# Patient Record
Sex: FEMALE | Race: BLACK OR AFRICAN AMERICAN | Employment: OTHER | URBAN - METROPOLITAN AREA
[De-identification: names, ages, dates, MRNs, and addresses within clinical notes are randomized per-mention and may not be internally consistent; named-entity substitution may affect disease eponyms.]

---

## 2017-01-10 ENCOUNTER — OFFICE VISIT (OUTPATIENT)
Dept: FAMILY MEDICINE CLINIC | Age: 62
End: 2017-01-10

## 2017-01-10 VITALS
SYSTOLIC BLOOD PRESSURE: 128 MMHG | HEART RATE: 83 BPM | BODY MASS INDEX: 30.9 KG/M2 | OXYGEN SATURATION: 98 % | HEIGHT: 64 IN | WEIGHT: 181 LBS | DIASTOLIC BLOOD PRESSURE: 74 MMHG | TEMPERATURE: 98.4 F | RESPIRATION RATE: 16 BRPM

## 2017-01-10 DIAGNOSIS — I10 ESSENTIAL HYPERTENSION: Primary | ICD-10-CM

## 2017-01-10 DIAGNOSIS — F32.5 MAJOR DEPRESSIVE DISORDER WITH SINGLE EPISODE, IN FULL REMISSION (HCC): ICD-10-CM

## 2017-01-10 DIAGNOSIS — Z23 ENCOUNTER FOR IMMUNIZATION: ICD-10-CM

## 2017-01-10 DIAGNOSIS — R23.2 HOT FLASHES: ICD-10-CM

## 2017-01-10 DIAGNOSIS — J30.2 SEASONAL ALLERGIC RHINITIS, UNSPECIFIED ALLERGIC RHINITIS TRIGGER: ICD-10-CM

## 2017-01-10 RX ORDER — PAROXETINE HYDROCHLORIDE 20 MG/1
20 TABLET, FILM COATED ORAL DAILY
Qty: 30 TAB | Refills: 0 | Status: SHIPPED | OUTPATIENT
Start: 2017-01-10 | End: 2017-02-23 | Stop reason: SDUPTHER

## 2017-01-10 RX ORDER — MONTELUKAST SODIUM 10 MG/1
10 TABLET ORAL DAILY
Qty: 30 TAB | Refills: 2 | Status: SHIPPED | OUTPATIENT
Start: 2017-01-10 | End: 2019-11-08

## 2017-01-10 RX ORDER — TRIAMCINOLONE ACETONIDE 0.25 MG/ML
LOTION TOPICAL 2 TIMES DAILY
Qty: 60 ML | Refills: 1 | Status: SHIPPED | OUTPATIENT
Start: 2017-01-10 | End: 2017-03-23 | Stop reason: SDUPTHER

## 2017-01-10 NOTE — PROGRESS NOTES
Louvella Goldmann, 62 y.o.,  female    SUBJECTIVE  Routine ff-up    HTN-compliant with meds  Hot flashes/depression- says paxil somewhat helpful, helps with mood as well  H/o chronic intermittent low back pain, known lumbar radiculopathy, followed by dr. Randy Vega  Request refill of triamcinolone she uses for intermittent hives, related to heat/sweating. Says this has been her worst allergic rhinitis winter season, she uses veramyst nasal spray, singulair and antihistamine, but still with constant post nasal drip, sore throat, rhinitis, at times with bloody tinged nasal discharge. Her eye doctor gave her patanol drops as well. She is interested in seeing allergist at this point for possible immunotherapy. ROS:  See HPI, all others negative        Patient Active Problem List   Diagnosis Code    Hypertension I10    Depression F32.9    Lumbar radiculopathy M54.16    Herpes genitalis in women A60.09    Allergic rhinitis due to allergen J30.9    Hot flashes R23.2    Advance directive discussed with patient Z71.89       Current Outpatient Prescriptions   Medication Sig Dispense Refill    fluticasone (VERAMYST) 27.5 mcg/actuation nasal spray 2 Sprays by Nasal route daily. 1 Bottle 5    PARoxetine (PAXIL) 20 mg tablet Take 1 Tab by mouth daily. 30 Tab 0    triamcinolone acetonide 0.025 % lotion Apply  to affected area two (2) times a day. 60 mL 1    montelukast (SINGULAIR) 10 mg tablet Take 1 Tab by mouth daily. 30 Tab 2    dextromethorphan-guaiFENesin  mg/15 mL liqd Take 10 mL by mouth three (3) times daily as needed. 1 Bottle 0    ibuprofen (MOTRIN) 200 mg tablet       calcium citrate-vitamin d2 250-100 mg-unit per tablet       prochlorperazine (COMPAZINE) 5 mg tablet Take 1 Tab by mouth every eight (8) hours as needed for Nausea. 10 Tab 0    lisinopril-hydrochlorothiazide (PRINZIDE, ZESTORETIC) 20-25 mg per tablet Take 1 Tab by mouth daily.  90 Tab 1    gabapentin (NEURONTIN) 300 mg capsule Take 1 tab in the morning, 1 tab in the afternoon, and 2 tabs in the evening. 120 Cap 2    acetaminophen-codeine (TYLENOL-CODEINE #3) 300-30 mg per tablet Take 1 Tab by mouth two (2) times daily as needed for Pain. Max Daily Amount: 2 Tabs. 60 Tab 0    valACYclovir (VALTREX) 500 mg tablet Take 1 Tab by mouth two (2) times a day. 6 Tab 5    PATADAY 0.2 % drop ophthalmic solution       aspirin delayed-release 81 mg tablet Take 81 mg by mouth daily.  cyclobenzaprine (FLEXERIL) 10 mg tablet Take 1 Tab by mouth nightly. 12 Tab 0    meloxicam (MOBIC) 15 mg tablet Take 15 mg by mouth daily.  nabumetone (RELAFEN) 500 mg tablet Take 1 Tab by mouth two (2) times daily as needed for Pain. 60 Tab 1    CALCIUM CARBONATE (CALCIUM 600 PO) Take  by mouth daily.  multivitamin (ONE A DAY) tablet Take 1 Tab by mouth daily. No Known Allergies    Past Medical History   Diagnosis Date    Arthritis     Bulging lumbar disc     Degenerative arthritis of finger      MP joint right index finger    Depression     H/O colonoscopy 12/2014     recommended yearly hemoccult stools    Hypertension     Low back pain     Lower extremity pain, left     Pinched nerve        Social History     Social History    Marital status:      Spouse name: N/A    Number of children: N/A    Years of education: N/A     Occupational History    Not on file.      Social History Main Topics    Smoking status: Never Smoker    Smokeless tobacco: Never Used    Alcohol use No    Drug use: No    Sexual activity: Not Currently     Other Topics Concern    Not on file     Social History Narrative       Family History   Problem Relation Age of Onset    Cancer Mother     Ovarian Cancer Mother 68    Stroke Father          OBJECTIVE    Physical Exam:     Visit Vitals    /74 (BP 1 Location: Left arm, BP Patient Position: Sitting)    Pulse 83    Temp 98.4 °F (36.9 °C) (Oral)    Resp 16    Ht 5' 4\" (1.626 m)    Wt 181 lb (82.1 kg)    SpO2 98%    BMI 31.07 kg/m2       General: alert, well-appearing, in no apparent distress or pain  HEENT: boggy nasal mucosa, throat, pharynx normal  CVS: normal rate, regular rhythm, distinct S1 and S2  Lungs:clear to ausculation bilaterally, no crackles, wheezing or rhonchi noted  Abdomen: normoactive bowel sounds, soft, non-tender  Skin: warm, no lesions, rashes noted  Psych:  mood and affect normal        ASSESSMENT/PLAN  Nish Lopez was seen today for hypertension, depression, leg pain and hand pain. Diagnoses and all orders for this visit:  Nish Lopez was seen today for hives, annual wellness visit, itchy eye and pressure behind the eyes. Diagnoses and all orders for this visit:    Essential hypertension, hypertension with unspecified goal  controlled  Orders:in 3 months  -     LIPID PANEL; Future  -     METABOLIC PANEL, COMPREHENSIVE; Future    Major depressive disorder with single episode, in full remission ([facility]  Stable, cont paxil    Hot flashes    Allergic rhinitis  Worsening  Cont veramyst, singulair, zyrtec  Refer to allergist    Encounter for vaccine  fluarix today. Follow-up Disposition:  Return in about 3 months (around 4/10/2017), or if symptoms worsen or fail to improve. Patient understands plan of care. Patient has provided input and agrees with goals.

## 2017-01-10 NOTE — MR AVS SNAPSHOT
Visit Information Date & Time Provider Department Dept. Phone Encounter #  
 1/10/2017 10:30 AM Kiana Moses, [address] [id number] Follow-up Instructions Return in about 3 months (around 4/10/2017), or if symptoms worsen or fail to improve. Upcoming Health Maintenance Date Due  
 PAP AKA CERVICAL CYTOLOGY 10/9/2017 BREAST CANCER SCRN MAMMOGRAM 12/9/2018 COLONOSCOPY 11/1/2019 DTaP/Tdap/Td series (2 - Td) 7/10/2025 Allergies as of 1/10/2017  Review Complete On: 1/10/2017 By: Kiana Moses MD  
 No Known Allergies Current Immunizations  Reviewed on 11/4/2015 Name Date Influenza Vaccine (Quad) PF 1/10/2017, 11/4/2015 Tdap 7/10/2015 Not reviewed this visit You Were Diagnosed With   
  
 Codes Comments Essential hypertension    -  Primary ICD-10-CM: I10 
ICD-9-CM: 401.9 Encounter for immunization     ICD-10-CM: L55 ICD-9-CM: V03.89 Hot flashes     ICD-10-CM: R23.2 ICD-9-CM: 782.62 Seasonal allergic rhinitis, unspecified allergic rhinitis trigger     ICD-10-CM: J30.2 ICD-9-CM: 477.9 Major depressive disorder with single episode, in full remission ([facility]     ICD-10-CM: F32.5 ICD-9-CM: 296.26 Vitals BP Pulse Temp Resp Height(growth percentile) Weight(growth percentile) 128/74 (BP 1 Location: Left arm, BP Patient Position: Sitting) 83 98.4 °F (36.9 °C) (Oral) 16 5' 4\" (1.626 m) 181 lb (82.1 kg) SpO2 BMI OB Status Smoking Status 98% 31.07 kg/m2 Hysterectomy Never Smoker BMI and BSA Data Body Mass Index Body Surface Area 31.07 kg/m 2 1.93 m 2 Preferred Pharmacy Pharmacy Name Phone [facility] [address], [address] Your Updated Medication List  
  
   
This list is accurate as of: 1/10/17 10:55 AM.  Always use your most recent med list.  
  
  
  
  
 acetaminophen-codeine 300-30 mg per tablet Commonly known as:  TYLENOL-CODEINE #3 Take 1 Tab by mouth two (2) times daily as needed for Pain. Max Daily Amount: 2 Tabs. aspirin delayed-release 81 mg tablet Take 81 mg by mouth daily. CALCIUM 600 PO Take  by mouth daily. calcium citrate-vitamin d2 250-100 mg-unit per tablet  
  
 cyclobenzaprine 10 mg tablet Commonly known as:  FLEXERIL Take 1 Tab by mouth nightly. dextromethorphan-guaiFENesin  mg/15 mL Liqd Take 10 mL by mouth three (3) times daily as needed. fluticasone 27.5 mcg/actuation nasal spray Commonly known as:  VERAMYST  
2 Sprays by Nasal route daily. gabapentin 300 mg capsule Commonly known as:  NEURONTIN Take 1 tab in the morning, 1 tab in the afternoon, and 2 tabs in the evening. ibuprofen 200 mg tablet Commonly known as:  MOTRIN  
  
 lisinopril-hydroCHLOROthiazide 20-25 mg per tablet Commonly known as:  Bam Posey Take 1 Tab by mouth daily. meloxicam 15 mg tablet Commonly known as:  MOBIC Take 15 mg by mouth daily. montelukast 10 mg tablet Commonly known as:  SINGULAIR Take 1 Tab by mouth daily. multivitamin tablet Commonly known as:  ONE A DAY Take 1 Tab by mouth daily. nabumetone 500 mg tablet Commonly known as:  RELAFEN Take 1 Tab by mouth two (2) times daily as needed for Pain. PARoxetine 20 mg tablet Commonly known as:  PAXIL Take 1 Tab by mouth daily. PATADAY 0.2 % Drop ophthalmic solution Generic drug:  olopatadine  
  
 prochlorperazine 5 mg tablet Commonly known as:  COMPAZINE Take 1 Tab by mouth every eight (8) hours as needed for Nausea. triamcinolone acetonide 0.025 % lotion Apply  to affected area two (2) times a day. valACYclovir 500 mg tablet Commonly known as:  VALTREX Take 1 Tab by mouth two (2) times a day. Prescriptions Sent to Pharmacy Refills fluticasone (VERAMYST) 27.5 mcg/actuation nasal spray 5 Si Sprays by Nasal route daily. Class: Normal  
 Pharmacy: [place] [id number] Ph #: [phone] Route: Nasal  
 PARoxetine (PAXIL) 20 mg tablet 0 Sig: Take 1 Tab by mouth daily. Class: Normal  
 Pharmacy: [place] [address] Ph #: [phone] Route: Oral  
 triamcinolone acetonide 0.025 % lotion 1 Sig: Apply  to affected area two (2) times a day. Class: Normal  
 Pharmacy: [place] [address] Ph #: [phone] Route: Topical  
 montelukast (SINGULAIR) 10 mg tablet 2 Sig: Take 1 Tab by mouth daily. Class: Normal  
 Pharmacy: [place] [address] Ph #: [phone] Route: Oral  
  
We Performed the Following INFLUENZA VIRUS VAC QUAD,SPLIT,PRESV FREE SYRINGE 3/> YRS IM [id number] CPT(R)] REFERRAL TO ALLERGY [REF5 Custom] Comments:  
 Please evaluate patient for allergic rhinitis Follow-up Instructions Return in about 3 months (around 4/10/2017), or if symptoms worsen or fail to improve. To-Do List   
 01/10/2017 Lab:  LIPID PANEL   
  
 01/10/2017 Lab:  METABOLIC PANEL, COMPREHENSIVE Referral Information Referral ID Referred By Referred To  
  
 [id number] Kadie TEE Not Available Visits Status Start Date End Date 1 New Request 1/10/17 1/10/18 If your referral has a status of pending review or denied, additional information will be sent to support the outcome of this decision. Patient Instructions DASH Diet: Care Instructions Your Care Instructions The DASH diet is an eating plan that can help lower your blood pressure. DASH stands for Dietary Approaches to Stop Hypertension. Hypertension is high blood pressure.  
The DASH diet focuses on eating foods that are high in calcium, potassium, and magnesium. These nutrients can lower blood pressure. The foods that are highest in these nutrients are fruits, vegetables, low-fat dairy products, nuts, seeds, and legumes. But taking calcium, potassium, and magnesium supplements instead of eating foods that are high in those nutrients does not have the same effect. The DASH diet also includes whole grains, fish, and poultry. The DASH diet is one of several lifestyle changes your doctor may recommend to lower your high blood pressure. Your doctor may also want you to decrease the amount of sodium in your diet. Lowering sodium while following the DASH diet can lower blood pressure even further than just the DASH diet alone. Follow-up care is a key part of your treatment and safety. Be sure to make and go to all appointments, and call your doctor if you are having problems. It's also a good idea to know your test results and keep a list of the medicines you take. How can you care for yourself at home? Following the DASH diet · Eat 4 to 5 servings of fruit each day. A serving is 1 medium-sized piece of fruit, ½ cup chopped or canned fruit, 1/4 cup dried fruit, or 4 ounces (½ cup) of fruit juice. Choose fruit more often than fruit juice. · Eat 4 to 5 servings of vegetables each day. A serving is 1 cup of lettuce or raw leafy vegetables, ½ cup of chopped or cooked vegetables, or 4 ounces (½ cup) of vegetable juice. Choose vegetables more often than vegetable juice. · Get 2 to 3 servings of low-fat and fat-free dairy each day. A serving is 8 ounces of milk, 1 cup of yogurt, or 1 ½ ounces of cheese. · Eat 6 to 8 servings of grains each day. A serving is 1 slice of bread, 1 ounce of dry cereal, or ½ cup of cooked rice, pasta, or cooked cereal. Try to choose whole-grain products as much as possible. · Limit lean meat, poultry, and fish to 2 servings each day. A serving is 3 ounces, about the size of a deck of cards. · Eat 4 to 5 servings of nuts, seeds, and legumes (cooked dried beans, lentils, and split peas) each week. A serving is 1/3 cup of nuts, 2 tablespoons of seeds, or ½ cup of cooked beans or peas. · Limit fats and oils to 2 to 3 servings each day. A serving is 1 teaspoon of vegetable oil or 2 tablespoons of salad dressing. · Limit sweets and added sugars to 5 servings or less a week. A serving is 1 tablespoon jelly or jam, ½ cup sorbet, or 1 cup of lemonade. · Eat less than 2,300 milligrams (mg) of sodium a day. If you limit your sodium to 1,500 mg a day, you can lower your blood pressure even more. Tips for success · Start small. Do not try to make dramatic changes to your diet all at once. You might feel that you are missing out on your favorite foods and then be more likely to not follow the plan. Make small changes, and stick with them. Once those changes become habit, add a few more changes. · Try some of the following: ¨ Make it a goal to eat a fruit or vegetable at every meal and at snacks. This will make it easy to get the recommended amount of fruits and vegetables each day. ¨ Try yogurt topped with fruit and nuts for a snack or healthy dessert. ¨ Add lettuce, tomato, cucumber, and onion to sandwiches. ¨ Combine a ready-made pizza crust with low-fat mozzarella cheese and lots of vegetable toppings. Try using tomatoes, squash, spinach, broccoli, carrots, cauliflower, and onions. ¨ Have a variety of cut-up vegetables with a low-fat dip as an appetizer instead of chips and dip. ¨ Sprinkle sunflower seeds or chopped almonds over salads. Or try adding chopped walnuts or almonds to cooked vegetables. ¨ Try some vegetarian meals using beans and peas. Add garbanzo or kidney beans to salads. Make burritos and tacos with mashed mccracken beans or black beans. Where can you learn more? Go to http://christine-katlin.info/.  
Enter H351 in the search box to learn more about \"DASH Diet: Care Instructions. \" Current as of: March 23, 2016 Content Version: 11.1 © 1187-9136 [employer]. Care instructions adapted under license by [employer] (which disclaims liability or warranty for this information). If you have questions about a medical condition or this instruction, always ask your healthcare professional. [address] any warranty or liability for your use of this information. Introducing [facility]! Alyssa Dougherty introduces [employer] patient portal. Now you can access parts of your medical record, email your doctor's office, and request medication refills online. 1. In your internet browser, go to [email or web address] 2. Click on the First Time User? Click Here link in the Sign In box. You will see the New Member Sign Up page. 3. Enter your [employer] Access Code exactly as it appears below. You will not need to use this code after youve completed the sign-up process. If you do not sign up before the expiration date, you must request a new code. · All[employer] Access Code: [email or web address] Expires: 4/10/2017 10:55 AM 
 
4. Enter the last four digits of your Social Security Number (xxxx) and Date of Birth (mm/dd/yyyy) as indicated and click Submit. You will be taken to the next sign-up page. 5. Create a AllFreed ID. This will be your AllFreed login ID and cannot be changed, so think of one that is secure and easy to remember. 6. Create a [employer]Freed password. You can change your password at any time. 7. Enter your Password Reset Question and Answer. This can be used at a later time if you forget your password. 8. Enter your e-mail address. You will receive e-mail notification when new information is available in [address] 9. Click Sign Up. You can now view and download portions of your medical record. 10. Click the Download Summary menu link to download a portable copy of your medical information. If you have questions, please visit the Frequently Asked Questions section of the [employer] website. Remember, [employer] is NOT to be used for urgent needs. For medical emergencies, dial 911. Now available from your iPhone and Android! Please provide this summary of care documentation to your next provider. Your primary care clinician is listed as Ziyad Cameron. If you have any questions after today's visit, please call [phone].

## 2017-01-10 NOTE — PATIENT INSTRUCTIONS

## 2017-01-10 NOTE — PROGRESS NOTES
Chief Complaint   Patient presents with    Hypertension    Depression    Other     Hot Flashes    Nasal Congestion     with bloody discharge     Patient presents for flu vaccine. Consent obtained, Tolerated procedure well at right deltoid. Patient remained in office 10 minutes post vaccine. No side effects noted.      Lot #  [id number]  exp 06/30/2017  [employer]  [address]: [phone]

## 2017-02-16 ENCOUNTER — OFFICE VISIT (OUTPATIENT)
Dept: ORTHOPEDIC SURGERY | Age: 62
End: 2017-02-16

## 2017-02-16 VITALS
BODY MASS INDEX: 30.19 KG/M2 | HEART RATE: 76 BPM | DIASTOLIC BLOOD PRESSURE: 82 MMHG | TEMPERATURE: 98.4 F | RESPIRATION RATE: 18 BRPM | SYSTOLIC BLOOD PRESSURE: 147 MMHG | HEIGHT: 64 IN | OXYGEN SATURATION: 99 % | WEIGHT: 176.8 LBS

## 2017-02-16 DIAGNOSIS — M48.061 LUMBAR STENOSIS: ICD-10-CM

## 2017-02-16 DIAGNOSIS — M47.819 FACET ARTHROPATHY: Primary | ICD-10-CM

## 2017-02-16 DIAGNOSIS — M54.16 LUMBAR RADICULOPATHY: ICD-10-CM

## 2017-02-16 DIAGNOSIS — M47.899 FACET SYNDROME: ICD-10-CM

## 2017-02-16 RX ORDER — NAPROXEN 500 MG/1
500 TABLET ORAL 2 TIMES DAILY WITH MEALS
Qty: 60 TAB | Refills: 1 | Status: SHIPPED | OUTPATIENT
Start: 2017-02-16 | End: 2017-04-27 | Stop reason: SDUPTHER

## 2017-02-16 RX ORDER — GABAPENTIN 300 MG/1
CAPSULE ORAL
Qty: 90 CAP | Refills: 2 | Status: SHIPPED | OUTPATIENT
Start: 2017-02-16 | End: 2017-10-26 | Stop reason: ALTCHOICE

## 2017-02-16 NOTE — PROGRESS NOTES
Chief complaint/History of Present Illness:  Chief Complaint   Patient presents with    Back Pain     3 month follow up     HPI  Jerri Saunders is a  62 y.o.  female      HISTORY OF PRESENT ILLNESS:  The patient comes in today for followup of her chronic low back pain with radiating left buttock pain and posterior leg pain down to her knee. She has known facet arthropathy at L4-5 and L5-S1 and foraminal stenosis, especially at L5-S1. She states the Tylenol No. 3 she was taking previously makes her nauseous, and she has stopped taking that. The Neurontin 300 mg one in the morning, one in the afternoon, and two at night, makes her sleepy, but it does help with the leg pain. She is trying to walk for exercise about a block a day, but she finds it difficult. She states Aleve does help. She is on Social Security disability for her back since 2012 or 2013. She is a nonsmoker. She denies fever or bowel or bladder dysfunction. PHYSICAL EXAM:  Ms. Oracio Ochoa is a 29-year-old female. She is alert and oriented. She has normal mood and affect. She has a full weight bearing, non-antalgic gait, 4/5 strength of the bilateral lower extremities, and negative straight leg raise. ASSESSENT/PLAN:  This is a patient with facet arthropathy and spinal stenosis. We did talk about doing facet injections. She would like to think about that. We are going to try her Naproxen 500 mg twice a day since Aleve does help her. We have refilled her Neurontin. She will stop the Tylenol No. 3.  We will see her back in about two months.         Review of systems:    Past Medical History   Diagnosis Date    Arthritis     Bulging lumbar disc     Degenerative arthritis of finger      MP joint right index finger    Depression     H/O colonoscopy 12/2014     recommended yearly hemoccult stools    Hypertension     Low back pain     Lower extremity pain, left     Pinched nerve      Past Surgical History   Procedure Laterality Date    Hx carpal tunnel release Right      hand    Hx orthopaedic      Hx hysterectomy       Social History     Social History    Marital status:      Spouse name: N/A    Number of children: N/A    Years of education: N/A     Occupational History    Not on file. Social History Main Topics    Smoking status: Never Smoker    Smokeless tobacco: Never Used    Alcohol use No    Drug use: No    Sexual activity: Not Currently     Other Topics Concern    Not on file     Social History Narrative     Family History   Problem Relation Age of Onset    Cancer Mother     Ovarian Cancer Mother 68    Stroke Father        Physical Exam:  Visit Vitals    /82    Pulse 76    Temp 98.4 °F (36.9 °C) (Oral)    Resp 18    Ht 5' 4\" (1.626 m)    Wt 176 lb 12.8 oz (80.2 kg)    SpO2 99%    BMI 30.35 kg/m2     Pain Scale: 6/10     has been . reviewed and is appropriate      Cy Joel was seen today for back pain. Diagnoses and all orders for this visit:    Facet arthropathy ([address]    Lumbar stenosis    Lumbar radiculopathy  -     gabapentin (NEURONTIN) 300 mg capsule; 3 tabs po q hs    Facet syndrome  -     gabapentin (NEURONTIN) 300 mg capsule; 3 tabs po q hs    Other orders  -     naproxen (NAPROSYN) 500 mg tablet; Take 1 Tab by mouth two (2) times daily (with meals). Follow-up Disposition:  Return in about 2 months (around 4/16/2017) for with [address] [place] [address].         We have informed Susi Greco to notify us for immediate appointment if she has any worsening neurogical symptoms or if an emergency situation presents, then call 911

## 2017-02-16 NOTE — PATIENT INSTRUCTIONS
Back Pain: Care Instructions  Your Care Instructions    Back pain has many possible causes. It is often related to problems with muscles and ligaments of the back. It may also be related to problems with the nerves, discs, or bones of the back. Moving, lifting, standing, sitting, or sleeping in an awkward way can strain the back. Sometimes you don't notice the injury until later. Arthritis is another common cause of back pain. Although it may hurt a lot, back pain usually improves on its own within several weeks. Most people recover in 12 weeks or less. Using good home treatment and being careful not to stress your back can help you feel better sooner. Follow-up care is a key part of your treatment and safety. Be sure to make and go to all appointments, and call your doctor if you are having problems. Its also a good idea to know your test results and keep a list of the medicines you take. How can you care for yourself at home? · Sit or lie in positions that are most comfortable and reduce your pain. Try one of these positions when you lie down:  ¨ Lie on your back with your knees bent and supported by large pillows. ¨ Lie on the floor with your legs on the seat of a sofa or chair. Mona Dumont on your side with your knees and hips bent and a pillow between your legs. ¨ Lie on your stomach if it does not make pain worse. · Do not sit up in bed, and avoid soft couches and twisted positions. Bed rest can help relieve pain at first, but it delays healing. Avoid bed rest after the first day of back pain. · Change positions every 30 minutes. If you must sit for long periods of time, take breaks from sitting. Get up and walk around, or lie in a comfortable position. · Try using a heating pad on a low or medium setting for 15 to 20 minutes every 2 or 3 hours. Try a warm shower in place of one session with the heating pad. · You can also try an ice pack for 10 to 15 minutes every 2 to 3 hours.  Put a thin cloth between the ice pack and your skin. · Take pain medicines exactly as directed. ¨ If the doctor gave you a prescription medicine for pain, take it as prescribed. ¨ If you are not taking a prescription pain medicine, ask your doctor if you can take an over-the-counter medicine. · Take short walks several times a day. You can start with 5 to 10 minutes, 3 or 4 times a day, and work up to longer walks. Walk on level surfaces and avoid hills and stairs until your back is better. · Return to work and other activities as soon as you can. Continued rest without activity is usually not good for your back. · To prevent future back pain, do exercises to stretch and strengthen your back and stomach. Learn how to use good posture, safe lifting techniques, and proper body mechanics. When should you call for help? Call your doctor now or seek immediate medical care if:  · You have new or worsening numbness in your legs. · You have new or worsening weakness in your legs. (This could make it hard to stand up.)  · You lose control of your bladder or bowels. Watch closely for changes in your health, and be sure to contact your doctor if:  · Your pain gets worse. · You are not getting better after 2 weeks. Where can you learn more? Go to http://christine-katlin.info/. Enter R946 in the search box to learn more about \"Back Pain: Care Instructions. \"  Current as of: May 23, 2016  Content Version: 11.1  © 4907-5323 [employer]wise, Incorporated. Care instructions adapted under license by "[employer] (which disclaims liability or warranty for this information). If you have questions about a medical condition or this instruction, always ask your healthcare professional. [address] any warranty or liability for your use of this information.

## 2017-02-16 NOTE — MR AVS SNAPSHOT
Visit Information Date & Time Provider Department Dept. Phone Encounter #  
 2/16/2017 10:00 AM Lizbet Hurd NP [address] [facility] [phone] [id number] Follow-up Instructions Return in about 2 months (around 4/16/2017) for with Gilbert Olson. Routing History Follow-up and Disposition History Your Appointments 4/17/2017  9:30 AM  
ROUTINE CARE with Holly Cheatham MD  
[facility] ([address]) Appt Note: 3 month followup [address] [facility] [address]  
[address] [facility] [address] [facility] [address] Upcoming Health Maintenance Date Due  
 PAP AKA CERVICAL CYTOLOGY 10/9/2017 BREAST CANCER SCRN MAMMOGRAM 12/9/2018 COLONOSCOPY 11/1/2019 DTaP/Tdap/Td series (2 - Td) 7/10/2025 Allergies as of 2/16/2017  Review Complete On: 2/16/2017 By: Lizbet Hurd NP No Known Allergies Current Immunizations  Reviewed on 11/4/2015 Name Date Influenza Vaccine (Quad) PF 1/10/2017, 11/4/2015 Tdap 7/10/2015 Not reviewed this visit You Were Diagnosed With   
  
 Codes Comments Facet arthropathy ([facility]    -  Primary ICD-10-CM: M12.88 ICD-9-CM: 721.90 Lumbar stenosis     ICD-10-CM: M48.06 
ICD-9-CM: 724.02 Lumbar radiculopathy     ICD-10-CM: M54.16 
ICD-9-CM: 724.4 Facet syndrome     ICD-10-CM: M12.88 ICD-9-CM: 724.8 Vitals BP Pulse Temp Resp Height(growth percentile) Weight(growth percentile) 147/82 76 98.4 °F (36.9 °C) (Oral) 18 5' 4\" (1.626 m) 176 lb 12.8 oz (80.2 kg) SpO2 BMI OB Status Smoking Status 99% 30.35 kg/m2 Hysterectomy Never Smoker BMI and BSA Data Body Mass Index Body Surface Area  
 30.35 kg/m 2 1.9 m 2 Preferred Pharmacy Pharmacy Name Phone [facility] [address], [address] [facility] Your Updated Medication List  
  
   
 This list is accurate as of: 2/16/17 11:05 AM.  Always use your most recent med list.  
  
  
  
  
 acetaminophen-codeine 300-30 mg per tablet Commonly known as:  TYLENOL-CODEINE #3 Take 1 Tab by mouth two (2) times daily as needed for Pain. Max Daily Amount: 2 Tabs. aspirin delayed-release 81 mg tablet Take 81 mg by mouth daily. CALCIUM 600 PO Take  by mouth daily. calcium citrate-vitamin d2 250-100 mg-unit per tablet  
  
 cyclobenzaprine 10 mg tablet Commonly known as:  FLEXERIL Take 1 Tab by mouth nightly. dextromethorphan-guaiFENesin  mg/15 mL Liqd Take 10 mL by mouth three (3) times daily as needed. fluticasone 27.5 mcg/actuation nasal spray Commonly known as:  VERAMYST  
2 Sprays by Nasal route daily. gabapentin 300 mg capsule Commonly known as:  NEURONTIN  
3 tabs po q hs  
  
 ibuprofen 200 mg tablet Commonly known as:  MOTRIN  
  
 lisinopril-hydroCHLOROthiazide 20-25 mg per tablet Commonly known as:  Claudia Schilder Take 1 Tab by mouth daily. meloxicam 15 mg tablet Commonly known as:  MOBIC Take 15 mg by mouth daily. montelukast 10 mg tablet Commonly known as:  SINGULAIR Take 1 Tab by mouth daily. multivitamin tablet Commonly known as:  ONE A DAY Take 1 Tab by mouth daily. nabumetone 500 mg tablet Commonly known as:  RELAFEN Take 1 Tab by mouth two (2) times daily as needed for Pain. naproxen 500 mg tablet Commonly known as:  NAPROSYN Take 1 Tab by mouth two (2) times daily (with meals). PARoxetine 20 mg tablet Commonly known as:  PAXIL Take 1 Tab by mouth daily. PATADAY 0.2 % Drop ophthalmic solution Generic drug:  olopatadine  
  
 prochlorperazine 5 mg tablet Commonly known as:  COMPAZINE Take 1 Tab by mouth every eight (8) hours as needed for Nausea. triamcinolone acetonide 0.025 % lotion Apply  to affected area two (2) times a day. valACYclovir 500 mg tablet Commonly known as:  VALTREX Take 1 Tab by mouth two (2) times a day. Prescriptions Sent to Pharmacy Refills  
 naproxen (NAPROSYN) 500 mg tablet 1 Sig: Take 1 Tab by mouth two (2) times daily (with meals). Class: Normal  
 Pharmacy: [id number] Ph #: [phone] Route: Oral  
 gabapentin (NEURONTIN) 300 mg capsule 2 Sig: 3 tabs po q hs Class: Normal  
 Pharmacy: [id number] Ph #: [phone] Follow-up Instructions Return in about 2 months (around 4/16/2017) for with Fernando Gilman. Patient Instructions Back Pain: Care Instructions Your Care Instructions Back pain has many possible causes. It is often related to problems with muscles and ligaments of the back. It may also be related to problems with the nerves, discs, or bones of the back. Moving, lifting, standing, sitting, or sleeping in an awkward way can strain the back. Sometimes you don't notice the injury until later. Arthritis is another common cause of back pain. Although it may hurt a lot, back pain usually improves on its own within several weeks. Most people recover in 12 weeks or less. Using good home treatment and being careful not to stress your back can help you feel better sooner. Follow-up care is a key part of your treatment and safety. Be sure to make and go to all appointments, and call your doctor if you are having problems. Its also a good idea to know your test results and keep a list of the medicines you take. How can you care for yourself at home? · Sit or lie in positions that are most comfortable and reduce your pain. Try one of these positions when you lie down: ¨ Lie on your back with your knees bent and supported by large pillows. ¨ Lie on the floor with your legs on the seat of a sofa or chair. [place] [address] on your side with your knees and hips bent and a pillow between your legs. ¨ Lie on your stomach if it does not make pain worse. · Do not sit up in bed, and avoid soft couches and twisted positions. Bed rest can help relieve pain at first, but it delays healing. Avoid bed rest after the first day of back pain. · Change positions every 30 minutes. If you must sit for long periods of time, take breaks from sitting. Get up and walk around, or lie in a comfortable position. · Try using a heating pad on a low or medium setting for 15 to 20 minutes every 2 or 3 hours. Try a warm shower in place of one session with the heating pad. · You can also try an ice pack for 10 to 15 minutes every 2 to 3 hours. Put a thin cloth between the ice pack and your skin. · Take pain medicines exactly as directed. ¨ If the doctor gave you a prescription medicine for pain, take it as prescribed. ¨ If you are not taking a prescription pain medicine, ask your doctor if you can take an over-the-counter medicine. · Take short walks several times a day. You can start with 5 to 10 minutes, 3 or 4 times a day, and work up to longer walks. Walk on level surfaces and avoid hills and stairs until your back is better. · Return to work and other activities as soon as you can. Continued rest without activity is usually not good for your back. · To prevent future back pain, do exercises to stretch and strengthen your back and stomach. Learn how to use good posture, safe lifting techniques, and proper body mechanics. When should you call for help? Call your doctor now or seek immediate medical care if: 
· You have new or worsening numbness in your legs. · You have new or worsening weakness in your legs. (This could make it hard to stand up.) · You lose control of your bladder or bowels. Watch closely for changes in your health, and be sure to contact your doctor if: 
· Your pain gets worse. · You are not getting better after 2 weeks. Where can you learn more? Go to http://christine-katlin.info/. Enter U970 in the search box to learn more about \"Back Pain: Care Instructions. \" Current as of: May 23, 2016 Content Version: 11.1 © 3316-3707 [employer]. Care instructions adapted under license by [employer] (which disclaims liability or warranty for this information). If you have questions about a medical condition or this instruction, always ask your healthcare professional. [address] any warranty or liability for your use of this information. Patient Instructions History Introducing [facility]! Amelia Gorman introduces InsideMaps patient portal. Now you can access parts of your medical record, email your doctor's office, and request medication refills online. 1. In your internet browser, go to [email or web address] 2. Click on the First Time User? Click Here link in the Sign In box. You will see the New Member Sign Up page. 3. Enter your InsideMaps Access Code exactly as it appears below. You will not need to use this code after youve completed the sign-up process. If you do not sign up before the expiration date, you must request a new code. · InsideMaps Access Code: [email or web address] Expires: 4/10/2017 10:55 AM 
 
4. Enter the last four digits of your Social Security Number (xxxx) and Date of Birth (mm/dd/yyyy) as indicated and click Submit. You will be taken to the next sign-up page. 5. Create a [employer] ID. This will be your InsideMaps login ID and cannot be changed, so think of one that is secure and easy to remember. 6. Create a InsideMaps password. You can change your password at any time. 7. Enter your Password Reset Question and Answer. This can be used at a later time if you forget your password. 8. Enter your e-mail address.  You will receive e-mail notification when new information is available in Vortal. 9. Click Sign Up. You can now view and download portions of your medical record. 10. Click the Download Summary menu link to download a portable copy of your medical information. If you have questions, please visit the Frequently Asked Questions section of the Vortal website. Remember, Vortal is NOT to be used for urgent needs. For medical emergencies, dial 911. Now available from your iPhone and Android! Please provide this summary of care documentation to your next provider. Your primary care clinician is listed as Kiana Moses. If you have any questions after today's visit, please call [phone].

## 2017-02-17 ENCOUNTER — TELEPHONE (OUTPATIENT)
Dept: FAMILY MEDICINE CLINIC | Age: 62
End: 2017-02-17

## 2017-02-17 NOTE — TELEPHONE ENCOUNTER
[facility] approved [id number]  Start date 02/16/2017  End date 02/16/2018

## 2017-02-17 NOTE — TELEPHONE ENCOUNTER
Patient is requesting (New  Updated) referral to the following office:    Speciality: Orthopedics  Specialist Name: Dr. Radha Tolentino Location: [facility] [address] [place] [address], [address] [facility]  Phone (if available): [phone]  Fax (if available): [phone]  Diagnosis: Back pain  Date of appointment (if scheduled): 2/16/17

## 2017-02-20 ENCOUNTER — HOSPITAL ENCOUNTER (OUTPATIENT)
Dept: LAB | Age: 62
Discharge: HOME | End: 2017-02-20

## 2017-02-20 PROCEDURE — 99001 SPECIMEN HANDLING PT-LAB: CPT | Performed by: FAMILY MEDICINE

## 2017-02-21 LAB
ALBUMIN SERPL-MCNC: 3.8 G/DL (ref 3.6–4.8)
ALBUMIN/GLOB SERPL: 1.5 {RATIO} (ref 1.1–2.5)
ALP SERPL-CCNC: 59 IU/L (ref 39–117)
ALT SERPL-CCNC: 17 IU/L (ref 0–32)
AST SERPL-CCNC: 19 IU/L (ref 0–40)
BILIRUB SERPL-MCNC: 0.6 MG/DL (ref 0–1.2)
BUN SERPL-MCNC: 12 MG/DL (ref 8–27)
BUN/CREAT SERPL: 21 (ref 11–26)
CALCIUM SERPL-MCNC: 9.2 MG/DL (ref 8.7–10.3)
CHLORIDE SERPL-SCNC: 100 MMOL/L (ref 96–106)
CHOLEST SERPL-MCNC: 186 MG/DL (ref 100–199)
CO2 SERPL-SCNC: 26 MMOL/L (ref 18–29)
CREAT SERPL-MCNC: 0.57 MG/DL (ref 0.57–1)
GLOBULIN SER CALC-MCNC: 2.6 G/DL (ref 1.5–4.5)
GLUCOSE SERPL-MCNC: 92 MG/DL (ref 65–99)
HDLC SERPL-MCNC: 76 MG/DL
INTERPRETATION, 910389: NORMAL
LDLC SERPL CALC-MCNC: 93 MG/DL (ref 0–99)
POTASSIUM SERPL-SCNC: 4.1 MMOL/L (ref 3.5–5.2)
PROT SERPL-MCNC: 6.4 G/DL (ref 6–8.5)
SODIUM SERPL-SCNC: 140 MMOL/L (ref 134–144)
TRIGL SERPL-MCNC: 83 MG/DL (ref 0–149)
VLDLC SERPL CALC-MCNC: 17 MG/DL (ref 5–40)

## 2017-02-24 RX ORDER — PAROXETINE HYDROCHLORIDE 20 MG/1
20 TABLET, FILM COATED ORAL DAILY
Qty: 30 TAB | Refills: 2 | Status: SHIPPED | OUTPATIENT
Start: 2017-02-24 | End: 2017-06-07 | Stop reason: SDUPTHER

## 2017-03-01 NOTE — PROGRESS NOTES
Essentially normal labs, cholesterol good range. Will discuss in detail in next appt 4/17. pls notify pt.   (Stevie CV risk 7%)

## 2017-03-01 NOTE — PROGRESS NOTES
Patient identified with 2 identifiers (name and ).   Patient aware of normal labs and reminded of appt 2017

## 2017-03-23 RX ORDER — TRIAMCINOLONE ACETONIDE 0.25 MG/ML
LOTION TOPICAL 2 TIMES DAILY
Qty: 60 ML | Refills: 1 | Status: SHIPPED | OUTPATIENT
Start: 2017-03-23 | End: 2017-06-15 | Stop reason: SDUPTHER

## 2017-03-23 RX ORDER — LISINOPRIL AND HYDROCHLOROTHIAZIDE 20; 25 MG/1; MG/1
1 TABLET ORAL DAILY
Qty: 90 TAB | Refills: 1 | Status: SHIPPED | OUTPATIENT
Start: 2017-03-23 | End: 2017-11-03 | Stop reason: SDUPTHER

## 2017-04-17 ENCOUNTER — OFFICE VISIT (OUTPATIENT)
Dept: FAMILY MEDICINE CLINIC | Age: 62
End: 2017-04-17

## 2017-04-17 VITALS
OXYGEN SATURATION: 96 % | DIASTOLIC BLOOD PRESSURE: 78 MMHG | HEIGHT: 64 IN | TEMPERATURE: 98.4 F | HEART RATE: 84 BPM | BODY MASS INDEX: 30.9 KG/M2 | WEIGHT: 181 LBS | SYSTOLIC BLOOD PRESSURE: 130 MMHG | RESPIRATION RATE: 16 BRPM

## 2017-04-17 DIAGNOSIS — G57.01 PIRIFORMIS SYNDROME OF RIGHT SIDE: ICD-10-CM

## 2017-04-17 DIAGNOSIS — J30.2 SEASONAL ALLERGIC RHINITIS, UNSPECIFIED ALLERGIC RHINITIS TRIGGER: ICD-10-CM

## 2017-04-17 DIAGNOSIS — R23.2 HOT FLASHES: ICD-10-CM

## 2017-04-17 DIAGNOSIS — I10 ESSENTIAL HYPERTENSION: Primary | ICD-10-CM

## 2017-04-17 DIAGNOSIS — F32.5 MAJOR DEPRESSIVE DISORDER WITH SINGLE EPISODE, IN FULL REMISSION (HCC): ICD-10-CM

## 2017-04-17 RX ORDER — CYCLOBENZAPRINE HCL 10 MG
10 TABLET ORAL
Qty: 12 TAB | Refills: 0 | Status: SHIPPED | OUTPATIENT
Start: 2017-04-17 | End: 2018-04-16

## 2017-04-17 NOTE — MR AVS SNAPSHOT
Visit Information Date & Time Provider Department Dept. Phone Encounter #  
 4/17/2017  9:30 AM Eva Feliciano, [address] [id number] Follow-up Instructions Return in about 2 months (around 6/17/2017), or plan for Medicare wellness then. Your Appointments 4/27/2017 10:00 AM  
Follow Up with Yonatan Sousa NP  
[facility] ([facility]) Appt Note: 2 mo f/u low back [address] [facility] [id number] [phone]  
  
   
 [address] [facility] [address] Upcoming Health Maintenance Date Due  
 PAP AKA CERVICAL CYTOLOGY 10/9/2017 BREAST CANCER SCRN MAMMOGRAM 12/9/2018 COLONOSCOPY 11/1/2019 DTaP/Tdap/Td series (2 - Td) 7/10/2025 Allergies as of 4/17/2017  Review Complete On: 4/17/2017 By: Eva Feliciano MD  
 No Known Allergies Current Immunizations  Reviewed on 11/4/2015 Name Date Influenza Vaccine (Quad) PF 1/10/2017, 11/4/2015 Tdap 7/10/2015 Not reviewed this visit You Were Diagnosed With   
  
 Codes Comments Essential hypertension    -  Primary ICD-10-CM: I10 
ICD-9-CM: 401.9 Major depressive disorder with single episode, in full remission ([address]     ICD-10-CM: F32.5 ICD-9-CM: 296.26 Hot flashes     ICD-10-CM: R23.2 ICD-9-CM: 782.62 Seasonal allergic rhinitis, unspecified allergic rhinitis trigger     ICD-10-CM: J30.2 ICD-9-CM: 477.9 Piriformis syndrome of right side     ICD-10-CM: G57.01 
ICD-9-CM: 355.0 Vitals BP Pulse Temp Resp Height(growth percentile) Weight(growth percentile) 130/78 (BP 1 Location: Left arm, BP Patient Position: Sitting) 84 98.4 °F (36.9 °C) (Oral) 16 5' 4\" (1.626 m) 181 lb (82.1 kg) SpO2 BMI OB Status Smoking Status 96% 31.07 kg/m2 Hysterectomy Never Smoker BMI and BSA Data Body Mass Index Body Surface Area 31.07 kg/m 2 1.93 m 2 Preferred Pharmacy Pharmacy Name Phone [facility] [address], [address] [facility] Your Updated Medication List  
  
   
This list is accurate as of: 4/17/17 10:02 AM.  Always use your most recent med list.  
  
  
  
  
 aspirin delayed-release 81 mg tablet Take 81 mg by mouth daily. CALCIUM 600 PO Take  by mouth daily. calcium citrate-vitamin d2 250-100 mg-unit per tablet  
  
 cyclobenzaprine 10 mg tablet Commonly known as:  FLEXERIL Take 1 Tab by mouth nightly. fluticasone 27.5 mcg/actuation nasal spray Commonly known as:  VERAMYST  
2 Sprays by Nasal route daily. gabapentin 300 mg capsule Commonly known as:  NEURONTIN  
3 tabs po q hs  
  
 lisinopril-hydroCHLOROthiazide 20-25 mg per tablet Commonly known as:  Selwyn Phalen Take 1 Tab by mouth daily. montelukast 10 mg tablet Commonly known as:  SINGULAIR Take 1 Tab by mouth daily. multivitamin tablet Commonly known as:  ONE A DAY Take 1 Tab by mouth daily. naproxen 500 mg tablet Commonly known as:  NAPROSYN Take 1 Tab by mouth two (2) times daily (with meals). PARoxetine 20 mg tablet Commonly known as:  PAXIL Take 1 Tab by mouth daily. PATADAY 0.2 % Drop ophthalmic solution Generic drug:  olopatadine  
  
 triamcinolone acetonide 0.025 % lotion Apply  to affected area two (2) times a day. valACYclovir 500 mg tablet Commonly known as:  VALTREX Take 1 Tab by mouth two (2) times a day. Prescriptions Sent to Pharmacy Refills  
 cyclobenzaprine (FLEXERIL) 10 mg tablet 0 Sig: Take 1 Tab by mouth nightly. Class: Normal  
 Pharmacy: [facility] [address] Ph #: [phone] Route: Oral  
  
We Performed the Following REFERRAL TO ALLERGY [REF5 Custom] Follow-up Instructions Return in about 2 months (around 6/17/2017), or plan for Medicare wellness then. Referral Information Referral ID Referred By Referred To  
  
 [id number] Julia TEE Not Available Visits Status Start Date End Date 1 New Request 4/17/17 4/17/18 If your referral has a status of pending review or denied, additional information will be sent to support the outcome of this decision. Patient Instructions Piriformis Syndrome: Care Instructions Your Care Instructions The piriformis muscle is deep under your rear end (buttock). One end of the muscle connects deep inside the pelvic area, and the other end attaches to the top of the thighbone. This muscle can press on the sciatic nerve that runs from your spine down your leg. When this happens, you may have pain, numbness, and tingling in the buttock and down the back of your leg. This is called piriformis syndrome. The pain may get worse when you sit for a long time or climb stairs. Also, you may be more likely to develop piriformis syndrome if you run or walk often. Your doctor will check for other causes of your pain before treating this syndrome. Treatment may include stretching exercises, massage, and medicine for the pain and swelling. If these do not help, you may get a shot of steroid medicine. Until the pain is gone, you may need to rest the muscle and limit activities like running. Exercises and a change in how you move and sit may be enough to stop the pressure on the nerve. Follow-up care is a key part of your treatment and safety. Be sure to make and go to all appointments, and call your doctor if you are having problems. It's also a good idea to know your test results and keep a list of the medicines you take. How can you care for yourself at home? · If your doctor thinks that strenuous exercise is causing your problem, stop or cut back on activities such as running.  You may find swimming to be a good exercise for a while. · Stretch the piriformis muscle. Carlos Barroso on your back. ¨ Bend one leg at the knee and keep the other leg flat on the ground. ¨ Raise your bent knee up and then move it across your body. Hold the outside of the knee with the opposite hand. ¨ Gently pull the knee with your hand toward the opposite shoulder. ¨ Hold the stretch for at least 15 to 30 seconds. Switch legs. ¨ Do the stretch several times each day. · Massage the muscle to relieve pressure. ¨ Sit on the floor. Lean to one side so that the hip on your sore side is off the ground. Put a tennis ball under your buttock on that side. ¨ As you put weight onto the tennis ball, you may find spots that are especially sore. Move gently so that the tennis ball gently massages each of the sore spots. · Use ice or heat to help reduce pain. Put ice or a cold pack or a heating pad set on low or a warm cloth on the sore area for 10 to 20 minutes at a time. Put a thin cloth between the ice pack or heating pad and your skin. · Ask your doctor if you can take an over-the-counter pain medicine, such as acetaminophen (Tylenol), ibuprofen (Advil, Motrin), or naproxen (Aleve). Be safe with medicines. Read and follow all instructions on the label. · Have your doctor or a physical therapist watch how you move. You may need physical therapy or special inserts in your shoes (orthotics) to help you move in a way that does not put pressure on your nerves. When should you call for help? Watch closely for changes in your health, and be sure to contact your doctor if: 
· You do not feel better after several weeks of home care. · Your pain gets worse. · Your leg becomes weak or numb. Where can you learn more? Go to http://christine-katlin.info/. Enter B126 in the search box to learn more about \"Piriformis Syndrome: Care Instructions. \" Current as of: May 23, 2016 Content Version: 11.2 © 7568-7287 Healthwise, Incorporated. Care instructions adapted under license by [employer] (which disclaims liability or warranty for this information). If you have questions about a medical condition or this instruction, always ask your healthcare professional. [address] any warranty or liability for your use of this information. Introducing [facility]! [facility] introduces [employer] patient portal. Now you can access parts of your medical record, email your doctor's office, and request medication refills online. 1. In your internet browser, go to [email or web address] 2. Click on the First Time User? Click Here link in the Sign In box. You will see the New Member Sign Up page. 3. Enter your [employer] Access Code exactly as it appears below. You will not need to use this code after youve completed the sign-up process. If you do not sign up before the expiration date, you must request a new code. · [employer] Access Code: [email or web address] Expires: 7/16/2017 10:02 AM 
 
4. Enter the last four digits of your Social Security Number (xxxx) and Date of Birth (mm/dd/yyyy) as indicated and click Submit. You will be taken to the next sign-up page. 5. Create a [employer] ID. This will be your [employer] login ID and cannot be changed, so think of one that is secure and easy to remember. 6. Create a [employer] password. You can change your password at any time. 7. Enter your Password Reset Question and Answer. This can be used at a later time if you forget your password. 8. Enter your e-mail address. You will receive e-mail notification when new information is available in [address] 9. Click Sign Up. You can now view and download portions of your medical record. 10. Click the Download Summary menu link to download a portable copy of your medical information.  
 
If you have questions, please visit the Frequently Asked Questions section of the [employer]. Remember, [employer] is NOT to be used for urgent needs. For medical emergencies, dial 911. Now available from your iPhone and Android! Please provide this summary of care documentation to your next provider. Your primary care clinician is listed as Akash Lujan. If you have any questions after today's visit, please call [phone].

## 2017-04-17 NOTE — PATIENT INSTRUCTIONS
Piriformis Syndrome: Care Instructions  Your Care Instructions    The piriformis muscle is deep under your rear end (buttock). One end of the muscle connects deep inside the pelvic area, and the other end attaches to the top of the thighbone. This muscle can press on the sciatic nerve that runs from your spine down your leg. When this happens, you may have pain, numbness, and tingling in the buttock and down the back of your leg. This is called piriformis syndrome. The pain may get worse when you sit for a long time or climb stairs. Also, you may be more likely to develop piriformis syndrome if you run or walk often. Your doctor will check for other causes of your pain before treating this syndrome. Treatment may include stretching exercises, massage, and medicine for the pain and swelling. If these do not help, you may get a shot of steroid medicine. Until the pain is gone, you may need to rest the muscle and limit activities like running. Exercises and a change in how you move and sit may be enough to stop the pressure on the nerve. Follow-up care is a key part of your treatment and safety. Be sure to make and go to all appointments, and call your doctor if you are having problems. It's also a good idea to know your test results and keep a list of the medicines you take. How can you care for yourself at home? · If your doctor thinks that strenuous exercise is causing your problem, stop or cut back on activities such as running. You may find swimming to be a good exercise for a while. · Stretch the piriformis muscle. Frank Neighbor on your back. ¨ Bend one leg at the knee and keep the other leg flat on the ground. ¨ Raise your bent knee up and then move it across your body. Hold the outside of the knee with the opposite hand. ¨ Gently pull the knee with your hand toward the opposite shoulder. ¨ Hold the stretch for at least 15 to 30 seconds. Switch legs. ¨ Do the stretch several times each day.   · Massage the muscle to relieve pressure. ¨ Sit on the floor. Lean to one side so that the hip on your sore side is off the ground. Put a tennis ball under your buttock on that side. ¨ As you put weight onto the tennis ball, you may find spots that are especially sore. Move gently so that the tennis ball gently massages each of the sore spots. · Use ice or heat to help reduce pain. Put ice or a cold pack or a heating pad set on low or a warm cloth on the sore area for 10 to 20 minutes at a time. Put a thin cloth between the ice pack or heating pad and your skin. · Ask your doctor if you can take an over-the-counter pain medicine, such as acetaminophen (Tylenol), ibuprofen (Advil, Motrin), or naproxen (Aleve). Be safe with medicines. Read and follow all instructions on the label. · Have your doctor or a physical therapist watch how you move. You may need physical therapy or special inserts in your shoes (orthotics) to help you move in a way that does not put pressure on your nerves. When should you call for help? Watch closely for changes in your health, and be sure to contact your doctor if:  · You do not feel better after several weeks of home care. · Your pain gets worse. · Your leg becomes weak or numb. Where can you learn more? Go to http://christine-katlin.info/. Enter F111 in the search box to learn more about \"Piriformis Syndrome: Care Instructions. \"  Current as of: May 23, 2016  Content Version: 11.2  © 7134-7871 [employer]. Care instructions adapted under license by [employer] (which disclaims liability or warranty for this information). If you have questions about a medical condition or this instruction, always ask your healthcare professional. [address] any warranty or liability for your use of this information.

## 2017-04-17 NOTE — PROGRESS NOTES
Yamilka Sewell, 62 y.o.,  female    SUBJECTIVE  Routine ff-up    HTN-compliant with meds, reviewed labs. Hot flashes/depression- says paxil somewhat helpful, helps with mood as well  H/o chronic intermittent low back pain, known lumbar radiculopathy, followed by dr. Victor Manuel Cannon    Did not see allergist for rhinitis says insurance did not cover for dr. Meghan Reardon. She says symptoms have improved of zyrtec and wants to hold off for now    C/o R sided buttock pain, worse with sitting past 2 days. No trauma, weakness, paresthesia. ROS:  See HPI, all others negative        Patient Active Problem List   Diagnosis Code    Hypertension I10    Depression F32.9    Lumbar radiculopathy M54.16    Herpes genitalis in women A60.09    Allergic rhinitis due to allergen J30.9    Hot flashes R23.2    Advance directive discussed with patient Z70.80    Facet arthropathy ([facility] [address] M12.88    Lumbar stenosis M48.06       Current Outpatient Prescriptions   Medication Sig Dispense Refill    cyclobenzaprine (FLEXERIL) 10 mg tablet Take 1 Tab by mouth nightly. 12 Tab 0    triamcinolone acetonide 0.025 % lotion Apply  to affected area two (2) times a day. 60 mL 1    lisinopril-hydroCHLOROthiazide (PRINZIDE, ZESTORETIC) 20-25 mg per tablet Take 1 Tab by mouth daily. 90 Tab 1    PARoxetine (PAXIL) 20 mg tablet Take 1 Tab by mouth daily. 30 Tab 2    naproxen (NAPROSYN) 500 mg tablet Take 1 Tab by mouth two (2) times daily (with meals). 60 Tab 1    gabapentin (NEURONTIN) 300 mg capsule 3 tabs po q hs 90 Cap 2    fluticasone (VERAMYST) 27.5 mcg/actuation nasal spray 2 Sprays by Nasal route daily. 1 Bottle 5    montelukast (SINGULAIR) 10 mg tablet Take 1 Tab by mouth daily. 30 Tab 2    calcium citrate-vitamin d2 250-100 mg-unit per tablet       valACYclovir (VALTREX) 500 mg tablet Take 1 Tab by mouth two (2) times a day.  6 Tab 5    PATADAY 0.2 % drop ophthalmic solution       aspirin delayed-release 81 mg tablet Take 81 mg by mouth daily.  CALCIUM CARBONATE (CALCIUM 600 PO) Take  by mouth daily.  multivitamin (ONE A DAY) tablet Take 1 Tab by mouth daily. No Known Allergies    Past Medical History:   Diagnosis Date    Arthritis     Bulging lumbar disc     Degenerative arthritis of finger     MP joint right index finger    Depression     H/O colonoscopy 12/2014    recommended yearly hemoccult stools    Hypertension     Low back pain     Lower extremity pain, left     Pinched nerve        Social History     Social History    Marital status:      Spouse name: N/A    Number of children: N/A    Years of education: N/A     Occupational History    Not on file. Social History Main Topics    Smoking status: Never Smoker    Smokeless tobacco: Never Used    Alcohol use No    Drug use: No    Sexual activity: Not Currently     Other Topics Concern    Not on file     Social History Narrative       Family History   Problem Relation Age of Onset    Cancer Mother     Ovarian Cancer Mother 68    Stroke Father          OBJECTIVE    Physical Exam:     Visit Vitals    /78 (BP 1 Location: Left arm, BP Patient Position: Sitting)    Pulse 84    Temp 98.4 °F (36.9 °C) (Oral)    Resp 16    Ht 5' 4\" (1.626 m)    Wt 181 lb (82.1 kg)    SpO2 96%    BMI 31.07 kg/m2       General: alert, well-appearing, in no apparent distress or pain  HEENT: boggy nasal mucosa, throat, pharynx normal  CVS: normal rate, regular rhythm, distinct S1 and S2  Lungs:clear to ausculation bilaterally, no crackles, wheezing or rhonchi noted  Abdomen: normoactive bowel sounds, soft, non-tender  Back: + R piriformis tenderness, neg SLR.  Motor, sensation , dtrs intact  Skin: warm, no lesions, rashes noted  Psych:  mood and affect normal    Results for orders placed or performed in visit on 00/60/78   METABOLIC PANEL, COMPREHENSIVE   Result Value Ref Range    Glucose 92 65 - 99 mg/dL    BUN 12 8 - 27 mg/dL    Creatinine 0.57 0.57 - 1.00 mg/dL    GFR est non- >59 mL/min/1.73    GFR est  >59 mL/min/1.73    BUN/Creatinine ratio 21 11 - 26    Sodium 140 134 - 144 mmol/L    Potassium 4.1 3.5 - 5.2 mmol/L    Chloride 100 96 - 106 mmol/L    CO2 26 18 - 29 mmol/L    Calcium 9.2 8.7 - 10.3 mg/dL    Protein, total 6.4 6.0 - 8.5 g/dL    Albumin 3.8 3.6 - 4.8 g/dL    GLOBULIN, TOTAL 2.6 1.5 - 4.5 g/dL    A-G Ratio 1.5 1.1 - 2.5    Bilirubin, total 0.6 0.0 - 1.2 mg/dL    Alk. phosphatase 59 39 - 117 IU/L    AST (SGOT) 19 0 - 40 IU/L    ALT (SGPT) 17 0 - 32 IU/L   LIPID PANEL   Result Value Ref Range    Cholesterol, total 186 100 - 199 mg/dL    Triglyceride 83 0 - 149 mg/dL    HDL Cholesterol 76 >39 mg/dL    VLDL, calculated 17 5 - 40 mg/dL    LDL, calculated 93 0 - 99 mg/dL   CVD REPORT   Result Value Ref Range    INTERPRETATION Note          ASSESSMENT/PLAN  Genevieve Hendricks was seen today for hypertension, depression, leg pain and hand pain. Diagnoses and all orders for this visit:  Genevieve Hendricks was seen today for hives, annual wellness visit, itchy eye and pressure behind the eyes. Diagnoses and all orders for this visit:    Essential hypertension, hypertension with unspecified goal  Controlled, cont prinzide  Calc cv risk 4.4% vs 3.1% for age. Statin not indicated    Major depressive disorder with single episode, in full remission ([facility] [address]  Stable, cont paxil    Hot flashes    Allergic rhinitis  improved  Cont veramyst, singulair, zyrtec  Hold off on allergist referral    Piriformis muscle pain  Flexeril, prn naprosyn  HEP provided     request shingles vaccine record from [facility]    Follow-up Disposition:  Return in about 2 months (around 6/17/2017), or plan for Medicare wellness then. Patient understands plan of care. Patient has provided input and agrees with goals.

## 2017-04-17 NOTE — PROGRESS NOTES
Chief Complaint   Patient presents with    Hypertension    Depression    Results     1. Have you been to the ER, urgent care clinic since your last visit? Hospitalized since your last visit? No    2. Have you seen or consulted any other health care providers outside of the [facility] since your last visit? Include any pap smears or colon screening.  No

## 2017-04-27 ENCOUNTER — OFFICE VISIT (OUTPATIENT)
Dept: ORTHOPEDIC SURGERY | Age: 62
End: 2017-04-27

## 2017-04-27 VITALS
DIASTOLIC BLOOD PRESSURE: 81 MMHG | HEIGHT: 64 IN | OXYGEN SATURATION: 99 % | HEART RATE: 91 BPM | RESPIRATION RATE: 18 BRPM | WEIGHT: 180.4 LBS | TEMPERATURE: 98.5 F | BODY MASS INDEX: 30.8 KG/M2 | SYSTOLIC BLOOD PRESSURE: 146 MMHG

## 2017-04-27 DIAGNOSIS — M47.819 FACET ARTHROPATHY: ICD-10-CM

## 2017-04-27 DIAGNOSIS — M48.061 LUMBAR STENOSIS: Primary | ICD-10-CM

## 2017-04-27 RX ORDER — NAPROXEN 500 MG/1
500 TABLET ORAL 2 TIMES DAILY WITH MEALS
Qty: 60 TAB | Refills: 5 | Status: SHIPPED | OUTPATIENT
Start: 2017-04-27 | End: 2017-10-26 | Stop reason: SDUPTHER

## 2017-04-27 NOTE — MR AVS SNAPSHOT
Visit Information Date & Time Provider Department Dept. Phone Encounter #  
 4/27/2017 10:00 AM Nunu Boles NP [address] [facility] [phone] [id number] Follow-up Instructions Return in about 6 months (around 10/27/2017) for with . Your Appointments 6/19/2017  9:00 AM  
Office Visit with Kacy Gutierrez MD  
[address] [facility] ([address]) Appt Note: AWV  and 2mo f/u  
 [address] [facility] [address]  
[address] [facility] [address] [facility] [address] Upcoming Health Maintenance Date Due  
 PAP AKA CERVICAL CYTOLOGY 10/9/2017 BREAST CANCER SCRN MAMMOGRAM 12/9/2018 COLONOSCOPY 11/1/2019 DTaP/Tdap/Td series (2 - Td) 7/10/2025 Allergies as of 4/27/2017  Review Complete On: 4/27/2017 By: Nunu Boles NP No Known Allergies Current Immunizations  Reviewed on 11/4/2015 Name Date Influenza Vaccine (Quad) PF 1/10/2017, 11/4/2015 Tdap 7/10/2015 Not reviewed this visit You Were Diagnosed With   
  
 Codes Comments Lumbar stenosis    -  Primary ICD-10-CM: M48.06 
ICD-9-CM: 724.02 Facet arthropathy ([facility] [address]     ICD-10-CM: M12.88 ICD-9-CM: 721.90 Vitals BP Pulse Temp Resp Height(growth percentile) Weight(growth percentile) 146/81 91 98.5 °F (36.9 °C) (Oral) 18 5' 4\" (1.626 m) 180 lb 6.4 oz (81.8 kg) SpO2 BMI OB Status Smoking Status 99% 30.97 kg/m2 Hysterectomy Never Smoker BMI and BSA Data Body Mass Index Body Surface Area 30.97 kg/m 2 1.92 m 2 Preferred Pharmacy Pharmacy Name Phone [facility] [address], [address] Your Updated Medication List  
  
   
This list is accurate as of: 4/27/17 10:43 AM.  Always use your most recent med list.  
  
  
  
  
 aspirin delayed-release 81 mg tablet Take 81 mg by mouth daily. CALCIUM 600 PO Take  by mouth daily. calcium citrate-vitamin d2 250-100 mg-unit per tablet  
  
 cyclobenzaprine 10 mg tablet Commonly known as:  FLEXERIL Take 1 Tab by mouth nightly. fluticasone 27.5 mcg/actuation nasal spray Commonly known as:  VERAMYST  
2 Sprays by Nasal route daily. gabapentin 300 mg capsule Commonly known as:  NEURONTIN  
3 tabs po q hs  
  
 lisinopril-hydroCHLOROthiazide 20-25 mg per tablet Commonly known as:  Trenna Pi Take 1 Tab by mouth daily. montelukast 10 mg tablet Commonly known as:  SINGULAIR Take 1 Tab by mouth daily. multivitamin tablet Commonly known as:  ONE A DAY Take 1 Tab by mouth daily. naproxen 500 mg tablet Commonly known as:  NAPROSYN Take 1 Tab by mouth two (2) times daily (with meals). PARoxetine 20 mg tablet Commonly known as:  PAXIL Take 1 Tab by mouth daily. PATADAY 0.2 % Drop ophthalmic solution Generic drug:  olopatadine  
  
 triamcinolone acetonide 0.025 % lotion Apply  to affected area two (2) times a day. valACYclovir 500 mg tablet Commonly known as:  VALTREX Take 1 Tab by mouth two (2) times a day. Follow-up Instructions Return in about 6 months (around 10/27/2017) for with . Patient Instructions Mattersight Activation Thank you for requesting access to Mattersight. Please follow the instructions below to securely access and download your online medical record. Mattersight allows you to send messages to your doctor, view your test results, renew your prescriptions, schedule appointments, and more. How Do I Sign Up? 1. In your internet browser, go to [email or web address] 
2. Click on the First Time User? Click Here link in the Sign In box. You will be redirect to the New Member Sign Up page. 3. Enter your Mattersight Access Code exactly as it appears below.  You will not need to use this code after youve completed the sign-up process. If you do not sign up before the expiration date, you must request a new code. Healthcare Bluebook Access Code: [email or web address] Expires: 2017 10:02 AM (This is the date your Healthcare Bluebook access code will ) 4. Enter the last four digits of your Social Security Number (xxxx) and Date of Birth (mm/dd/yyyy) as indicated and click Submit. You will be taken to the next sign-up page. 5. Create a Healthcare Bluebook ID. This will be your Healthcare Bluebook login ID and cannot be changed, so think of one that is secure and easy to remember. 6. Create a Healthcare Bluebook password. You can change your password at any time. 7. Enter your Password Reset Question and Answer. This can be used at a later time if you forget your password. 8. Enter your e-mail address. You will receive e-mail notification when new information is available in [address] 9. Click Sign Up. You can now view and download portions of your medical record. 10. Click the Download Summary menu link to download a portable copy of your medical information. Additional Information If you have questions, please visit the Frequently Asked Questions section of the Healthcare Bluebook website at [email or web address] Remember, Healthcare Bluebook is NOT to be used for urgent needs. For medical emergencies, dial 911. Back Pain: Care Instructions Your Care Instructions Back pain has many possible causes. It is often related to problems with muscles and ligaments of the back. It may also be related to problems with the nerves, discs, or bones of the back. Moving, lifting, standing, sitting, or sleeping in an awkward way can strain the back. Sometimes you don't notice the injury until later. Arthritis is another common cause of back pain. Although it may hurt a lot, back pain usually improves on its own within several weeks. Most people recover in 12 weeks or less.  Using good home treatment and being careful not to stress your back can help you feel better sooner. Follow-up care is a key part of your treatment and safety. Be sure to make and go to all appointments, and call your doctor if you are having problems. Its also a good idea to know your test results and keep a list of the medicines you take. How can you care for yourself at home? · Sit or lie in positions that are most comfortable and reduce your pain. Try one of these positions when you lie down: ¨ Lie on your back with your knees bent and supported by large pillows. ¨ Lie on the floor with your legs on the seat of a sofa or chair. Nusrat Donath on your side with your knees and hips bent and a pillow between your legs. ¨ Lie on your stomach if it does not make pain worse. · Do not sit up in bed, and avoid soft couches and twisted positions. Bed rest can help relieve pain at first, but it delays healing. Avoid bed rest after the first day of back pain. · Change positions every 30 minutes. If you must sit for long periods of time, take breaks from sitting. Get up and walk around, or lie in a comfortable position. · Try using a heating pad on a low or medium setting for 15 to 20 minutes every 2 or 3 hours. Try a warm shower in place of one session with the heating pad. · You can also try an ice pack for 10 to 15 minutes every 2 to 3 hours. Put a thin cloth between the ice pack and your skin. · Take pain medicines exactly as directed. ¨ If the doctor gave you a prescription medicine for pain, take it as prescribed. ¨ If you are not taking a prescription pain medicine, ask your doctor if you can take an over-the-counter medicine. · Take short walks several times a day. You can start with 5 to 10 minutes, 3 or 4 times a day, and work up to longer walks. Walk on level surfaces and avoid hills and stairs until your back is better. · Return to work and other activities as soon as you can.  Continued rest without activity is usually not good for your back. · To prevent future back pain, do exercises to stretch and strengthen your back and stomach. Learn how to use good posture, safe lifting techniques, and proper body mechanics. When should you call for help? Call your doctor now or seek immediate medical care if: 
· You have new or worsening numbness in your legs. · You have new or worsening weakness in your legs. (This could make it hard to stand up.) · You lose control of your bladder or bowels. Watch closely for changes in your health, and be sure to contact your doctor if: 
· Your pain gets worse. · You are not getting better after 2 weeks. Where can you learn more? Go to http://christine-katlin.info/. Enter G321 in the search box to learn more about \"Back Pain: Care Instructions. \" Current as of: May 23, 2016 Content Version: 11.2 © 5169-7031 [employer]. Care instructions adapted under license by UrbanIndo (which disclaims liability or warranty for this information). If you have questions about a medical condition or this instruction, always ask your healthcare professional. [address] any warranty or liability for your use of this information. Introducing [facility]! Cameron White introduces QuesCom patient portal. Now you can access parts of your medical record, email your doctor's office, and request medication refills online. 1. In your internet browser, go to [email or web address] 2. Click on the First Time User? Click Here link in the Sign In box. You will see the New Member Sign Up page. 3. Enter your QuesCom Access Code exactly as it appears below. You will not need to use this code after youve completed the sign-up process. If you do not sign up before the expiration date, you must request a new code. · QuesCom Access Code: [email or web address] Expires: 7/16/2017 10:02 AM 
 
 4. Enter the last four digits of your Social Security Number (xxxx) and Date of Birth (mm/dd/yyyy) as indicated and click Submit. You will be taken to the next sign-up page. 5. Create a AlphaStripe ID. This will be your AlphaStripe login ID and cannot be changed, so think of one that is secure and easy to remember. 6. Create a AlphaStripe password. You can change your password at any time. 7. Enter your Password Reset Question and Answer. This can be used at a later time if you forget your password. 8. Enter your e-mail address. You will receive e-mail notification when new information is available in [address] 9. Click Sign Up. You can now view and download portions of your medical record. 10. Click the Download Summary menu link to download a portable copy of your medical information. If you have questions, please visit the Frequently Asked Questions section of the AlphaStripe website. Remember, AlphaStripe is NOT to be used for urgent needs. For medical emergencies, dial 911. Now available from your iPhone and Android! Please provide this summary of care documentation to your next provider. Your primary care clinician is listed as Andres Valdovinos. If you have any questions after today's visit, please call [phone].

## 2017-04-27 NOTE — PROGRESS NOTES
Chief complaint/History of Present Illness:  Chief Complaint   Patient presents with    Back Pain     2 month follow up     HPI  Eden Frias is a  65 y.o.  female      HISTORY OF PRESENT ILLNESS:  The patient comes in today for followup of her chronic low back pain with radiating left lower extremity pain. She rates her pain as a 10/10 with a flare and with activity, but today, she is rating it as a 5/10. When she lies down, that does help her back and it will go down to a 0/10. She does that one to two times a day. She is doing exercises and stretching four to five times a week during the flare, but now she is doing it twice a week. She has considered the facet injections we discussed previously. She denies fever or bowel or bladder dysfunction. She is taking Neurontin 300 mg in the morning and at lunch, and two at night, which is 600 mg. She is taking Naproxen 500 mg twice a day without any side effects. Her PCP did start her on some Flexeril, which helps some. She is on Social Security disability. She is a nonsmoker. PHYSICAL EXAM:  Ms. Sofia Coleman is a 78-year-old female. She is alert and oriented. She has a full weight bearing, non-antalgic gait with 5/5 strength of the bilateral lower extremities and negative straight leg raise. ASSESSENT/PLAN:  This is a patient with lumbar facet arthropathy and stenosis. She wants to continue her current conservative management with Naproxen and Neurontin. We have refilled the Naproxen. She will call us when she needs the Neurontin. We will see her back in six months with Dr. Edgar Fowler for M.D. followup.         Review of systems:    Past Medical History:   Diagnosis Date    Arthritis     Bulging lumbar disc     Degenerative arthritis of finger     MP joint right index finger    Depression     H/O colonoscopy 12/2014    recommended yearly hemoccult stools    Hypertension     Low back pain     Lower extremity pain, left     Pinched nerve      Past Surgical History:   Procedure Laterality Date    HX CARPAL TUNNEL RELEASE Right     hand    HX HYSTERECTOMY      HX ORTHOPAEDIC       Social History     Social History    Marital status:      Spouse name: N/A    Number of children: N/A    Years of education: N/A     Occupational History    Not on file. Social History Main Topics    Smoking status: Never Smoker    Smokeless tobacco: Never Used    Alcohol use No    Drug use: No    Sexual activity: Not Currently     Other Topics Concern    Not on file     Social History Narrative     Family History   Problem Relation Age of Onset    Cancer Mother     Ovarian Cancer Mother 68    Stroke Father        Physical Exam:  Visit Vitals    /81    Pulse 91    Temp 98.5 °F (36.9 °C) (Oral)    Resp 18    Ht 5' 4\" (1.626 m)    Wt 180 lb 6.4 oz (81.8 kg)    SpO2 99%    BMI 30.97 kg/m2     Pain Scale: 5/10     has been . reviewed and is appropriate          Elo Bella was seen today for back pain. Diagnoses and all orders for this visit:    Lumbar stenosis    Facet arthropathy ([address]    Other orders  -     naproxen (NAPROSYN) 500 mg tablet; Take 1 Tab by mouth two (2) times daily (with meals). Follow-up Disposition:  Return in about 6 months (around 10/27/2017) for with Dr Marianna Swann.         We have informed Maria Guadalupe Monteiro to notify us for immediate appointment if she has any worsening neurogical symptoms or if an emergency situation presents, then call 911

## 2017-04-27 NOTE — PATIENT INSTRUCTIONS
HealthyRoad Activation    Thank you for requesting access to HealthyRoad. Please follow the instructions below to securely access and download your online medical record. HealthyRoad allows you to send messages to your doctor, view your test results, renew your prescriptions, schedule appointments, and more. How Do I Sign Up? 1. In your internet browser, go to [email or web address]  2. Click on the First Time User? Click Here link in the Sign In box. You will be redirect to the New Member Sign Up page. 3. Enter your HealthyRoad Access Code exactly as it appears below. You will not need to use this code after youve completed the sign-up process. If you do not sign up before the expiration date, you must request a new code. HealthyRoad Access Code: [email or web address]  Expires: 2017 10:02 AM (This is the date your HealthyRoad access code will )    4. Enter the last four digits of your Social Security Number (xxxx) and Date of Birth (mm/dd/yyyy) as indicated and click Submit. You will be taken to the next sign-up page. 5. Create a HealthyRoad ID. This will be your HealthyRoad login ID and cannot be changed, so think of one that is secure and easy to remember. 6. Create a HealthyRoad password. You can change your password at any time. 7. Enter your Password Reset Question and Answer. This can be used at a later time if you forget your password. 8. Enter your e-mail address. You will receive e-mail notification when new information is available in [address] 9. Click Sign Up. You can now view and download portions of your medical record. 10. Click the Download Summary menu link to download a portable copy of your medical information. Additional Information    If you have questions, please visit the Frequently Asked Questions section of the HealthyRoad website at [email or web address]/. Remember, HealthyRoad is NOT to be used for urgent needs. For medical emergencies, dial 911.          Back Pain: Care Instructions  Your Care Instructions    Back pain has many possible causes. It is often related to problems with muscles and ligaments of the back. It may also be related to problems with the nerves, discs, or bones of the back. Moving, lifting, standing, sitting, or sleeping in an awkward way can strain the back. Sometimes you don't notice the injury until later. Arthritis is another common cause of back pain. Although it may hurt a lot, back pain usually improves on its own within several weeks. Most people recover in 12 weeks or less. Using good home treatment and being careful not to stress your back can help you feel better sooner. Follow-up care is a key part of your treatment and safety. Be sure to make and go to all appointments, and call your doctor if you are having problems. Its also a good idea to know your test results and keep a list of the medicines you take. How can you care for yourself at home? · Sit or lie in positions that are most comfortable and reduce your pain. Try one of these positions when you lie down:  ¨ Lie on your back with your knees bent and supported by large pillows. ¨ Lie on the floor with your legs on the seat of a sofa or chair. [place]ine [place] on your side with your knees and hips bent and a pillow between your legs. ¨ Lie on your stomach if it does not make pain worse. · Do not sit up in bed, and avoid soft couches and twisted positions. Bed rest can help relieve pain at first, but it delays healing. Avoid bed rest after the first day of back pain. · Change positions every 30 minutes. If you must sit for long periods of time, take breaks from sitting. Get up and walk around, or lie in a comfortable position. · Try using a heating pad on a low or medium setting for 15 to 20 minutes every 2 or 3 hours. Try a warm shower in place of one session with the heating pad. · You can also try an ice pack for 10 to 15 minutes every 2 to 3 hours.  Put a thin cloth between the ice pack and your skin. · Take pain medicines exactly as directed. ¨ If the doctor gave you a prescription medicine for pain, take it as prescribed. ¨ If you are not taking a prescription pain medicine, ask your doctor if you can take an over-the-counter medicine. · Take short walks several times a day. You can start with 5 to 10 minutes, 3 or 4 times a day, and work up to longer walks. Walk on level surfaces and avoid hills and stairs until your back is better. · Return to work and other activities as soon as you can. Continued rest without activity is usually not good for your back. · To prevent future back pain, do exercises to stretch and strengthen your back and stomach. Learn how to use good posture, safe lifting techniques, and proper body mechanics. When should you call for help? Call your doctor now or seek immediate medical care if:  · You have new or worsening numbness in your legs. · You have new or worsening weakness in your legs. (This could make it hard to stand up.)  · You lose control of your bladder or bowels. Watch closely for changes in your health, and be sure to contact your doctor if:  · Your pain gets worse. · You are not getting better after 2 weeks. Where can you learn more? Go to http://christine-katlin.info/. Enter C857 in the search box to learn more about \"Back Pain: Care Instructions. \"  Current as of: May 23, 2016  Content Version: 11.2  © 6961-7977 [employer]. Care instructions adapted under license by Translimit (which disclaims liability or warranty for this information). If you have questions about a medical condition or this instruction, always ask your healthcare professional. [address] any warranty or liability for your use of this information.

## 2017-06-07 RX ORDER — PAROXETINE HYDROCHLORIDE 20 MG/1
20 TABLET, FILM COATED ORAL DAILY
Qty: 90 TAB | Refills: 1 | Status: SHIPPED | OUTPATIENT
Start: 2017-06-07 | End: 2018-01-09 | Stop reason: SDUPTHER

## 2017-06-15 RX ORDER — TRIAMCINOLONE ACETONIDE 0.25 MG/ML
LOTION TOPICAL 2 TIMES DAILY
Qty: 60 ML | Refills: 1 | Status: SHIPPED | OUTPATIENT
Start: 2017-06-15 | End: 2017-12-04 | Stop reason: SDUPTHER

## 2017-06-15 NOTE — TELEPHONE ENCOUNTER
This patient contacted office for the following prescriptions to be filled:    Medication requested :   Requested Prescriptions     Pending Prescriptions Disp Refills    triamcinolone acetonide 0.025 % lotion 60 mL 1     Sig: Apply  to affected area two (2) times a day.      PCP: [address] [place] [address] or Print: [facility]  Mail order or Local pharmacy [address]    Scheduled appointment if not seen by current providers in office: LOV 4/17/2017 f/u 6/19/2017

## 2017-06-19 ENCOUNTER — OFFICE VISIT (OUTPATIENT)
Dept: FAMILY MEDICINE CLINIC | Age: 62
End: 2017-06-19

## 2017-06-19 VITALS
WEIGHT: 184 LBS | RESPIRATION RATE: 16 BRPM | DIASTOLIC BLOOD PRESSURE: 80 MMHG | HEIGHT: 64 IN | SYSTOLIC BLOOD PRESSURE: 136 MMHG | OXYGEN SATURATION: 96 % | HEART RATE: 81 BPM | BODY MASS INDEX: 31.41 KG/M2 | TEMPERATURE: 98.2 F

## 2017-06-19 DIAGNOSIS — Z71.89 ADVANCE CARE PLANNING: Primary | ICD-10-CM

## 2017-06-19 DIAGNOSIS — F32.5 MAJOR DEPRESSIVE DISORDER WITH SINGLE EPISODE, IN FULL REMISSION (HCC): ICD-10-CM

## 2017-06-19 DIAGNOSIS — Z13.39 SCREENING FOR ALCOHOLISM: ICD-10-CM

## 2017-06-19 DIAGNOSIS — R23.2 HOT FLASHES: ICD-10-CM

## 2017-06-19 DIAGNOSIS — J30.2 SEASONAL ALLERGIC RHINITIS, UNSPECIFIED ALLERGIC RHINITIS TRIGGER: ICD-10-CM

## 2017-06-19 DIAGNOSIS — I10 ESSENTIAL HYPERTENSION: ICD-10-CM

## 2017-06-19 DIAGNOSIS — Z00.00 ROUTINE GENERAL MEDICAL EXAMINATION AT A HEALTH CARE FACILITY: ICD-10-CM

## 2017-06-19 DIAGNOSIS — A60.09 HERPES GENITALIS IN WOMEN: ICD-10-CM

## 2017-06-19 NOTE — MR AVS SNAPSHOT
Visit Information Date & Time Provider Department Dept. Phone Encounter #  
 6/19/2017  9:00 AM Jason Ramsay, [address] [id number] Follow-up Instructions Return in about 5 months (around 11/19/2017). Your Appointments 10/26/2017  9:45 AM  
Follow Up with Darlyn Navarrete MD  
[address] [facility], [address] and [facility] ONE [address]) Appt Note: 6mo low back [address] [facility] [id number] [phone]  
  
   
 [address] [facility] [id number] Upcoming Health Maintenance Date Due INFLUENZA AGE 9 TO ADULT 8/1/2017 BREAST CANCER SCRN MAMMOGRAM 12/9/2018 COLONOSCOPY 11/1/2019 DTaP/Tdap/Td series (2 - Td) 7/10/2025 Allergies as of 6/19/2017  Review Complete On: 6/19/2017 By: Jason Ramsay MD  
 No Known Allergies Current Immunizations  Reviewed on 11/4/2015 Name Date Influenza Vaccine (Quad) PF 1/10/2017, 11/4/2015 Tdap 7/10/2015 Not reviewed this visit You Were Diagnosed With   
  
 Codes Comments Advance care planning    -  Primary ICD-10-CM: Z71.89 ICD-9-CM: V65.49 Routine general medical examination at a health care facility     ICD-10-CM: Z00.00 ICD-9-CM: V70.0 Screening for alcoholism     ICD-10-CM: Z13.89 ICD-9-CM: V79.1 Essential hypertension     ICD-10-CM: I10 
ICD-9-CM: 401.9 Major depressive disorder with single episode, in full remission ([facility] [address]     ICD-10-CM: F32.5 ICD-9-CM: 296.26 Hot flashes     ICD-10-CM: R23.2 ICD-9-CM: 782.62 Seasonal allergic rhinitis, unspecified allergic rhinitis trigger     ICD-10-CM: J30.2 ICD-9-CM: 477.9 Herpes genitalis in women     ICD-10-CM: A60.09 
ICD-9-CM: 054.10 Vitals BP Pulse Temp Resp Height(growth percentile) Weight(growth percentile)  136/80 (BP 1 Location: Left arm, BP Patient Position: Sitting) 81 98.2 °F (36.8 °C) (Oral) 16 5' 4\" (1.626 m) 184 lb (83.5 kg) SpO2 BMI OB Status Smoking Status 96% 31.58 kg/m2 Hysterectomy Never Smoker BMI and BSA Data Body Mass Index Body Surface Area 31.58 kg/m 2 1.94 m 2 Preferred Pharmacy Pharmacy Name Phone [facility] [address], [address] [facility] Your Updated Medication List  
  
   
This list is accurate as of: 6/19/17  9:26 AM.  Always use your most recent med list.  
  
  
  
  
 aspirin delayed-release 81 mg tablet Take 81 mg by mouth daily. CALCIUM 600 PO Take  by mouth daily. calcium citrate-vitamin d2 250-100 mg-unit per tablet  
  
 cyclobenzaprine 10 mg tablet Commonly known as:  FLEXERIL Take 1 Tab by mouth nightly. fluticasone 27.5 mcg/actuation nasal spray Commonly known as:  VERAMYST  
2 Sprays by Nasal route daily. gabapentin 300 mg capsule Commonly known as:  NEURONTIN  
3 tabs po q hs  
  
 lisinopril-hydroCHLOROthiazide 20-25 mg per tablet Commonly known as:  Roz Rower Take 1 Tab by mouth daily. montelukast 10 mg tablet Commonly known as:  SINGULAIR Take 1 Tab by mouth daily. multivitamin tablet Commonly known as:  ONE A DAY Take 1 Tab by mouth daily. naproxen 500 mg tablet Commonly known as:  NAPROSYN Take 1 Tab by mouth two (2) times daily (with meals). PARoxetine 20 mg tablet Commonly known as:  PAXIL Take 1 Tab by mouth daily. PATADAY 0.2 % Drop ophthalmic solution Generic drug:  olopatadine  
  
 triamcinolone acetonide 0.025 % lotion Apply  to affected area two (2) times a day. valACYclovir 500 mg tablet Commonly known as:  VALTREX Take 1 Tab by mouth two (2) times a day. We Performed the Following ADVANCE CARE PLANNING FIRST 30 MINS [23665 CPT(R)] Follow-up Instructions Return in about 5 months (around 11/19/2017). Patient Instructions Medicare Wellness Visit, Female The best way to improve and maintain good health is to have a healthy lifestyle by eating a well-balanced diet, exercising regularly, limiting alcohol and stopping smoking. Regular visits with your physician or non-physician health care provider also support your good health. Preventive screening tests can find health problems before they become diseases or illnesses. Preventive services such as immunizations prevent serious infections. All people over age 72 should have a Pneumovax and a Prevnar-13 shot to prevent potentially life threatening infections with the pneumococcus bacteria, a common cause of pneumonia. These are once in a lifetime unless you and your provider decide differently. All people over 65 should have a yearly influenza vaccine or \"flu\" shot. This does not prevent infection with cold viruses but has been proven to prevent hospitalization and death from influenza. Although Medicare part B \"regular Medicare\" currently only covers tetanus vaccination in the context of an injury, a tetanus vaccine (Tdap or Td) is recommended every 10 years. A shingles vaccine is recommended once in a lifetime after age 61. The Shingles vaccine is also not covered by Medicare part B. Note, however, that both the Shingles vaccine and Tdap/Td are generally covered by secondary carriers. Please check your coverage and out of pocket expenses. Consider contacting your local health department because it may stock these vaccines for a reasonable charge. We currently have documentation of the following immunization history for you: 
Immunization History Administered Date(s) Administered  Influenza Vaccine (Quad) PF 11/04/2015, 01/10/2017  Tdap 07/10/2015 Screening for infection with Hepatitis C is recommended for anyone born between [birth year] through [address]. The table at the bottom of this document indicates the status of this and other preventive services. A bone mass density test (DEXA) to screen for osteoporosis or thinning of the bones should be done at least once after age 72 and may be done up to every 2 years as determined by you and your health care provider. The most recent DEXA we have on file for you is: DEXA Results (most recent): No results found for this or any previous visit. Screening for diabetes mellitus with a blood sugar test (glucose) should be done at least every 3 years until age 79. You and your health care provider may decide whether to continue screening after age 79. The most recent blood glucose we have on file for you is: Lab Results Component Value Date/Time Glucose 92 02/20/2017 11:05 AM  
 
 
 
Glaucoma is a disease of the eye due to increased ocular pressure that can lead to blindness. People with risk factors for glaucoma ( race, diabetes, family history) should consider screening at least every 2 years by an eye professional.  
 
Cardiovascular screening tests that check for elevated lipids or cholesterol (fatty part of blood) which can lead to heart disease and strokes should be done every 4-6 years through age 79. You and your health care provider may decide whether to continue screening after age 79. The most recent lipid panel we have on file for you is:  
Lab Results Component Value Date/Time Cholesterol, total 186 02/20/2017 11:05 AM  
 HDL Cholesterol 76 02/20/2017 11:05 AM  
 LDL, calculated 93 02/20/2017 11:05 AM  
 VLDL, calculated 17 02/20/2017 11:05 AM  
 Triglyceride 83 02/20/2017 11:05 AM  
 
 
Colorectal cancer screening that evaluates for blood or polyps in your colon for people with average risk should be done yearly as a stool test, every five years as a flexible sigmoidoscope or every 10 years as a colonoscopy up to age 76. You and your health care provider may decide whether to continue screening after age 76. Breast cancer screening with a mammogram is recommended at least once every 2 years  for women age 54-69. You and your health care provider may decide whether to continue screening after age 76. The most recent mammogram we have on file for you is: [facility] Results (most recent): 
 
Results from Hospital Encounter encounter on 12/09/16 [facility] MAMMO BI SCREENING DIGTL Narrative STUDY:  Bilateral digital screening mammogram      
 
INDICATION:  Screening. COMPARISON:  2015; 2014; 2013 FINDINGS: Bilateral digital screening mammography was performed, and is 
interpreted in conjunction with a computer assisted detection (CAD) system. No dominant masses, unexplained architectural distortion or suspicious 
microcalcifications are identified. Impression IMPRESSION:  
No mammographic evidence of malignancy. RECOMMENDATION: 
Routine follow-up advised. Breast Density B:  The breast parenchymal pattern shows scattered fibroglandular 
densities. BIRADS 1:  Negative Screening for cervical cancer with a pap smear is recommended for all women with a cervix until age 72. The frequency of this test is based on the details of her prior pap smear testing. You and your health care provider may decide whether to continue screening after age 72. People who have smoked the equivalent of 1 pack per day for 30 years or more may benefit from screening for lung cancer with a yearly low dose CT scan until they have been non smokers for 15 years or competing health conditions render this unlikely to be beneficial.  
 
Your Medicare Wellness Exam is recommended annually. Here is a list of your current Health Maintenance items with a due date: 
Health Maintenance Topic Date Due  
 INFLUENZA AGE 9 TO ADULT  08/01/2017  BREAST CANCER SCRN MAMMOGRAM  12/09/2018  COLONOSCOPY  11/01/2019  
 DTaP/Tdap/Td series (2 - Td) 07/10/2025  Hepatitis C Screening  Completed  ZOSTER VACCINE AGE 60>  Completed Introducing [facility]! [facility] introduces [employer] patient portal. Now you can access parts of your medical record, email your doctor's office, and request medication refills online. 1. In your internet browser, go to [email or web address] 2. Click on the First Time User? Click Here link in the Sign In box. You will see the New Member Sign Up page. 3. Enter your [employer] Access Code exactly as it appears below. You will not need to use this code after youve completed the sign-up process. If you do not sign up before the expiration date, you must request a new code. · [employer] Access Code: [email or web address] Expires: 7/16/2017 10:02 AM 
 
4. Enter the last four digits of your Social Security Number (xxxx) and Date of Birth (mm/dd/yyyy) as indicated and click Submit. You will be taken to the next sign-up page. 5. Create a [employer] ID. This will be your [employer] login ID and cannot be changed, so think of one that is secure and easy to remember. 6. Create a [employer] password. You can change your password at any time. 7. Enter your Password Reset Question and Answer. This can be used at a later time if you forget your password. 8. Enter your e-mail address. You will receive e-mail notification when new information is available in [address] 9. Click Sign Up. You can now view and download portions of your medical record. 10. Click the Download Summary menu link to download a portable copy of your medical information. If you have questions, please visit the Frequently Asked Questions section of the [employer] website. Remember, [employer] is NOT to be used for urgent needs. For medical emergencies, dial 911. Now available from your iPhone and Android! Please provide this summary of care documentation to your next provider.  
  
  
 Your primary care clinician is listed as Jason Ramsay. If you have any questions after today's visit, please call [phone].

## 2017-06-19 NOTE — PROGRESS NOTES
This is a Subsequent Medicare Annual Wellness Visit providing Personalized Prevention Plan Services (PPPS) (Performed 12 months after initial AWV and PPPS )    I have reviewed the patient's medical history in detail and updated the computerized patient record. History     Past Medical History:   Diagnosis Date    Arthritis     Bulging lumbar disc     Degenerative arthritis of finger     MP joint right index finger    Depression     H/O colonoscopy 12/2014    recommended yearly hemoccult stools    Hypertension     Low back pain     Lower extremity pain, left     Pinched nerve       Past Surgical History:   Procedure Laterality Date    HX CARPAL TUNNEL RELEASE Right     hand    HX HYSTERECTOMY      HX ORTHOPAEDIC       Current Outpatient Prescriptions   Medication Sig Dispense Refill    triamcinolone acetonide 0.025 % lotion Apply  to affected area two (2) times a day. 60 mL 1    PARoxetine (PAXIL) 20 mg tablet Take 1 Tab by mouth daily. 90 Tab 1    naproxen (NAPROSYN) 500 mg tablet Take 1 Tab by mouth two (2) times daily (with meals). 60 Tab 5    cyclobenzaprine (FLEXERIL) 10 mg tablet Take 1 Tab by mouth nightly. 12 Tab 0    lisinopril-hydroCHLOROthiazide (PRINZIDE, ZESTORETIC) 20-25 mg per tablet Take 1 Tab by mouth daily. 90 Tab 1    gabapentin (NEURONTIN) 300 mg capsule 3 tabs po q hs (Patient taking differently: 1 in am and 2 in pm) 90 Cap 2    fluticasone (VERAMYST) 27.5 mcg/actuation nasal spray 2 Sprays by Nasal route daily. 1 Bottle 5    montelukast (SINGULAIR) 10 mg tablet Take 1 Tab by mouth daily. 30 Tab 2    calcium citrate-vitamin d2 250-100 mg-unit per tablet       valACYclovir (VALTREX) 500 mg tablet Take 1 Tab by mouth two (2) times a day. 6 Tab 5    PATADAY 0.2 % drop ophthalmic solution       aspirin delayed-release 81 mg tablet Take 81 mg by mouth daily.  CALCIUM CARBONATE (CALCIUM 600 PO) Take  by mouth daily.       multivitamin (ONE A DAY) tablet Take 1 Tab by mouth daily. No Known Allergies  Family History   Problem Relation Age of Onset   [facility] Cancer Mother     Ovarian Cancer Mother 68    Stroke Father      Social History   Substance Use Topics    Smoking status: Never Smoker    Smokeless tobacco: Never Used    Alcohol use No     Patient Active Problem List   Diagnosis Code    Hypertension I10    Depression F32.9    Lumbar radiculopathy M54.16    Herpes genitalis in women A60.09    Allergic rhinitis due to allergen J30.9    Hot flashes R23.2    Advance directive discussed with patient Z71.89    Facet arthropathy ([address] M12.88    Lumbar stenosis M48.06       Depression Risk Factor Screening:     PHQ over the last two weeks 5/12/2014   Little interest or pleasure in doing things Not at all   Feeling down, depressed or hopeless Not at all   Total Score PHQ 2 0     Alcohol Risk Factor Screening:   None per patient report      Functional Ability and Level of Safety:     Hearing Loss   none    Activities of Daily Living   Self-care. Requires assistance with: no ADLs    Fall Risk     Fall Risk Assessment, last 12 mths 5/12/2014   Able to walk? Yes   Fall in past 12 months? No     Abuse Screen   Patient is not abused    Review of Systems   Pertinent items are noted in HPI.     Physical Examination     Evaluation of Cognitive Function:  Mood/affect:  neutral  Appearance: age appropriate and casually dressed  Family member/caregiver input: None present      Patient Care Team:  Selwyn Lock MD as PCP - General (Family Practice)  DAVID Lux MD (Orthopedic Surgery)  Campbell Martinez MD (Orthopedic Surgery)    Advice/Referrals/Counseling   Education and counseling provided:  Are appropriate based on today's review and evaluation  End-of-Life planning (with patient's consent)- discussed, and provided form  Screening Mammography-update 12/17  Colorectal cancer screening tests-update 2019      Assessment/Plan   reviewed diet, exercise and weight control. Rolf Edmondson, 65 y.o.,  female    SUBJECTIVE  Routine ff-up    HTN-compliant with meds, reviewed labs. Hot flashes/depression- says paxil somewhat helpful, helps with mood as well  H/o chronic intermittent low back pain, known lumbar radiculopathy, followed by dr. Reji Umanzor    Piriformis syndrome, from last visit improved    ROS:  See HPI, all others negative        Patient Active Problem List   Diagnosis Code    Hypertension I10    Depression F32.9    Lumbar radiculopathy M54.16    Herpes genitalis in women A60.09    Allergic rhinitis due to allergen J30.9    Hot flashes R23.2    Advance directive discussed with patient Z70.80    Facet arthropathy ([facility] [address] M12.88    Lumbar stenosis M48.06    Advance care planning Z71.89       Current Outpatient Prescriptions   Medication Sig Dispense Refill    triamcinolone acetonide 0.025 % lotion Apply  to affected area two (2) times a day. 60 mL 1    PARoxetine (PAXIL) 20 mg tablet Take 1 Tab by mouth daily. 90 Tab 1    naproxen (NAPROSYN) 500 mg tablet Take 1 Tab by mouth two (2) times daily (with meals). 60 Tab 5    cyclobenzaprine (FLEXERIL) 10 mg tablet Take 1 Tab by mouth nightly. 12 Tab 0    lisinopril-hydroCHLOROthiazide (PRINZIDE, ZESTORETIC) 20-25 mg per tablet Take 1 Tab by mouth daily. 90 Tab 1    gabapentin (NEURONTIN) 300 mg capsule 3 tabs po q hs (Patient taking differently: 1 in am and 2 in pm) 90 Cap 2    fluticasone (VERAMYST) 27.5 mcg/actuation nasal spray 2 Sprays by Nasal route daily. 1 Bottle 5    montelukast (SINGULAIR) 10 mg tablet Take 1 Tab by mouth daily. 30 Tab 2    calcium citrate-vitamin d2 250-100 mg-unit per tablet       valACYclovir (VALTREX) 500 mg tablet Take 1 Tab by mouth two (2) times a day. 6 Tab 5    PATADAY 0.2 % drop ophthalmic solution       aspirin delayed-release 81 mg tablet Take 81 mg by mouth daily.  CALCIUM CARBONATE (CALCIUM 600 PO) Take  by mouth daily.       multivitamin (ONE A DAY) tablet Take 1 Tab by mouth daily. No Known Allergies    Past Medical History:   Diagnosis Date    Arthritis     Bulging lumbar disc     Degenerative arthritis of finger     MP joint right index finger    Depression     H/O colonoscopy 12/2014    recommended yearly hemoccult stools    Hypertension     Low back pain     Lower extremity pain, left     Pinched nerve        Social History     Social History    Marital status:      Spouse name: N/A    Number of children: N/A    Years of education: N/A     Occupational History    Not on file. Social History Main Topics    Smoking status: Never Smoker    Smokeless tobacco: Never Used    Alcohol use No    Drug use: No    Sexual activity: Not Currently     Other Topics Concern    Not on file     Social History Narrative       Family History   Problem Relation Age of Onset    Cancer Mother     Ovarian Cancer Mother 68    Stroke Father          OBJECTIVE    Physical Exam:     Visit Vitals    /80 (BP 1 Location: Left arm, BP Patient Position: Sitting)    Pulse 81    Temp 98.2 °F (36.8 °C) (Oral)    Resp 16    Ht 5' 4\" (1.626 m)    Wt 184 lb (83.5 kg)    SpO2 96%    BMI 31.58 kg/m2       General: alert, well-appearing, in no apparent distress or pain  Breasts: breasts appear normal, no suspicious masses, no skin or nipple changes or axillary nodes.   HEENT: normal nasal mucosa, throat, pharynx normal  CVS: normal rate, regular rhythm, distinct S1 and S2  Lungs:clear to ausculation bilaterally, no crackles, wheezing or rhonchi noted  Abdomen: normoactive bowel sounds, soft, non-tender  Skin: warm, no lesions, rashes noted   Psych:  mood and affect normal    Results for orders placed or performed in visit on 69/24/13   METABOLIC PANEL, COMPREHENSIVE   Result Value Ref Range    Glucose 92 65 - 99 mg/dL    BUN 12 8 - 27 mg/dL    Creatinine 0.57 0.57 - 1.00 mg/dL    GFR est non- >59 mL/min/1.73 GFR est  >59 mL/min/1.73    BUN/Creatinine ratio 21 11 - 26    Sodium 140 134 - 144 mmol/L    Potassium 4.1 3.5 - 5.2 mmol/L    Chloride 100 96 - 106 mmol/L    CO2 26 18 - 29 mmol/L    Calcium 9.2 8.7 - 10.3 mg/dL    Protein, total 6.4 6.0 - 8.5 g/dL    Albumin 3.8 3.6 - 4.8 g/dL    GLOBULIN, TOTAL 2.6 1.5 - 4.5 g/dL    A-G Ratio 1.5 1.1 - 2.5    Bilirubin, total 0.6 0.0 - 1.2 mg/dL    Alk. phosphatase 59 39 - 117 IU/L    AST (SGOT) 19 0 - 40 IU/L    ALT (SGPT) 17 0 - 32 IU/L   LIPID PANEL   Result Value Ref Range    Cholesterol, total 186 100 - 199 mg/dL    Triglyceride 83 0 - 149 mg/dL    HDL Cholesterol 76 >39 mg/dL    VLDL, calculated 17 5 - 40 mg/dL    LDL, calculated 93 0 - 99 mg/dL   CVD REPORT   Result Value Ref Range    INTERPRETATION Note          ASSESSMENT/PLAN  Jasper Blancas was seen today for hypertension, depression, leg pain and hand pain. Diagnoses and all orders for this visit:  Jasper Blancas was seen today for hives, annual wellness visit, itchy eye and pressure behind the eyes. Diagnoses and all orders for this visit:    Essential hypertension, hypertension with unspecified goal  Controlled, cont prinzide  Calc cv risk 4.4% vs 3.1% for age. Statin not indicated    Major depressive disorder with single episode, in full remission ([address]  Stable, cont paxil    Hot flashes    Allergic rhinitis  Fair control  Cont veramyst, singulair, zyrtec    Follow-up Disposition:  Return in about 5 months (around 11/19/2017). Patient understands plan of care. Patient has provided input and agrees with goals.

## 2017-06-19 NOTE — PATIENT INSTRUCTIONS
Medicare Wellness Visit, Female    The best way to improve and maintain good health is to have a healthy lifestyle by eating a well-balanced diet, exercising regularly, limiting alcohol and stopping smoking. Regular visits with your physician or non-physician health care provider also support your good health. Preventive screening tests can find health problems before they become diseases or illnesses. Preventive services such as immunizations prevent serious infections. All people over age 72 should have a Pneumovax and a Prevnar-13 shot to prevent potentially life threatening infections with the pneumococcus bacteria, a common cause of pneumonia. These are once in a lifetime unless you and your provider decide differently. All people over 65 should have a yearly influenza vaccine or \"flu\" shot. This does not prevent infection with cold viruses but has been proven to prevent hospitalization and death from influenza. Although Medicare part B \"regular Medicare\" currently only covers tetanus vaccination in the context of an injury, a tetanus vaccine (Tdap or Td) is recommended every 10 years. A shingles vaccine is recommended once in a lifetime after age 61. The Shingles vaccine is also not covered by Medicare part B. Note, however, that both the Shingles vaccine and Tdap/Td are generally covered by secondary carriers. Please check your coverage and out of pocket expenses. Consider contacting your local health department because it may stock these vaccines for a reasonable charge. We currently have documentation of the following immunization history for you:  Immunization History   Administered Date(s) Administered    Influenza Vaccine (Quad) PF 11/04/2015, 01/10/2017    Tdap 07/10/2015       Screening for infection with Hepatitis C is recommended for anyone born between [birth year] through [address]. The table at the bottom of this document indicates the status of this and other preventive services. A bone mass density test (DEXA) to screen for osteoporosis or thinning of the bones should be done at least once after age 72 and may be done up to every 2 years as determined by you and your health care provider. The most recent DEXA we have on file for you is:  DEXA Results (most recent):  No results found for this or any previous visit. Screening for diabetes mellitus with a blood sugar test (glucose) should be done at least every 3 years until age 79. You and your health care provider may decide whether to continue screening after age 79. The most recent blood glucose we have on file for you is: Lab Results   Component Value Date/Time    Glucose 92 02/20/2017 11:05 AM         Glaucoma is a disease of the eye due to increased ocular pressure that can lead to blindness. People with risk factors for glaucoma ( race, diabetes, family history) should consider screening at least every 2 years by an eye professional.     Cardiovascular screening tests that check for elevated lipids or cholesterol (fatty part of blood) which can lead to heart disease and strokes should be done every 4-6 years through age 79. You and your health care provider may decide whether to continue screening after age 79. The most recent lipid panel we have on file for you is:   Lab Results   Component Value Date/Time    Cholesterol, total 186 02/20/2017 11:05 AM    HDL Cholesterol 76 02/20/2017 11:05 AM    LDL, calculated 93 02/20/2017 11:05 AM    VLDL, calculated 17 02/20/2017 11:05 AM    Triglyceride 83 02/20/2017 11:05 AM       Colorectal cancer screening that evaluates for blood or polyps in your colon for people with average risk should be done yearly as a stool test, every five years as a flexible sigmoidoscope or every 10 years as a colonoscopy up to age 76. You and your health care provider may decide whether to continue screening after age 76.     Breast cancer screening with a mammogram is recommended at least once every 2 years  for women age 54-69. You and your health care provider may decide whether to continue screening after age 76. The most recent mammogram we have on file for you is:   [facility] Results (most recent):    Results from [place] encounter on 12/09/16   [facility] [address]   Narrative STUDY:  Bilateral digital screening mammogram         INDICATION:  Screening. COMPARISON:  2015; 2014; 2013      FINDINGS: Bilateral digital screening mammography was performed, and is  interpreted in conjunction with a computer assisted detection (CAD) system. No dominant masses, unexplained architectural distortion or suspicious  microcalcifications are identified. Impression IMPRESSION:   No mammographic evidence of malignancy. RECOMMENDATION:  Routine follow-up advised. Breast Density B:  The breast parenchymal pattern shows scattered fibroglandular  densities. BIRADS 1:  Negative          Screening for cervical cancer with a pap smear is recommended for all women with a cervix until age 72. The frequency of this test is based on the details of her prior pap smear testing. You and your health care provider may decide whether to continue screening after age 72. People who have smoked the equivalent of 1 pack per day for 30 years or more may benefit from screening for lung cancer with a yearly low dose CT scan until they have been non smokers for 15 years or competing health conditions render this unlikely to be beneficial.     Your Medicare Wellness Exam is recommended annually.     Here is a list of your current Health Maintenance items with a due date:  Health Maintenance   Topic Date Due    INFLUENZA AGE 9 TO ADULT  08/01/2017    BREAST CANCER SCRN MAMMOGRAM  12/09/2018    COLONOSCOPY  11/01/2019    DTaP/Tdap/Td series (2 - Td) 07/10/2025    Hepatitis C Screening  Completed    ZOSTER VACCINE AGE 60>  Completed

## 2017-06-19 NOTE — ACP (ADVANCE CARE PLANNING)
Advance Care Planning (ACP) Provider Conversation Snapshot    Date of ACP Conversation: 06/19/17  Persons included in Conversation:  patient  Length of ACP Conversation in minutes:  16 minutes    Authorized Decision Maker (if patient is incapable of making informed decisions): This person is:   pt has yet to decide          For Patients with Decision Making Capacity:   Values/Goals: Exploration of values, goals, and preferences if recovery is not expected, even with continued medical treatment in the event of:  Imminent death  Severe, permanent brain injury  \"In these circumstances, what matters most to you? \"  Other: she has yet to decide    Conversation Outcomes / Follow-Up Plan:   Recommended completion of Advance Directive form after review of ACP materials and conversation with prospective healthcare agent   Recommended communicating the plan and making copies for the healthcare agent, personal physician, and others as appropriate (e.g., health system)  Recommended review of completed ACP document annually or upon change in health status

## 2017-09-21 ENCOUNTER — OFFICE VISIT (OUTPATIENT)
Dept: FAMILY MEDICINE CLINIC | Age: 62
End: 2017-09-21

## 2017-09-21 VITALS
DIASTOLIC BLOOD PRESSURE: 88 MMHG | OXYGEN SATURATION: 98 % | BODY MASS INDEX: 31.58 KG/M2 | TEMPERATURE: 98.8 F | WEIGHT: 185 LBS | HEART RATE: 83 BPM | HEIGHT: 64 IN | SYSTOLIC BLOOD PRESSURE: 142 MMHG | RESPIRATION RATE: 16 BRPM

## 2017-09-21 DIAGNOSIS — J30.2 SEASONAL ALLERGIC RHINITIS, UNSPECIFIED ALLERGIC RHINITIS TRIGGER: Primary | ICD-10-CM

## 2017-09-21 DIAGNOSIS — I10 ESSENTIAL HYPERTENSION: ICD-10-CM

## 2017-09-21 DIAGNOSIS — E66.9 OBESITY, UNSPECIFIED OBESITY SEVERITY, UNSPECIFIED OBESITY TYPE: ICD-10-CM

## 2017-09-21 DIAGNOSIS — H93.12 TINNITUS, LEFT: ICD-10-CM

## 2017-09-21 DIAGNOSIS — R11.0 NAUSEA: ICD-10-CM

## 2017-09-21 DIAGNOSIS — Z23 ENCOUNTER FOR IMMUNIZATION: ICD-10-CM

## 2017-09-21 NOTE — MR AVS SNAPSHOT
Visit Information Date & Time Provider Department Dept. Phone Encounter #  
 9/21/2017 10:15 AM Valentin Ramires, [address] [id number] Follow-up Instructions Return Tuesday, December 19, 2017 10:00 AM for routine care (already scheduled). Your Appointments 10/26/2017  9:45 AM  
Follow Up with Montez Cooks, MD  
[address] [facility], [address] and [facility] ONE [address]) Appt Note: 6mo low back [address] [facility] [address]  
[phone]  
  
   
 [address]  
  
    
 12/19/2017 10:00 AM  
ROUTINE CARE with Ashkan Beatty MD  
[address] [facility] ([address]) Appt Note: routine f/u 6mo [address] [facility] [address]  
[address] [facility] [address] [facility] [address] Upcoming Health Maintenance Date Due INFLUENZA AGE 9 TO ADULT 8/1/2017 BREAST CANCER SCRN MAMMOGRAM 12/9/2018 COLONOSCOPY 11/1/2019 DTaP/Tdap/Td series (2 - Td) 7/10/2025 Allergies as of 9/21/2017  Review Complete On: 9/21/2017 By: Valentin Ramires MD  
 No Known Allergies Current Immunizations  Reviewed on 11/4/2015 Name Date Influenza Vaccine (Quad) PF 1/10/2017, 11/4/2015 Tdap 7/10/2015 Not reviewed this visit You Were Diagnosed With   
  
 Codes Comments Seasonal allergic rhinitis, unspecified allergic rhinitis trigger    -  Primary ICD-10-CM: J30.2 ICD-9-CM: 477.9 Tinnitus, left     ICD-10-CM: H93.12 
ICD-9-CM: 388.30 Essential hypertension     ICD-10-CM: I10 
ICD-9-CM: 401.9 Nausea     ICD-10-CM: R11.0 ICD-9-CM: 787.02 Vitals BP Pulse Temp Resp Height(growth percentile) Weight(growth percentile) 142/88 (BP 1 Location: Left arm, BP Patient Position: Sitting) 83 98.8 °F (37.1 °C) (Oral) 16 5' 4\" (1.626 m) 185 lb (83.9 kg) LMP SpO2 BMI OB Status Smoking Status (LMP Unknown) 98% 31.76 kg/m2 Hysterectomy Never Smoker Vitals History BMI and BSA Data Body Mass Index Body Surface Area 31.76 kg/m 2 1.95 m 2 Preferred Pharmacy Pharmacy Name Phone [facility] [address], [address] [facility] Your Updated Medication List  
  
   
This list is accurate as of: 9/21/17 10:35 AM.  Always use your most recent med list.  
  
  
  
  
 aspirin delayed-release 81 mg tablet Take 81 mg by mouth daily. CALCIUM 600 PO Take 1 Tab by mouth daily. calcium citrate-vitamin d2 250-100 mg-unit per tablet Take 1 Tab by mouth daily. cyclobenzaprine 10 mg tablet Commonly known as:  FLEXERIL Take 1 Tab by mouth nightly. fluticasone 27.5 mcg/actuation nasal spray Commonly known as:  VERAMYST  
2 Sprays by Nasal route daily. gabapentin 300 mg capsule Commonly known as:  NEURONTIN  
3 tabs po q hs  
  
 lisinopril-hydroCHLOROthiazide 20-25 mg per tablet Commonly known as:  Jose Scarlet Take 1 Tab by mouth daily. montelukast 10 mg tablet Commonly known as:  SINGULAIR Take 1 Tab by mouth daily. multivitamin tablet Commonly known as:  ONE A DAY Take 1 Tab by mouth daily. naproxen 500 mg tablet Commonly known as:  NAPROSYN Take 1 Tab by mouth two (2) times daily (with meals). PARoxetine 20 mg tablet Commonly known as:  PAXIL Take 1 Tab by mouth daily. PATADAY 0.2 % Drop ophthalmic solution Generic drug:  olopatadine Administer 1 Drop to both eyes daily. triamcinolone acetonide 0.025 % lotion Apply  to affected area two (2) times a day. valACYclovir 500 mg tablet Commonly known as:  VALTREX Take 1 Tab by mouth two (2) times a day. Prescriptions Sent to Pharmacy Refills  
 fluticasone (VERAMYST) 27.5 mcg/actuation nasal spray 5 Si Sprays by Nasal route daily. Class: Normal  
 Pharmacy: [facility] [address] Ph #: [phone] Route: Nasal  
  
Follow-up Instructions Return 2017 10:00 AM for routine care (already scheduled). Patient Instructions Allergies: Care Instructions Your Care Instructions Allergies occur when your body's defense system (immune system) overreacts to certain substances. The immune system treats a harmless substance as if it were a harmful germ or virus. Many things can cause this overreaction, including pollens, medicine, food, dust, animal dander, and mold. Allergies can be mild or severe. Mild allergies can be managed with home treatment. But medicine may be needed to prevent problems. Managing your allergies is an important part of staying healthy. Your doctor may suggest that you have allergy testing to help find out what is causing your allergies. When you know what things trigger your symptoms, you can avoid them. This can prevent allergy symptoms and other health problems. For severe allergies that cause reactions that affect your whole body (anaphylactic reactions), your doctor may prescribe a shot of epinephrine to carry with you in case you have a severe reaction. Learn how to give yourself the shot and keep it with you at all times. Make sure it is not . Follow-up care is a key part of your treatment and safety. Be sure to make and go to all appointments, and call your doctor if you are having problems. It's also a good idea to know your test results and keep a list of the medicines you take. How can you care for yourself at home? · If you have been told by your doctor that dust or dust mites are causing your allergy, decrease the dust around your bed: 
[facility] [employer] sheets, pillowcases, and other bedding in hot water every week. ¨ Use dust-proof covers for pillows, duvets, and mattresses.  Avoid plastic covers because they tear easily and do not \"breathe. \" Wash as instructed on the label. ¨ Do not use any blankets and pillows that you do not need. ¨ Use blankets that you can wash in your washing machine. ¨ Consider removing drapes and carpets, which attract and hold dust, from your bedroom. · If you are allergic to house dust and mites, do not use home humidifiers. Your doctor can suggest ways you can control dust and mites. · Look for signs of cockroaches. Cockroaches cause allergic reactions. Use cockroach baits to get rid of them. Then, clean your home well. Cockroaches like areas where grocery bags, newspapers, empty bottles, or cardboard boxes are stored. Do not keep these inside your home, and keep trash and food containers sealed. Seal off any spots where cockroaches might enter your home. · If you are allergic to mold, get rid of furniture, rugs, and drapes that smell musty. Check for mold in the bathroom. · If you are allergic to outdoor pollen or mold spores, use air-conditioning. Change or clean all filters every month. Keep windows closed. · If you are allergic to pollen, stay inside when pollen counts are high. Use a vacuum  with a HEPA filter or a double-thickness filter at least two times each week. · Stay inside when air pollution is bad. Avoid paint fumes, perfumes, and other strong odors. · Avoid conditions that make your allergies worse. Stay away from smoke. Do not smoke or let anyone else smoke in your house. Do not use fireplaces or wood-burning stoves. · If you are allergic to your pets, change the air filter in your furnace every month. Use high-efficiency filters. · If you are allergic to pet dander, keep pets outside or out of your bedroom. Old carpet and cloth furniture can hold a lot of animal dander. You may need to replace them. When should you call for help? Give an epinephrine shot if: 
· You think you are having a severe allergic reaction. · You have symptoms in more than one body area, such as mild nausea and an itchy mouth. After giving an epinephrine shot call 911, even if you feel better. Call 911 if: 
· You have symptoms of a severe allergic reaction. These may include: 
¨ Sudden raised, red areas (hives) all over your body. ¨ Swelling of the throat, mouth, lips, or tongue. ¨ Trouble breathing. ¨ Passing out (losing consciousness). Or you may feel very lightheaded or suddenly feel weak, confused, or restless. · You have been given an epinephrine shot, even if you feel better. Call your doctor now or seek immediate medical care if: 
· You have symptoms of an allergic reaction, such as: ¨ A rash or hives (raised, red areas on the skin). ¨ Itching. ¨ Swelling. ¨ Belly pain, nausea, or vomiting. Watch closely for changes in your health, and be sure to contact your doctor if: 
· You do not get better as expected. Where can you learn more? Go to http://christine-katlin.info/. Enter T520 in the search box to learn more about \"Allergies: Care Instructions. \" Current as of: April 3, 2017 Content Version: 11.3 © 2326-1237 [employer]. Care instructions adapted under license by [employer] (which disclaims liability or warranty for this information). If you have questions about a medical condition or this instruction, always ask your healthcare professional. [place] [address] any warranty or liability for your use of this information. Eustachian Tube Problems: Care Instructions Your Care Instructions The eustachian (say \"you-STAY-shee-un\") tubes run between the inside of the ears and the throat. They keep air pressure stable in the ears. If your eustachian tubes become blocked, the air pressure in your ears changes. The fluids from a cold can clog eustachian tubes, causing pain in the ears.  A quick change in air pressure can cause eustachian tubes to close up. This might happen when an airplane changes altitude or when a  goes up or down underwater. Eustachian tube problems often clear up on their own or after allergy treatment. If your tubes continue to be blocked, you may need surgery. Follow-up care is a key part of your treatment and safety. Be sure to make and go to all appointments, and call your doctor if you are having problems. It's also a good idea to know your test results and keep a list of the medicines you take. How can you care for yourself at home? · To ease ear pain, apply a warm washcloth or a heating pad set on low. There may be some drainage from the ear when the heat melts earwax. Put a cloth between the heat source and your skin. Do not use a heating pad with children. · If your doctor prescribed antibiotics, take them as directed. Do not stop taking them just because you feel better. You need to take the full course of antibiotics. · Your doctor may recommend over-the-counter medicine. Be safe with medicines. Oral or nasal decongestants may relieve ear pain. Avoid decongestants that are combined with antihistamines, which tend to cause more blockage. But if allergies seem to be the problem, your doctor may recommend a combination. Be careful with cough and cold medicines. Don't give them to children younger than 6, because they don't work for children that age and can even be harmful. For children 6 and older, always follow all the instructions carefully. Make sure you know how much medicine to give and how long to use it. And use the dosing device if one is included. When should you call for help? Call your doctor now or seek immediate medical care if: 
· You develop sudden, complete hearing loss. · You have severe pain or feel dizzy. · You have new or increasing pus or blood draining from your ear. · You have redness, swelling, or pain around or behind the ear. Watch closely for changes in your health, and be sure to contact your doctor if: 
· You do not get better after 2 weeks. · You have any new symptoms, such as itching or a feeling of fullness in the ear. Where can you learn more? Go to http://christine-katlin.info/. Enter Y822 in the search box to learn more about \"Eustachian Tube Problems: Care Instructions. \" Current as of: May 4, 2017 Content Version: 11.3 © 7695-2906 [employer]. Care instructions adapted under license by [employer] (which disclaims liability or warranty for this information). If you have questions about a medical condition or this instruction, always ask your healthcare professional. [address] any warranty or liability for your use of this information. Influenza (Flu) Vaccine (Inactivated or Recombinant): What You Need to Know Why get vaccinated? Influenza (\"flu\") is a contagious disease that spreads around the [facility] every winter, usually between October and May. Flu is caused by influenza viruses and is spread mainly by coughing, sneezing, and close contact. Anyone can get flu. Flu strikes suddenly and can last several days. Symptoms vary by age, but can include: · Fever/chills. · Sore throat. · Muscle aches. · Fatigue. · Cough. · Headache. · Runny or stuffy nose. Flu can also lead to pneumonia and blood infections, and cause diarrhea and seizures in children. If you have a medical condition, such as heart or lung disease, flu can make it worse. Flu is more dangerous for some people. Infants and young children, people 72years of age and older, pregnant women, and people with certain health conditions or a weakened immune system are at greatest risk. Each year thousands of people in the [facility] die from flu, and many more are hospitalized. Flu vaccine can: · Keep you from getting flu. · Make flu less severe if you do get it. · Keep you from spreading flu to your family and other people. Inactivated and recombinant flu vaccines A dose of flu vaccine is recommended every flu season. Children 6 months through 6years of age may need two doses during the same flu season. Everyone else needs only one dose each flu season. Some inactivated flu vaccines contain a very small amount of a mercury-based preservative called thimerosal. Studies have not shown thimerosal in vaccines to be harmful, but flu vaccines that do not contain thimerosal are available. There is no live flu virus in flu shots. They cannot cause the flu. There are many flu viruses, and they are always changing. Each year a new flu vaccine is made to protect against three or four viruses that are likely to cause disease in the upcoming flu season. But even when the vaccine doesn't exactly match these viruses, it may still provide some protection. Flu vaccine cannot prevent: · Flu that is caused by a virus not covered by the vaccine. · Illnesses that look like flu but are not. Some people should not get this vaccine Tell the person who is giving you the vaccine: · If you have any severe (life-threatening) allergies. If you ever had a life-threatening allergic reaction after a dose of flu vaccine, or have a severe allergy to any part of this vaccine, you may be advised not to get vaccinated. Most, but not all, types of flu vaccine contain a small amount of egg protein. · If you ever had Guillain-Barré syndrome (also called GBS) Some people with a history of GBS should not get this vaccine. This should be discussed with your doctor. · If you are not feeling well. It is usually okay to get flu vaccine when you have a mild illness, but you might be asked to come back when you feel better. Risks of a vaccine reaction With any medicine, including vaccines, there is a chance of reactions.  These are usually mild and go away on their own, but serious reactions are also possible. Most people who get a flu shot do not have any problems with it. Minor problems following a flu shot include: · Soreness, redness, or swelling where the shot was given · Hoarseness · Sore, red or itchy eyes · Cough · Fever · Aches · Headache · Itching · Fatigue If these problems occur, they usually begin soon after the shot and last 1 or 2 days. More serious problems following a flu shot can include the following: · There may be a small increased risk of Guillain-Barré Syndrome (GBS) after inactivated flu vaccine. This risk has been estimated at 1 or 2 additional cases per million people vaccinated. This is much lower than the risk of severe complications from flu, which can be prevented by flu vaccine. · [facility] children who get the flu shot along with pneumococcal vaccine (PCV13) and/or DTaP vaccine at the same time might be slightly more likely to have a seizure caused by fever. Ask your doctor for more information. Tell your doctor if a child who is getting flu vaccine has ever had a seizure Problems that could happen after any injected vaccine: · People sometimes faint after a medical procedure, including vaccination. Sitting or lying down for about 15 minutes can help prevent fainting, and injuries caused by a fall. Tell your doctor if you feel dizzy, or have vision changes or ringing in the ears. · Some people get severe pain in the shoulder and have difficulty moving the arm where a shot was given. This happens very rarely. · Any medication can cause a severe allergic reaction. Such reactions from a vaccine are very rare, estimated at about 1 in a million doses, and would happen within a few minutes to a few hours after the vaccination. As with any medicine, there is a very remote chance of a vaccine causing a serious injury or death. The safety of vaccines is always being monitored. For more information, visit: www.cdc.gov/vaccinesafety/. What if there is a serious reaction? What should I look for? · Look for anything that concerns you, such as signs of a severe allergic reaction, very high fever, or unusual behavior. Signs of a severe allergic reaction can include hives, swelling of the face and throat, difficulty breathing, a fast heartbeat, dizziness, and weakness  usually within a few minutes to a few hours after the vaccination. What should I do? · If you think it is a severe allergic reaction or other emergency that can't wait, call 9-1-1 and get the person to the nearest hospital. Otherwise, call your doctor. · Reactions should be reported to the \"Vaccine Adverse Event Reporting System\" (VAERS). Your doctor should file this report, or you can do it yourself through the VAERS website at www.vaers. [place].gov, or by calling 8-465.434.4026. VAERS does not give medical advice. The National Vaccine Injury Compensation Program 
The National Vaccine Injury Compensation Program (VICP) is a federal program that was created to compensate people who may have been injured by certain vaccines. Persons who believe they may have been injured by a vaccine can learn about the program and about filing a claim by calling 3-487.838.6977 or visiting the [facility] [employer] website at www.[facility].gov/vaccinecompensation. There is a time limit to file a claim for compensation. How can I learn more? · Ask your healthcare provider. He or she can give you the vaccine package insert or suggest other sources of information. · Call your local or state health department. · Contact the Centers for Disease Control and Prevention (CDC): 
¨ Call 4-482.547.5900 (1-800-CDC-INFO) or ¨ Visit CDC's website at www.cdc.gov/flu Vaccine Information Statement Inactivated Influenza Vaccine 8/7/2015) 42 JORDY Hernandez [id number]-80 Department of Health and [employer] Centers for Disease Control and Prevention Many Vaccine Information Statements are available in Romansh and other languages. See www.immunize.org/vis. Muchas hojas de información sobre vacunas están disponibles en español y en otros idiomas. Visite www.immunize.org/vis. Care instructions adapted under license by [employer] (which disclaims liability or warranty for this information). If you have questions about a medical condition or this instruction, always ask your healthcare professional. [address] any warranty or liability for your use of this information. Introducing [facility] & HEALTH SERVICES! [facility] introduces [employer] patient portal. Now you can access parts of your medical record, email your doctor's office, and request medication refills online. 1. In your internet browser, go to [email or web address] 2. Click on the First Time User? Click Here link in the Sign In box. You will see the New Member Sign Up page. 3. Enter your [employer] Access Code exactly as it appears below. You will not need to use this code after youve completed the sign-up process. If you do not sign up before the expiration date, you must request a new code. · [employer] Access Code: [email or web address] Expires: 12/20/2017 10:35 AM 
 
4. Enter the last four digits of your Social Security Number (xxxx) and Date of Birth (mm/dd/yyyy) as indicated and click Submit. You will be taken to the next sign-up page. 5. Create a [employer] ID. This will be your [employer] login ID and cannot be changed, so think of one that is secure and easy to remember. 6. Create a [employer] password. You can change your password at any time. 7. Enter your Password Reset Question and Answer. This can be used at a later time if you forget your password. 8. Enter your e-mail address. You will receive e-mail notification when new information is available in [address] 9. Click Sign Up. You can now view and download portions of your medical record.  
10. Click the Download Summary menu link to download a portable copy of your medical information. If you have questions, please visit the Frequently Asked Questions section of the [employer] website. Remember, [employer] is NOT to be used for urgent needs. For medical emergencies, dial 911. Now available from your iPhone and Android! Please provide this summary of care documentation to your next provider. Your primary care clinician is listed as Lulla Leyden. If you have any questions after today's visit, please call [phone].

## 2017-09-21 NOTE — PATIENT INSTRUCTIONS
Allergies: Care Instructions  Your Care Instructions  Allergies occur when your body's defense system (immune system) overreacts to certain substances. The immune system treats a harmless substance as if it were a harmful germ or virus. Many things can cause this overreaction, including pollens, medicine, food, dust, animal dander, and mold. Allergies can be mild or severe. Mild allergies can be managed with home treatment. But medicine may be needed to prevent problems. Managing your allergies is an important part of staying healthy. Your doctor may suggest that you have allergy testing to help find out what is causing your allergies. When you know what things trigger your symptoms, you can avoid them. This can prevent allergy symptoms and other health problems. For severe allergies that cause reactions that affect your whole body (anaphylactic reactions), your doctor may prescribe a shot of epinephrine to carry with you in case you have a severe reaction. Learn how to give yourself the shot and keep it with you at all times. Make sure it is not . Follow-up care is a key part of your treatment and safety. Be sure to make and go to all appointments, and call your doctor if you are having problems. It's also a good idea to know your test results and keep a list of the medicines you take. How can you care for yourself at home? · If you have been told by your doctor that dust or dust mites are causing your allergy, decrease the dust around your bed:  [facility] sheets, pillowcases, and other bedding in hot water every week. ¨ Use dust-proof covers for pillows, duvets, and mattresses. Avoid plastic covers because they tear easily and do not \"breathe. \" Wash as instructed on the label. ¨ Do not use any blankets and pillows that you do not need. ¨ Use blankets that you can wash in your washing machine. ¨ Consider removing drapes and carpets, which attract and hold dust, from your bedroom.   · If you are allergic to house dust and mites, do not use home humidifiers. Your doctor can suggest ways you can control dust and mites. · Look for signs of cockroaches. Cockroaches cause allergic reactions. Use cockroach baits to get rid of them. Then, clean your home well. Cockroaches like areas where grocery bags, newspapers, empty bottles, or cardboard boxes are stored. Do not keep these inside your home, and keep trash and food containers sealed. Seal off any spots where cockroaches might enter your home. · If you are allergic to mold, get rid of furniture, rugs, and drapes that smell musty. Check for mold in the bathroom. · If you are allergic to outdoor pollen or mold spores, use air-conditioning. Change or clean all filters every month. Keep windows closed. · If you are allergic to pollen, stay inside when pollen counts are high. Use a vacuum  with a HEPA filter or a double-thickness filter at least two times each week. · Stay inside when air pollution is bad. Avoid paint fumes, perfumes, and other strong odors. · Avoid conditions that make your allergies worse. Stay away from smoke. Do not smoke or let anyone else smoke in your house. Do not use fireplaces or wood-burning stoves. · If you are allergic to your pets, change the air filter in your furnace every month. Use high-efficiency filters. · If you are allergic to pet dander, keep pets outside or out of your bedroom. Old carpet and cloth furniture can hold a lot of animal dander. You may need to replace them. When should you call for help? Give an epinephrine shot if:  · You think you are having a severe allergic reaction. · You have symptoms in more than one body area, such as mild nausea and an itchy mouth. After giving an epinephrine shot call 911, even if you feel better. Call 911 if:  · You have symptoms of a severe allergic reaction. These may include:  ¨ Sudden raised, red areas (hives) all over your body.   ¨ Swelling of the throat, mouth, lips, or tongue. ¨ Trouble breathing. ¨ Passing out (losing consciousness). Or you may feel very lightheaded or suddenly feel weak, confused, or restless. · You have been given an epinephrine shot, even if you feel better. Call your doctor now or seek immediate medical care if:  · You have symptoms of an allergic reaction, such as:  ¨ A rash or hives (raised, red areas on the skin). ¨ Itching. ¨ Swelling. ¨ Belly pain, nausea, or vomiting. Watch closely for changes in your health, and be sure to contact your doctor if:  · You do not get better as expected. Where can you learn more? Go to http://christine-katlin.info/. Enter C379 in the search box to learn more about \"Allergies: Care Instructions. \"  Current as of: April 3, 2017  Content Version: 11.3  © 3396-6412 [employer]. Care instructions adapted under license by [employer] (which disclaims liability or warranty for this information). If you have questions about a medical condition or this instruction, always ask your healthcare professional. [place] [address] any warranty or liability for your use of this information. Eustachian Tube Problems: Care Instructions  Your Care Instructions    The eustachian (say \"you-STAY-shee-un\") tubes run between the inside of the ears and the throat. They keep air pressure stable in the ears. If your eustachian tubes become blocked, the air pressure in your ears changes. The fluids from a cold can clog eustachian tubes, causing pain in the ears. A quick change in air pressure can cause eustachian tubes to close up. This might happen when an airplane changes altitude or when a  goes up or down underwater. Eustachian tube problems often clear up on their own or after allergy treatment. If your tubes continue to be blocked, you may need surgery. Follow-up care is a key part of your treatment and safety.  Be sure to make and go to all appointments, and call your doctor if you are having problems. It's also a good idea to know your test results and keep a list of the medicines you take. How can you care for yourself at home? · To ease ear pain, apply a warm washcloth or a heating pad set on low. There may be some drainage from the ear when the heat melts earwax. Put a cloth between the heat source and your skin. Do not use a heating pad with children. · If your doctor prescribed antibiotics, take them as directed. Do not stop taking them just because you feel better. You need to take the full course of antibiotics. · Your doctor may recommend over-the-counter medicine. Be safe with medicines. Oral or nasal decongestants may relieve ear pain. Avoid decongestants that are combined with antihistamines, which tend to cause more blockage. But if allergies seem to be the problem, your doctor may recommend a combination. Be careful with cough and cold medicines. Don't give them to children younger than 6, because they don't work for children that age and can even be harmful. For children 6 and older, always follow all the instructions carefully. Make sure you know how much medicine to give and how long to use it. And use the dosing device if one is included. When should you call for help? Call your doctor now or seek immediate medical care if:  · You develop sudden, complete hearing loss. · You have severe pain or feel dizzy. · You have new or increasing pus or blood draining from your ear. · You have redness, swelling, or pain around or behind the ear. Watch closely for changes in your health, and be sure to contact your doctor if:  · You do not get better after 2 weeks. · You have any new symptoms, such as itching or a feeling of fullness in the ear. Where can you learn more? Go to http://christine-katlin.info/. Enter Y822 in the search box to learn more about \"Eustachian Tube Problems: Care Instructions. \"  Current as of:  May 4, 2017  Content Version: 11.3  © 0058-5636 [employer]. Care instructions adapted under license by [email or web address] (which disclaims liability or warranty for this information). If you have questions about a medical condition or this instruction, always ask your healthcare professional. [address] any warranty or liability for your use of this information. Influenza (Flu) Vaccine (Inactivated or Recombinant): What You Need to Know  Why get vaccinated? Influenza (\"flu\") is a contagious disease that spreads around the [place] every winter, usually between October and May. Flu is caused by influenza viruses and is spread mainly by coughing, sneezing, and close contact. Anyone can get flu. Flu strikes suddenly and can last several days. Symptoms vary by age, but can include:  · Fever/chills. · Sore throat. · Muscle aches. · Fatigue. · Cough. · Headache. · Runny or stuffy nose. Flu can also lead to pneumonia and blood infections, and cause diarrhea and seizures in children. If you have a medical condition, such as heart or lung disease, flu can make it worse. Flu is more dangerous for some people. Infants and young children, people 72years of age and older, pregnant women, and people with certain health conditions or a weakened immune system are at greatest risk. Each year thousands of people in the [facility] die from flu, and many more are hospitalized. Flu vaccine can:  · Keep you from getting flu. · Make flu less severe if you do get it. · Keep you from spreading flu to your family and other people. Inactivated and recombinant flu vaccines  A dose of flu vaccine is recommended every flu season. Children 6 months through 6years of age may need two doses during the same flu season. Everyone else needs only one dose each flu season.   Some inactivated flu vaccines contain a very small amount of a mercury-based preservative called thimerosal. Studies have not shown thimerosal in vaccines to be harmful, but flu vaccines that do not contain thimerosal are available. There is no live flu virus in flu shots. They cannot cause the flu. There are many flu viruses, and they are always changing. Each year a new flu vaccine is made to protect against three or four viruses that are likely to cause disease in the upcoming flu season. But even when the vaccine doesn't exactly match these viruses, it may still provide some protection. Flu vaccine cannot prevent:  · Flu that is caused by a virus not covered by the vaccine. · Illnesses that look like flu but are not. Some people should not get this vaccine  Tell the person who is giving you the vaccine:  · If you have any severe (life-threatening) allergies. If you ever had a life-threatening allergic reaction after a dose of flu vaccine, or have a severe allergy to any part of this vaccine, you may be advised not to get vaccinated. Most, but not all, types of flu vaccine contain a small amount of egg protein. · If you ever had Guillain-Barré syndrome (also called GBS) Some people with a history of GBS should not get this vaccine. This should be discussed with your doctor. · If you are not feeling well. It is usually okay to get flu vaccine when you have a mild illness, but you might be asked to come back when you feel better. Risks of a vaccine reaction  With any medicine, including vaccines, there is a chance of reactions. These are usually mild and go away on their own, but serious reactions are also possible. Most people who get a flu shot do not have any problems with it. Minor problems following a flu shot include:  · Soreness, redness, or swelling where the shot was given  · Hoarseness  · Sore, red or itchy eyes  · Cough  · Fever  · Aches  · Headache  · Itching  · Fatigue  If these problems occur, they usually begin soon after the shot and last 1 or 2 days.   More serious problems following a flu shot can include the following:  · There may be a small increased risk of Guillain-Barré Syndrome (GBS) after inactivated flu vaccine. This risk has been estimated at 1 or 2 additional cases per million people vaccinated. This is much lower than the risk of severe complications from flu, which can be prevented by flu vaccine. · The [place] children who get the flu shot along with pneumococcal vaccine (PCV13) and/or DTaP vaccine at the same time might be slightly more likely to have a seizure caused by fever. Ask your doctor for more information. Tell your doctor if a child who is getting flu vaccine has ever had a seizure  Problems that could happen after any injected vaccine:  · People sometimes faint after a medical procedure, including vaccination. Sitting or lying down for about 15 minutes can help prevent fainting, and injuries caused by a fall. Tell your doctor if you feel dizzy, or have vision changes or ringing in the ears. · Some people get severe pain in the shoulder and have difficulty moving the arm where a shot was given. This happens very rarely. · Any medication can cause a severe allergic reaction. Such reactions from a vaccine are very rare, estimated at about 1 in a million doses, and would happen within a few minutes to a few hours after the vaccination. As with any medicine, there is a very remote chance of a vaccine causing a serious injury or death. The safety of vaccines is always being monitored. For more information, visit: www.cdc.gov/vaccinesafety/. What if there is a serious reaction? What should I look for? · Look for anything that concerns you, such as signs of a severe allergic reaction, very high fever, or unusual behavior. Signs of a severe allergic reaction can include hives, swelling of the face and throat, difficulty breathing, a fast heartbeat, dizziness, and weakness  usually within a few minutes to a few hours after the vaccination. What should I do?   · If you think it is a severe allergic reaction or other emergency that can't wait, call 9-1-1 and get the person to the nearest hospital. Otherwise, call your doctor. · Reactions should be reported to the \"Vaccine Adverse Event Reporting System\" (VAERS). Your doctor should file this report, or you can do it yourself through the VAERS website at www.vaers. [facility].gov, or by calling 9-642.626.4794. VAERS does not give medical advice. The National Vaccine Injury Compensation Program  The National Vaccine Injury Compensation Program (VICP) is a federal program that was created to compensate people who may have been injured by certain vaccines. Persons who believe they may have been injured by a vaccine can learn about the program and about filing a claim by calling 4-337.144.2751 or visiting the [employer] website at www.[facility].gov/vaccinecompensation. There is a time limit to file a claim for compensation. How can I learn more? · Ask your healthcare provider. He or she can give you the vaccine package insert or suggest other sources of information. · Call your local or state health department. · Contact the Centers for Disease Control and Prevention (CDC):  ¨ Call 9-971.968.9006 (1-800-CDC-INFO) or  ¨ Visit CDC's website at www.cdc.gov/flu  Vaccine Information Statement  Inactivated Influenza Vaccine  8/7/2015)  [address] 124OA-98  Department of Health and Human Services  Centers for Disease Control and Prevention  Many Vaccine Information Statements are available in English and other languages. See www.immunize.org/vis. Muchas hojas de información sobre vacunas están disponibles en español y en otros idiomas. Visite www.immunize.org/vis. Care instructions adapted under license by [employer] (which disclaims liability or warranty for this information).  If you have questions about a medical condition or this instruction, always ask your healthcare professional. [address] any warranty or liability for your use of this information.

## 2017-09-21 NOTE — PROGRESS NOTES
SUBJECTIVE  Chief Complaint   Patient presents with    Ringing in Ear     left x 3 days    Nausea     denies vomitting    Fatigue     x 3 days     The patient presents complaining of a 3 day history of left ear fullness and ringing intermittently. Associated symptoms: has had fatigue and increased allergy symptoms over the past 3 days as well. This morning felt a sour stomach with nausea. She has not been having this regularly. She did not vomit but dry heaved a bit. The patient denies sinus pressure or headaches. The patient denies any fevers. The patient has tried taking Zyrtec without significant relief. She is not currently on her nasal corticosteroid. OBJECTIVE    Blood pressure 142/88, pulse 83, temperature 98.8 °F (37.1 °C), temperature source Oral, resp. rate 16, height 5' 4\" (1.626 m), weight 185 lb (83.9 kg), SpO2 98 %. General:  Alert, cooperative, well appearing, in no apparent distress. Eyes: The lids are without swelling, lesions, or drainage. The conjunctiva is clear and noninjected. ENT:  The external auditory canals are without swelling or drainage. The tympanic membranes are intact, clear, and non-erythematous. The left has fluid present which is only minimally cloudy, not purulent. The septum is midline. The nasal turbinates are edematous. The tongue and mucous membranes are pink and moist without lesions. The pharynx is mildly erythematous without exudates. CV:  The heart sounds are regular in rate and rhythm. There is a normal S1 and S2.    Lungs: Inspiratory and expiratory efforts are full and unlabored. Lung sounds are clear and equal to auscultation throughout all lung fields without wheezing, rales, or rhonchi. Skin:  no rashes, no jaundice. ASSESSMENT / PLAN    ICD-10-CM ICD-9-CM    1. Seasonal allergic rhinitis, unspecified allergic rhinitis trigger J30.2 477.9    2. Tinnitus, left H93.12 388.30    3. Essential hypertension I10 401.9    4.  Nausea R11.0 787.02    5. Obesity, unspecified obesity severity, unspecified obesity type E66.9 278.00    6. Encounter for immunization Z23 V03.89 INFLUENZA VIRUS VAC QUAD,SPLIT,PRESV FREE SYRINGE IM      ADMIN INFLUENZA VIRUS VAC     Allergic rhinitis with tinnitus from ETD - advised to cont Zyrtec and add Veramyst.   If this is not improving in 2 weeks, we can advise she starts back on singulair. I did advise her on signs and symptoms of otitis media. HTN - mild elevation. She will see what her BP is at her routine follow-up with her PCP in Dec.    Nausea - she will monitor as this was only this morning. Obesity - EMR alert addressed. Will benefit from encouraging diet and exercise. Flu vaccine administered. All chart history elements were reviewed by me at the time of the visit even though marked at time of note closure. Patient understands our medical plan. Patient has provided input and agrees with goals. Alternatives have been explained and offered. All questions answered. The patient is to call if condition worsens or fails to improve. Follow-up Disposition:  Return Tuesday, December 19, 2017 10:00 AM for routine care (already scheduled).

## 2017-09-21 NOTE — PROGRESS NOTES
1. Have you been to the ER, urgent care clinic since your last visit? Hospitalized since your last visit? No    2. Have you seen or consulted any other health care providers outside of the [address] [place] [address] since your last visit? Include any pap smears or colon screening. No      Physician order obtained. Patient completed adult immunization consent form. Allergies, contraindications and recommendations reviewed with patient. Seasonal influenza vaccine administered IM left deltoid. Patient tolerated well. Patient remained in office for 15 minutes after injection and no adverse reactions were noted.

## 2017-10-26 ENCOUNTER — OFFICE VISIT (OUTPATIENT)
Dept: ORTHOPEDIC SURGERY | Age: 62
End: 2017-10-26

## 2017-10-26 VITALS
SYSTOLIC BLOOD PRESSURE: 146 MMHG | RESPIRATION RATE: 18 BRPM | BODY MASS INDEX: 32.03 KG/M2 | DIASTOLIC BLOOD PRESSURE: 76 MMHG | WEIGHT: 187.6 LBS | OXYGEN SATURATION: 99 % | HEIGHT: 64 IN | HEART RATE: 84 BPM | TEMPERATURE: 98.4 F

## 2017-10-26 DIAGNOSIS — M47.816 LUMBAR FACET ARTHROPATHY: Primary | ICD-10-CM

## 2017-10-26 RX ORDER — NAPROXEN 500 MG/1
500 TABLET ORAL 2 TIMES DAILY WITH MEALS
Qty: 180 TAB | Refills: 1 | Status: SHIPPED | OUTPATIENT
Start: 2017-10-26 | End: 2018-10-15 | Stop reason: SDUPTHER

## 2017-10-26 RX ORDER — GABAPENTIN 300 MG/1
900 CAPSULE ORAL
Qty: 270 CAP | Status: SHIPPED | OUTPATIENT
Start: 2017-10-26 | End: 2018-01-25 | Stop reason: SDUPTHER

## 2017-10-26 NOTE — MR AVS SNAPSHOT
Visit Information Date & Time Provider Department Dept. Phone Encounter #  
 10/26/2017  9:45 AM Mohsen Watkins MD [address] [facility] [phone] [id number] Follow-up Instructions Return in about 3 months (around 1/26/2018), or with NP. Prabhjot Zhang Your Appointments 12/19/2017 10:00 AM  
ROUTINE CARE with Fede Dunaway MD  
[facility] ([address]) Appt Note: routine f/u 6mo [address] [facility] [address]  
[address] [facility] [address] [facility] [address] Upcoming Health Maintenance Date Due  
 BREAST CANCER SCRN MAMMOGRAM 12/9/2018 COLONOSCOPY 11/1/2019 DTaP/Tdap/Td series (2 - Td) 7/10/2025 Allergies as of 10/26/2017  Review Complete On: 10/26/2017 By: Orly Moreau No Known Allergies Current Immunizations  Reviewed on 11/4/2015 Name Date Influenza Vaccine (Quad) PF 9/21/2017, 1/10/2017, 11/4/2015 Tdap 7/10/2015 Not reviewed this visit You Were Diagnosed With   
  
 Codes Comments Lumbar facet arthropathy    -  Primary ICD-10-CM: M12.88 ICD-9-CM: 721.3 Vitals BP Pulse Temp Resp Height(growth percentile) Weight(growth percentile) 146/76 84 98.4 °F (36.9 °C) (Oral) 18 5' 4\" (1.626 m) 187 lb 9.6 oz (85.1 kg) LMP SpO2 BMI OB Status Smoking Status (LMP Unknown) 99% 32.2 kg/m2 Hysterectomy Never Smoker BMI and BSA Data Body Mass Index Body Surface Area  
 32.2 kg/m 2 1.96 m 2 Preferred Pharmacy Pharmacy Name Phone [facility] [address], [address] Your Updated Medication List  
  
   
This list is accurate as of: 10/26/17 11:19 AM.  Always use your most recent med list.  
  
  
  
  
 aspirin delayed-release 81 mg tablet Take 81 mg by mouth daily. CALCIUM 600 PO Take 1 Tab by mouth daily. calcium citrate-vitamin d2 250-100 mg-unit per tablet Take 1 Tab by mouth daily. cyclobenzaprine 10 mg tablet Commonly known as:  FLEXERIL Take 1 Tab by mouth nightly. fluticasone 27.5 mcg/actuation nasal spray Commonly known as:  VERAMYST  
2 Sprays by Nasal route daily. gabapentin 300 mg capsule Commonly known as:  NEURONTIN Take 3 Caps by mouth nightly. lisinopril-hydroCHLOROthiazide 20-25 mg per tablet Commonly known as:  Sneha Hodgkin Take 1 Tab by mouth daily. montelukast 10 mg tablet Commonly known as:  SINGULAIR Take 1 Tab by mouth daily. multivitamin tablet Commonly known as:  ONE A DAY Take 1 Tab by mouth daily. naproxen 500 mg tablet Commonly known as:  NAPROSYN Take 1 Tab by mouth two (2) times daily (with meals). PARoxetine 20 mg tablet Commonly known as:  PAXIL Take 1 Tab by mouth daily. PATADAY 0.2 % Drop ophthalmic solution Generic drug:  olopatadine Administer 1 Drop to both eyes daily. triamcinolone acetonide 0.025 % lotion Apply  to affected area two (2) times a day. valACYclovir 500 mg tablet Commonly known as:  VALTREX Take 1 Tab by mouth two (2) times a day. Prescriptions Sent to Pharmacy Refills  
 gabapentin (NEURONTIN) 300 mg capsule prn Sig: Take 3 Caps by mouth nightly. Class: Normal  
 Pharmacy: [facility] [address] [facility] [address] Ph #: [phone] Route: Oral  
 naproxen (NAPROSYN) 500 mg tablet 1 Sig: Take 1 Tab by mouth two (2) times daily (with meals). Class: Normal  
 Pharmacy: [facility] [address] [facility] [address] Ph #: [phone] Route: Oral  
  
Follow-up Instructions Return in about 3 months (around 1/26/2018), or with NP. Atul Menjivar Patient Instructions MyChart Activation Thank you for requesting access to [employer].  Please follow the instructions below to securely access and download your online medical record. [employer] allows you to send messages to your doctor, view your test results, renew your prescriptions, schedule appointments, and more. How Do I Sign Up? 1. In your internet browser, go to [email or web address] 
2. Click on the First Time User? Click Here link in the Sign In box. You will be redirect to the New Member Sign Up page. 3. Enter your Al[employer]ir [employer] Access Code exactly as it appears below. You will not need to use this code after youve completed the sign-up process. If you do not sign up before the expiration date, you must request a new code. Al[employer]ir [employer] Access Code: [email or web address] Expires: 2017 10:35 AM (This is the date your Al[employer] access code will ) 4. Enter the last four digits of your Social Security Number (xxxx) and Date of Birth (mm/dd/yyyy) as indicated and click Submit. You will be taken to the next sign-up page. 5. Create a Altair Prep ID. This will be your Al[employer]ir Prep login ID and cannot be changed, so think of one that is secure and easy to remember. 6. Create a Al[employer]ir Prep password. You can change your password at any time. 7. Enter your Password Reset Question and Answer. This can be used at a later time if you forget your password. 8. Enter your e-mail address. You will receive e-mail notification when new information is available in [address] 9. Click Sign Up. You can now view and download portions of your medical record. 10. Click the Download Summary menu link to download a portable copy of your medical information. Additional Information If you have questions, please visit the Frequently Asked Questions section of the Altair Prep website at [email or web address] Remember, [employer] Prep is NOT to be used for urgent needs. For medical emergencies, dial 911. Learning About Medial Branch Block and Neurotomy What are medial branch block and neurotomy? Facet joints connect your vertebrae to each other. Problems in these joints can cause chronic (long-term) pain in the neck or back. They can sometimes affect the shoulders, arms, buttocks, or legs. Medial branch nerves are the nerves that carry many of the pain messages from your facet joints. Radiofrequency medial branch neurotomy is a type of medial branch neurotomy that is used to relieve arthritis pain. It uses radio waves to damage nerves in your neck or back so that they can no longer send pain messages to your brain. Before your doctor knows if a neurotomy will help you, he or she will do a medial branch block to find out if certain nerves are the ones that are a source of your pain. You will need two separate visits to the outpatient center or hospital to have both procedures. How is a medial branch block done? The doctor will use a tiny needle to numb the skin where you will get the block. Then he or she puts the block needle into the numbed area. You may feel some pressure, but you should not feel pain. Using fluoroscopy (live X-ray) to guide the needle, the doctor injects medicine onto one or more nerves to make them numb. If you get relief from your pain in the next 4 to 6 hours, it's a sign that those nerves may be contributing to your pain. The relief will last only a short time. You may then have a medial branch neurotomy at a later visit to try to get longer relief. It takes 20 to 30 minutes to get the block. You can go home after the doctor watches you for about an hour. You will get instructions on how to report how much pain you have when you are at home. You will need someone to drive you home. How is medial branch neurotomy done? The doctor will use a tiny needle to numb the skin where you will get the neurotomy. Then he or she puts the neurotomy needle into the numbed area. You may feel some pressure.  Using fluoroscopy (live X-ray) to guide the needle, the doctor sends radio waves through the needle to the nerve for 60 to 90 seconds. The radio waves heat the nerve, which damages it. The doctor may do this several times. And he or she may treat more than one nerve. It takes 45 to 90 minutes to get a neurotomy, depending on how many nerves are heated. You will probably go home 30 to 60 minutes later. You will need someone to drive you home. What can you expect after a neurotomy? You may feel a little sore or tender at the injection site at first. But after a successful neurotomy, most people have pain relief right away. It often lasts for 9 to 12 months or longer. Sometimes the pain relief is permanent. If your pain does come back, it may mean that the damaged nerve has healed and can send pain messages again. Or it can mean that a different nerve is causing pain. Your doctor will discuss your options with you. Follow-up care is a key part of your treatment and safety. Be sure to make and go to all appointments, and call your doctor if you are having problems. It's also a good idea to know your test results and keep a list of the medicines you take. Where can you learn more? Go to http://christine-katlin.info/. Enter V380 in the search box to learn more about \"Learning About Medial Branch Block and Neurotomy. \" Current as of: October 14, 2016 Content Version: 11.4 © 0422-3453 Healthwise, Incorporated. Care instructions adapted under license by ebooxter.com (which disclaims liability or warranty for this information). If you have questions about a medical condition or this instruction, always ask your healthcare professional. [place] [address] any warranty or liability for your use of this information. Introducing [facility]! Giovani Francisco introduces CrayonPixel patient portal. Now you can access parts of your medical record, email your doctor's office, and request medication refills online. 1. In your internet browser, go to [email or web address] 2. Click on the First Time User? Click Here link in the Sign In box. You will see the New Member Sign Up page. 3. Enter your [employer] Access Code exactly as it appears below. You will not need to use this code after youve completed the sign-up process. If you do not sign up before the expiration date, you must request a new code. · [employer] Access Code: [email or web address] Expires: 12/20/2017 10:35 AM 
 
4. Enter the last four digits of your Social Security Number (xxxx) and Date of Birth (mm/dd/yyyy) as indicated and click Submit. You will be taken to the next sign-up page. 5. Create a Core Diagnosticst ID. This will be your [employer] login ID and cannot be changed, so think of one that is secure and easy to remember. 6. Create a [employer] password. You can change your password at any time. 7. Enter your Password Reset Question and Answer. This can be used at a later time if you forget your password. 8. Enter your e-mail address. You will receive e-mail notification when new information is available in [address] 9. Click Sign Up. You can now view and download portions of your medical record. 10. Click the Download Summary menu link to download a portable copy of your medical information. If you have questions, please visit the Frequently Asked Questions section of the [employer] website. Remember, [employer] is NOT to be used for urgent needs. For medical emergencies, dial 911. Now available from your iPhone and Android! Please provide this summary of care documentation to your next provider. Your primary care clinician is listed as María Ibanez. If you have any questions after today's visit, please call [phone].

## 2017-10-26 NOTE — PROGRESS NOTES
[address]  [address]  [place]  Phone: [phone]  Fax: [phone]        Joey Patel  : 1955  PCP: Magi Almendarez MD  10/26/2017    PROGRESS NOTE      ASSESSMENT AND PLAN    Can Katz comes in to the office today for continued symptoms related to lumbar facet arthropathy and myofascial pain. On the examination, she had tenderness in the lumbar region and reproducible pain with back extension. She has been compliant with a HEP. I suggested a few more exercises such as a clamshell progression. We discussed the RFA procedure as a treatment option for the lumbar facet arthropathy. She preferred to think about this and call if interested. I provided her refills on her current medication regimen of Gabapentin and Naproxen. Pt will f/u in 3 months with NP and in 6 months with Dr. Irvin Dallas. Diagnoses and all orders for this visit:    1. Lumbar facet arthropathy  -     gabapentin (NEURONTIN) 300 mg capsule; Take 3 Caps by mouth nightly.  -     naproxen (NAPROSYN) 500 mg tablet; Take 1 Tab by mouth two (2) times daily (with meals). Follow-up Disposition:  Return in about 3 months (around 2018), or with NP. Carla Jamison HISTORY OF PRESENT ILLNESS  Can Katz is a 65 y.o. female. A&P / HPI from 2017: Can Katz was in the office today for a f/u appointment. She has been taking Tylenol 3 prn and Gabapentin 1,200mg daily with relief so she will continue these medications. She was advised to increase her overall exercise level by doing activities such as walking and her at home PT exercises. HISTORY OF PRESENT ILLNESS  Can Katz is a 62 y.o. female. Pt presents to the office for a f/u for lumbar pain.  Pt has been taking Tylenol 3 prn and she has been finding relief from Gabapentin 1,200mg daily, no side effects were noted.      Pt says increased lifting and bending exacerbates her pain when doing activities such as cleaning but she is learning how to limit herself. She also is continuing to have pain with walking. Pt reports she has not been exercising.      Excerpt from Rosina Nunez's note on 04/27/2017:     HISTORY OF PRESENT ILLNESS:  The patient comes in today for followup of her chronic low back pain with radiating left lower extremity pain. She rates her pain as a 10/10 with a flare and with activity, but today, she is rating it as a 5/10. When she lies down, that does help her back and it will go down to a 0/10. She does that one to two times a day. She is doing exercises and stretching four to five times a week during the flare, but now she is doing it twice a week. She has considered the facet injections we discussed previously. She denies fever or bowel or bladder dysfunction. She is taking Neurontin 300 mg in the morning and at lunch, and two at night, which is 600 mg. She is taking Naproxen 500 mg twice a day without any side effects. Her PCP did start her on some Flexeril, which helps some. She is on Social Security disability. She is a nonsmoker. Updates from 10/26/17:  Pt presents for a PRN f/u. Since her last office visit she has been evaluated by Courtney Obrein who continued her medication regimen of Narproxen and Gabapentin. At this time, she rates her pain at an constant aching 5/10. She notes pressure while sitting will increase her pain. PAST MEDICAL HISTORY   Past Medical History:   Diagnosis Date    Arthritis     Bulging lumbar disc     Degenerative arthritis of finger     MP joint right index finger    Depression     H/O colonoscopy 12/2014    recommended yearly hemoccult stools    Hypertension     Low back pain     Lower extremity pain, left     Pinched nerve        Past Surgical History:   Procedure Laterality Date    HX CARPAL TUNNEL RELEASE Right     hand    HX HYSTERECTOMY      HX ORTHOPAEDIC     .       MEDICATIONS      Current Outpatient Prescriptions   Medication Sig Dispense Refill    fluticasone (VERAMYST) 27.5 mcg/actuation nasal spray 2 Sprays by Nasal route daily. 1 Bottle 5    triamcinolone acetonide 0.025 % lotion Apply  to affected area two (2) times a day. 60 mL 1    PARoxetine (PAXIL) 20 mg tablet Take 1 Tab by mouth daily. 90 Tab 1    naproxen (NAPROSYN) 500 mg tablet Take 1 Tab by mouth two (2) times daily (with meals). 60 Tab 5    cyclobenzaprine (FLEXERIL) 10 mg tablet Take 1 Tab by mouth nightly. 12 Tab 0    lisinopril-hydroCHLOROthiazide (PRINZIDE, ZESTORETIC) 20-25 mg per tablet Take 1 Tab by mouth daily. 90 Tab 1    gabapentin (NEURONTIN) 300 mg capsule 3 tabs po q hs (Patient taking differently: 1 in am and 3 in pm) 90 Cap 2    montelukast (SINGULAIR) 10 mg tablet Take 1 Tab by mouth daily. 30 Tab 2    calcium citrate-vitamin d2 250-100 mg-unit per tablet Take 1 Tab by mouth daily.  valACYclovir (VALTREX) 500 mg tablet Take 1 Tab by mouth two (2) times a day. 6 Tab 5    PATADAY 0.2 % drop ophthalmic solution Administer 1 Drop to both eyes daily.  aspirin delayed-release 81 mg tablet Take 81 mg by mouth daily.  CALCIUM CARBONATE (CALCIUM 600 PO) Take 1 Tab by mouth daily.  multivitamin (ONE A DAY) tablet Take 1 Tab by mouth daily.           ALLERGIES  No Known Allergies       SOCIAL HISTORY    Social History     Social History    Marital status:      Spouse name: N/A    Number of children: N/A    Years of education: N/A     Social History Main Topics    Smoking status: Never Smoker    Smokeless tobacco: Never Used    Alcohol use No    Drug use: No    Sexual activity: Not Currently     Other Topics Concern    None     Social History Narrative     Social History Narrative      Problem Relation Age of Onset    Cancer Mother     Ovarian Cancer Mother 68    Stroke Father          REVIEW OF SYSTEMS  Review of Systems   Constitutional: Negative for chills, diaphoresis, fever, malaise/fatigue and weight loss. Respiratory: Negative for shortness of breath. Cardiovascular: Negative for chest pain and leg swelling. Gastrointestinal: Negative for constipation, nausea and vomiting. Neurological: Negative for dizziness, tingling, seizures, loss of consciousness, weakness and headaches. Psychiatric/Behavioral: The patient does not have insomnia. PHYSICAL EXAMINATION  Visit Vitals    /76    Pulse 84    Temp 98.4 °F (36.9 °C) (Oral)    Resp 18    Ht 5' 4\" (1.626 m)    Wt 187 lb 9.6 oz (85.1 kg)    LMP  (LMP Unknown)    SpO2 99%    BMI 32.2 kg/m2       Pain Assessment  10/26/2017   Location of Pain Back;Leg   Pain Location Comment -   Location Modifiers -   Severity of Pain 5   Quality of Pain Aching;Burning   Quality of Pain Comment tingling   Duration of Pain Persistent   Frequency of Pain Constant   Date Pain First Started -   Aggravating Factors -   Aggravating Factors Comment -   Limiting Behavior Some   Relieving Factors Nothing   Relieving Factors Comment -   Result of Injury No           Constitutional:  Well developed, well nourished, in no acute distress. Psychiatric: Affect and mood are appropriate. Integumentary: No rashes or abrasions noted on exposed areas. SPINE/MUSCULOSKELETAL EXAM    Lumbar spine:  No rash, ecchymosis, or gross obliquity.    No fasciculations.    No focal atrophy is noted.    No pain with hip ROM.    Range of motion is normal.  Mild tenderness to palpation. Tenderness to palpation at the right sciatic notch.    SI joints non-tender.    Trochanters non tender. Piriformis stretch test negative on right side.         Sensation in the bilateral legs grossly intact to light touch.     MOTOR:      Biceps  Triceps Deltoids Wrist Ext Wrist Flex Hand Intrin   Right 5/5 5/5 5/5 5/5 5/5 5/5   Left 5/5 5/5 5/5 5/5 5/5 5/5             Hip Flex  Quads Hamstrings Ankle DF EHL Ankle PF   Right 5/5 5/5 5/5 5/5 5/5 5/5   Left 5/5 5/5 5/5 5/5 5/5 5/5     DTRs are 2+ biceps, triceps, brachioradialis, patella, and Achilles.     Negative Straight Leg raise.    Squat not tested.    No difficulty with tandem gait.    Normal heel walk.    Normal toe rise.      Ambulation without assistive device. FWB.       RADIOGRAPHS  Lumbar MRI films from 4/19/2016 personally reviewed with patient:  1) Degenerative grade 1 anterior listhesis of both L4 and L5 in the presence of  advanced facet arthropathy. No high-grade spinal stenosis. There are bilateral  foraminal stenoses. Some exiting nerve contact but no overt impingement.       reviewed     This plan was reviewed with the patient and patient agrees. All questions were answered. More than half of this visit today was spent on counseling. Written by Fiona Sharma, as dictated by Dr. Otf Lauren. I, Dr. Otf Lauren, confirm that all documentation is accurate.

## 2017-10-26 NOTE — PATIENT INSTRUCTIONS
BigCalc Activation    Thank you for requesting access to BigCalc. Please follow the instructions below to securely access and download your online medical record. BigCalc allows you to send messages to your doctor, view your test results, renew your prescriptions, schedule appointments, and more. How Do I Sign Up? 1. In your internet browser, go to [email or web address]  2. Click on the First Time User? Click Here link in the Sign In box. You will be redirect to the New Member Sign Up page. 3. Enter your BigCalc Access Code exactly as it appears below. You will not need to use this code after youve completed the sign-up process. If you do not sign up before the expiration date, you must request a new code. BigCalc Access Code: [email or web address]  Expires: 2017 10:35 AM (This is the date your BigCalc access code will )    4. Enter the last four digits of your Social Security Number (xxxx) and Date of Birth (mm/dd/yyyy) as indicated and click Submit. You will be taken to the next sign-up page. 5. Create a BigCalc ID. This will be your BigCalc login ID and cannot be changed, so think of one that is secure and easy to remember. 6. Create a BigCalc password. You can change your password at any time. 7. Enter your Password Reset Question and Answer. This can be used at a later time if you forget your password. 8. Enter your e-mail address. You will receive e-mail notification when new information is available in [address] 9. Click Sign Up. You can now view and download portions of your medical record. 10. Click the Download Summary menu link to download a portable copy of your medical information. Additional Information    If you have questions, please visit the Frequently Asked Questions section of the BigCalc website at [email or web address] Remember, BigCalc is NOT to be used for urgent needs. For medical emergencies, dial 911.          Learning About Medial Branch Block and Neurotomy  What are medial branch block and neurotomy? Facet joints connect your vertebrae to each other. Problems in these joints can cause chronic (long-term) pain in the neck or back. They can sometimes affect the shoulders, arms, buttocks, or legs. Medial branch nerves are the nerves that carry many of the pain messages from your facet joints. Radiofrequency medial branch neurotomy is a type of medial branch neurotomy that is used to relieve arthritis pain. It uses radio waves to damage nerves in your neck or back so that they can no longer send pain messages to your brain. Before your doctor knows if a neurotomy will help you, he or she will do a medial branch block to find out if certain nerves are the ones that are a source of your pain. You will need two separate visits to the outpatient center or hospital to have both procedures. How is a medial branch block done? The doctor will use a tiny needle to numb the skin where you will get the block. Then he or she puts the block needle into the numbed area. You may feel some pressure, but you should not feel pain. Using fluoroscopy (live X-ray) to guide the needle, the doctor injects medicine onto one or more nerves to make them numb. If you get relief from your pain in the next 4 to 6 hours, it's a sign that those nerves may be contributing to your pain. The relief will last only a short time. You may then have a medial branch neurotomy at a later visit to try to get longer relief. It takes 20 to 30 minutes to get the block. You can go home after the doctor watches you for about an hour. You will get instructions on how to report how much pain you have when you are at home. You will need someone to drive you home. How is medial branch neurotomy done? The doctor will use a tiny needle to numb the skin where you will get the neurotomy. Then he or she puts the neurotomy needle into the numbed area. You may feel some pressure.  Using fluoroscopy (live X-ray) to guide the needle, the doctor sends radio waves through the needle to the nerve for 60 to 90 seconds. The radio waves heat the nerve, which damages it. The doctor may do this several times. And he or she may treat more than one nerve. It takes 45 to 90 minutes to get a neurotomy, depending on how many nerves are heated. You will probably go home 30 to 60 minutes later. You will need someone to drive you home. What can you expect after a neurotomy? You may feel a little sore or tender at the injection site at first. But after a successful neurotomy, most people have pain relief right away. It often lasts for 9 to 12 months or longer. Sometimes the pain relief is permanent. If your pain does come back, it may mean that the damaged nerve has healed and can send pain messages again. Or it can mean that a different nerve is causing pain. Your doctor will discuss your options with you. Follow-up care is a key part of your treatment and safety. Be sure to make and go to all appointments, and call your doctor if you are having problems. It's also a good idea to know your test results and keep a list of the medicines you take. Where can you learn more? Go to http://christine-katlin.info/. Enter Y639 in the search box to learn more about \"Learning About Medial Branch Block and Neurotomy. \"  Current as of: October 14, 2016  Content Version: 11.4  © 4346-0872 [employer]. Care instructions adapted under license by [employer] (which disclaims liability or warranty for this information). If you have questions about a medical condition or this instruction, always ask your healthcare professional. [address] any warranty or liability for your use of this information.

## 2017-11-03 RX ORDER — LISINOPRIL AND HYDROCHLOROTHIAZIDE 20; 25 MG/1; MG/1
1 TABLET ORAL DAILY
Qty: 90 TAB | Refills: 1 | Status: SHIPPED | OUTPATIENT
Start: 2017-11-03 | End: 2018-05-22 | Stop reason: SDUPTHER

## 2017-11-03 NOTE — TELEPHONE ENCOUNTER
This patient contacted office for the following prescriptions to be filled:    Medication requested :  Requested Prescriptions     Pending Prescriptions Disp Refills    lisinopril-hydroCHLOROthiazide (PRINZIDE, ZESTORETIC) 20-25 mg per tablet 90 Tab 1     Sig: Take 1 Tab by mouth daily. PCP: gladys  Pharmacy or Print:  [facility]  Mail order or Local pharmacy [phone]    Scheduled appointment if not seen by current providers in office: 9/21/17 [facility] 12/19/17    Pt states her insurance [employer] need the dr to call the rx to verify pt is taking med listed above.

## 2017-11-13 ENCOUNTER — OFFICE VISIT (OUTPATIENT)
Dept: FAMILY MEDICINE CLINIC | Age: 62
End: 2017-11-13

## 2017-11-13 ENCOUNTER — HOSPITAL ENCOUNTER (OUTPATIENT)
Dept: LAB | Age: 62
Discharge: HOME | End: 2017-11-13

## 2017-11-13 VITALS
SYSTOLIC BLOOD PRESSURE: 124 MMHG | TEMPERATURE: 98.6 F | OXYGEN SATURATION: 96 % | HEART RATE: 90 BPM | BODY MASS INDEX: 31.24 KG/M2 | HEIGHT: 64 IN | DIASTOLIC BLOOD PRESSURE: 78 MMHG | RESPIRATION RATE: 16 BRPM | WEIGHT: 183 LBS

## 2017-11-13 DIAGNOSIS — K62.5 BRBPR (BRIGHT RED BLOOD PER RECTUM): Primary | ICD-10-CM

## 2017-11-13 DIAGNOSIS — J30.2 CHRONIC SEASONAL ALLERGIC RHINITIS, UNSPECIFIED TRIGGER: ICD-10-CM

## 2017-11-13 DIAGNOSIS — I10 ESSENTIAL HYPERTENSION: ICD-10-CM

## 2017-11-13 DIAGNOSIS — F32.0 MILD SINGLE CURRENT EPISODE OF MAJOR DEPRESSIVE DISORDER (HCC): ICD-10-CM

## 2017-11-13 PROCEDURE — 99001 SPECIMEN HANDLING PT-LAB: CPT | Performed by: FAMILY MEDICINE

## 2017-11-13 NOTE — PATIENT INSTRUCTIONS
Lower Gastrointestinal Bleeding: Care Instructions  Your Care Instructions    The digestive or gastrointestinal tract goes from the mouth to the anus. It is often called the GI tract. Bleeding in the lower GI tract can happen anywhere in your small or large intestine. It can also happen in your rectum or anus. In some cases, it is caused by an infection, cancer, or inflammatory bowel disease. Or it may be caused by hemorrhoids, diverticulitis, or clotting problems. Light bleeding may not cause any symptoms at first. But if you continue to bleed for a while, you may feel very weak or tired. Sudden, heavy bleeding means you need to see a doctor right away. This kind of bleeding can be very dangerous. But it can usually be cured or controlled. The doctor may do some tests to find the cause of your bleeding. Follow-up care is a key part of your treatment and safety. Be sure to make and go to all appointments, and call your doctor if you are having problems. It's also a good idea to know your test results and keep a list of the medicines you take. How can you care for yourself at home? · Be safe with medicines. Take your medicines exactly as prescribed. Call your doctor if you think you are having a problem with your medicine. You will get more details on the specific medicines your doctor prescribes. · Do not take aspirin or other anti-inflammatory medicines, such as naproxen (Aleve) or ibuprofen (Advil, Motrin), without talking to your doctor first. Ask your doctor if it is okay to use acetaminophen (Tylenol). · Do not drink alcohol. · The bleeding may make you lose iron. So it's important to eat foods that have a lot of iron. These include red meat, shellfish, poultry, and eggs. They also include beans, raisins, whole-grain breads, and leafy green vegetables. If you want help planning meals, you can meet with a dietitian. When should you call for help?   Call 911 anytime you think you may need emergency care. For example, call if:  ? · You have sudden, severe belly pain. ? · You vomit blood or what looks like coffee grounds. ? · You passed out (lost consciousness). ? · Your stools are maroon or very bloody. ?Call your doctor now or seek immediate medical care if:  ? · You are dizzy or lightheaded, or you feel like you may faint. ? · Your stools are black and look like tar, or they have streaks of blood. ? · You have belly pain. ? · You vomit or have nausea. ? Watch closely for changes in your health, and be sure to contact your doctor if you do not get better as expected. Where can you learn more? Go to http://christine-katlin.info/. Enter D566 in the search box to learn more about \"Lower Gastrointestinal Bleeding: Care Instructions. \"  Current as of: March 20, 2017  Content Version: 11.4  © 9653-6530 [employer]. Care instructions adapted under license by [employer] (which disclaims liability or warranty for this information). If you have questions about a medical condition or this instruction, always ask your healthcare professional. [place] [address] any warranty or liability for your use of this information.

## 2017-11-13 NOTE — MR AVS SNAPSHOT
Visit Information Date & Time Provider Department Dept. Phone Encounter #  
 11/13/2017  2:30 PM Magi Almendarez, [address] [id number] Follow-up Instructions Return in about 2 months (around 1/13/2018), or if symptoms worsen or fail to improve. Your Appointments 12/19/2017 10:00 AM  
ROUTINE CARE with Magi Almendarez MD  
[facility] ([facility]) Appt Note: routine f/u 6mo [address] [facility] [address] [facility] [address] [address] [address] [address]  
  
    
 1/25/2018  9:20 AM  
Follow Up with Jania Partida NP  
[facility] ([facility]) Appt Note: 3 mo f/u  
 [address] [facility] [address]  
[phone]  
  
   
 [address] [facility] [address] Upcoming Health Maintenance Date Due  
 BREAST CANCER SCRN MAMMOGRAM 12/9/2018 COLONOSCOPY 11/1/2019 DTaP/Tdap/Td series (2 - Td) 7/10/2025 Allergies as of 11/13/2017  Review Complete On: 11/13/2017 By: Magi Almendarez MD  
 No Known Allergies Current Immunizations  Reviewed on 11/4/2015 Name Date Influenza Vaccine (Quad) PF 9/21/2017, 1/10/2017, 11/4/2015 Tdap 7/10/2015 Not reviewed this visit You Were Diagnosed With   
  
 Codes Comments Chronic seasonal allergic rhinitis, unspecified trigger    -  Primary ICD-10-CM: J30.2 ICD-9-CM: 477.8 Essential hypertension     ICD-10-CM: I10 
ICD-9-CM: 401.9 Mild single current episode of major depressive disorder (HCC)     ICD-10-CM: F32.0 ICD-9-CM: 296.21 BMI 31.0-31.9,adult     ICD-10-CM: Z68.31 
ICD-9-CM: V85.31   
 BRBPR (bright red blood per rectum)     ICD-10-CM: K62.5 ICD-9-CM: 569.3 Vitals BP Pulse Temp Resp Height(growth percentile) Weight(growth percentile) 124/78 (BP 1 Location: Left arm, BP Patient Position: Sitting) 90 98.6 °F (37 °C) (Oral) 16 5' 4\" (1.626 m) 183 lb (83 kg) LMP SpO2 BMI OB Status Smoking Status (LMP Unknown) 96% 31.41 kg/m2 Hysterectomy Never Smoker Vitals History BMI and BSA Data Body Mass Index Body Surface Area  
 31.41 kg/m 2 1.94 m 2 Preferred Pharmacy Pharmacy Name Phone [facility] [address], [address] [facility] Your Updated Medication List  
  
   
This list is accurate as of: 11/13/17  2:58 PM.  Always use your most recent med list.  
  
  
  
  
 aspirin delayed-release 81 mg tablet Take 81 mg by mouth daily. calcium citrate-vitamin d2 250-100 mg-unit per tablet Take 1 Tab by mouth daily. cyclobenzaprine 10 mg tablet Commonly known as:  FLEXERIL Take 1 Tab by mouth nightly. fluticasone 27.5 mcg/actuation nasal spray Commonly known as:  VERAMYST  
2 Sprays by Nasal route daily. gabapentin 300 mg capsule Commonly known as:  NEURONTIN Take 3 Caps by mouth nightly. lisinopril-hydroCHLOROthiazide 20-25 mg per tablet Commonly known as:  Kathalene Rockers Take 1 Tab by mouth daily. montelukast 10 mg tablet Commonly known as:  SINGULAIR Take 1 Tab by mouth daily. multivitamin tablet Commonly known as:  ONE A DAY Take 1 Tab by mouth daily. naproxen 500 mg tablet Commonly known as:  NAPROSYN Take 1 Tab by mouth two (2) times daily (with meals). PARoxetine 20 mg tablet Commonly known as:  PAXIL Take 1 Tab by mouth daily. PATADAY 0.2 % Drop ophthalmic solution Generic drug:  olopatadine Administer 1 Drop to both eyes daily. triamcinolone acetonide 0.025 % lotion Apply  to affected area two (2) times a day. valACYclovir 500 mg tablet Commonly known as:  VALTREX Take 1 Tab by mouth two (2) times a day. We Performed the Following REFERRAL TO GASTROENTEROLOGY [XCS44 Custom] Comments:  
 Or first available Follow-up Instructions Return in about 2 months (around 1/13/2018), or if symptoms worsen or fail to improve. To-Do List   
 11/13/2017 Lab:  CBC WITH AUTOMATED DIFF   
  
 11/13/2017 Microbiology:  CULTURE, STOOL   
  
 11/13/2017 Microbiology:  OVA & PARASITES, STOOL   
  
 11/13/2017 Lab:  WBC, STOOL Referral Information Referral ID Referred By Referred To  
  
 [id number] [place], [address] Violet Knox MD   
   [address] [place] [address] Drive [address] Ryan ayala, [address] Phone: [phone] Fax: [phone] Visits Status Start Date End Date 1 New Request 11/13/17 11/13/18 If your referral has a status of pending review or denied, additional information will be sent to support the outcome of this decision. Patient Instructions Lower Gastrointestinal Bleeding: Care Instructions Your Care Instructions The digestive or gastrointestinal tract goes from the mouth to the anus. It is often called the GI tract. Bleeding in the lower GI tract can happen anywhere in your small or large intestine. It can also happen in your rectum or anus. In some cases, it is caused by an infection, cancer, or inflammatory bowel disease. Or it may be caused by hemorrhoids, diverticulitis, or clotting problems. Light bleeding may not cause any symptoms at first. But if you continue to bleed for a while, you may feel very weak or tired. Sudden, heavy bleeding means you need to see a doctor right away. This kind of bleeding can be very dangerous. But it can usually be cured or controlled. The doctor may do some tests to find the cause of your bleeding. Follow-up care is a key part of your treatment and safety. Be sure to make and go to all appointments, and call your doctor if you are having problems.  It's also a good idea to know your test results and keep a list of the medicines you take. How can you care for yourself at home? · Be safe with medicines. Take your medicines exactly as prescribed. Call your doctor if you think you are having a problem with your medicine. You will get more details on the specific medicines your doctor prescribes. · Do not take aspirin or other anti-inflammatory medicines, such as naproxen (Aleve) or ibuprofen (Advil, Motrin), without talking to your doctor first. Ask your doctor if it is okay to use acetaminophen (Tylenol). · Do not drink alcohol. · The bleeding may make you lose iron. So it's important to eat foods that have a lot of iron. These include red meat, shellfish, poultry, and eggs. They also include beans, raisins, whole-grain breads, and leafy green vegetables. If you want help planning meals, you can meet with a dietitian. When should you call for help? Call 911 anytime you think you may need emergency care. For example, call if: 
? · You have sudden, severe belly pain. ? · You vomit blood or what looks like coffee grounds. ? · You passed out (lost consciousness). ? · Your stools are maroon or very bloody. ?Call your doctor now or seek immediate medical care if: 
? · You are dizzy or lightheaded, or you feel like you may faint. ? · Your stools are black and look like tar, or they have streaks of blood. ? · You have belly pain. ? · You vomit or have nausea. ? Watch closely for changes in your health, and be sure to contact your doctor if you do not get better as expected. Where can you learn more? Go to http://christine-katlin.info/. Enter V178 in the search box to learn more about \"Lower Gastrointestinal Bleeding: Care Instructions. \" Current as of: March 20, 2017 Content Version: 11.4 © 5496-4216 [employer].  Care instructions adapted under license by [employer] (which disclaims liability or warranty for this information). If you have questions about a medical condition or this instruction, always ask your healthcare professional. [address] any warranty or liability for your use of this information. Introducing [facility]! [facility] introduces Workforce Insight patient portal. Now you can access parts of your medical record, email your doctor's office, and request medication refills online. 1. In your internet browser, go to [email or web address] 2. Click on the First Time User? Click Here link in the Sign In box. You will see the New Member Sign Up page. 3. Enter your Workforce Insight Access Code exactly as it appears below. You will not need to use this code after youve completed the sign-up process. If you do not sign up before the expiration date, you must request a new code. · Workforce Insight Access Code: [email or web address] Expires: 12/20/2017  9:35 AM 
 
4. Enter the last four digits of your Social Security Number (xxxx) and Date of Birth (mm/dd/yyyy) as indicated and click Submit. You will be taken to the next sign-up page. 5. Create a Workforce Insight ID. This will be your Workforce Insight login ID and cannot be changed, so think of one that is secure and easy to remember. 6. Create a Workforce Insight password. You can change your password at any time. 7. Enter your Password Reset Question and Answer. This can be used at a later time if you forget your password. 8. Enter your e-mail address. You will receive e-mail notification when new information is available in [address] 9. Click Sign Up. You can now view and download portions of your medical record. 10. Click the Download Summary menu link to download a portable copy of your medical information. If you have questions, please visit the Frequently Asked Questions section of the Workforce Insight website. Remember, Workforce Insight is NOT to be used for urgent needs. For medical emergencies, dial 911. Now available from your iPhone and Android! Please provide this summary of care documentation to your next provider. Your primary care clinician is listed as Shreya Dean. If you have any questions after today's visit, please call [phone].

## 2017-11-13 NOTE — PROGRESS NOTES
Toi Castellano, 65 y.o.,  female    SUBJECTIVE  Routine ff-up with acute concern    Bloody watery stools about 3-4 bouts a day for the past 3 days. No abdominal pain, nausea, vomiting, constipation, fever, travel. She denies lightheadedness/fatigue, no new meds or food. Does not feel ill or sick. Had h/o colonoscopy she reports about 2-3 years ago which showed benign polyp (dr. Michelle Patel)    HTN-no cp, leg edema, compliant with meds  Hot flashes/depression- says paxil somewhat helpful, helps with mood as well  H/o chronic intermittent low back pain, known lumbar radiculopathy, followed by dr. Rick Olvera on neurontin  Allergies- more symptomatic this fall, says has been using veramyst more regularly now with good response. ROS:  See HPI, all others negative        Patient Active Problem List   Diagnosis Code    Hypertension I10    Lumbar radiculopathy M54.16    Herpes genitalis in women A60.09    Allergic rhinitis due to allergen J30.9    Hot flashes R23.2    Advance directive discussed with patient Z71.89    Facet arthropathy M12.88    Lumbar stenosis M48.061    Advance care planning Z71.89    Obesity E66.9    Mild single current episode of major depressive disorder (HCC) F32.0       Current Outpatient Prescriptions   Medication Sig Dispense Refill    lisinopril-hydroCHLOROthiazide (PRINZIDE, ZESTORETIC) 20-25 mg per tablet Take 1 Tab by mouth daily. 90 Tab 1    gabapentin (NEURONTIN) 300 mg capsule Take 3 Caps by mouth nightly. 270 Cap prn    naproxen (NAPROSYN) 500 mg tablet Take 1 Tab by mouth two (2) times daily (with meals). 180 Tab 1    fluticasone (VERAMYST) 27.5 mcg/actuation nasal spray 2 Sprays by Nasal route daily. 1 Bottle 5    triamcinolone acetonide 0.025 % lotion Apply  to affected area two (2) times a day. 60 mL 1    cyclobenzaprine (FLEXERIL) 10 mg tablet Take 1 Tab by mouth nightly. 12 Tab 0    calcium citrate-vitamin d2 250-100 mg-unit per tablet Take 1 Tab by mouth daily.  valACYclovir (VALTREX) 500 mg tablet Take 1 Tab by mouth two (2) times a day. 6 Tab 5    PATADAY 0.2 % drop ophthalmic solution Administer 1 Drop to both eyes daily.  aspirin delayed-release 81 mg tablet Take 81 mg by mouth daily.  multivitamin (ONE A DAY) tablet Take 1 Tab by mouth daily.  PARoxetine (PAXIL) 20 mg tablet Take 1 Tab by mouth daily. 90 Tab 1    montelukast (SINGULAIR) 10 mg tablet Take 1 Tab by mouth daily. 30 Tab 2       No Known Allergies    Past Medical History:   Diagnosis Date    Arthritis     Bulging lumbar disc     Degenerative arthritis of finger     MP joint right index finger    Depression     H/O colonoscopy 12/2014    recommended yearly hemoccult stools    Hypertension     Low back pain     Lower extremity pain, left     Pinched nerve        Social History     Social History    Marital status:      Spouse name: N/A    Number of children: N/A    Years of education: N/A     Occupational History    Not on file.      Social History Main Topics    Smoking status: Never Smoker    Smokeless tobacco: Never Used    Alcohol use No    Drug use: No    Sexual activity: Not Currently     Other Topics Concern    Not on file     Social History Narrative       Family History   Problem Relation Age of Onset    Cancer Mother     Ovarian Cancer Mother 68    Stroke Father          OBJECTIVE    Physical Exam:     Visit Vitals    /78 (BP 1 Location: Left arm, BP Patient Position: Sitting)    Pulse 90    Temp 98.6 °F (37 °C) (Oral)    Resp 16    Ht 5' 4\" (1.626 m)    Wt 183 lb (83 kg)    LMP  (LMP Unknown)    SpO2 96%    BMI 31.41 kg/m2       General: alert, well-appearing, in no apparent distress or pain  HEENT: normal nasal mucosa, throat, pharynx normal  CVS: normal rate, regular rhythm, distinct S1 and S2  Lungs:clear to ausculation bilaterally, no crackles, wheezing or rhonchi noted  Abdomen: normoactive bowel sounds, soft, non-tender  Rectal exam: negative without mass, lesions or tenderness, external hemorrhoids noted. Skin: warm, no lesions, rashes noted   Psych:  mood and affect normal    Results for orders placed or performed in visit on 51/61/59   METABOLIC PANEL, COMPREHENSIVE   Result Value Ref Range    Glucose 92 65 - 99 mg/dL    BUN 12 8 - 27 mg/dL    Creatinine 0.57 0.57 - 1.00 mg/dL    GFR est non- >59 mL/min/1.73    GFR est  >59 mL/min/1.73    BUN/Creatinine ratio 21 11 - 26    Sodium 140 134 - 144 mmol/L    Potassium 4.1 3.5 - 5.2 mmol/L    Chloride 100 96 - 106 mmol/L    CO2 26 18 - 29 mmol/L    Calcium 9.2 8.7 - 10.3 mg/dL    Protein, total 6.4 6.0 - 8.5 g/dL    Albumin 3.8 3.6 - 4.8 g/dL    GLOBULIN, TOTAL 2.6 1.5 - 4.5 g/dL    A-G Ratio 1.5 1.1 - 2.5    Bilirubin, total 0.6 0.0 - 1.2 mg/dL    Alk. phosphatase 59 39 - 117 IU/L    AST (SGOT) 19 0 - 40 IU/L    ALT (SGPT) 17 0 - 32 IU/L   LIPID PANEL   Result Value Ref Range    Cholesterol, total 186 100 - 199 mg/dL    Triglyceride 83 0 - 149 mg/dL    HDL Cholesterol 76 >39 mg/dL    VLDL, calculated 17 5 - 40 mg/dL    LDL, calculated 93 0 - 99 mg/dL   CVD REPORT   Result Value Ref Range    INTERPRETATION Note          ASSESSMENT/PLAN  Scott Mclaughlin was seen today for hypertension, depression, leg pain and hand pain. Diagnoses and all orders for this visit:  Scott Mclaughlin was seen today for hives, annual wellness visit, itchy eye and pressure behind the eyes. Diagnoses and all orders for this visit:    Diagnoses and all orders for this visit:    1. BRBPR (bright red blood per rectum)  Hemodynamically stable, painless, w/ h/o colon polyp  -     REFERRAL TO GASTROENTEROLOGY  check  -     CBC WITH AUTOMATED DIFF; Future  -     CULTURE, STOOL; Future  -     WBC, STOOL; Future  -     OVA & PARASITES, STOOL; Future  ED precautions discussed    2. Essential hypertension  Controlled, cont prinzide    3. Mild single current episode of major depressive disorder (HCC)  Stable, cont paxil    4. Chronic seasonal allergic rhinitis, unspecified trigger  Fair control, cont veramyst, zyrtec, advised regular use    5. BMI 31.0-31.9,adult  Encouraged wt loss/diet        Follow-up Disposition:  Return in about 2 months (around 1/13/2018), or if symptoms worsen or fail to improve. Patient understands plan of care. Patient has provided input and agrees with goals.

## 2017-11-14 LAB
BASOPHILS # BLD AUTO: 0 X10E3/UL (ref 0–0.2)
BASOPHILS NFR BLD AUTO: 0 %
EOSINOPHIL # BLD AUTO: 0 X10E3/UL (ref 0–0.4)
EOSINOPHIL NFR BLD AUTO: 1 %
ERYTHROCYTE [DISTWIDTH] IN BLOOD BY AUTOMATED COUNT: 14.7 % (ref 12.3–15.4)
HCT VFR BLD AUTO: 35.4 % (ref 34–46.6)
HGB BLD-MCNC: 12.1 G/DL (ref 11.1–15.9)
IMM GRANULOCYTES # BLD: 0 X10E3/UL (ref 0–0.1)
IMM GRANULOCYTES NFR BLD: 0 %
LYMPHOCYTES # BLD AUTO: 2.1 X10E3/UL (ref 0.7–3.1)
LYMPHOCYTES NFR BLD AUTO: 40 %
MCH RBC QN AUTO: 28.3 PG (ref 26.6–33)
MCHC RBC AUTO-ENTMCNC: 34.2 G/DL (ref 31.5–35.7)
MCV RBC AUTO: 83 FL (ref 79–97)
MONOCYTES # BLD AUTO: 0.4 X10E3/UL (ref 0.1–0.9)
MONOCYTES NFR BLD AUTO: 8 %
NEUTROPHILS # BLD AUTO: 2.6 X10E3/UL (ref 1.4–7)
NEUTROPHILS NFR BLD AUTO: 51 %
PLATELET # BLD AUTO: 202 X10E3/UL (ref 150–379)
RBC # BLD AUTO: 4.27 X10E6/UL (ref 3.77–5.28)
WBC # BLD AUTO: 5.2 X10E3/UL (ref 3.4–10.8)

## 2017-11-15 ENCOUNTER — HOSPITAL ENCOUNTER (OUTPATIENT)
Dept: LAB | Age: 62
Discharge: HOME | End: 2017-11-15

## 2017-11-15 PROCEDURE — 99001 SPECIMEN HANDLING PT-LAB: CPT | Performed by: FAMILY MEDICINE

## 2017-11-16 NOTE — PROGRESS NOTES
Patient identified with 2 identifiers (name and ). Patient aware of no anemia and awaiting stool results.

## 2017-11-19 LAB
CAMPYLOBACTER STL CULT: NORMAL
E COLI SXT STL QL IA: NEGATIVE
O+P SPEC MICRO: NORMAL
SALM + SHIG STL CULT: NORMAL
WBC STL QL MICRO: NORMAL

## 2017-12-04 RX ORDER — TRIAMCINOLONE ACETONIDE 0.25 MG/ML
LOTION TOPICAL 2 TIMES DAILY
Qty: 60 ML | Refills: 1 | Status: SHIPPED | OUTPATIENT
Start: 2017-12-04 | End: 2018-03-02 | Stop reason: SDUPTHER

## 2017-12-04 NOTE — TELEPHONE ENCOUNTER
This patient contacted office for the following prescriptions to be filled:    Medication requested :   Requested Prescriptions     Pending Prescriptions Disp Refills    triamcinolone acetonide 0.025 % lotion 60 mL 1     Sig: Apply  to affected area two (2) times a day.      PCP: [address] [place] [address] or Print: [facility]   Mail order or Local pharmacy [address]    Scheduled appointment if not seen by current providers in office:  LOV 11/13/2017 f/u 1/16/2018

## 2017-12-15 ENCOUNTER — HOSPITAL ENCOUNTER (OUTPATIENT)
Dept: MAMMOGRAPHY | Age: 62
Discharge: HOME | End: 2017-12-15
Attending: FAMILY MEDICINE
Payer: MEDICARE

## 2017-12-15 DIAGNOSIS — Z12.31 VISIT FOR SCREENING MAMMOGRAM: ICD-10-CM

## 2017-12-15 PROCEDURE — 77063 BREAST TOMOSYNTHESIS BI: CPT

## 2018-01-09 NOTE — TELEPHONE ENCOUNTER
This patient contacted office for the following prescriptions to be filled:    Medication requested :  Requested Prescriptions     Pending Prescriptions Disp Refills    fluticasone (VERAMYST) 27.5 mcg/actuation nasal spray 1 Bottle 5     Si Sprays by Nasal route daily.  PARoxetine (PAXIL) 20 mg tablet 90 Tab 1     Sig: Take 1 Tab by mouth daily. PCP: gladys    Pharmacy or Print: [facility]    Mail order or Local pharmacy [phone]    Scheduled appointment if not seen by current providers in office: lov 17 ucv 18      Pt states her insurance will not cover the veramyst is there another option for that med.

## 2018-01-11 RX ORDER — PAROXETINE HYDROCHLORIDE 20 MG/1
20 TABLET, FILM COATED ORAL DAILY
Qty: 90 TAB | Refills: 1 | Status: SHIPPED | OUTPATIENT
Start: 2018-01-11 | End: 2018-07-10 | Stop reason: SDUPTHER

## 2018-01-16 ENCOUNTER — OFFICE VISIT (OUTPATIENT)
Dept: FAMILY MEDICINE CLINIC | Age: 63
End: 2018-01-16

## 2018-01-16 VITALS
BODY MASS INDEX: 31.58 KG/M2 | WEIGHT: 185 LBS | HEART RATE: 81 BPM | SYSTOLIC BLOOD PRESSURE: 110 MMHG | DIASTOLIC BLOOD PRESSURE: 74 MMHG | TEMPERATURE: 98.3 F | HEIGHT: 64 IN | RESPIRATION RATE: 16 BRPM | OXYGEN SATURATION: 98 %

## 2018-01-16 DIAGNOSIS — F32.0 MILD SINGLE CURRENT EPISODE OF MAJOR DEPRESSIVE DISORDER (HCC): Primary | ICD-10-CM

## 2018-01-16 DIAGNOSIS — R23.2 HOT FLASHES: ICD-10-CM

## 2018-01-16 DIAGNOSIS — J30.2 CHRONIC SEASONAL ALLERGIC RHINITIS, UNSPECIFIED TRIGGER: ICD-10-CM

## 2018-01-16 DIAGNOSIS — I10 ESSENTIAL HYPERTENSION: ICD-10-CM

## 2018-01-16 NOTE — PROGRESS NOTES
Chief Complaint   Patient presents with    Hypertension    Depression    Referral Follow Up     did not see GI/ no more rectal bleeding

## 2018-01-16 NOTE — MR AVS SNAPSHOT
[address] [facility] [address] [facility], [place] 
[phone] Patient: Lidia Schaefer MRN: [id number] MHQ:[id number] Visit Information Date & Time Provider Department Dept. Phone Encounter #  
 1/16/2018  1:15 PM Spike Vickey, [address] [facility] [id number] Follow-up Instructions Return in about 3 months (around 4/16/2018), or if symptoms worsen or fail to improve. Your Appointments 1/25/2018  9:20 AM  
Follow Up with Andre Perla NP  
[facility] ([address] [place] [address]) Appt Note: 3 mo f/u  
 [address] [facility] [address]  
[phone]  
  
   
 [address] [facility] [address] Upcoming Health Maintenance Date Due COLONOSCOPY 11/1/2019 BREAST CANCER SCRN MAMMOGRAM 12/15/2019 DTaP/Tdap/Td series (2 - Td) 7/10/2025 Allergies as of 1/16/2018  Review Complete On: 1/16/2018 By: Gil Ramirez LPN No Known Allergies Current Immunizations  Reviewed on 11/4/2015 Name Date Influenza Vaccine (Quad) PF 9/21/2017, 1/10/2017, 11/4/2015 Tdap 7/10/2015 Not reviewed this visit You Were Diagnosed With   
  
 Codes Comments Mild single current episode of major depressive disorder [facility]    -  Primary ICD-10-CM: F32.0 ICD-9-CM: 296.21 BMI 31.0-31.9,adult     ICD-10-CM: Z68.31 
ICD-9-CM: V85.31 Hot flashes     ICD-10-CM: R23.2 ICD-9-CM: 782.62 Chronic seasonal allergic rhinitis, unspecified trigger     ICD-10-CM: J30.2 ICD-9-CM: 477.8 Essential hypertension     ICD-10-CM: I10 
ICD-9-CM: 401.9 Vitals BP Pulse Temp Resp Height(growth percentile) Weight(growth percentile) 110/74 (BP 1 Location: Left arm, BP Patient Position: Sitting) 81 98.3 °F (36.8 °C) (Oral) 16 5' 4\" (1.626 m) 185 lb (83.9 kg) LMP SpO2 BMI OB Status Smoking Status (LMP Unknown) 98% 31.76 kg/m2 Hysterectomy Never Smoker Vitals History BMI and BSA Data Body Mass Index Body Surface Area 31.76 kg/m 2 1.95 m 2 Preferred Pharmacy Pharmacy Name Phone Soren Gonsalves [address], [address] [facility] Your Updated Medication List  
  
   
This list is accurate as of: 1/16/18  1:35 PM.  Always use your most recent med list.  
  
  
  
  
 aspirin delayed-release 81 mg tablet Take 81 mg by mouth daily. calcium citrate-vitamin d2 250-100 mg-unit per tablet Take 1 Tab by mouth daily. cyclobenzaprine 10 mg tablet Commonly known as:  FLEXERIL Take 1 Tab by mouth nightly. fluticasone 27.5 mcg/actuation nasal spray Commonly known as:  VERAMYST  
2 Sprays by Nasal route daily. gabapentin 300 mg capsule Commonly known as:  NEURONTIN Take 3 Caps by mouth nightly. lisinopril-hydroCHLOROthiazide 20-25 mg per tablet Commonly known as:  Nadine Michelle Take 1 Tab by mouth daily. montelukast 10 mg tablet Commonly known as:  SINGULAIR Take 1 Tab by mouth daily. multivitamin tablet Commonly known as:  ONE A DAY Take 1 Tab by mouth daily. naproxen 500 mg tablet Commonly known as:  NAPROSYN Take 1 Tab by mouth two (2) times daily (with meals). PARoxetine 20 mg tablet Commonly known as:  PAXIL Take 1 Tab by mouth daily. PATADAY 0.2 % Drop ophthalmic solution Generic drug:  olopatadine Administer 1 Drop to both eyes daily. triamcinolone acetonide 0.025 % lotion Apply  to affected area two (2) times a day. valACYclovir 500 mg tablet Commonly known as:  VALTREX Take 1 Tab by mouth two (2) times a day. Follow-up Instructions Return in about 3 months (around 4/16/2018), or if symptoms worsen or fail to improve. To-Do List   
 01/16/2018 Lab:  LIPID PANEL   
  
 01/16/2018 Lab: METABOLIC PANEL, COMPREHENSIVE Patient Instructions DASH Diet: Care Instructions Your Care Instructions The DASH diet is an eating plan that can help lower your blood pressure. DASH stands for Dietary Approaches to Stop Hypertension. Hypertension is high blood pressure. The DASH diet focuses on eating foods that are high in calcium, potassium, and magnesium. These nutrients can lower blood pressure. The foods that are highest in these nutrients are fruits, vegetables, low-fat dairy products, nuts, seeds, and legumes. But taking calcium, potassium, and magnesium supplements instead of eating foods that are high in those nutrients does not have the same effect. The DASH diet also includes whole grains, fish, and poultry. The DASH diet is one of several lifestyle changes your doctor may recommend to lower your high blood pressure. Your doctor may also want you to decrease the amount of sodium in your diet. Lowering sodium while following the DASH diet can lower blood pressure even further than just the DASH diet alone. Follow-up care is a key part of your treatment and safety. Be sure to make and go to all appointments, and call your doctor if you are having problems. It's also a good idea to know your test results and keep a list of the medicines you take. How can you care for yourself at home? Following the DASH diet · Eat 4 to 5 servings of fruit each day. A serving is 1 medium-sized piece of fruit, ½ cup chopped or canned fruit, 1/4 cup dried fruit, or 4 ounces (½ cup) of fruit juice. Choose fruit more often than fruit juice. · Eat 4 to 5 servings of vegetables each day. A serving is 1 cup of lettuce or raw leafy vegetables, ½ cup of chopped or cooked vegetables, or 4 ounces (½ cup) of vegetable juice. Choose vegetables more often than vegetable juice. · Get 2 to 3 servings of low-fat and fat-free dairy each day.  A serving is 8 ounces of milk, 1 cup of yogurt, or 1 ½ ounces of cheese. · Eat 6 to 8 servings of grains each day. A serving is 1 slice of bread, 1 ounce of dry cereal, or ½ cup of cooked rice, pasta, or cooked cereal. Try to choose whole-grain products as much as possible. · Limit lean meat, poultry, and fish to 2 servings each day. A serving is 3 ounces, about the size of a deck of cards. · Eat 4 to 5 servings of nuts, seeds, and legumes (cooked dried beans, lentils, and split peas) each week. A serving is 1/3 cup of nuts, 2 tablespoons of seeds, or ½ cup of cooked beans or peas. · Limit fats and oils to 2 to 3 servings each day. A serving is 1 teaspoon of vegetable oil or 2 tablespoons of salad dressing. · Limit sweets and added sugars to 5 servings or less a week. A serving is 1 tablespoon jelly or jam, ½ cup sorbet, or 1 cup of lemonade. · Eat less than 2,300 milligrams (mg) of sodium a day. If you limit your sodium to 1,500 mg a day, you can lower your blood pressure even more. Tips for success · Start small. Do not try to make dramatic changes to your diet all at once. You might feel that you are missing out on your favorite foods and then be more likely to not follow the plan. Make small changes, and stick with them. Once those changes become habit, add a few more changes. · Try some of the following: ¨ Make it a goal to eat a fruit or vegetable at every meal and at snacks. This will make it easy to get the recommended amount of fruits and vegetables each day. ¨ Try yogurt topped with fruit and nuts for a snack or healthy dessert. ¨ Add lettuce, tomato, cucumber, and onion to sandwiches. ¨ Combine a ready-made pizza crust with low-fat mozzarella cheese and lots of vegetable toppings. Try using tomatoes, squash, spinach, broccoli, carrots, cauliflower, and onions. ¨ Have a variety of cut-up vegetables with a low-fat dip as an appetizer instead of chips and dip. ¨ Sprinkle sunflower seeds or chopped almonds over salads. Or try adding chopped walnuts or almonds to cooked vegetables. ¨ Try some vegetarian meals using beans and peas. Add garbanzo or kidney beans to salads. Make burritos and tacos with mashed mccracken beans or black beans. Where can you learn more? Go to http://christine-katlin.info/. Enter X471 in the search box to learn more about \"DASH Diet: Care Instructions. \" Current as of: September 21, 2016 Content Version: 11.4 © 4860-8492 [employer]. Care instructions adapted under license by [employer] (which disclaims liability or warranty for this information). If you have questions about a medical condition or this instruction, always ask your healthcare professional. [address] any warranty or liability for your use of this information. Introducing [facility]! Rehan Watson introduces Roseonly patient portal. Now you can access parts of your medical record, email your doctor's office, and request medication refills online. 1. In your internet browser, go to [email or web address] 2. Click on the First Time User? Click Here link in the Sign In box. You will see the New Member Sign Up page. 3. Enter your Roseonly Access Code exactly as it appears below. You will not need to use this code after youve completed the sign-up process. If you do not sign up before the expiration date, you must request a new code. · Roseonly Access Code: [email or web address] Expires: 4/16/2018  1:35 PM 
 
4. Enter the last four digits of your Social Security Number (xxxx) and Date of Birth (mm/dd/yyyy) as indicated and click Submit. You will be taken to the next sign-up page. 5. Create a EPV [employer]AR[employer] ID. This will be your Roseonly login ID and cannot be changed, so think of one that is secure and easy to remember. 6. Create a [employer] password. You can change your password at any time. 7. Enter your Password Reset Question and Answer. This can be used at a later time if you forget your password. 8. Enter your e-mail address. You will receive e-mail notification when new information is available in [address] 9. Click Sign Up. You can now view and download portions of your medical record. 10. Click the Download Summary menu link to download a portable copy of your medical information. If you have questions, please visit the Frequently Asked Questions section of the [email or web address] website. Remember, [email or web address] is NOT to be used for urgent needs. For medical emergencies, dial 911. Now available from your iPhone and Android! Please provide this summary of care documentation to your next provider. Your primary care clinician is listed as Brutus Sandifer. If you have any questions after today's visit, please call [phone].

## 2018-01-16 NOTE — PROGRESS NOTES
Sara Palafox, 65 y.o.,  female    SUBJECTIVE  Ff-up    HTN- taking meds without problems. Intends to start daily walking regimen   Depression/hot flashes- says doing well for the most part, taking paxil. Rectal bleeding has resolved. Isolated incident after spicy meal.  FIT test negative. Colonoscopy normal per pt 2-3 years ago dr. Pilar Haas. Chronic LBP- on neurontin, following dr. Louis Patel  ROS:  See HPI, all others negative        Patient Active Problem List   Diagnosis Code    Hypertension I10    Lumbar radiculopathy M54.16    Herpes genitalis in women A60.09    Allergic rhinitis due to allergen J30.9    Hot flashes R23.2    Advance directive discussed with patient Z71.89    Facet arthropathy M12.88    Lumbar stenosis M48.061    Advance care planning Z71.89    Obesity E66.9    Mild single current episode of major depressive disorder (HCC) F32.0       Current Outpatient Prescriptions   Medication Sig Dispense Refill    PARoxetine (PAXIL) 20 mg tablet Take 1 Tab by mouth daily. 90 Tab 1    triamcinolone acetonide 0.025 % lotion Apply  to affected area two (2) times a day. 60 mL 1    lisinopril-hydroCHLOROthiazide (PRINZIDE, ZESTORETIC) 20-25 mg per tablet Take 1 Tab by mouth daily. 90 Tab 1    gabapentin (NEURONTIN) 300 mg capsule Take 3 Caps by mouth nightly. 270 Cap prn    calcium citrate-vitamin d2 250-100 mg-unit per tablet Take 1 Tab by mouth daily.  valACYclovir (VALTREX) 500 mg tablet Take 1 Tab by mouth two (2) times a day. 6 Tab 5    PATADAY 0.2 % drop ophthalmic solution Administer 1 Drop to both eyes daily.  aspirin delayed-release 81 mg tablet Take 81 mg by mouth daily.  multivitamin (ONE A DAY) tablet Take 1 Tab by mouth daily.  fluticasone (VERAMYST) 27.5 mcg/actuation nasal spray 2 Sprays by Nasal route daily. 1 Bottle 5    naproxen (NAPROSYN) 500 mg tablet Take 1 Tab by mouth two (2) times daily (with meals).  180 Tab 1    cyclobenzaprine (FLEXERIL) 10 mg tablet Take 1 Tab by mouth nightly. 12 Tab 0    montelukast (SINGULAIR) 10 mg tablet Take 1 Tab by mouth daily. 30 Tab 2       No Known Allergies    Past Medical History:   Diagnosis Date    Arthritis     Bulging lumbar disc     Degenerative arthritis of finger     MP joint right index finger    Depression     H/O colonoscopy 12/2014    recommended yearly hemoccult stools    Hypertension     Low back pain     Lower extremity pain, left     Pinched nerve        Social History     Social History    Marital status:      Spouse name: N/A    Number of children: N/A    Years of education: N/A     Occupational History    Not on file. Social History Main Topics    Smoking status: Never Smoker    Smokeless tobacco: Never Used    Alcohol use No    Drug use: No    Sexual activity: Not Currently     Other Topics Concern    Not on file     Social History Narrative       Family History   Problem Relation Age of Onset    Cancer Mother     Ovarian Cancer Mother 68    Stroke Father          OBJECTIVE    Physical Exam:     Visit Vitals    /74 (BP 1 Location: Left arm, BP Patient Position: Sitting)    Pulse 81    Temp 98.3 °F (36.8 °C) (Oral)    Resp 16    Ht 5' 4\" (1.626 m)    Wt 185 lb (83.9 kg)    LMP  (LMP Unknown)    SpO2 98%    BMI 31.76 kg/m2       General: alert, well-appearing, AA, in no apparent distress or pain  Neck: supple, no adenopathy palpated  CVS: normal rate, regular rhythm, distinct S1 and S2  Lungs:clear to ausculation bilaterally, no crackles, wheezing or rhonchi noted  Abdomen: normoactive bowel sounds, soft, non-tender  Extremities: no edema, no cyanosis,  Skin: warm, no lesions, rashes noted  Psych:  mood and affect normal        ASSESSMENT/PLAN  Diagnoses and all orders for this visit:    1. Mild single current episode of major depressive disorder (HCC)  Stable, cont paxil    2. BMI 31.0-31.9,adult  encouraged wt loss/ exercise     3.  Hot flashes  Benefiting from paxil    4. Chronic seasonal allergic rhinitis, unspecified trigger  Weather changes trigger  Cont prn veramyst, zyrtec    5. Essential hypertension  Controlled, cont lisinopril/hctz  -     METABOLIC PANEL, COMPREHENSIVE; Future  -     LIPID PANEL; Future    Follow-up Disposition:  Return in about 3 months (around 4/16/2018), or if symptoms worsen or fail to improve. Patient understands plan of care. Patient has provided input and agrees with goals.

## 2018-01-16 NOTE — PATIENT INSTRUCTIONS

## 2018-01-25 ENCOUNTER — OFFICE VISIT (OUTPATIENT)
Dept: ORTHOPEDIC SURGERY | Age: 63
End: 2018-01-25

## 2018-01-25 VITALS
HEART RATE: 85 BPM | TEMPERATURE: 96.6 F | DIASTOLIC BLOOD PRESSURE: 78 MMHG | HEIGHT: 64 IN | WEIGHT: 185 LBS | BODY MASS INDEX: 31.58 KG/M2 | SYSTOLIC BLOOD PRESSURE: 122 MMHG

## 2018-01-25 DIAGNOSIS — M47.816 LUMBAR FACET ARTHROPATHY: ICD-10-CM

## 2018-01-25 DIAGNOSIS — M48.062 SPINAL STENOSIS OF LUMBAR REGION WITH NEUROGENIC CLAUDICATION: Primary | ICD-10-CM

## 2018-01-25 RX ORDER — GABAPENTIN 300 MG/1
900 CAPSULE ORAL
Qty: 270 CAP | Refills: 1 | Status: SHIPPED | OUTPATIENT
Start: 2018-01-25 | End: 2019-02-21 | Stop reason: ALTCHOICE

## 2018-01-25 NOTE — MR AVS SNAPSHOT
[address] [facility] [facility] [address] 
[phone] Patient: Seven Man MRN: [id number] Visit Information Date & Time Provider Department Dept. Phone Encounter #  
 1/25/2018  9:20 AM Madhu Limon NP [facility] [phone] [id number] Follow-up Instructions Return in about 6 months (around 7/25/2018) for with Dr. Fredy Jaffe. Follow-up and Disposition History Your Appointments 4/16/2018 10:00 AM  
FOLLOW UP EXAM with Marino Mclaughlin MD  
[facility] ([facility]) Appt Note: routine f/u [address] [place] [address] Drive [address] [facility] [address]  
[address] [facility] 200 [facility] Upcoming Health Maintenance Date Due COLONOSCOPY 11/1/2019 BREAST CANCER SCRN MAMMOGRAM 12/15/2019 DTaP/Tdap/Td series (2 - Td) 7/10/2025 Allergies as of 1/25/2018  Review Complete On: 1/25/2018 By: Madhu Limon NP No Known Allergies Current Immunizations  Reviewed on 11/4/2015 Name Date Influenza Vaccine (Quad) PF 9/21/2017, 1/10/2017, 11/4/2015 Tdap 7/10/2015 Not reviewed this visit You Were Diagnosed With   
  
 Codes Comments Spinal stenosis of lumbar region with neurogenic claudication    -  Primary ICD-10-CM: K45.983 
ICD-9-CM: 724.03 Lumbar facet arthropathy     ICD-10-CM: M12.88 ICD-9-CM: 721.3 Vitals BP Pulse Temp Height(growth percentile) Weight(growth percentile) LMP  
 122/78 85 96.6 °F (35.9 °C) (Tympanic) 5' 4\" (1.626 m) 185 lb (83.9 kg) (LMP Unknown) BMI OB Status Smoking Status 31.76 kg/m2 Hysterectomy Never Smoker BMI and BSA Data Body Mass Index Body Surface Area 31.76 kg/m 2 1.95 m 2 Preferred Pharmacy Pharmacy Name Phone [address] [facility] [address] [facility] Your Updated Medication List  
  
   
This list is accurate as of: 1/25/18  9:45 AM.  Always use your most recent med list.  
  
  
  
  
 aspirin delayed-release 81 mg tablet Take 81 mg by mouth daily. calcium citrate-vitamin d2 250-100 mg-unit per tablet Take 1 Tab by mouth daily. cyclobenzaprine 10 mg tablet Commonly known as:  FLEXERIL Take 1 Tab by mouth nightly. fluticasone 27.5 mcg/actuation nasal spray Commonly known as:  VERAMYST  
2 Sprays by Nasal route daily. gabapentin 300 mg capsule Commonly known as:  NEURONTIN Take 3 Caps by mouth nightly. Indications: NEUROPATHIC PAIN  
  
 lisinopril-hydroCHLOROthiazide 20-25 mg per tablet Commonly known as:  Lashawn Awkward Take 1 Tab by mouth daily. montelukast 10 mg tablet Commonly known as:  SINGULAIR Take 1 Tab by mouth daily. multivitamin tablet Commonly known as:  ONE A DAY Take 1 Tab by mouth daily. naproxen 500 mg tablet Commonly known as:  NAPROSYN Take 1 Tab by mouth two (2) times daily (with meals). PARoxetine 20 mg tablet Commonly known as:  PAXIL Take 1 Tab by mouth daily. PATADAY 0.2 % Drop ophthalmic solution Generic drug:  olopatadine Administer 1 Drop to both eyes daily. triamcinolone acetonide 0.025 % lotion Apply  to affected area two (2) times a day. valACYclovir 500 mg tablet Commonly known as:  VALTREX Take 1 Tab by mouth two (2) times a day. Prescriptions Printed Refills  
 gabapentin (NEURONTIN) 300 mg capsule 1 Sig: Take 3 Caps by mouth nightly. Indications: NEUROPATHIC PAIN Class: Print Route: Oral  
  
Follow-up Instructions Return in about 6 months (around 7/25/2018) for with Dr. Jose Daniel Grajeda. Patient Instructions Back Pain: Care Instructions Your Care Instructions Back pain has many possible causes. It is often related to problems with muscles and ligaments of the back. It may also be related to problems with the nerves, discs, or bones of the back. Moving, lifting, standing, sitting, or sleeping in an awkward way can strain the back. Sometimes you don't notice the injury until later. Arthritis is another common cause of back pain. Although it may hurt a lot, back pain usually improves on its own within several weeks. Most people recover in 12 weeks or less. Using good home treatment and being careful not to stress your back can help you feel better sooner. Follow-up care is a key part of your treatment and safety. Be sure to make and go to all appointments, and call your doctor if you are having problems. It's also a good idea to know your test results and keep a list of the medicines you take. How can you care for yourself at home? · Sit or lie in positions that are most comfortable and reduce your pain. Try one of these positions when you lie down: ¨ Lie on your back with your knees bent and supported by large pillows. ¨ Lie on the floor with your legs on the seat of a sofa or chair. Gallito Kurk on your side with your knees and hips bent and a pillow between your legs. ¨ Lie on your stomach if it does not make pain worse. · Do not sit up in bed, and avoid soft couches and twisted positions. Bed rest can help relieve pain at first, but it delays healing. Avoid bed rest after the first day of back pain. · Change positions every 30 minutes. If you must sit for long periods of time, take breaks from sitting. Get up and walk around, or lie in a comfortable position. · Try using a heating pad on a low or medium setting for 15 to 20 minutes every 2 or 3 hours. Try a warm shower in place of one session with the heating pad. · You can also try an ice pack for 10 to 15 minutes every 2 to 3 hours. Put a thin cloth between the ice pack and your skin. · Take pain medicines exactly as directed. ¨ If the doctor gave you a prescription medicine for pain, take it as prescribed. ¨ If you are not taking a prescription pain medicine, ask your doctor if you can take an over-the-counter medicine. · Take short walks several times a day. You can start with 5 to 10 minutes, 3 or 4 times a day, and work up to longer walks. Walk on level surfaces and avoid hills and stairs until your back is better. · Return to work and other activities as soon as you can. Continued rest without activity is usually not good for your back. · To prevent future back pain, do exercises to stretch and strengthen your back and stomach. Learn how to use good posture, safe lifting techniques, and proper body mechanics. When should you call for help? Call your doctor now or seek immediate medical care if: 
? · You have new or worsening numbness in your legs. ? · You have new or worsening weakness in your legs. (This could make it hard to stand up.) ? · You lose control of your bladder or bowels. ? Watch closely for changes in your health, and be sure to contact your doctor if: 
? · Your pain gets worse. ? · You are not getting better after 2 weeks. Where can you learn more? Go to http://christine-katlin.info/. Enter O005 in the search box to learn more about \"Back Pain: Care Instructions. \" Current as of: March 21, 2017 Content Version: 11.4 © 8019-6943 [employer]. Care instructions adapted under license by [employer] (which disclaims liability or warranty for this information). If you have questions about a medical condition or this instruction, always ask your healthcare professional. [address] any warranty or liability for your use of this information. Patient Instructions History Introducing [facility]!    
 [facility] [employer] introduces [employer] patient portal. Now you can access parts of your medical record, email your doctor's office, and request medication refills online. 1. In your internet browser, go to [email or web address] 2. Click on the First Time User? Click Here link in the Sign In box. You will see the New Member Sign Up page. 3. Enter your [employer] Access Code exactly as it appears below. You will not need to use this code after youve completed the sign-up process. If you do not sign up before the expiration date, you must request a new code. · [employer] Access Code: [email or web address] Expires: 4/16/2018  1:35 PM 
 
4. Enter the last four digits of your Social Security Number (xxxx) and Date of Birth (mm/dd/yyyy) as indicated and click Submit. You will be taken to the next sign-up page. 5. Create a [employer] ID. This will be your [employer] login ID and cannot be changed, so think of one that is secure and easy to remember. 6. Create a [employer] password. You can change your password at any time. 7. Enter your Password Reset Question and Answer. This can be used at a later time if you forget your password. 8. Enter your e-mail address. You will receive e-mail notification when new information is available in [address] 9. Click Sign Up. You can now view and download portions of your medical record. 10. Click the Download Summary menu link to download a portable copy of your medical information. If you have questions, please visit the Frequently Asked Questions section of the [employer] website. Remember, [employer] is NOT to be used for urgent needs. For medical emergencies, dial 911. Now available from your iPhone and Android! Please provide this summary of care documentation to your next provider. Your primary care clinician is listed as Tova Hwang. If you have any questions after today's visit, please call [phone].

## 2018-01-25 NOTE — PATIENT INSTRUCTIONS

## 2018-01-25 NOTE — PROGRESS NOTES
Chief complaint/History of Present Illness:  Chief Complaint   Patient presents with    Back Pain     follow up    Hip Pain     left side     HPI  Abel Salamanca is a  65 y.o.  female      HISTORY OF PRESENT ILLNESS:  The patient comes in today for follow up of her chronic low back pain. She is still getting pain in the left lower extremity down to her calf. Her pain is increased with cold weather. She has good and bad days. She denies fever and bowel or bladder dysfunction. She is on Social Security Disability. She is a nonsmoker. She denies new medical issues. PHYSICAL EXAM:  Ms. Danielle Oconnell is 72-year-old female. She is alert and oriented. She has a normal mood and affect. She has a full weightbearing, non-antalgic gait. She has 4/5 strength of the bilateral lower extremities and negative straight leg raise. She does have pain with hyperextension of the lumbar spine. She has a stiff gait. ASSESSENT/PLAN:  This is a patient with facet arthropathy, scattered listhesis that gives her back pain and radiating left leg pain. We did discuss injections and RF again. She still does not want to try that. She wants to continue her Naproxen 500 mg twice a day and Gabapentin 300 mg three capsules at night. I have refilled the Gabapentin. She has enough Naproxen. She knows to take Naproxen with food. We will see her back in six months or sooner if needed.        Review of systems:    Past Medical History:   Diagnosis Date    Arthritis     Bulging lumbar disc     Degenerative arthritis of finger     MP joint right index finger    Depression     H/O colonoscopy 12/2014    recommended yearly hemoccult stools    Hypertension     Low back pain     Lower extremity pain, left     Pinched nerve      Past Surgical History:   Procedure Laterality Date    HX CARPAL TUNNEL RELEASE Right     hand    HX HYSTERECTOMY      HX ORTHOPAEDIC       Social History     Social History    Marital status:      Spouse name: N/A    Number of children: N/A    Years of education: N/A     Occupational History    Not on file. Social History Main Topics    Smoking status: Never Smoker    Smokeless tobacco: Never Used    Alcohol use No    Drug use: No    Sexual activity: Not Currently     Other Topics Concern    Not on file     Social History Narrative     Family History   Problem Relation Age of Onset    Cancer Mother     Ovarian Cancer Mother 68    Stroke Father        Physical Exam:  Visit Vitals    /78    Pulse 85    Temp 96.6 °F (35.9 °C) (Tympanic)    Ht 5' 4\" (1.626 m)    Wt 185 lb (83.9 kg)    LMP  (LMP Unknown)    BMI 31.76 kg/m2     Pain Scale: 2/10        has been . reviewed and is appropriate    Pt has a consistent UDS in the record      Diagnoses and all orders for this visit:    1. Spinal stenosis of lumbar region with neurogenic claudication    2. Lumbar facet arthropathy  -     gabapentin (NEURONTIN) 300 mg capsule; Take 3 Caps by mouth nightly. Indications: NEUROPATHIC PAIN            Follow-up Disposition:  Return in about 6 months (around 7/25/2018) for with Dr. Bola Contreras.         We have informed Ilana Coker to notify us for immediate appointment if she has any worsening neurogical symptoms or if an emergency situation presents, then call 911

## 2018-02-12 ENCOUNTER — HOSPITAL ENCOUNTER (OUTPATIENT)
Dept: LAB | Age: 63
Discharge: HOME | End: 2018-02-12

## 2018-02-12 PROCEDURE — 99001 SPECIMEN HANDLING PT-LAB: CPT | Performed by: FAMILY MEDICINE

## 2018-02-13 LAB
ALBUMIN SERPL-MCNC: 4.3 G/DL (ref 3.6–4.8)
ALBUMIN/GLOB SERPL: 1.6 {RATIO} (ref 1.2–2.2)
ALP SERPL-CCNC: 72 IU/L (ref 39–117)
ALT SERPL-CCNC: 15 IU/L (ref 0–32)
AST SERPL-CCNC: 17 IU/L (ref 0–40)
BILIRUB SERPL-MCNC: 0.5 MG/DL (ref 0–1.2)
BUN SERPL-MCNC: 14 MG/DL (ref 8–27)
BUN/CREAT SERPL: 20 (ref 12–28)
CALCIUM SERPL-MCNC: 9.6 MG/DL (ref 8.7–10.3)
CHLORIDE SERPL-SCNC: 97 MMOL/L (ref 96–106)
CHOLEST SERPL-MCNC: 204 MG/DL (ref 100–199)
CO2 SERPL-SCNC: 30 MMOL/L (ref 18–29)
CREAT SERPL-MCNC: 0.69 MG/DL (ref 0.57–1)
GFR SERPLBLD CREATININE-BSD FMLA CKD-EPI: 109 ML/MIN/1.73
GFR SERPLBLD CREATININE-BSD FMLA CKD-EPI: 94 ML/MIN/1.73
GLOBULIN SER CALC-MCNC: 2.7 G/DL (ref 1.5–4.5)
GLUCOSE SERPL-MCNC: 101 MG/DL (ref 65–99)
HDLC SERPL-MCNC: 76 MG/DL
INTERPRETATION, 910389: NORMAL
LDLC SERPL CALC-MCNC: 113 MG/DL (ref 0–99)
POTASSIUM SERPL-SCNC: 4.1 MMOL/L (ref 3.5–5.2)
PROT SERPL-MCNC: 7 G/DL (ref 6–8.5)
SODIUM SERPL-SCNC: 141 MMOL/L (ref 134–144)
TRIGL SERPL-MCNC: 75 MG/DL (ref 0–149)
VLDLC SERPL CALC-MCNC: 15 MG/DL (ref 5–40)

## 2018-02-27 NOTE — PROGRESS NOTES
Borderline high cholesterol and glucose level, cont to work on diet/ wt loss  Monitoring, pls notify pt  (Calc cv risk 5.2% vs 3.4%)

## 2018-03-02 RX ORDER — TRIAMCINOLONE ACETONIDE 0.25 MG/ML
LOTION TOPICAL 2 TIMES DAILY
Qty: 60 ML | Refills: 1 | Status: SHIPPED | OUTPATIENT
Start: 2018-03-02 | End: 2018-06-07 | Stop reason: SDUPTHER

## 2018-03-02 NOTE — TELEPHONE ENCOUNTER
This patient contacted office for the following prescriptions to be filled:    Medication requested :   Requested Prescriptions     Pending Prescriptions Disp Refills    triamcinolone acetonide 0.025 % lotion 60 mL 1     Sig: Apply  to affected area two (2) times a day.      PCP: [address] [place] [address] or Print:[facility]   Mail order or Local pharmacy [address]     Scheduled appointment if not seen by current providers in office: LOV 1/16/2018 f/u 4/16/2018

## 2018-03-06 NOTE — PROGRESS NOTES
Patient identified with 2 identifiers (name and ).   Patient aware of borderline elevated cholesterol and glucose levels advised on diet and wt loss

## 2018-04-16 ENCOUNTER — OFFICE VISIT (OUTPATIENT)
Dept: FAMILY MEDICINE CLINIC | Age: 63
End: 2018-04-16

## 2018-04-16 VITALS
RESPIRATION RATE: 16 BRPM | BODY MASS INDEX: 31.24 KG/M2 | DIASTOLIC BLOOD PRESSURE: 78 MMHG | HEART RATE: 83 BPM | HEIGHT: 64 IN | OXYGEN SATURATION: 98 % | WEIGHT: 183 LBS | SYSTOLIC BLOOD PRESSURE: 136 MMHG | TEMPERATURE: 98.3 F

## 2018-04-16 DIAGNOSIS — I10 ESSENTIAL HYPERTENSION: Primary | ICD-10-CM

## 2018-04-16 DIAGNOSIS — F32.0 MILD SINGLE CURRENT EPISODE OF MAJOR DEPRESSIVE DISORDER (HCC): ICD-10-CM

## 2018-04-16 DIAGNOSIS — J30.2 SEASONAL ALLERGIC RHINITIS, UNSPECIFIED TRIGGER: ICD-10-CM

## 2018-04-16 NOTE — PROGRESS NOTES
Yuliana Steve, 65 y.o.,  female    SUBJECTIVE  Ff-up    HTN- taking meds without problems. Intends to start daily walking regimen. Reviewed labs. Depression/hot flashes- says doing well for the most part, taking paxil. Chronic LBP- on neurontin, following dr. Debbie GORMAN:  See HPI, all others negative        Patient Active Problem List   Diagnosis Code    Hypertension I10    Lumbar radiculopathy M54.16    Herpes genitalis in women A60.09    Allergic rhinitis due to allergen J30.9    Hot flashes R23.2    Advance directive discussed with patient Z71.89    Facet arthropathy ([facility] [address] M46.90    Lumbar stenosis M48.061    Advance care planning Z71.89    Obesity E66.9    Mild single current episode of major depressive disorder (HCC) F32.0       Current Outpatient Prescriptions   Medication Sig Dispense Refill    triamcinolone acetonide 0.025 % lotion Apply  to affected area two (2) times a day. 60 mL 1    gabapentin (NEURONTIN) 300 mg capsule Take 3 Caps by mouth nightly. Indications: NEUROPATHIC PAIN 270 Cap 1    PARoxetine (PAXIL) 20 mg tablet Take 1 Tab by mouth daily. 90 Tab 1    lisinopril-hydroCHLOROthiazide (PRINZIDE, ZESTORETIC) 20-25 mg per tablet Take 1 Tab by mouth daily. 90 Tab 1    naproxen (NAPROSYN) 500 mg tablet Take 1 Tab by mouth two (2) times daily (with meals). 180 Tab 1    montelukast (SINGULAIR) 10 mg tablet Take 1 Tab by mouth daily. 30 Tab 2    calcium citrate-vitamin d2 250-100 mg-unit per tablet Take 1 Tab by mouth daily.  valACYclovir (VALTREX) 500 mg tablet Take 1 Tab by mouth two (2) times a day. 6 Tab 5    aspirin delayed-release 81 mg tablet Take 81 mg by mouth daily.  multivitamin (ONE A DAY) tablet Take 1 Tab by mouth daily.  fluticasone (VERAMYST) 27.5 mcg/actuation nasal spray 2 Sprays by Nasal route daily. 1 Bottle 5    PATADAY 0.2 % drop ophthalmic solution Administer 1 Drop to both eyes daily.          No Known Allergies    Past Medical History:   Diagnosis Date    Arthritis     Bulging lumbar disc     Degenerative arthritis of finger     MP joint right index finger    Depression     H/O colonoscopy 12/2014    recommended yearly hemoccult stools    Hypertension     Low back pain     Lower extremity pain, left     Pinched nerve        Social History     Social History    Marital status:      Spouse name: N/A    Number of children: N/A    Years of education: N/A     Occupational History    Not on file.      Social History Main Topics    Smoking status: Never Smoker    Smokeless tobacco: Never Used    Alcohol use No    Drug use: No    Sexual activity: Not Currently     Other Topics Concern    Not on file     Social History Narrative       Family History   Problem Relation Age of Onset    Cancer Mother     Ovarian Cancer Mother 68    Stroke Father          OBJECTIVE    Physical Exam:     Visit Vitals    /78 (BP 1 Location: Left arm, BP Patient Position: Sitting)    Pulse 83    Temp 98.3 °F (36.8 °C)    Resp 16    Ht 5' 4\" (1.626 m)    Wt 183 lb (83 kg)    LMP  (LMP Unknown)    SpO2 98%    BMI 31.41 kg/m2       General: alert, well-appearing, AA, in no apparent distress or pain  HEENT: normal nasal mucosa, pharynx/tonsils clear, eac patente, TM intact  Neck: supple, no adenopathy palpated  CVS: normal rate, regular rhythm, distinct S1 and S2  Lungs:clear to ausculation bilaterally, no crackles, wheezing or rhonchi noted  Extremities: no edema, no cyanosis  Skin: warm, no lesions, rashes noted  Psych:  mood and affect normal    Results for orders placed or performed in visit on 37/42/79   METABOLIC PANEL, COMPREHENSIVE   Result Value Ref Range    Glucose 101 (H) 65 - 99 mg/dL    BUN 14 8 - 27 mg/dL    Creatinine 0.69 0.57 - 1.00 mg/dL    GFR est non-AA 94 >59 mL/min/1.73    GFR est  >59 mL/min/1.73    BUN/Creatinine ratio 20 12 - 28    Sodium 141 134 - 144 mmol/L    Potassium 4.1 3.5 - 5.2 mmol/L Chloride 97 96 - 106 mmol/L    CO2 30 (H) 18 - 29 mmol/L    Calcium 9.6 8.7 - 10.3 mg/dL    Protein, total 7.0 6.0 - 8.5 g/dL    Albumin 4.3 3.6 - 4.8 g/dL    GLOBULIN, TOTAL 2.7 1.5 - 4.5 g/dL    A-G Ratio 1.6 1.2 - 2.2    Bilirubin, total 0.5 0.0 - 1.2 mg/dL    Alk. phosphatase 72 39 - 117 IU/L    AST (SGOT) 17 0 - 40 IU/L    ALT (SGPT) 15 0 - 32 IU/L   LIPID PANEL   Result Value Ref Range    Cholesterol, total 204 (H) 100 - 199 mg/dL    Triglyceride 75 0 - 149 mg/dL    HDL Cholesterol 76 >39 mg/dL    VLDL, calculated 15 5 - 40 mg/dL    LDL, calculated 113 (H) 0 - 99 mg/dL   CVD REPORT   Result Value Ref Range    INTERPRETATION Note          ASSESSMENT/PLAN  Diagnoses and all orders for this visit:    1. Mild single current episode of major depressive disorder (HCC)  Stable, cont paxil    2. BMI 31.0-31.9,adult  encouraged wt loss/ exercise     3. Hot flashes  Benefiting from paxil    4. Chronic seasonal allergic rhinitis, unspecified trigger  Weather changes trigger  Cont prn veramyst, zyrtec    5. Essential hypertension  Controlled, cont lisinopril/hctz  Calc cv risk 5.2% vs 3.4% for age  monitoring    Follow-up Disposition:  Return in about 4 months (around 8/16/2018), or if symptoms worsen or fail to improve. plan for medicare wellness then. Plan to offer shingrix then    Patient understands plan of care. Patient has provided input and agrees with goals.

## 2018-04-16 NOTE — MR AVS SNAPSHOT
[address] [facility] 
[phone] Patient: Sara Woodall MRN: [id number] Visit Information Date & Time Provider Department Dept. Phone Encounter #  
 4/16/2018 10:00 AM Jenifer Martinez, [address] [id number] Follow-up Instructions Return in about 4 months (around 8/16/2018), or if symptoms worsen or fail to improve. Your Appointments 8/23/2018  9:30 AM  
Follow Up with Vasu Crews MD  
[address] [facility], [address] and [facility] [address]) Appt Note: 6MO FU  
 [address] [facility] [address] [phone]  
  
   
 [address] [facility] [address] Upcoming Health Maintenance Date Due  
 MEDICARE YEARLY EXAM 6/20/2018 COLONOSCOPY 11/1/2019 BREAST CANCER SCRN MAMMOGRAM 12/15/2019 DTaP/Tdap/Td series (2 - Td) 7/10/2025 Allergies as of 4/16/2018  Review Complete On: 4/16/2018 By: Jenifer Martinez MD  
 No Known Allergies Current Immunizations  Reviewed on 11/4/2015 Name Date Influenza Vaccine (Quad) PF 9/21/2017, 1/10/2017, 11/4/2015 Tdap 7/10/2015 Not reviewed this visit You Were Diagnosed With   
  
 Codes Comments Essential hypertension    -  Primary ICD-10-CM: I10 
ICD-9-CM: 401.9 Mild single current episode of major depressive disorder (HCC)     ICD-10-CM: F32.0 ICD-9-CM: 296.21 Seasonal allergic rhinitis, unspecified trigger     ICD-10-CM: J30.2 ICD-9-CM: 477.9 BMI 31.0-31.9,adult     ICD-10-CM: Z68.31 
ICD-9-CM: V85.31 Vitals BP Pulse Temp Resp Height(growth percentile) Weight(growth percentile) 136/78 (BP 1 Location: Left arm, BP Patient Position: Sitting) 83 98.3 °F (36.8 °C) 16 5' 4\" (1.626 m) 183 lb (83 kg) LMP SpO2 BMI OB Status Smoking Status (LMP Unknown) 98% 31.41 kg/m2 Hysterectomy Never Smoker Vitals History BMI and BSA Data Body Mass Index Body Surface Area  
 31.41 kg/m 2 1.94 m 2 Preferred Pharmacy Pharmacy Name Phone [address], [address] [facility] Your Updated Medication List  
  
   
This list is accurate as of 4/16/18 10:55 AM.  Always use your most recent med list.  
  
  
  
  
 aspirin delayed-release 81 mg tablet Take 81 mg by mouth daily. calcium citrate-vitamin d2 250-100 mg-unit per tablet Take 1 Tab by mouth daily. fluticasone 27.5 mcg/actuation nasal spray Commonly known as:  VERAMYST  
2 Sprays by Nasal route daily. gabapentin 300 mg capsule Commonly known as:  NEURONTIN Take 3 Caps by mouth nightly. Indications: NEUROPATHIC PAIN  
  
 lisinopril-hydroCHLOROthiazide 20-25 mg per tablet Commonly known as:  Stephen Done Take 1 Tab by mouth daily. montelukast 10 mg tablet Commonly known as:  SINGULAIR Take 1 Tab by mouth daily. multivitamin tablet Commonly known as:  ONE A DAY Take 1 Tab by mouth daily. naproxen 500 mg tablet Commonly known as:  NAPROSYN Take 1 Tab by mouth two (2) times daily (with meals). PARoxetine 20 mg tablet Commonly known as:  PAXIL Take 1 Tab by mouth daily. PATADAY 0.2 % Drop ophthalmic solution Generic drug:  olopatadine Administer 1 Drop to both eyes daily. triamcinolone acetonide 0.025 % lotion Apply  to affected area two (2) times a day. valACYclovir 500 mg tablet Commonly known as:  VALTREX Take 1 Tab by mouth two (2) times a day. Follow-up Instructions Return in about 4 months (around 8/16/2018), or if symptoms worsen or fail to improve. Patient Instructions DASH Diet: Care Instructions Your Care Instructions The DASH diet is an eating plan that can help lower your blood pressure. DASH stands for Dietary Approaches to Stop Hypertension. Hypertension is high blood pressure. The DASH diet focuses on eating foods that are high in calcium, potassium, and magnesium. These nutrients can lower blood pressure. The foods that are highest in these nutrients are fruits, vegetables, low-fat dairy products, nuts, seeds, and legumes. But taking calcium, potassium, and magnesium supplements instead of eating foods that are high in those nutrients does not have the same effect. The DASH diet also includes whole grains, fish, and poultry. The DASH diet is one of several lifestyle changes your doctor may recommend to lower your high blood pressure. Your doctor may also want you to decrease the amount of sodium in your diet. Lowering sodium while following the DASH diet can lower blood pressure even further than just the DASH diet alone. Follow-up care is a key part of your treatment and safety. Be sure to make and go to all appointments, and call your doctor if you are having problems. It's also a good idea to know your test results and keep a list of the medicines you take. How can you care for yourself at home? Following the DASH diet · Eat 4 to 5 servings of fruit each day. A serving is 1 medium-sized piece of fruit, ½ cup chopped or canned fruit, 1/4 cup dried fruit, or 4 ounces (½ cup) of fruit juice. Choose fruit more often than fruit juice. · Eat 4 to 5 servings of vegetables each day. A serving is 1 cup of lettuce or raw leafy vegetables, ½ cup of chopped or cooked vegetables, or 4 ounces (½ cup) of vegetable juice. Choose vegetables more often than vegetable juice. · Get 2 to 3 servings of low-fat and fat-free dairy each day. A serving is 8 ounces of milk, 1 cup of yogurt, or 1 ½ ounces of cheese. · Eat 6 to 8 servings of grains each day.  A serving is 1 slice of bread, 1 ounce of dry cereal, or ½ cup of cooked rice, pasta, or cooked cereal. Try to choose whole-grain products as much as possible. · Limit lean meat, poultry, and fish to 2 servings each day. A serving is 3 ounces, about the size of a deck of cards. · Eat 4 to 5 servings of nuts, seeds, and legumes (cooked dried beans, lentils, and split peas) each week. A serving is 1/3 cup of nuts, 2 tablespoons of seeds, or ½ cup of cooked beans or peas. · Limit fats and oils to 2 to 3 servings each day. A serving is 1 teaspoon of vegetable oil or 2 tablespoons of salad dressing. · Limit sweets and added sugars to 5 servings or less a week. A serving is 1 tablespoon jelly or jam, ½ cup sorbet, or 1 cup of lemonade. · Eat less than 2,300 milligrams (mg) of sodium a day. If you limit your sodium to 1,500 mg a day, you can lower your blood pressure even more. Tips for success · Start small. Do not try to make dramatic changes to your diet all at once. You might feel that you are missing out on your favorite foods and then be more likely to not follow the plan. Make small changes, and stick with them. Once those changes become habit, add a few more changes. · Try some of the following: ¨ Make it a goal to eat a fruit or vegetable at every meal and at snacks. This will make it easy to get the recommended amount of fruits and vegetables each day. ¨ Try yogurt topped with fruit and nuts for a snack or healthy dessert. ¨ Add lettuce, tomato, cucumber, and onion to sandwiches. ¨ Combine a ready-made pizza crust with low-fat mozzarella cheese and lots of vegetable toppings. Try using tomatoes, squash, spinach, broccoli, carrots, cauliflower, and onions. ¨ Have a variety of cut-up vegetables with a low-fat dip as an appetizer instead of chips and dip. ¨ Sprinkle sunflower seeds or chopped almonds over salads. Or try adding chopped walnuts or almonds to cooked vegetables. ¨ Try some vegetarian meals using beans and peas.  Add garbanzo or kidney beans to salads. Make burritos and tacos with mashed mccracken beans or black beans. Where can you learn more? Go to http://christine-katlin.info/. Enter N867 in the search box to learn more about \"DASH Diet: Care Instructions. \" Current as of: September 21, 2016 Content Version: 11.4 © 1655-6417 [employer]. Care instructions adapted under license by [employer] (which disclaims liability or warranty for this information). If you have questions about a medical condition or this instruction, always ask your healthcare professional. [place] [address] any warranty or liability for your use of this information. Introducing [facility]! [facility] introduces [employer] patient portal. Now you can access parts of your medical record, email your doctor's office, and request medication refills online. 1. In your internet browser, go to [email or web address] 2. Click on the First Time User? Click Here link in the Sign In box. You will see the New Member Sign Up page. 3. Enter your [employer] Access Code exactly as it appears below. You will not need to use this code after youve completed the sign-up process. If you do not sign up before the expiration date, you must request a new code. · [employer] Access Code: [email or web address] Expires: 4/16/2018  2:35 PM 
 
4. Enter the last four digits of your Social Security Number (xxxx) and Date of Birth (mm/dd/yyyy) as indicated and click Submit. You will be taken to the next sign-up page. 5. Create a TapTrackt ID. This will be your X[employer] login ID and cannot be changed, so think of one that is secure and easy to remember. 6. Create a [employer] password. You can change your password at any time. 7. Enter your Password Reset Question and Answer. This can be used at a later time if you forget your password. 8. Enter your e-mail address.  You will receive e-mail notification when new information is available in [employer]. 9. Click Sign Up. You can now view and download portions of your medical record. 10. Click the Download Summary menu link to download a portable copy of your medical information. If you have questions, please visit the Frequently Asked Questions section of the [employer] website. Remember, [employer] is NOT to be used for urgent needs. For medical emergencies, dial 911. Now available from your iPhone and Android! Please provide this summary of care documentation to your next provider. Your primary care clinician is listed as Miriam Hay. If you have any questions after today's visit, please call [phone].

## 2018-04-16 NOTE — PATIENT INSTRUCTIONS

## 2018-04-16 NOTE — PROGRESS NOTES
1. Have you been to the ER, urgent care clinic since your last visit? Hospitalized since your last visit? No    2. Have you seen or consulted any other health care providers outside of the [address] [place] [address] since your last visit? Include any pap smears or colon screening.  No

## 2018-05-24 RX ORDER — LISINOPRIL AND HYDROCHLOROTHIAZIDE 20; 25 MG/1; MG/1
TABLET ORAL
Qty: 90 TAB | Refills: 1 | Status: SHIPPED | OUTPATIENT
Start: 2018-05-24 | End: 2018-09-10 | Stop reason: SDUPTHER

## 2018-06-07 RX ORDER — TRIAMCINOLONE ACETONIDE 0.25 MG/ML
LOTION TOPICAL 2 TIMES DAILY
Qty: 60 ML | Refills: 1 | Status: SHIPPED | OUTPATIENT
Start: 2018-06-07 | End: 2019-02-15 | Stop reason: SDUPTHER

## 2018-06-07 NOTE — TELEPHONE ENCOUNTER
LOV 04/16/2018  F/U 08/16/2018  Patient called for refill. Medication to be sent to [facility] as indicated in pharmacy section.

## 2018-07-10 NOTE — TELEPHONE ENCOUNTER
This patient contacted office for the following prescriptions to be filled:    Medication requested :   Requested Prescriptions     Pending Prescriptions Disp Refills    PARoxetine (PAXIL) 20 mg tablet 90 Tab 1     Sig: Take 1 Tab by mouth daily.      PCP: [address] [place] [address] or Print: [facility]   Mail order or Local pharmacy [address]     Scheduled appointment if not seen by current providers in office: LOV 4/16/2018 f/u 8/16/2018

## 2018-07-11 RX ORDER — PAROXETINE HYDROCHLORIDE 20 MG/1
20 TABLET, FILM COATED ORAL DAILY
Qty: 90 TAB | Refills: 1 | Status: SHIPPED | OUTPATIENT
Start: 2018-07-11 | End: 2019-01-24 | Stop reason: SDUPTHER

## 2018-07-27 ENCOUNTER — OFFICE VISIT (OUTPATIENT)
Dept: FAMILY MEDICINE CLINIC | Age: 63
End: 2018-07-27

## 2018-07-27 ENCOUNTER — HOSPITAL ENCOUNTER (OUTPATIENT)
Dept: GENERAL RADIOLOGY | Age: 63
Discharge: HOME | End: 2018-07-27
Payer: MEDICARE

## 2018-07-27 VITALS
WEIGHT: 181 LBS | OXYGEN SATURATION: 98 % | BODY MASS INDEX: 30.9 KG/M2 | HEART RATE: 82 BPM | RESPIRATION RATE: 16 BRPM | DIASTOLIC BLOOD PRESSURE: 82 MMHG | HEIGHT: 64 IN | TEMPERATURE: 98.4 F | SYSTOLIC BLOOD PRESSURE: 114 MMHG

## 2018-07-27 DIAGNOSIS — M79.671 PAIN OF RIGHT HEEL: ICD-10-CM

## 2018-07-27 DIAGNOSIS — M79.671 PAIN OF RIGHT HEEL: Primary | ICD-10-CM

## 2018-07-27 PROCEDURE — 73650 X-RAY EXAM OF HEEL: CPT

## 2018-07-27 RX ORDER — METHYLPREDNISOLONE 4 MG/1
TABLET ORAL
Qty: 1 DOSE PACK | Refills: 0 | Status: SHIPPED | OUTPATIENT
Start: 2018-07-27 | End: 2018-08-16

## 2018-07-27 NOTE — PROGRESS NOTES
1. Have you been to the ER, urgent care clinic since your last visit? Hospitalized since your last visit? No 
 
2. Have you seen or consulted any other health care providers outside of the [facility] since your last visit? Include any pap smears or colon screening.  No

## 2018-07-27 NOTE — MR AVS SNAPSHOT
[address] [facility] [address] 200 [facility] 
[phone] Patient: Penelope Waller MRN: [id number] Visit Information Date & Time Provider Department Dept. Phone Encounter #  
 7/27/2018  [facility], [address] [id number] Follow-up Instructions Return keep appt in august or sooner as needed. Your Appointments 8/16/2018  9:45 AM  
FOLLOW UP EXAM with Carrie Lynn MD  
2056 [facility] ([address]) Appt Note: routine f/u 4mo [address] [place] [address] [facility] [address] 200 [facility]  
  
    
 8/23/2018  9:30 AM  
Follow Up with Dayron Grant MD  
[facility] [address] [facility] [address] [facility]) Appt Note: 6MO FU  
 [address] [facility] [address]  
[phone]  
  
   
 [address] [facility] [address] Upcoming Health Maintenance Date Due Influenza Age 5 to Adult 8/1/2018 COLONOSCOPY 11/1/2019 BREAST CANCER SCRN MAMMOGRAM 12/15/2019 DTaP/Tdap/Td series (2 - Td) 7/10/2025 Allergies as of 7/27/2018  Review Complete On: 7/27/2018 By: Pito Cain LPN No Known Allergies Current Immunizations  Reviewed on 11/4/2015 Name Date Influenza Vaccine (Quad) PF 9/21/2017, 1/10/2017, 11/4/2015 Tdap 7/10/2015 Not reviewed this visit You Were Diagnosed With   
  
 Codes Comments Pain of right heel    -  Primary ICD-10-CM: Y88.809 ICD-9-CM: 729.5 Vitals BP Pulse Temp Resp Height(growth percentile) Weight(growth percentile) 114/82 (BP 1 Location: Left arm, BP Patient Position: Sitting) 82 98.4 °F (36.9 °C) (Oral) 16 5' 4\" (1.626 m) 181 lb (82.1 kg) LMP SpO2 BMI OB Status Smoking Status (LMP Unknown) 98% 31.07 kg/m2 Hysterectomy Never Smoker Vitals History BMI and BSA Data Body Mass Index Body Surface Area 31.07 kg/m 2 1.93 m 2 Preferred Pharmacy Pharmacy Name Phone [address], [address] [facility] Your Updated Medication List  
  
   
This list is accurate as of 7/27/18  9:50 AM.  Always use your most recent med list.  
  
  
  
  
 aspirin delayed-release 81 mg tablet Take 81 mg by mouth daily. calcium citrate-vitamin d2 250-100 mg-unit per tablet Take 1 Tab by mouth daily. fluticasone 27.5 mcg/actuation nasal spray Commonly known as:  VERAMYST  
2 Sprays by Nasal route daily. gabapentin 300 mg capsule Commonly known as:  NEURONTIN Take 3 Caps by mouth nightly. Indications: NEUROPATHIC PAIN  
  
 lisinopril-hydroCHLOROthiazide 20-25 mg per tablet Commonly known as:  PRINZIDE, ZESTORETIC  
TAKE ONE TABLET BY MOUTH ONCE DAILY  
  
 methylPREDNISolone 4 mg tablet Commonly known as:  Shubhamsandra Esteves Use as directed  
  
 montelukast 10 mg tablet Commonly known as:  SINGULAIR Take 1 Tab by mouth daily. multivitamin tablet Commonly known as:  ONE A DAY Take 1 Tab by mouth daily. naproxen 500 mg tablet Commonly known as:  NAPROSYN Take 1 Tab by mouth two (2) times daily (with meals). PARoxetine 20 mg tablet Commonly known as:  PAXIL Take 1 Tab by mouth daily. PATADAY 0.2 % Drop ophthalmic solution Generic drug:  olopatadine Administer 1 Drop to both eyes daily. triamcinolone acetonide 0.025 % lotion Apply  to affected area two (2) times a day. valACYclovir 500 mg tablet Commonly known as:  VALTREX Take 1 Tab by mouth two (2) times a day. Prescriptions Sent to Pharmacy Refills  
 methylPREDNISolone (MEDROL DOSEPACK) 4 mg tablet 0 Sig: Use as directed  Class: Normal  
 Pharmacy: [facility] [address] Ph #: [phone] Follow-up Instructions Return keep appt in august or sooner as needed. To-Do List   
 07/27/2018 Imaging:  XR CALCANEUS RT Patient Instructions HEEL LIFT INSERT Plantar Fasciitis: Exercises Your Care Instructions Here are some examples of typical rehabilitation exercises for your condition. Start each exercise slowly. Ease off the exercise if you start to have pain. Your doctor or physical therapist will tell you when you can start these exercises and which ones will work best for you. How to do the exercises Towel stretch 1. Sit with your legs extended and knees straight. 2. Place a towel around your foot just under the toes. 3. Hold each end of the towel in each hand, with your hands above your knees. 4. Pull back with the towel so that your foot stretches toward you. 5. Hold the position for at least 15 to 30 seconds. 6. Repeat 2 to 4 times a session, up to 5 sessions a day. Calf stretch This exercise stretches the muscles at the back of the lower leg (the calf) and the Achilles tendon. Do this exercise 3 or 4 times a day, 5 days a week. 1. Stand facing a wall with your hands on the wall at about eye level. Put the leg you want to stretch about a step behind your other leg. 2. Keeping your back heel on the floor, bend your front knee until you feel a stretch in the back leg. 3. Hold the stretch for 15 to 30 seconds. Repeat 2 to 4 times. Plantar fascia and calf stretch Stretching the plantar fascia and calf muscles can increase flexibility and decrease heel pain. You can do this exercise several times each day and before and after activity. 1. Stand on a step as shown above. Be sure to hold on to the banister. 2. Slowly let your heels down over the edge of the step as you relax your calf muscles.  You should feel a gentle stretch across the bottom of your foot and up the back of your leg to your knee. 3. Hold the stretch about 15 to 30 seconds, and then tighten your calf muscle a little to bring your heel back up to the level of the step. Repeat 2 to 4 times. Towel curls Make this exercise more challenging by placing a weighted object, such as a soup can, on the other end of the towel. 1. While sitting, place your foot on a towel on the floor and scrunch the towel toward you with your toes. 2. Then, also using your toes, push the towel away from you. [place] pickups 1. Put marbles on the floor next to a cup. 
2. Using your toes, try to lift the marbles up from the floor and put them in the cup. Follow-up care is a key part of your treatment and safety. Be sure to make and go to all appointments, and call your doctor if you are having problems. It's also a good idea to know your test results and keep a list of the medicines you take. Where can you learn more? Go to http://christine-katlin.info/. Joelle Tyler in the search box to learn more about \"Plantar Fasciitis: Exercises. \" Current as of: November 29, 2017 Content Version: 11.7 © 5492-6380 [employer]. Care instructions adapted under license by [employer] (which disclaims liability or warranty for this information). If you have questions about a medical condition or this instruction, always ask your healthcare professional. [place] [address] any warranty or liability for your use of this information. Plantar Fasciitis: Care Instructions Your Care Instructions Plantar fasciitis is pain and inflammation of the plantar fascia, the tissue at the bottom of your foot that connects the heel bone to the toes. The plantar fascia also supports the arch. If you strain the plantar fascia, it can develop small tears and cause heel pain when you stand or walk. Plantar fasciitis can be caused by running or other sports.  It also may occur in people who are overweight or who have high arches or flat feet. You may get plantar fasciitis if you walk or stand for long periods, or have a tight Achilles tendon or calf muscles. You can improve your foot pain with rest and other care at home. It might take a few weeks to a few months for your foot to heal completely. Follow-up care is a key part of your treatment and safety. Be sure to make and go to all appointments, and call your doctor if you are having problems. It's also a good idea to know your test results and keep a list of the medicines you take. How can you care for yourself at home? · Rest your feet often. Reduce your activity to a level that lets you avoid pain. If possible, do not run or walk on hard surfaces. · Take pain medicines exactly as directed. ¨ If the doctor gave you a prescription medicine for pain, take it as prescribed. ¨ If you are not taking a prescription pain medicine, take an over-the-counter anti-inflammatory medicine for pain and swelling, such as ibuprofen (Advil, Motrin) or naproxen (Aleve). Read and follow all instructions on the label. · Use ice massage to help with pain and swelling. You can use an ice cube or an ice cup several times a day. To make an ice cup, fill a paper cup with water and freeze it. Cut off the top of the cup until a half-inch of ice shows. Hold onto the remaining paper to use the cup. Rub the ice in small circles over the area for 5 to 7 minutes. · Contrast baths, which alternate hot and cold water, can also help reduce swelling. But because heat alone may make pain and swelling worse, end a contrast bath with a soak in cold water. · Wear a night splint if your doctor suggests it. A night splint holds your foot with the toes pointed up and the foot and ankle at a 90-degree angle. This position gives the bottom of your foot a constant, gentle stretch.  
· Do simple exercises such as calf stretches and towel stretches 2 to 3 times each day, especially when you first get up in the morning. These can help the plantar fascia become more flexible. They also make the muscles that support your arch stronger. Hold these stretches for 15 to 30 seconds per stretch. Repeat 2 to 4 times. ¨ Stand about 1 foot from a wall. Place the palms of both hands against the wall at chest level. Lean forward against the wall, keeping one leg with the knee straight and heel on the ground while bending the knee of the other leg. ¨ Sit down on the floor or a mat with your feet stretched in front of you. Roll up a towel lengthwise, and loop it over the ball of your foot. Holding the towel at both ends, gently pull the towel toward you to stretch your foot. · Wear shoes with good arch support. Athletic shoes or shoes with a well-cushioned sole are good choices. · Try heel cups or shoe inserts (orthotics) to help cushion your heel. You can buy these at many shoe stores. · Put on your shoes as soon as you get out of bed. Going barefoot or wearing slippers may make your pain worse. · Reach and stay at a good weight for your height. This puts less strain on your feet. When should you call for help? Call your doctor now or seek immediate medical care if: 
  · You have heel pain with fever, redness, or warmth in your heel.  
  · You cannot put weight on the sore foot.  
 Watch closely for changes in your health, and be sure to contact your doctor if: 
  · You have numbness or tingling in your heel.  
  · Your heel pain lasts more than 2 weeks. Where can you learn more? Go to http://christine-katlin.info/. [facility] in the search box to learn more about \"Plantar Fasciitis: Care Instructions. \" Current as of: November 29, 2017 Content Version: 11.7 © 4160-1134 [employer].  Care instructions adapted under license by [employer] (which disclaims liability or warranty for this information). If you have questions about a medical condition or this instruction, always ask your healthcare professional. [address] any warranty or liability for your use of this information. Introducing [facility] [place]! Trudi Alston introduces On-Q-ity patient portal. Now you can access parts of your medical record, email your doctor's office, and request medication refills online. 1. In your internet browser, go to [email or web address] 2. Click on the First Time User? Click Here link in the Sign In box. You will see the New Member Sign Up page. 3. Enter your On-Q-ity Access Code exactly as it appears below. You will not need to use this code after youve completed the sign-up process. If you do not sign up before the expiration date, you must request a new code. · On-Q-ity Access Code: [email or web address] Expires: 10/25/2018  9:50 AM 
 
4. Enter the last four digits of your Social Security Number (xxxx) and Date of Birth (mm/dd/yyyy) as indicated and click Submit. You will be taken to the next sign-up page. 5. Create a On-Q-ity ID. This will be your On-Q-ity login ID and cannot be changed, so think of one that is secure and easy to remember. 6. Create a On-Q-ity password. You can change your password at any time. 7. Enter your Password Reset Question and Answer. This can be used at a later time if you forget your password. 8. Enter your e-mail address. You will receive e-mail notification when new information is available in [address] 9. Click Sign Up. You can now view and download portions of your medical record. 10. Click the Download Summary menu link to download a portable copy of your medical information. If you have questions, please visit the Frequently Asked Questions section of the On-Q-ity website. Remember, On-Q-ity is NOT to be used for urgent needs. For medical emergencies, dial 911. Now available from your iPhone and Android! Please provide this summary of care documentation to your next provider. Your primary care clinician is listed as Fede Dunaway. If you have any questions after today's visit, please call [phone].

## 2018-07-27 NOTE — PROGRESS NOTES
Elizabeth Fajardo, 59 y.o.,  female SUBJECTIVE 
R heel pain x 1 month Pt c/o R heel pain on and off for a month now. Worse with weight bearing, no trauma. She does have daily walks through her neighborhood. She takes naprosyn and gabapentin for her chronic back pain that has not been helpful. Says walking on her toes helpful. ROS: 
See HPI, all others negative Patient Active Problem List  
Diagnosis Code  Hypertension I10  Lumbar radiculopathy M54.16  
 Herpes genitalis in women A60.09  
 Allergic rhinitis due to allergen J30.9  Hot flashes R23.2  Advance directive discussed with patient Z70.80  
 Facet arthropathy ([facility] [address] M46.90  
 Lumbar stenosis M48.061  Advance care planning Z71.89  
 Obesity E66.9  Mild single current episode of major depressive disorder (HCC) F32.0 Current Outpatient Prescriptions Medication Sig Dispense Refill  methylPREDNISolone (MEDROL DOSEPACK) 4 mg tablet Use as directed 1 Dose Pack 0  
 PARoxetine (PAXIL) 20 mg tablet Take 1 Tab by mouth daily. 90 Tab 1  
 triamcinolone acetonide 0.025 % lotion Apply  to affected area two (2) times a day. 60 mL 1  
 lisinopril-hydroCHLOROthiazide (PRINZIDE, ZESTORETIC) 20-25 mg per tablet TAKE ONE TABLET BY MOUTH ONCE DAILY 90 Tab 1  
 gabapentin (NEURONTIN) 300 mg capsule Take 3 Caps by mouth nightly. Indications: NEUROPATHIC PAIN 270 Cap 1  
 fluticasone (VERAMYST) 27.5 mcg/actuation nasal spray 2 Sprays by Nasal route daily. 1 Bottle 5  
 naproxen (NAPROSYN) 500 mg tablet Take 1 Tab by mouth two (2) times daily (with meals). 180 Tab 1  calcium citrate-vitamin d2 250-100 mg-unit per tablet Take 1 Tab by mouth daily.  valACYclovir (VALTREX) 500 mg tablet Take 1 Tab by mouth two (2) times a day. 6 Tab 5  
 aspirin delayed-release 81 mg tablet Take 81 mg by mouth daily.  multivitamin (ONE A DAY) tablet Take 1 Tab by mouth daily.     
 montelukast (SINGULAIR) 10 mg tablet Take 1 Tab by mouth daily. 30 Tab 2  
 PATADAY 0.2 % drop ophthalmic solution Administer 1 Drop to both eyes daily. No Known Allergies Past Medical History:  
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  
 recommended yearly hemoccult stools  Hypertension  Low back pain  Lower extremity pain, left  Pinched nerve Social History Social History  Marital status:  Spouse name: N/A  
 Number of children: N/A  
 Years of education: N/A Occupational History  Not on file. Social History Main Topics  Smoking status: Never Smoker  Smokeless tobacco: Never Used  Alcohol use No  
 Drug use: No  
 Sexual activity: Not Currently Other Topics Concern  Not on file Social History Narrative Family History Problem Relation Age of Onset  Cancer Mother  Ovarian Cancer Mother 68  Stroke Father OBJECTIVE Physical Exam:  
 
Visit Vitals  /82 (BP 1 Location: Left arm, BP Patient Position: Sitting)  Pulse 82  Temp 98.4 °F (36.9 °C) (Oral)  Resp 16  
 Ht 5' 4\" (1.626 m)  Wt 181 lb (82.1 kg)  LMP  (LMP Unknown)  SpO2 98%  BMI 31.07 kg/m2 General: alert, well-appearing, in no apparent distress or pain Extremities:R heel tenderness. FROM, good dp pulses Skin: warm, no lesions, rashes noted Psych:  mood and affect normal 
 
 
 
ASSESSMENT/PLAN Diagnoses and all orders for this visit: 1. Pain of right heel 
-     methylPREDNISolone (MEDROL DOSEPACK) 4 mg tablet; Use as directed -     XR CALCANEUS RT; Future Hold naprosyn while on steroids HEP for plantar fascitis, advised heel pads BMI 31 Encourage wt loss Follow-up Disposition: 
Return keep appt in august or sooner as needed. Patient understands plan of care. Patient has provided input and agrees with goals.

## 2018-07-27 NOTE — PATIENT INSTRUCTIONS
HEEL LIFT INSERT Plantar Fasciitis: Exercises Your Care Instructions Here are some examples of typical rehabilitation exercises for your condition. Start each exercise slowly. Ease off the exercise if you start to have pain. Your doctor or physical therapist will tell you when you can start these exercises and which ones will work best for you. How to do the exercises Towel stretch 1. Sit with your legs extended and knees straight. 2. Place a towel around your foot just under the toes. 3. Hold each end of the towel in each hand, with your hands above your knees. 4. Pull back with the towel so that your foot stretches toward you. 5. Hold the position for at least 15 to 30 seconds. 6. Repeat 2 to 4 times a session, up to 5 sessions a day. Calf stretch This exercise stretches the muscles at the back of the lower leg (the calf) and the Achilles tendon. Do this exercise 3 or 4 times a day, 5 days a week. 1. Stand facing a wall with your hands on the wall at about eye level. Put the leg you want to stretch about a step behind your other leg. 2. Keeping your back heel on the floor, bend your front knee until you feel a stretch in the back leg. 3. Hold the stretch for 15 to 30 seconds. Repeat 2 to 4 times. Plantar fascia and calf stretch Stretching the plantar fascia and calf muscles can increase flexibility and decrease heel pain. You can do this exercise several times each day and before and after activity. 1. Stand on a step as shown above. Be sure to hold on to the banister. 2. Slowly let your heels down over the edge of the step as you relax your calf muscles. You should feel a gentle stretch across the bottom of your foot and up the back of your leg to your knee. 3. Hold the stretch about 15 to 30 seconds, and then tighten your calf muscle a little to bring your heel back up to the level of the step. Repeat 2 to 4 times. Towel curls Make this exercise more challenging by placing a weighted object, such as a soup can, on the other end of the towel. 1. While sitting, place your foot on a towel on the floor and scrunch the towel toward you with your toes. 2. Then, also using your toes, push the towel away from you. [place] pickups 1. Put marbles on the floor next to a cup. 
2. Using your toes, try to lift the marbles up from the floor and put them in the cup. Follow-up care is a key part of your treatment and safety. Be sure to make and go to all appointments, and call your doctor if you are having problems. It's also a good idea to know your test results and keep a list of the medicines you take. Where can you learn more? Go to http://christine-katlin.info/. Jose Jean in the search box to learn more about \"Plantar Fasciitis: Exercises. \" Current as of: November 29, 2017 Content Version: 11.7 © 1517-4991 [employer]. Care instructions adapted under license by [employer] (which disclaims liability or warranty for this information). If you have questions about a medical condition or this instruction, always ask your healthcare professional. [place] [address] any warranty or liability for your use of this information. Plantar Fasciitis: Care Instructions Your Care Instructions Plantar fasciitis is pain and inflammation of the plantar fascia, the tissue at the bottom of your foot that connects the heel bone to the toes. The plantar fascia also supports the arch. If you strain the plantar fascia, it can develop small tears and cause heel pain when you stand or walk. Plantar fasciitis can be caused by running or other sports. It also may occur in people who are overweight or who have high arches or flat feet. You may get plantar fasciitis if you walk or stand for long periods, or have a tight Achilles tendon or calf muscles. You can improve your foot pain with rest and other care at home.  It might take a few weeks to a few months for your foot to heal completely. Follow-up care is a key part of your treatment and safety. Be sure to make and go to all appointments, and call your doctor if you are having problems. It's also a good idea to know your test results and keep a list of the medicines you take. How can you care for yourself at home? · Rest your feet often. Reduce your activity to a level that lets you avoid pain. If possible, do not run or walk on hard surfaces. · Take pain medicines exactly as directed. ¨ If the doctor gave you a prescription medicine for pain, take it as prescribed. ¨ If you are not taking a prescription pain medicine, take an over-the-counter anti-inflammatory medicine for pain and swelling, such as ibuprofen (Advil, Motrin) or naproxen (Aleve). Read and follow all instructions on the label. · Use ice massage to help with pain and swelling. You can use an ice cube or an ice cup several times a day. To make an ice cup, fill a paper cup with water and freeze it. Cut off the top of the cup until a half-inch of ice shows. Hold onto the remaining paper to use the cup. Rub the ice in small circles over the area for 5 to 7 minutes. · Contrast baths, which alternate hot and cold water, can also help reduce swelling. But because heat alone may make pain and swelling worse, end a contrast bath with a soak in cold water. · Wear a night splint if your doctor suggests it. A night splint holds your foot with the toes pointed up and the foot and ankle at a 90-degree angle. This position gives the bottom of your foot a constant, gentle stretch. · Do simple exercises such as calf stretches and towel stretches 2 to 3 times each day, especially when you first get up in the morning. These can help the plantar fascia become more flexible. They also make the muscles that support your arch stronger. Hold these stretches for 15 to 30 seconds per stretch. Repeat 2 to 4 times. ¨ Stand about 1 foot from a wall.  Place the palms of both hands against the wall at chest level. Lean forward against the wall, keeping one leg with the knee straight and heel on the ground while bending the knee of the other leg. ¨ Sit down on the floor or a mat with your feet stretched in front of you. Roll up a towel lengthwise, and loop it over the ball of your foot. Holding the towel at both ends, gently pull the towel toward you to stretch your foot. · Wear shoes with good arch support. Athletic shoes or shoes with a well-cushioned sole are good choices. · Try heel cups or shoe inserts (orthotics) to help cushion your heel. You can buy these at many shoe stores. · Put on your shoes as soon as you get out of bed. Going barefoot or wearing slippers may make your pain worse. · Reach and stay at a good weight for your height. This puts less strain on your feet. When should you call for help? Call your doctor now or seek immediate medical care if: 
  · You have heel pain with fever, redness, or warmth in your heel.  
  · You cannot put weight on the sore foot.  
 Watch closely for changes in your health, and be sure to contact your doctor if: 
  · You have numbness or tingling in your heel.  
  · Your heel pain lasts more than 2 weeks. Where can you learn more? Go to http://christine-katlin.info/. Anurag Sy in the search box to learn more about \"Plantar Fasciitis: Care Instructions. \" Current as of: November 29, 2017 Content Version: 11.7 © 0851-1544 [employer]. Care instructions adapted under license by [employer] (which disclaims liability or warranty for this information). If you have questions about a medical condition or this instruction, always ask your healthcare professional. [place] [address] any warranty or liability for your use of this information.

## 2018-08-02 NOTE — PROGRESS NOTES
Xray show no fracture , + heel spur consistent with plantar fascitis (cont plan with nsaid/HEP)  pls notify pt.

## 2018-08-13 NOTE — PROGRESS NOTES
Patient identified with 2 identifiers (name and ).   Patient aware of Xray show no fracture , + heel spur consistent with plantar fascitis (cont plan with nsaid/HEP)

## 2018-08-16 ENCOUNTER — OFFICE VISIT (OUTPATIENT)
Dept: FAMILY MEDICINE CLINIC | Age: 63
End: 2018-08-16

## 2018-08-16 VITALS
HEIGHT: 64 IN | RESPIRATION RATE: 16 BRPM | HEART RATE: 78 BPM | BODY MASS INDEX: 31.24 KG/M2 | TEMPERATURE: 98 F | WEIGHT: 183 LBS | OXYGEN SATURATION: 98 % | DIASTOLIC BLOOD PRESSURE: 84 MMHG | SYSTOLIC BLOOD PRESSURE: 132 MMHG

## 2018-08-16 DIAGNOSIS — Z71.89 ADVANCE DIRECTIVE DISCUSSED WITH PATIENT: ICD-10-CM

## 2018-08-16 DIAGNOSIS — Z00.00 MEDICARE ANNUAL WELLNESS VISIT, SUBSEQUENT: ICD-10-CM

## 2018-08-16 DIAGNOSIS — Z12.39 BREAST CANCER SCREENING: ICD-10-CM

## 2018-08-16 DIAGNOSIS — I10 ESSENTIAL HYPERTENSION: Primary | ICD-10-CM

## 2018-08-16 DIAGNOSIS — J30.2 SEASONAL ALLERGIC RHINITIS, UNSPECIFIED TRIGGER: ICD-10-CM

## 2018-08-16 DIAGNOSIS — F32.0 MILD SINGLE CURRENT EPISODE OF MAJOR DEPRESSIVE DISORDER (HCC): ICD-10-CM

## 2018-08-16 DIAGNOSIS — M48.062 SPINAL STENOSIS OF LUMBAR REGION WITH NEUROGENIC CLAUDICATION: ICD-10-CM

## 2018-08-16 NOTE — PROGRESS NOTES
This is the Subsequent Medicare Annual Wellness Exam, performed 12 months or more after the Initial AWV or the last Subsequent AWV    I have reviewed the patient's medical history in detail and updated the computerized patient record. History     Past Medical History:   Diagnosis Date    Arthritis     Bulging lumbar disc     Degenerative arthritis of finger     MP joint right index finger    Depression     H/O colonoscopy 12/2014    recommended yearly hemoccult stools    Hypertension     Low back pain     Lower extremity pain, left     Pinched nerve       Past Surgical History:   Procedure Laterality Date    HX CARPAL TUNNEL RELEASE Right     hand    HX HYSTERECTOMY      HX ORTHOPAEDIC       Current Outpatient Prescriptions   Medication Sig Dispense Refill    varicella-zoster recombinant, PF, (SHINGRIX) 50 mcg/0.5 mL susr injection 0.5 mL by IntraMUSCular route once for 1 dose. 0.5 mL 1    PARoxetine (PAXIL) 20 mg tablet Take 1 Tab by mouth daily. 90 Tab 1    triamcinolone acetonide 0.025 % lotion Apply  to affected area two (2) times a day. 60 mL 1    lisinopril-hydroCHLOROthiazide (PRINZIDE, ZESTORETIC) 20-25 mg per tablet TAKE ONE TABLET BY MOUTH ONCE DAILY 90 Tab 1    gabapentin (NEURONTIN) 300 mg capsule Take 3 Caps by mouth nightly. Indications: NEUROPATHIC PAIN 270 Cap 1    fluticasone (VERAMYST) 27.5 mcg/actuation nasal spray 2 Sprays by Nasal route daily. 1 Bottle 5    naproxen (NAPROSYN) 500 mg tablet Take 1 Tab by mouth two (2) times daily (with meals). 180 Tab 1    montelukast (SINGULAIR) 10 mg tablet Take 1 Tab by mouth daily. 30 Tab 2    calcium citrate-vitamin d2 250-100 mg-unit per tablet Take 1 Tab by mouth daily.  valACYclovir (VALTREX) 500 mg tablet Take 1 Tab by mouth two (2) times a day. 6 Tab 5    aspirin delayed-release 81 mg tablet Take 81 mg by mouth daily.  multivitamin (ONE A DAY) tablet Take 1 Tab by mouth daily.        No Known Allergies  Family History   Problem Relation Age of Onset    Cancer Mother     Ovarian Cancer Mother 68    Stroke Father      Social History   Substance Use Topics    Smoking status: Never Smoker    Smokeless tobacco: Never Used    Alcohol use No     Patient Active Problem List   Diagnosis Code    Hypertension I10    Lumbar radiculopathy M54.16    Herpes genitalis in women A60.09    Allergic rhinitis due to allergen J30.9    Hot flashes R23.2    Advance directive discussed with patient Z71.89    [facility] M46.90    Lumbar stenosis M48.061    Advance care planning Z71.89    Obesity E66.9    Mild single current episode of major depressive disorder ([facility] F32.0       Depression Risk Factor Screening:     PHQ over the last two weeks 5/12/2014   Little interest or pleasure in doing things Not at all   Feeling down, depressed, irritable, or hopeless Not at all   Total Score PHQ 2 0     Alcohol Risk Factor Screening: You do not drink alcohol or very rarely. Functional Ability and Level of Safety:   Hearing Loss  Hearing is good. Activities of Daily Living  The home contains: no safety equipment. Patient does total self care    Fall Risk  Fall Risk Assessment, last 12 mths 5/12/2014   Able to walk? Yes   Fall in past 12 months?  No       Abuse Screen  Patient is not abused    Cognitive Screening   Evaluation of Cognitive Function:  Has your family/caregiver stated any concerns about your memory: no    Patient Care Team   Patient Care Team:  Fidelia Menendez MD as PCP - General (Family Practice)  Kaitlyn Valenzuela, DAVID Banerjee MD (Orthopedic Surgery)  Suly Brooks MD (Orthopedic Surgery)    Assessment/Plan   Education and counseling provided:  Are appropriate based on today's review and evaluation  End-of-Life planning (with patient's consent)-discussed, provided form  Pneumococcal Vaccine- to start at age 72  Screening Mammography- due dec, order placed  Screening Pap and pelvic (covered once every 2 years)- no longer indicated s/p hysterectomy  Colorectal cancer screening tests- update 2019        Health Maintenance Due   Topic Date Due    Influenza Age 5 to Adult  08/01/2018     [facility], 59 y.o.,  female    SUBJECTIVE  Ff-up    HTN- taking meds without problems. Allergic rhinitis-responding well to prn flonase, zyrtec  Heel pain has improved- took medrol dose back, and following HEP. Xray reviewed showing heel spur  Depression/hot flashes- says doing well for the most part, taking paxil. Chronic LBP- on neurontin, following dr. Han Gallagher    ROS:  See HPI, all others negative        Patient Active Problem List   Diagnosis Code    Hypertension I10    Lumbar radiculopathy M54.16    Herpes genitalis in women A60.09    Allergic rhinitis due to allergen J30.9    Hot flashes R23.2    Advance directive discussed with patient Z70.80    Facet arthropathy ([facility] M46.90    Lumbar stenosis M48.061    Advance care planning Z71.89    Obesity E66.9    Mild single current episode of major depressive disorder (HCC) F32.0       Current Outpatient Prescriptions   Medication Sig Dispense Refill    varicella-zoster recombinant, PF, (SHINGRIX) 50 mcg/0.5 mL susr injection 0.5 mL by IntraMUSCular route once for 1 dose. 0.5 mL 1    PARoxetine (PAXIL) 20 mg tablet Take 1 Tab by mouth daily. 90 Tab 1    triamcinolone acetonide 0.025 % lotion Apply  to affected area two (2) times a day. 60 mL 1    lisinopril-hydroCHLOROthiazide (PRINZIDE, ZESTORETIC) 20-25 mg per tablet TAKE ONE TABLET BY MOUTH ONCE DAILY 90 Tab 1    gabapentin (NEURONTIN) 300 mg capsule Take 3 Caps by mouth nightly. Indications: NEUROPATHIC PAIN 270 Cap 1    fluticasone (VERAMYST) 27.5 mcg/actuation nasal spray 2 Sprays by Nasal route daily. 1 Bottle 5    naproxen (NAPROSYN) 500 mg tablet Take 1 Tab by mouth two (2) times daily (with meals). 180 Tab 1    montelukast (SINGULAIR) 10 mg tablet Take 1 Tab by mouth daily. 30 Tab 2    calcium citrate-vitamin d2 250-100 mg-unit per tablet Take 1 Tab by mouth daily.  valACYclovir (VALTREX) 500 mg tablet Take 1 Tab by mouth two (2) times a day. 6 Tab 5    aspirin delayed-release 81 mg tablet Take 81 mg by mouth daily.  multivitamin (ONE A DAY) tablet Take 1 Tab by mouth daily. No Known Allergies    Past Medical History:   Diagnosis Date    Arthritis     Bulging lumbar disc     Degenerative arthritis of finger     MP joint right index finger    Depression     H/O colonoscopy 12/2014    recommended yearly hemoccult stools    Hypertension     Low back pain     Lower extremity pain, left     Pinched nerve        Social History     Social History    Marital status:      Spouse name: N/A    Number of children: N/A    Years of education: N/A     Occupational History    Not on file. Social History Main Topics    Smoking status: Never Smoker    Smokeless tobacco: Never Used    Alcohol use No    Drug use: No    Sexual activity: Not Currently     Other Topics Concern    Not on file     Social History Narrative       Family History   Problem Relation Age of Onset    Cancer Mother     Ovarian Cancer Mother 68    Stroke Father          OBJECTIVE    Physical Exam:     Visit Vitals    /84 (BP 1 Location: Left arm, BP Patient Position: Sitting)    Pulse 78    Temp 98 °F (36.7 °C) (Oral)    Resp 16    Ht 5' 4\" (1.626 m)    Wt 183 lb (83 kg)    LMP  (LMP Unknown)    SpO2 98%    BMI 31.41 kg/m2       General: alert, well-appearing, AA, in no apparent distress or pain  HEENT: normal nasal mucosa, pharynx/tonsils clear, eac patente, TM intact  Neck: supple, no adenopathy palpated  Breasts: breasts appear normal, no suspicious masses, no skin or nipple changes or axillary nodes.   CVS: normal rate, regular rhythm, distinct S1 and S2  Lungs:clear to ausculation bilaterally, no crackles, wheezing or rhonchi noted  Extremities: no edema, no cyanosis  Skin: warm, no lesions, rashes noted  Psych:  mood and affect normal    Results for orders placed or performed in visit on 16/18/13   METABOLIC PANEL, COMPREHENSIVE   Result Value Ref Range    Glucose 101 (H) 65 - 99 mg/dL    BUN 14 8 - 27 mg/dL    Creatinine 0.69 0.57 - 1.00 mg/dL    GFR est non-AA 94 >59 mL/min/1.73    GFR est  >59 mL/min/1.73    BUN/Creatinine ratio 20 12 - 28    Sodium 141 134 - 144 mmol/L    Potassium 4.1 3.5 - 5.2 mmol/L    Chloride 97 96 - 106 mmol/L    CO2 30 (H) 18 - 29 mmol/L    Calcium 9.6 8.7 - 10.3 mg/dL    Protein, total 7.0 6.0 - 8.5 g/dL    Albumin 4.3 3.6 - 4.8 g/dL    GLOBULIN, TOTAL 2.7 1.5 - 4.5 g/dL    A-G Ratio 1.6 1.2 - 2.2    Bilirubin, total 0.5 0.0 - 1.2 mg/dL    Alk. phosphatase 72 39 - 117 IU/L    AST (SGOT) 17 0 - 40 IU/L    ALT (SGPT) 15 0 - 32 IU/L   LIPID PANEL   Result Value Ref Range    Cholesterol, total 204 (H) 100 - 199 mg/dL    Triglyceride 75 0 - 149 mg/dL    HDL Cholesterol 76 >39 mg/dL    VLDL, calculated 15 5 - 40 mg/dL    LDL, calculated 113 (H) 0 - 99 mg/dL   CVD REPORT   Result Value Ref Range    INTERPRETATION Note          ASSESSMENT/PLAN  Diagnoses and all orders for this visit:    1. Mild single current episode of major depressive disorder (HCC)  Stable, cont paxil    2. BMI 31.0-31.9,adult  encouraged wt loss/ exercise     3. Hot flashes  Benefiting from paxil    4. Chronic seasonal allergic rhinitis, unspecified trigger  Weather changes trigger  Cont prn flonaset, zyrtec    5. Essential hypertension  Controlled, cont lisinopril/hctz  Calc cv risk 5.2% vs 3.4% for age  monitoring  Check BMP in 3 months    shingrix rx provided    Follow-up Disposition:  Return in about 3 months (around 11/16/2018), or if symptoms worsen or fail to improve. Patient understands plan of care. Patient has provided input and agrees with goals.

## 2018-08-16 NOTE — MR AVS SNAPSHOT
[address] [facility] [address] [facility] 
[phone] Patient: Penelope Waller MRN: [id number] [id number] Visit Information Date & Time Provider Department Dept. Phone Encounter #  
 8/16/2018  9:45 AM Carrie Lynn, [address] [id number] Follow-up Instructions Return in about 3 months (around 11/16/2018), or if symptoms worsen or fail to improve. Your Appointments 8/23/2018  9:30 AM  
Follow Up with Dayron Grant MD  
4 [facility], [address] and [facility] ONE [address]) Appt Note: 6MO FU  
 [address] [facility] [address] [phone]  
  
   
 [address] [facility] [address] Upcoming Health Maintenance Date Due Influenza Age 5 to Adult 8/1/2018 COLONOSCOPY 11/1/2019 BREAST CANCER SCRN MAMMOGRAM 12/15/2019 DTaP/Tdap/Td series (2 - Td) 7/10/2025 Allergies as of 8/16/2018  Review Complete On: 8/16/2018 By: Carrie Lynn MD  
 No Known Allergies Current Immunizations  Reviewed on 11/4/2015 Name Date Influenza Vaccine (Quad) PF 9/21/2017, 1/10/2017, 11/4/2015 Tdap 7/10/2015 Not reviewed this visit You Were Diagnosed With   
  
 Codes Comments Essential hypertension    -  Primary ICD-10-CM: I10 
ICD-9-CM: 401.9 Medicare annual wellness visit, subsequent     ICD-10-CM: Z00.00 ICD-9-CM: V70.0 Breast cancer screening     ICD-10-CM: Z12.31 
ICD-9-CM: V76.10 Mild single current episode of major depressive disorder (HCC)     ICD-10-CM: F32.0 ICD-9-CM: 296.21 Spinal stenosis of lumbar region with neurogenic claudication     ICD-10-CM: M48.062 
ICD-9-CM: 724.03   
 BMI 31.0-31.9,adult     ICD-10-CM: Z68.31 
ICD-9-CM: V85.31 Seasonal allergic rhinitis, unspecified trigger     ICD-10-CM: J30.2 ICD-9-CM: 477.9 Advance directive discussed with patient     ICD-10-CM: Z71.89 ICD-9-CM: V65.49 Vitals BP Pulse Temp Resp Height(growth percentile) Weight(growth percentile) 132/84 (BP 1 Location: Left arm, BP Patient Position: Sitting) 78 98 °F (36.7 °C) (Oral) 16 5' 4\" (1.626 m) 183 lb (83 kg) LMP SpO2 BMI OB Status Smoking Status (LMP Unknown) 98% 31.41 kg/m2 Hysterectomy Never Smoker Vitals History BMI and BSA Data Body Mass Index Body Surface Area  
 31.41 kg/m 2 1.94 m 2 Preferred Pharmacy Pharmacy Name Phone Roberto Zavala [address] [facility], [address] [facility] Your Updated Medication List  
  
   
This list is accurate as of 8/16/18 10:15 AM.  Always use your most recent med list.  
  
  
  
  
 aspirin delayed-release 81 mg tablet Take 81 mg by mouth daily. calcium citrate-vitamin d2 250-100 mg-unit per tablet Take 1 Tab by mouth daily. fluticasone 27.5 mcg/actuation nasal spray Commonly known as:  VERAMYST  
2 Sprays by Nasal route daily. gabapentin 300 mg capsule Commonly known as:  NEURONTIN Take 3 Caps by mouth nightly. Indications: NEUROPATHIC PAIN  
  
 lisinopril-hydroCHLOROthiazide 20-25 mg per tablet Commonly known as:  PRINZIDE, ZESTORETIC  
TAKE ONE TABLET BY MOUTH ONCE DAILY  
  
 montelukast 10 mg tablet Commonly known as:  SINGULAIR Take 1 Tab by mouth daily. multivitamin tablet Commonly known as:  ONE A DAY Take 1 Tab by mouth daily. naproxen 500 mg tablet Commonly known as:  NAPROSYN Take 1 Tab by mouth two (2) times daily (with meals). PARoxetine 20 mg tablet Commonly known as:  PAXIL Take 1 Tab by mouth daily. triamcinolone acetonide 0.025 % lotion Apply  to affected area two (2) times a day. valACYclovir 500 mg tablet Commonly known as:  VALTREX Take 1 Tab by mouth two (2) times a day. varicella-zoster recombinant (PF) 50 mcg/0.5 mL Susr injection Commonly known as:  SHINGRIX  
0.5 mL by IntraMUSCular route once for 1 dose. Prescriptions Printed Refills  
 varicella-zoster recombinant, PF, (SHINGRIX) 50 mcg/0.5 mL susr injection 1 Si.5 mL by IntraMUSCular route once for 1 dose. Class: Print Route: IntraMUSCular We Performed the Following ADVANCE CARE PLANNING FIRST 30 MINS [69352 CPT(R)] Follow-up Instructions Return in about 3 months (around 2018), or if symptoms worsen or fail to improve. To-Do List   
 2018 Lab:  METABOLIC PANEL, BASIC   
  
 2018 Imaging:  [facility] MAMMO BI SCREENING INCL CAD Patient Instructions Medicare Wellness Visit, Female The best way to live healthy is to have a lifestyle where you eat a well-balanced diet, exercise regularly, limit alcohol use, and quit all forms of tobacco/nicotine, if applicable. Regular preventive services are another way to keep healthy. Preventive services (vaccines, screening tests, monitoring & exams) can help personalize your care plan, which helps you manage your own care. Screening tests can find health problems at the earliest stages, when they are easiest to treat. Tim Secatilio follows the current, evidence-based guidelines published by the [place] Kaleb Jena (USPSTF) when recommending preventive services for our patients. Because we follow these guidelines, sometimes recommendations change over time as research supports it. (For example, mammograms used to be recommended annually. Even though Medicare will still pay for an annual mammogram, the newer guidelines recommend a mammogram every two years for women of average risk.) Of course, you and your doctor may decide to screen more often for some diseases, based on your risk and your health status. Preventive services for you include: - Medicare offers their members a free annual wellness visit, which is time for you and your primary care provider to discuss and plan for your preventive service needs. Take advantage of this benefit every year! 
-All adults over the age of 72 should receive the recommended pneumonia vaccines. Current USPSTF guidelines recommend a series of two vaccines for the best pneumonia protection.  
-All adults should have a flu vaccine yearly and a tetanus vaccine every 10 years. All adults age 61 and older should receive a shingles vaccine once in their lifetime.   
-A bone mass density test is recommended when a woman turns 65 to screen for osteoporosis. This test is only recommended one time, as a screening. Some providers will use this same test as a disease monitoring tool if you already have osteoporosis. -All adults age 38-68 who are overweight should have a diabetes screening test once every three years.  
-Other screening tests and preventive services for persons with diabetes include: an eye exam to screen for diabetic retinopathy, a kidney function test, a foot exam, and stricter control over your cholesterol.  
-Cardiovascular screening for adults with routine risk involves an electrocardiogram (ECG) at intervals determined by your doctor.  
-Colorectal cancer screenings should be done for adults age 54-65 with no increased risk factors for colorectal cancer. There are a number of acceptable methods of screening for this type of cancer. Each test has its own benefits and drawbacks. Discuss with your doctor what is most appropriate for you during your annual wellness visit. The different tests include: colonoscopy (considered the best screening method), a fecal occult blood test, a fecal DNA test, and sigmoidoscopy. -Breast cancer screenings are recommended every other year for women of normal risk, age 54-69. 
-Cervical cancer screenings for women over age 72 are only recommended with certain risk factors. -All adults born between [facility] should be screened once for Hepatitis C. Here is a list of your current Health Maintenance items (your personalized list of preventive services) with a due date: 
Health Maintenance Due Topic Date Due  
 Flu Vaccine  08/01/2018 Introducing [facility]! [facility] introduces [facility] patient portal. Now you can access parts of your medical record, email your doctor's office, and request medication refills online. 1. In your internet browser, go to [email or web address] 2. Click on the First Time User? Click Here link in the Sign In box. You will see the New Member Sign Up page. 3. Enter your Cherrish Access Code exactly as it appears below. You will not need to use this code after youve completed the sign-up process. If you do not sign up before the expiration date, you must request a new code. · Cherrish Access Code: [email or web address] Expires: 10/25/2018  9:50 AM 
 
4. Enter the last four digits of your Social Security Number (xxxx) and Date of Birth (mm/dd/yyyy) as indicated and click Submit. You will be taken to the next sign-up page. 5. Create a [facility]rish ID. This will be your [facility]rish login ID and cannot be changed, so think of one that is secure and easy to remember. 6. Create a Cherrish password. You can change your password at any time. 7. Enter your Password Reset Question and Answer. This can be used at a later time if you forget your password. 8. Enter your e-mail address. You will receive e-mail notification when new information is available in [address] 9. Click Sign Up. You can now view and download portions of your medical record. 10. Click the Download Summary menu link to download a portable copy of your medical information. If you have questions, please visit the Frequently Asked Questions section of the Cherrish website.  Remember, [facility] is NOT to be used for urgent needs. For medical emergencies, dial 911. Now available from your iPhone and Android! Please provide this summary of care documentation to your next provider. Your primary care clinician is listed as Holly Cheatham. If you have any questions after today's visit, please call [phone].

## 2018-08-16 NOTE — ACP (ADVANCE CARE PLANNING)
Advance Care Planning (ACP) Provider Note - Comprehensive     Date of ACP Conversation: 08/16/18  Persons included in Conversation:  patient  Length of ACP Conversation in minutes:  16 minutes    Authorized Decision Maker (if patient is incapable of making informed decisions): This person is:  Has yet to decide        General ACP for ALL Patients with Decision Making Capacity:   Importance of advance care planning, including choosing a healthcare agent to communicate patient's healthcare decisions if patient lost the ability to make decisions, such as after a sudden illness or accident  Understanding of the healthcare agent role was assessed and information provided  Exploration of values, goals, and preferences if recovery is not expected, even with continued medical treatment in the event of: Imminent death  Severe, permanent brain injury  \"In these circumstances, what matters most to you? \"  Still considering    For Serious or Chronic Illness:  Understanding of CPR, goals and expected outcomes, benefits and burdens discussed. Information on CPR success rates provided (e.g. for CPR in hospital, survival to d/c at two weeks is 22%, for chronically ill or elderly/frail survival is less than 3%); Individual asked to communicate understanding of information in his/her own words.   Explored fears and concerns regarding CPR or possible outcomes    Interventions Provided:  Recommended completion of Advance Directive form after review of ACP materials and conversation with prospective healthcare agent   Recommended communicating the plan and making copies for the healthcare agent, personal physician, and others as appropriate (e.g., health system)  Recommended review of completed ACP document annually or upon change in health status

## 2018-08-16 NOTE — PATIENT INSTRUCTIONS
Medicare Wellness Visit, Female     The best way to live healthy is to have a lifestyle where you eat a well-balanced diet, exercise regularly, limit alcohol use, and quit all forms of tobacco/nicotine, if applicable. Regular preventive services are another way to keep healthy. Preventive services (vaccines, screening tests, monitoring & exams) can help personalize your care plan, which helps you manage your own care. Screening tests can find health problems at the earliest stages, when they are easiest to treat. Tim Nix follows the current, evidence-based guidelines published by the [facility] Kaleb Jena ([facility]) when recommending preventive services for our patients. Because we follow these guidelines, sometimes recommendations change over time as research supports it. (For example, mammograms used to be recommended annually. Even though Medicare will still pay for an annual mammogram, the newer guidelines recommend a mammogram every two years for women of average risk.)  Of course, you and your doctor may decide to screen more often for some diseases, based on your risk and your health status. Preventive services for you include:  - Medicare offers their members a free annual wellness visit, which is time for you and your primary care provider to discuss and plan for your preventive service needs. Take advantage of this benefit every year!  -All adults over the age of 72 should receive the recommended pneumonia vaccines. Current USPSTF guidelines recommend a series of two vaccines for the best pneumonia protection.   -All adults should have a flu vaccine yearly and a tetanus vaccine every 10 years. All adults age 61 and older should receive a shingles vaccine once in their lifetime.    -A bone mass density test is recommended when a woman turns 65 to screen for osteoporosis. This test is only recommended one time, as a screening.  Some providers will use this same test as a disease monitoring tool if you already have osteoporosis. -All adults age 38-68 who are overweight should have a diabetes screening test once every three years.   -Other screening tests and preventive services for persons with diabetes include: an eye exam to screen for diabetic retinopathy, a kidney function test, a foot exam, and stricter control over your cholesterol.   -Cardiovascular screening for adults with routine risk involves an electrocardiogram (ECG) at intervals determined by your doctor.   -Colorectal cancer screenings should be done for adults age 54-65 with no increased risk factors for colorectal cancer. There are a number of acceptable methods of screening for this type of cancer. Each test has its own benefits and drawbacks. Discuss with your doctor what is most appropriate for you during your annual wellness visit. The different tests include: colonoscopy (considered the best screening method), a fecal occult blood test, a fecal DNA test, and sigmoidoscopy. -Breast cancer screenings are recommended every other year for women of normal risk, age 54-69.  -Cervical cancer screenings for women over age 72 are only recommended with certain risk factors.   -All adults born between [facility] should be screened once for Hepatitis C. Here is a list of your current Health Maintenance items (your personalized list of preventive services) with a due date:  Health Maintenance Due   Topic Date Due    Flu Vaccine  08/01/2018           Medicare Wellness Visit, Female     The best way to live healthy is to have a lifestyle where you eat a well-balanced diet, exercise regularly, limit alcohol use, and quit all forms of tobacco/nicotine, if applicable. Regular preventive services are another way to keep healthy. Preventive services (vaccines, screening tests, monitoring & exams) can help personalize your care plan, which helps you manage your own care.  Screening tests can find health problems at the earliest stages, when they are easiest to treat. Tim Nix follows the current, evidence-based guidelines published by the [facility] States Kaleb Jena (USPSTF) when recommending preventive services for our patients. Because we follow these guidelines, sometimes recommendations change over time as research supports it. (For example, mammograms used to be recommended annually. Even though Medicare will still pay for an annual mammogram, the newer guidelines recommend a mammogram every two years for women of average risk.)  Of course, you and your doctor may decide to screen more often for some diseases, based on your risk and your health status. Preventive services for you include:  - Medicare offers their members a free annual wellness visit, which is time for you and your primary care provider to discuss and plan for your preventive service needs. Take advantage of this benefit every year!  -All adults over the age of 72 should receive the recommended pneumonia vaccines. Current USPSTF guidelines recommend a series of two vaccines for the best pneumonia protection.   -All adults should have a flu vaccine yearly and a tetanus vaccine every 10 years. All adults age 61 and older should receive a shingles vaccine once in their lifetime.    -A bone mass density test is recommended when a woman turns 65 to screen for osteoporosis. This test is only recommended one time, as a screening. Some providers will use this same test as a disease monitoring tool if you already have osteoporosis.   -All adults age 38-68 who are overweight should have a diabetes screening test once every three years.   -Other screening tests and preventive services for persons with diabetes include: an eye exam to screen for diabetic retinopathy, a kidney function test, a foot exam, and stricter control over your cholesterol.   -Cardiovascular screening for adults with routine risk involves an electrocardiogram (ECG) at intervals determined by your doctor.   -Colorectal cancer screenings should be done for adults age 54-65 with no increased risk factors for colorectal cancer. There are a number of acceptable methods of screening for this type of cancer. Each test has its own benefits and drawbacks. Discuss with your doctor what is most appropriate for you during your annual wellness visit. The different tests include: colonoscopy (considered the best screening method), a fecal occult blood test, a fecal DNA test, and sigmoidoscopy. -Breast cancer screenings are recommended every other year for women of normal risk, age 54-69.  -Cervical cancer screenings for women over age 72 are only recommended with certain risk factors.   -All adults born between [facility] should be screened once for Hepatitis C.      Here is a list of your current Health Maintenance items (your personalized list of preventive services) with a due date:  Health Maintenance Due   Topic Date Due    Flu Vaccine  08/01/2018

## 2018-08-16 NOTE — PROGRESS NOTES
1. Have you been to the ER, urgent care clinic since your last visit? Hospitalized since your last visit? No    2. Have you seen or consulted any other health care providers outside of the [address] [place] [address] since your last visit? Include any pap smears or colon screening.  No

## 2018-08-23 ENCOUNTER — OFFICE VISIT (OUTPATIENT)
Dept: ORTHOPEDIC SURGERY | Age: 63
End: 2018-08-23

## 2018-08-23 VITALS
SYSTOLIC BLOOD PRESSURE: 138 MMHG | HEIGHT: 64 IN | DIASTOLIC BLOOD PRESSURE: 88 MMHG | WEIGHT: 183 LBS | HEART RATE: 75 BPM | BODY MASS INDEX: 31.24 KG/M2

## 2018-08-23 DIAGNOSIS — M79.18 MYOFASCIAL PAIN: Primary | ICD-10-CM

## 2018-08-23 DIAGNOSIS — M79.671 PAIN OF RIGHT HEEL: ICD-10-CM

## 2018-08-23 DIAGNOSIS — M47.816 LUMBAR FACET ARTHROPATHY: ICD-10-CM

## 2018-08-23 NOTE — MR AVS SNAPSHOT
[address] [facility] [address] [place] [address] 
[phone] Patient: Penelope Waller MRN: [id number] UOC:[id number] Visit Information Date & Time Provider Department Dept. Phone Encounter #  
 8/23/2018  9:30 AM Dayron Grant MD [facility] [phone] [id number] Follow-up Instructions Return in about 6 months (around 2/23/2019). Your Appointments 11/16/2018 11:00 AM  
FOLLOW UP EXAM with Carrie Lynn MD  
[facility] ([address]) Appt Note: 3 month f/u  
 [address] [facility] [address]  
[address] [facility] [place] [address] [facility] [address] Upcoming Health Maintenance Date Due Influenza Age 5 to Adult 8/1/2018 COLONOSCOPY 11/1/2019 BREAST CANCER SCRN MAMMOGRAM 12/15/2019 DTaP/Tdap/Td series (2 - Td) 7/10/2025 Allergies as of 8/23/2018  Review Complete On: 8/23/2018 By: Tai Jenkins No Known Allergies Current Immunizations  Reviewed on 11/4/2015 Name Date Influenza Vaccine (Quad) PF 9/21/2017, 1/10/2017, 11/4/2015 Tdap 7/10/2015 Not reviewed this visit You Were Diagnosed With   
  
 Codes Comments Myofascial pain    -  Primary ICD-10-CM: M79.1 ICD-9-CM: 729.1 Lumbar facet arthropathy (HCC)     ICD-10-CM: M46.96 
ICD-9-CM: 721.3 Pain of right heel     ICD-10-CM: M79.671 ICD-9-CM: 729.5 Vitals BP Pulse Height(growth percentile) Weight(growth percentile) LMP BMI  
 138/88 75 5' 4\" (1.626 m) 183 lb (83 kg) (LMP Unknown) 31.41 kg/m2 OB Status Smoking Status Hysterectomy Never Smoker Vitals History BMI and BSA Data Body Mass Index Body Surface Area  
 31.41 kg/m 2 1.94 m 2 Preferred Pharmacy Pharmacy Name Phone [address], [address] [facility] Your Updated Medication List  
  
   
This list is accurate as of 8/23/18 10:31 AM.  Always use your most recent med list.  
  
  
  
  
 aspirin delayed-release 81 mg tablet Take 81 mg by mouth daily. calcium citrate-vitamin d2 250-100 mg-unit per tablet Take 1 Tab by mouth daily. fluticasone 27.5 mcg/actuation nasal spray Commonly known as:  VERAMYST  
2 Sprays by Nasal route daily. gabapentin 300 mg capsule Commonly known as:  NEURONTIN Take 3 Caps by mouth nightly. Indications: NEUROPATHIC PAIN  
  
 lisinopril-hydroCHLOROthiazide 20-25 mg per tablet Commonly known as:  PRINZIDE, ZESTORETIC  
TAKE ONE TABLET BY MOUTH ONCE DAILY  
  
 montelukast 10 mg tablet Commonly known as:  SINGULAIR Take 1 Tab by mouth daily. multivitamin tablet Commonly known as:  ONE A DAY Take 1 Tab by mouth daily. naproxen 500 mg tablet Commonly known as:  NAPROSYN Take 1 Tab by mouth two (2) times daily (with meals). PARoxetine 20 mg tablet Commonly known as:  PAXIL Take 1 Tab by mouth daily. triamcinolone acetonide 0.025 % lotion Apply  to affected area two (2) times a day. valACYclovir 500 mg tablet Commonly known as:  VALTREX Take 1 Tab by mouth two (2) times a day. Follow-up Instructions Return in about 6 months (around 2/23/2019). To-Do List   
 12/18/2018 10:00 AM  
  Appointment with MELISSA BEYER RM 1 at [address] [place] [address] ([phone]) OUTSIDE FILMS  - Any outside films related to the study being scheduled should be brought with you on the day of the exam.  If this cannot be done there may be a delay in the reading of the study.   MEDICATIONS  - Patient must bring a complete list of all medications currently taking to include prescriptions, over-the-counter meds, herbals, vitamins & any dietary supplements  GENERAL INSTRUCTIONS  - On the day of your exam do not use any bath powder, deodorant or lotions on the armpit area. -Tenderness of breasts may cause an increase of discomfort during procedure. If you are experiencing breast tenderness on the day of your appointment and would like to reschedule, please call [phone]. Patient Instructions Right heel pain - fat/heel pad syndrome; heel cup Introducing [facility]! Blaine Currie introduces [employer] patient portal. Now you can access parts of your medical record, email your doctor's office, and request medication refills online. 1. In your internet browser, go to [email or web address] 2. Click on the First Time User? Click Here link in the Sign In box. You will see the New Member Sign Up page. 3. Enter your CY[employer] Access Code exactly as it appears below. You will not need to use this code after youve completed the sign-up process. If you do not sign up before the expiration date, you must request a new code. · [employer] Access Code: [email or web address] Expires: 10/25/2018  9:50 AM 
 
4. Enter the last four digits of your Social Security Number (xxxx) and Date of Birth (mm/dd/yyyy) as indicated and click Submit. You will be taken to the next sign-up page. 5. Create a [employer]BRA ID. This will be your [employer] login ID and cannot be changed, so think of one that is secure and easy to remember. 6. Create a [employer] password. You can change your password at any time. 7. Enter your Password Reset Question and Answer. This can be used at a later time if you forget your password. 8. Enter your e-mail address. You will receive e-mail notification when new information is available in [address] 9. Click Sign Up. You can now view and download portions of your medical record. 10. Click the Download Summary menu link to download a portable copy of your medical information.  
 
If you have questions, please visit the Frequently Asked Questions section of the [email or web address]. Remember, [employer] is NOT to be used for urgent needs. For medical emergencies, dial 911. Now available from your iPhone and Android! Please provide this summary of care documentation to your next provider. Your primary care clinician is listed as Severiano Johnson. If you have any questions after today's visit, please call [phone].

## 2018-08-23 NOTE — PROGRESS NOTES
[address]  [address]  [place]  Phone: [phone]  Fax: [phone]        Mercedes Villalobos  : 1955  PCP: María Ibanez MD  2018    PROGRESS NOTE      ASSESSMENT AND PLAN    Mello Baez comes in to the office today for 6 month f/u. She reports that she has been maintaining an HEP of walking, but she has \"heel spurs\" on her right heel. She has some residual low back pain with some tingling in her LLE. She rates her pain as a 2/10 today. On examination, she had tenderness to palpation of her right heel pad. Her right heel pain is likely heel/fat pad syndrome. I advised she wear a heel cup. Her low back/contralateral leg pain likely remains myofascial in nature in compensation now for her right heel pain with a component of facet arthropathy. I advised she continue maintaining her HEP. Pt will f/u in 6 months or sooner as needed. Diagnoses and all orders for this visit:    1. Myofascial pain    2. Lumbar facet arthropathy (HCC)    3. Pain of right heel       Follow-up Disposition: Not on File      HISTORY OF PRESENT ILLNESS  Mello Baez is a 59 y.o. female. A&P / HPI from 2017: Jordyn Galvan was in the office today for a f/u appointment. She has been taking Tylenol 3 prn and Gabapentin 1,200mg daily with relief so she will continue these medications. She was advised to increase her overall exercise level by doing activities such as walking and her at home PT exercises.      HISTORY OF PRESENT ILLNESS  Jordyn Galvan is a 62 y.o. female. Pt presents to the office for a f/u for lumbar pain. Pt has been taking Tylenol 3 prn and she has been finding relief from Gabapentin 1,200mg daily, no side effects were noted.       Pt says increased lifting and bending exacerbates her pain when doing activities such as cleaning but she is learning how to limit herself.  She also is continuing to have pain with walking. Pt reports she has not been exercising.       Excerpt from Annette Nunez's note on 04/27/2017:     HISTORY OF PRESENT ILLNESS:  The patient comes in today for followup of her chronic low back pain with radiating left lower extremity pain.  She rates her pain as a 10/10 with a flare and with activity, but today, she is rating it as a 5/10.  When she lies down, that does help her back and it will go down to a 0/10.  She does that one to two times a day. Addie Barroso is doing exercises and stretching four to five times a week during the flare, but now she is doing it twice a week.  She has considered the facet injections we discussed previously. Addie Barroso denies fever or bowel or bladder dysfunction. Addie Barroso is taking Neurontin 300 mg in the morning and at lunch, and two at night, which is 600 mg. Addie Barroso is taking Naproxen 500 mg twice a day without any side effects.  Her PCP did start her on some Flexeril, which helps some. Addie Barroso is on Social Security disability. Addie Barroso is a nonsmoker.     Updates from 10/26/17:  Pt presents for a PRN f/u. Since her last office visit she has been evaluated by Ilan Houston who continued her medication regimen of Narproxen and Gabapentin. At this time, she rates her pain at an constant aching 5/10. She notes pressure while sitting will increase her pain. Updates from 08/23/18:  Pt presents for 6 month f/u. She continues to have some residual low back pain with some tingling. She also c/o right heel pain. She reports her heel pain is due to a bone spur in her heel, and her PCP provided her exercises for plantar fasciitis. 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       PAST MEDICAL HISTORY   Past Medical History:   Diagnosis Date    Arthritis     Bulging lumbar disc     Degenerative arthritis of finger     MP joint right index finger    Depression     H/O colonoscopy 12/2014    recommended yearly hemoccult stools    Hypertension     Low back pain     Lower extremity pain, left     Pinched nerve        Past Surgical History:   Procedure Laterality Date    HX CARPAL TUNNEL RELEASE Right     hand    HX HYSTERECTOMY      HX ORTHOPAEDIC     . MEDICATIONS    Current Outpatient Prescriptions   Medication Sig Dispense Refill    PARoxetine (PAXIL) 20 mg tablet Take 1 Tab by mouth daily. 90 Tab 1    triamcinolone acetonide 0.025 % lotion Apply  to affected area two (2) times a day. 60 mL 1    lisinopril-hydroCHLOROthiazide (PRINZIDE, ZESTORETIC) 20-25 mg per tablet TAKE ONE TABLET BY MOUTH ONCE DAILY 90 Tab 1    gabapentin (NEURONTIN) 300 mg capsule Take 3 Caps by mouth nightly. Indications: NEUROPATHIC PAIN 270 Cap 1    fluticasone (VERAMYST) 27.5 mcg/actuation nasal spray 2 Sprays by Nasal route daily. 1 Bottle 5    naproxen (NAPROSYN) 500 mg tablet Take 1 Tab by mouth two (2) times daily (with meals). 180 Tab 1    montelukast (SINGULAIR) 10 mg tablet Take 1 Tab by mouth daily. 30 Tab 2    calcium citrate-vitamin d2 250-100 mg-unit per tablet Take 1 Tab by mouth daily.  valACYclovir (VALTREX) 500 mg tablet Take 1 Tab by mouth two (2) times a day. 6 Tab 5    aspirin delayed-release 81 mg tablet Take 81 mg by mouth daily.  multivitamin (ONE A DAY) tablet Take 1 Tab by mouth daily.           ALLERGIES  No Known Allergies       SOCIAL HISTORY    Social History     Social History    Marital status:      Spouse name: N/A    Number of children: N/A    Years of education: N/A     Social History Main Topics    Smoking status: Never Smoker    Smokeless tobacco: Never Used    Alcohol use No    Drug use: No    Sexual activity: Not Currently     Other Topics Concern    Not on file     Social History Narrative       FAMILY HISTORY  Family History   Problem Relation Age of Onset    Cancer Mother     Ovarian Cancer Mother 68    Stroke Father          REVIEW OF SYSTEMS  Review of Systems   Musculoskeletal:        Right heel pain          PHYSICAL EXAMINATION  Visit Vitals    LMP  (LMP Unknown)       Pain Assessment  1/25/2018   Location of Pain Back; Hip   Pain Location Comment lower back   Location Modifiers -   Severity of Pain 2   Quality of Pain Aching   Quality of Pain Comment -   Duration of Pain Persistent   Frequency of Pain Constant   Date Pain First Started -   Aggravating Factors Walking; Other (Comment)   Aggravating Factors Comment prolonged walking   Limiting Behavior -   Relieving Factors Other (Comment); Ice   Relieving Factors Comment lay flat   Result of Injury No           Constitutional:  Well developed, well nourished, in no acute distress. Psychiatric: Affect and mood are appropriate. Integumentary: No rashes or abrasions noted on exposed areas. SPINE/MUSCULOSKELETAL EXAM    Lumbar spine:  No rash, ecchymosis, or gross obliquity.    No fasciculations.    No focal atrophy is noted.    No pain with hip ROM.    Range of motion is normal.  Mild tenderness to palpation. Tenderness to palpation at the right sciatic notch.    SI joints non-tender.    Trochanters non tender. Piriformis stretch test negative on right side.         Sensation in the bilateral legs grossly intact to light touch. MOTOR:      Biceps  Triceps Deltoids Wrist Ext Wrist Flex Hand Intrin   Right 5/5 5/5 5/5 5/5 5/5 5/5   Left 5/5 5/5 5/5 5/5 5/5 5/5             Hip Flex  Quads Hamstrings Ankle DF EHL Ankle PF   Right 5/5 5/5 5/5 5/5 5/5 5/5   Left 5/5 5/5 5/5 5/5 5/5 5/5     DTRs are 2+ biceps, triceps, brachioradialis, patella, and Achilles.      Negative Straight Leg raise.    Squat not tested.    No difficulty with tandem gait.    Normal heel walk.    Normal toe rise.       Ambulation without assistive device. FWB.       RADIOGRAPHS  Lumbar MRI films from 4/19/2016 personally reviewed with patient:  1) Degenerative grade 1 anterior listhesis of both L4 and L5 in the presence of  advanced facet arthropathy. No high-grade spinal stenosis.  There are bilateral  foraminal stenoses. Some exiting nerve contact but no overt impingement. 17 minutes of face-to-face contact were spent with the patient during today's visit extensively discussing symptoms and treatment plan. All questions were answered. More than half of this visit today was spent on counseling.      Written by Nithya Alvarez as dictated by Tj Olivas MD

## 2018-09-10 RX ORDER — TRIAMCINOLONE ACETONIDE 0.25 MG/ML
LOTION TOPICAL
Qty: 60 ML | Refills: 1 | Status: SHIPPED | OUTPATIENT
Start: 2018-09-10 | End: 2018-12-04 | Stop reason: SDUPTHER

## 2018-09-10 RX ORDER — LISINOPRIL AND HYDROCHLOROTHIAZIDE 20; 25 MG/1; MG/1
TABLET ORAL
Qty: 90 TAB | Refills: 1 | Status: SHIPPED | OUTPATIENT
Start: 2018-09-10 | End: 2019-07-12 | Stop reason: SDUPTHER

## 2018-09-19 ENCOUNTER — PATIENT OUTREACH (OUTPATIENT)
Dept: FAMILY MEDICINE CLINIC | Age: 63
End: 2018-09-19

## 2018-09-26 ENCOUNTER — PATIENT OUTREACH (OUTPATIENT)
Dept: FAMILY MEDICINE CLINIC | Age: 63
End: 2018-09-26

## 2018-09-26 NOTE — PROGRESS NOTES
Call placed to patient offering honoring Choices. Patient said that she was not interested in meeting right now. She would look through the information and if she decided she wanted to meet to fill out the form she would call and schedule an appointment prior to her November PCP appointment. Patient went on to say that she thought she could fill out the form on her own but if she ran in to problems she would call.

## 2018-09-26 NOTE — Clinical Note
Honoring choices.   This patient declined appointment for honoring choices conversation after receiving folder

## 2018-10-15 DIAGNOSIS — M47.816 LUMBAR FACET ARTHROPATHY: ICD-10-CM

## 2018-10-15 RX ORDER — NAPROXEN 500 MG/1
TABLET ORAL
Qty: 180 TAB | Refills: 1 | Status: SHIPPED | OUTPATIENT
Start: 2018-10-15 | End: 2019-06-14 | Stop reason: SDUPTHER

## 2018-11-09 ENCOUNTER — HOSPITAL ENCOUNTER (OUTPATIENT)
Dept: LAB | Age: 63
Discharge: HOME | End: 2018-11-09

## 2018-11-09 LAB — XX-LABCORP SPECIMEN COL,LCBCF: NORMAL

## 2018-11-09 PROCEDURE — 99001 SPECIMEN HANDLING PT-LAB: CPT

## 2018-11-10 LAB
BUN SERPL-MCNC: 21 MG/DL (ref 8–27)
BUN/CREAT SERPL: 30 (ref 12–28)
CALCIUM SERPL-MCNC: 9.5 MG/DL (ref 8.7–10.3)
CHLORIDE SERPL-SCNC: 104 MMOL/L (ref 96–106)
CO2 SERPL-SCNC: 27 MMOL/L (ref 20–29)
CREAT SERPL-MCNC: 0.69 MG/DL (ref 0.57–1)
GLUCOSE SERPL-MCNC: 104 MG/DL (ref 65–99)
POTASSIUM SERPL-SCNC: 4 MMOL/L (ref 3.5–5.2)
SODIUM SERPL-SCNC: 145 MMOL/L (ref 134–144)
SPECIMEN STATUS REPORT, ROLRST: NORMAL

## 2018-11-16 ENCOUNTER — OFFICE VISIT (OUTPATIENT)
Dept: FAMILY MEDICINE CLINIC | Age: 63
End: 2018-11-16

## 2018-11-16 VITALS
BODY MASS INDEX: 31.51 KG/M2 | RESPIRATION RATE: 16 BRPM | OXYGEN SATURATION: 98 % | HEART RATE: 84 BPM | WEIGHT: 184.6 LBS | HEIGHT: 64 IN | TEMPERATURE: 98.2 F | SYSTOLIC BLOOD PRESSURE: 136 MMHG | DIASTOLIC BLOOD PRESSURE: 88 MMHG

## 2018-11-16 DIAGNOSIS — J30.2 SEASONAL ALLERGIC RHINITIS, UNSPECIFIED TRIGGER: ICD-10-CM

## 2018-11-16 DIAGNOSIS — M79.671 CHRONIC HEEL PAIN, RIGHT: Primary | ICD-10-CM

## 2018-11-16 DIAGNOSIS — Z23 ENCOUNTER FOR IMMUNIZATION: ICD-10-CM

## 2018-11-16 DIAGNOSIS — G89.29 CHRONIC HEEL PAIN, RIGHT: Primary | ICD-10-CM

## 2018-11-16 DIAGNOSIS — R23.2 HOT FLASHES: ICD-10-CM

## 2018-11-16 DIAGNOSIS — I10 ESSENTIAL HYPERTENSION: ICD-10-CM

## 2018-11-16 NOTE — PROGRESS NOTES
Brittnee Boone, 59 y.o.,  female    SUBJECTIVE  Ff-up    HTN- taking meds without problems. Allergic rhinitis-responding well to prn flonase, zyrtec  Heel pain persistent- tried medrol dose back, and following HEP. Xray reviewed showing heel spur. Still very painful especially with weight bearing, thinking contributing to back pain . Depression/hot flashes- says doing well for the most part, taking paxil. Chronic LBP- on neurontin, following dr. Stallworth January    ROS:  See HPI, all others negative        Patient Active Problem List   Diagnosis Code    Hypertension I10    Lumbar radiculopathy M54.16    Herpes genitalis in women A60.09    Allergic rhinitis due to allergen J30.9    Hot flashes R23.2    Advance directive discussed with patient Z70.80    Facet arthropathy ([facility] [address] M46.90    Lumbar stenosis M48.061    Advance care planning Z71.89    Obesity E66.9    Mild single current episode of major depressive disorder (HCC) F32.0       Current Outpatient Prescriptions   Medication Sig Dispense Refill    varicella-zoster recombinant, PF, (SHINGRIX) 50 mcg/0.5 mL susr injection 0.5 mL by IntraMUSCular route once for 1 dose. 0.5 mL 1    PARoxetine (PAXIL) 20 mg tablet Take 1 Tab by mouth daily. 90 Tab 1    triamcinolone acetonide 0.025 % lotion Apply  to affected area two (2) times a day. 60 mL 1    lisinopril-hydroCHLOROthiazide (PRINZIDE, ZESTORETIC) 20-25 mg per tablet TAKE ONE TABLET BY MOUTH ONCE DAILY 90 Tab 1    gabapentin (NEURONTIN) 300 mg capsule Take 3 Caps by mouth nightly. Indications: NEUROPATHIC PAIN 270 Cap 1    fluticasone (VERAMYST) 27.5 mcg/actuation nasal spray 2 Sprays by Nasal route daily. 1 Bottle 5    naproxen (NAPROSYN) 500 mg tablet Take 1 Tab by mouth two (2) times daily (with meals). 180 Tab 1    montelukast (SINGULAIR) 10 mg tablet Take 1 Tab by mouth daily. 30 Tab 2    calcium citrate-vitamin d2 250-100 mg-unit per tablet Take 1 Tab by mouth daily.       valACYclovir (VALTREX) 500 mg tablet Take 1 Tab by mouth two (2) times a day. 6 Tab 5    aspirin delayed-release 81 mg tablet Take 81 mg by mouth daily.  multivitamin (ONE A DAY) tablet Take 1 Tab by mouth daily. No Known Allergies    Past Medical History:   Diagnosis Date    Arthritis     Bulging lumbar disc     Degenerative arthritis of finger     MP joint right index finger    Depression     H/O colonoscopy 12/2014    recommended yearly hemoccult stools    Hypertension     Low back pain     Lower extremity pain, left     Pinched nerve        Social History     Social History    Marital status:      Spouse name: N/A    Number of children: N/A    Years of education: N/A     Occupational History    Not on file. Social History Main Topics    Smoking status: Never Smoker    Smokeless tobacco: Never Used    Alcohol use No    Drug use: No    Sexual activity: Not Currently     Other Topics Concern    Not on file     Social History Narrative       Family History   Problem Relation Age of Onset    Cancer Mother     Ovarian Cancer Mother 68    Stroke Father          OBJECTIVE    Physical Exam:     Visit Vitals  /88   Pulse 84   Temp 98.2 °F (36.8 °C) (Oral)   Resp 16   Ht 5' 4\" (1.626 m)   Wt 184 lb 9.6 oz (83.7 kg)   LMP  (LMP Unknown)   SpO2 98%   BMI 31.69 kg/m²         General: alert, well-appearing, AA, in no apparent distress or pain  HEENT: normal nasal mucosa, pharynx/tonsils clear, eac patente, TM intact  CVS: normal rate, regular rhythm, distinct S1 and S2  Lungs:clear to ausculation bilaterally, no crackles, wheezing or rhonchi noted  Extremities: no edema, no cyanosis  Skin: warm, no lesions, rashes noted  Psych:  mood and affect normal  Results for orders placed or performed during the hospital encounter of 11/09/18   [facility] SPECIMEN COL   Result Value Ref Range    [facility] SPECIMEN COLLN.         Specimens collected/sent to [facility]. Please direct inquiries to ([phone]). ASSESSMENT/PLAN  Diagnoses and all orders for this visit:    1. Mild single current episode of major depressive disorder (HCC)  Stable, cont paxil    2. BMI 31.0-31.9,adult  encouraged wt loss/ exercise     3. Hot flashes  Benefiting from paxil    4. Chronic seasonal allergic rhinitis, unspecified trigger  Weather changes trigger  Cont prn flonaset, zyrtec    5. Essential hypertension  Controlled, cont lisinopril/hctz  Calc cv risk 5.2% vs 3.4% for age  monitoring    10. Chronic heel pain  Persistent  Refer to dr. Keyla Gonzalez    7. Encounter for vaccine  Flu vaccine given    Follow-up Disposition:  Return in about 3 months (around 11/16/2018), or if symptoms worsen or fail to improve. Patient understands plan of care. Patient has provided input and agrees with goals.

## 2018-11-16 NOTE — PATIENT INSTRUCTIONS
DASH Diet: Care Instructions  Your Care Instructions    The DASH diet is an eating plan that can help lower your blood pressure. DASH stands for Dietary Approaches to Stop Hypertension. Hypertension is high blood pressure. The DASH diet focuses on eating foods that are high in calcium, potassium, and magnesium. These nutrients can lower blood pressure. The foods that are highest in these nutrients are fruits, vegetables, low-fat dairy products, nuts, seeds, and legumes. But taking calcium, potassium, and magnesium supplements instead of eating foods that are high in those nutrients does not have the same effect. The DASH diet also includes whole grains, fish, and poultry. The DASH diet is one of several lifestyle changes your doctor may recommend to lower your high blood pressure. Your doctor may also want you to decrease the amount of sodium in your diet. Lowering sodium while following the DASH diet can lower blood pressure even further than just the DASH diet alone. Follow-up care is a key part of your treatment and safety. Be sure to make and go to all appointments, and call your doctor if you are having problems. It's also a good idea to know your test results and keep a list of the medicines you take. How can you care for yourself at home? Following the DASH diet  · Eat 4 to 5 servings of fruit each day. A serving is 1 medium-sized piece of fruit, ½ cup chopped or canned fruit, 1/4 cup dried fruit, or 4 ounces (½ cup) of fruit juice. Choose fruit more often than fruit juice. · Eat 4 to 5 servings of vegetables each day. A serving is 1 cup of lettuce or raw leafy vegetables, ½ cup of chopped or cooked vegetables, or 4 ounces (½ cup) of vegetable juice. Choose vegetables more often than vegetable juice. · Get 2 to 3 servings of low-fat and fat-free dairy each day. A serving is 8 ounces of milk, 1 cup of yogurt, or 1 ½ ounces of cheese. · Eat 6 to 8 servings of grains each day.  A serving is 1 slice of bread, 1 ounce of dry cereal, or ½ cup of cooked rice, pasta, or cooked cereal. Try to choose whole-grain products as much as possible. · Limit lean meat, poultry, and fish to 2 servings each day. A serving is 3 ounces, about the size of a deck of cards. · Eat 4 to 5 servings of nuts, seeds, and legumes (cooked dried beans, lentils, and split peas) each week. A serving is 1/3 cup of nuts, 2 tablespoons of seeds, or ½ cup of cooked beans or peas. · Limit fats and oils to 2 to 3 servings each day. A serving is 1 teaspoon of vegetable oil or 2 tablespoons of salad dressing. · Limit sweets and added sugars to 5 servings or less a week. A serving is 1 tablespoon jelly or jam, ½ cup sorbet, or 1 cup of lemonade. · Eat less than 2,300 milligrams (mg) of sodium a day. If you limit your sodium to 1,500 mg a day, you can lower your blood pressure even more. Tips for success  · Start small. Do not try to make dramatic changes to your diet all at once. You might feel that you are missing out on your favorite foods and then be more likely to not follow the plan. Make small changes, and stick with them. Once those changes become habit, add a few more changes. · Try some of the following:  ? Make it a goal to eat a fruit or vegetable at every meal and at snacks. This will make it easy to get the recommended amount of fruits and vegetables each day. ? Try yogurt topped with fruit and nuts for a snack or healthy dessert. ? Add lettuce, tomato, cucumber, and onion to sandwiches. ? Combine a ready-made pizza crust with low-fat mozzarella cheese and lots of vegetable toppings. Try using tomatoes, squash, spinach, broccoli, carrots, cauliflower, and onions. ? Have a variety of cut-up vegetables with a low-fat dip as an appetizer instead of chips and dip. ? Sprinkle sunflower seeds or chopped almonds over salads. Or try adding chopped walnuts or almonds to cooked vegetables.   ? Try some vegetarian meals using beans and peas. Add garbanzo or kidney beans to salads. Make burritos and tacos with mashed mccracken beans or black beans. Where can you learn more? Go to http://christine-katlin.info/. Enter Q319 in the search box to learn more about \"DASH Diet: Care Instructions. \"  Current as of: December 6, 2017  Content Version: 11.8  © 2563-7964 [employer]. Care instructions adapted under license by [employer] (which disclaims liability or warranty for this information). If you have questions about a medical condition or this instruction, always ask your healthcare professional. [address] any warranty or liability for your use of this information.

## 2018-11-16 NOTE — PROGRESS NOTES
1. Have you been to the ER, urgent care clinic since your last visit? Hospitalized since your last visit? No    2. Have you seen or consulted any other health care providers outside of the [address] [place] [address] since your last visit? Include any pap smears or colon screening. No    Chief Complaint   Patient presents with    Hypertension    Allergic Rhinitis    Depression    Other     hot flashes    Back Pain     sees Dr. Genny Apley Pain     heel pain, not improved     Seasonal influenza vaccine 0.5 ml given IM in LT deltoid. Lot # [id number], exp date 06/30/2019. Patient tolerated injection well. No adverse reaction noted.

## 2018-11-26 ENCOUNTER — OFFICE VISIT (OUTPATIENT)
Dept: ORTHOPEDIC SURGERY | Age: 63
End: 2018-11-26

## 2018-11-26 VITALS
TEMPERATURE: 98.5 F | SYSTOLIC BLOOD PRESSURE: 132 MMHG | HEART RATE: 82 BPM | WEIGHT: 187.8 LBS | DIASTOLIC BLOOD PRESSURE: 76 MMHG | RESPIRATION RATE: 16 BRPM | HEIGHT: 64 IN | OXYGEN SATURATION: 100 % | BODY MASS INDEX: 32.06 KG/M2

## 2018-11-26 DIAGNOSIS — M72.2 PLANTAR FASCIITIS OF RIGHT FOOT: Primary | ICD-10-CM

## 2018-11-26 RX ORDER — FAMOTIDINE 40 MG/1
40 TABLET, FILM COATED ORAL DAILY
Qty: 30 TAB | Refills: 0 | Status: SHIPPED | OUTPATIENT
Start: 2018-11-26 | End: 2019-11-08

## 2018-11-26 RX ORDER — MELOXICAM 15 MG/1
15 TABLET ORAL
Qty: 30 TAB | Refills: 0 | Status: SHIPPED | OUTPATIENT
Start: 2018-11-26 | End: 2019-11-08 | Stop reason: ALTCHOICE

## 2018-11-26 NOTE — PROGRESS NOTES
AMBULATORY PROGRESS NOTE      Patient: Boogie Verde             MRN: [id number]     SSN: [id number] Body mass index is 32.24 kg/m². YOB: 1955     AGE: 59 y.o. EX: female    PCP: Diamond Mahoney MD     IMPRESSION/DIAGNOSIS AND TREATMENT PLAN     DIAGNOSES  1. Plantar fasciitis of right foot        Orders Placed This Encounter    AMB SUPPLY ORDER    meloxicam (MOBIC) 15 mg tablet    famotidine (PEPCID) 40 mg tablet      Boogie Verde understands her diagnoses and the proposed plan. Plan:    1) CESIA Arriaga ATC, has educated the patient on plantar fascia specific exercises and/or stretches. 2) Mobic 15 m PO every day; 30 tablets, 0 refills. 3) Pepcid 40 m PO every day; 30 tablets, 0 refills. 4) DME Order: Dorsal night splint. 5) Continue activity as tolerated. 6) Anticipate Cortisone injection if condition does not improve. RTO - 3 weeks     HPI AND EXAMINATION     Joelle Cullen IS A 59 y.o. female who presents to my outpatient office complaining of right foot pain. Ms. Gruber Hence states that she has been experiencing pain in her right plantar heel since  or 2018. She states that she experiences pain first thing in the morning when she gets out of bed that improves as the day goes on. She notes that if she relaxes, her pain will go away, but if she resumes activity, she will begin experiencing sharp pain again. She reports that she has been performing stretches for her plantar heel, but notes that they have not been helping much. Additionally, she has tried Visco heel inserts, which have also not helped much. She denies any burning or tingling in her plantar heel. The patient denies any recent falls, twists, or trauma. The patient denies any GI issues or bariatric procedures. The patient is not currently taking blood thinners. The patient denies any kidney, lung, or heart diseases.         Visit Vitals  /76   Pulse 82   Temp 98.5 °F (36.9 °C) (Oral)   Resp 16   Ht 5' 4\" (1.626 m)   Wt 187 lb 12.8 oz (85.2 kg)   SpO2 100%   BMI 32.24 kg/m²      Appearance: Alert, well appearing and pleasant patient who is in no distress, oriented to person, place/time, and who follows commands. Psychiatric: Affect and mood are appropriate. Cardiovascular/Peripheral Vascular: Normal Pulses to each hand and foot  Musculoskeletal:  LOCATION:  Right FOOT/ANKLE  Integumentary: No rashes, skin patches, wounds, or abrasions to the right or left legs       Warm and normal color. No regions of expressible drainage. Palpable fullness or mass is not present      Gait: Limp with gait is not present       Tenderness: mild inferomedial hindfoot along PLANTAR FASCIA REGION,     with no NODULARITIES        Negative squeeze test      Motor/Strength/Tone Exam: Normal DF, INV,EVERSION. Slightly diminished PF           strength due to plantar fascial tenderness      Sensory Exam:   Intact Normal Sensation to ankle/foot      Stability Testing: No anterolateral or varus instability of the Ankle or Subtalar Joints               No peroneal tendon instability noted      ROM: Normal ROM noted to ankle/foot      Contractures: No Achilles or Gastrocnemius Contractures      Calf tenderness: Absent for calf or gastrocnemius muscle regions       Soft, supple, non tender, non taut lower extremity compartments       Alignment: Neutral Hindfoot  Wounds/Abrasions:   None present  Extremities:   No embolic phenomena to the toes or hands         No significant edema to the foot and or toes.         Lower extremities are warm and appear well perfused    DVT: No evidence of DVT seen on examination at this time     No calf swelling, no tenderness to calf muscles  Lymphatic:  No Evidence of Lymphedema  Vascular: Medial Border of Tibia Region: Edema is not present        Pulses: Dorsalis Pedis &  Posterior Tibial Pulses : Palpable yes        Varicosities Lower Limbs :  None    Neuro: Negative bilateral Straight leg raise (seated position)   no Tinel's at PM NV Bundle Tarsal Canal   no Proximal Tarsal Tunnel Tenderness    no Distal Tarsal Tunnel Tenderness    See Musculoskeletal section for pertinent individual extremity examination    No abnormal hand/wrist, foot/ankle, or facial/neck tremors. CHART REVIEW     Past Medical History:   Diagnosis Date    Arthritis     Bulging lumbar disc     Degenerative arthritis of finger     MP joint right index finger    Depression     H/O colonoscopy 12/2014    recommended yearly hemoccult stools    Hypertension     Low back pain     Lower extremity pain, left     Pinched nerve      Current Outpatient Medications   Medication Sig    meloxicam (MOBIC) 15 mg tablet Take 1 Tab by mouth daily (with breakfast).  famotidine (PEPCID) 40 mg tablet Take 1 Tab by mouth daily.  naproxen (NAPROSYN) 500 mg tablet TAKE ONE TABLET BY MOUTH TWICE DAILY WITH  MEALS    lisinopril-hydroCHLOROthiazide (PRINZIDE, ZESTORETIC) 20-25 mg per tablet TAKE 1 TABLET BY MOUTH ONCE DAILY    triamcinolone acetonide 0.025 % lotion APPLY TO AFFECTED AREA TWICE DAILY    PARoxetine (PAXIL) 20 mg tablet Take 1 Tab by mouth daily.  triamcinolone acetonide 0.025 % lotion Apply  to affected area two (2) times a day.  gabapentin (NEURONTIN) 300 mg capsule Take 3 Caps by mouth nightly. Indications: NEUROPATHIC PAIN    fluticasone (VERAMYST) 27.5 mcg/actuation nasal spray 2 Sprays by Nasal route daily.  montelukast (SINGULAIR) 10 mg tablet Take 1 Tab by mouth daily.  calcium citrate-vitamin d2 250-100 mg-unit per tablet Take 1 Tab by mouth daily.  valACYclovir (VALTREX) 500 mg tablet Take 1 Tab by mouth two (2) times a day.  aspirin delayed-release 81 mg tablet Take 81 mg by mouth daily.  multivitamin (ONE A DAY) tablet Take 1 Tab by mouth daily. No current facility-administered medications for this visit.       No Known Allergies  Past Surgical History: Procedure Laterality Date    HX CARPAL TUNNEL RELEASE Right     hand    HX HYSTERECTOMY      HX ORTHOPAEDIC       Social History     Occupational History    Not on file   Tobacco Use    Smoking status: Never Smoker    Smokeless tobacco: Never Used   Substance and Sexual Activity    Alcohol use: No    Drug use: No    Sexual activity: Not Currently     Family History   Problem Relation Age of Onset    Cancer Mother     Ovarian Cancer Mother 68    Stroke Father         REVIEW OF SYSTEMS : 11/26/2018  ALL BELOW ARE Negative except : SEE HPI     Constitutional: Negative for fever, chills and weight loss. Neg Weight Loss  Cardiovascular: Negative for chest pain, claudication and leg swelling. SOB, SPENCE   Gastrointestinal/Urological: Negative for  pain, N/V/D/C, Blood in stool or urine,dysuria                         Hematuria, Incontinence, pelvic pain  Musculoskeletal: see HPI. Neurological: Negative for dizziness and weakness, headaches,Visual Changes             Confusion,  Or Seizures,   Psychiatric/Behavioral: Negative for depression, memory loss and substance abuse. Extremities:  Negative for hair changes, rash or skin lesion changes. Hematologic: Negative for Bleeding problems, bruising, pallor or swollen lymph nodes. Peripheral Vascular: No calf pain, vascular vein tenderness to calf pain              No calf throbbing, posterior knee throbbing pain     DIAGNOSTIC IMAGING  [facility]: INTERPRETATION BY RADIOLOGIST      Study Result     EXAM:  CALCANEUS RIGHT      CLINICAL HISTORY/INDICATION: Right heel pain x1 month     COMPARISON: None.     TECHNIQUE: AP and lateral view      FINDINGS:     No radiopaque foreign body is seen. No fracture or dislocation is demonstrated. Mineralization is normal. There is no periostitis. There is a large heel spur at  the insertion of the plantar aponeurosis and the Achilles tendon.     IMPRESSION  IMPRESSION:     Large calcaneal enthesophytes. Please see above section of this report. I have personally reviewed the results of the above study. The interpretation of this study is my professional opinion. Written by Dina Ryan, as dictated by Dr. Scott Hoang. I, Dr. Scott Hoang, confirm that all documentation is accurate.       Campos Pratt MD  11/26/2018  5:58 PM

## 2018-11-26 NOTE — PATIENT INSTRUCTIONS
Please follow up in 3 weeks. You are advised to contact us if your condition worsens.          Plantar Fasciitis: Exercises    Your Care Instructions  Here are some examples of typical rehabilitation exercises for your condition. Start each exercise slowly. Ease off the exercise if you start to have pain. Your doctor or  will tell you when you can start these exercises and which ones will work best for you. Cross-friction massage         1. Sit in a chair with your shoes and socks off (for all exercises). 2. Using both thumbs, gently massage from just in front of your heel to the balls  of your feet. Continue for 3-5 minutes. 3. Using the same technique, massage perpendicular to your foot, from your  arch to the outside of your foot. Continue for 3-5 minutes. Plantar fascia self-massage    1. Sit in a chair. 2. Place your affected foot on a firm, tube-shaped object, such as a can or water bottle. You may progress to a tennis ball if these other items become to easy. 3. Roll your foot back and forth over the object to massage the bottom of your foot, from just in front of your heel to the balls of your feet. 4. If you want to do ice massage, fill a water bottle about three-fourths of the way full and freeze before using. 5. Continue for 3 to 5 minutes. Towel stretch (calf)    1. Sit with your legs extended and knees straight. 2. Place a towel around your foot just under the toes. 3. Hold each end of the towel in each hand, with your hands above your knees. 4. Pull back with the towel so that your foot stretches toward you. 5. Hold the position for at least 20 to 30 seconds. 6. Repeat 3 times a session, up to 5 sessions a day. Floor stretch (calf)    1. Stand about 2 feet from a wall, and place your hands on the wall at about shoulder height. Or you can stand behind a chair, placing your hands on the back of it for balance.   2. Step back with the leg you want to stretch. Keep the leg straight, and press your heel into the floor with your toe turned slightly in.  3. Lean forward, and bend your other leg slightly. Feel the stretch in the Achilles tendon of your back leg. Hold for at least 20 to 30 seconds. Repeat with back knee slightly bent. 4. Repeat 3 times a session, up to 5 sessions a day. Stair stretch (calf)    1. Stand with the balls of both feet on the edge of a step or curb. With at least one hand, hold onto something solid for balance, such as a banister or handrail. 2. Keeping your affected leg straight, slowly let that heel hang down off of the step or curb until you feel a stretch in the back of your calf and/or Achilles area. Some of your weight should still be on the other leg. 3. Hold this position for at least 20 to 30 seconds. 4. Repeat 3 times a session, up to 5 sessions a day or whenever your Achilles tendon starts to feel tight. This stretch should also be repeated with your knee slightly bent. If too easy, progress to one leg at a time. Plantar fascia stretch    1. Sit in a chair and put your affected foot on your other knee. 2. Hold the heel of your foot in one hand, and grasp your toes with the other hand. 3. Pull on your heel (toward your body), and at the same time pull your toes back with your other hand. 4. You should feel a stretch along the bottom of your foot. 5. Hold 20 to 30 seconds. 6. Repeat 3 times. Plantar fascia stretch (kneeling)    1. Get on your hands and knees on the floor. Keep your heels pointing up and the balls of your feet and your toes on the floor. 2. Slowly sit back toward your ankles. 3. If this is too hard, you can try doing it one leg at a time. Stand up, and then kneel on one knee and keep the other leg forward. Place the foot of your forward leg flat on the ground and bend that knee. The heel on the leg still behind you should point up.  The ball and toes of that foot should be on the floor. Sit back toward that ankle. 4. Hold 20 to 30 seconds. 5. Repeat 3 times. Switch legs if you are doing this one leg at a time. Toe taps           1. Sit in a chair or stand. 2. All the toes are lifted off the floor and, keeping the heel on the floor and the  outside four toes in the air, the big toe is tapped to the floor repetitively. 3. Next, the process is reversed, and the outside four toes are repetitively tapped  to the floor while keeping the big toe in the air. 4. Repeat 10 times for each foot, for 3 sets each, alternating feet after each set. Towel curls    1. While sitting, place your foot on a towel on the floor and scrunch the towel toward you with your toes. 2. Then, also using your toes, push the towel away from you.   3. Repeat 10 times for each foot, for 3 sets each, alternating feet after each set.

## 2018-12-04 RX ORDER — TRIAMCINOLONE ACETONIDE 0.25 MG/ML
LOTION TOPICAL
Qty: 60 ML | Refills: 1 | Status: SHIPPED | OUTPATIENT
Start: 2018-12-04 | End: 2019-04-24 | Stop reason: SDUPTHER

## 2018-12-04 NOTE — TELEPHONE ENCOUNTER
This patient contacted office for the following prescriptions to be filled:    Medication requested :   Requested Prescriptions     Pending Prescriptions Disp Refills    triamcinolone acetonide 0.025 % lotion 60 mL 1     PCP: Dr. Ortiz Dry or Print: [facility]  Mail order or Local pharmacy: [phone]    Scheduled appointment if not seen by current providers in office: LOV 11/16/18, next appt 2/11/19

## 2018-12-18 ENCOUNTER — HOSPITAL ENCOUNTER (OUTPATIENT)
Dept: MAMMOGRAPHY | Age: 63
Discharge: HOME | End: 2018-12-18
Attending: FAMILY MEDICINE
Payer: MEDICARE

## 2018-12-18 DIAGNOSIS — Z12.39 BREAST CANCER SCREENING: ICD-10-CM

## 2018-12-18 PROCEDURE — 77067 SCR MAMMO BI INCL CAD: CPT

## 2018-12-24 ENCOUNTER — OFFICE VISIT (OUTPATIENT)
Dept: ORTHOPEDIC SURGERY | Age: 63
End: 2018-12-24

## 2018-12-24 VITALS
OXYGEN SATURATION: 94 % | HEART RATE: 71 BPM | WEIGHT: 185.4 LBS | BODY MASS INDEX: 31.65 KG/M2 | TEMPERATURE: 98.5 F | SYSTOLIC BLOOD PRESSURE: 144 MMHG | HEIGHT: 64 IN | DIASTOLIC BLOOD PRESSURE: 86 MMHG

## 2018-12-24 DIAGNOSIS — M72.2 PLANTAR FASCIITIS OF RIGHT FOOT: Primary | ICD-10-CM

## 2018-12-24 RX ORDER — TRIAMCINOLONE ACETONIDE 40 MG/ML
40 INJECTION, SUSPENSION INTRA-ARTICULAR; INTRAMUSCULAR ONCE
Qty: 1 ML | Refills: 0
Start: 2018-12-24 | End: 2018-12-24

## 2018-12-24 NOTE — PROGRESS NOTES
AMBULATORY PROGRESS NOTE      Patient: [place] Bustamante             MRN: [id number]     SSN: [id number] Body mass index is 31.82 kg/m². YOB: 1955     AGE: 59 y.o. EX: female    PCP: Pj Augustine MD     IMPRESSION/DIAGNOSIS AND TREATMENT PLAN     DIAGNOSES  1. Plantar fasciitis of right foot        Orders Placed This Encounter    INJECT TENDON SHEATH/LIGAMENT    TRIAMCINOLONE ACETONIDE INJ    triamcinolone acetonide (KENALOG) 40 mg/mL injection      Joelle Palafox understands her diagnoses and the proposed plan. Plan:    1) Cortisone injection to the right plantar fascia: 1 mL Kenalog and 2 mL Lidocaine. 2) Use cryotherapy as directed after injection. 3) Continue HEP with plantar fascial exercises. 4) Continue using the night splint as directed. RTO - 4 weeks     HPI AND EXAMINATION     Joelle Cullen IS A 59 y.o. female who presents to my outpatient office for follow up of plantar fasciitis of the right foot. At the last visit, I prescribed Mobic 15 mg and Pepcid 40 mg, provided an HEP with plantar fascial specific exercises or stretches, provided an order for a dorsal night splint, and instructed the patient to continue activity as tolerated. Since we saw her last, Ms. Vivian Palafox states that she is still experiencing a 7 out of 10 daily pain in her right plantar fascia. She clarifies that the pain is present only when she is on it. She notes that the dorsal night splint has helped some, though. She denies having had a Cortisone injection yet. She states that she would like to receive a Cortisone injection at this time. Visit Vitals  /86   Pulse 71   Temp 98.5 °F (36.9 °C) (Oral)   Ht 5' 4\" (1.626 m)   Wt 185 lb 6.4 oz (84.1 kg)   SpO2 94%   BMI 31.82 kg/m²     Appearance: Alert, well appearing and pleasant patient who is in no distress, oriented to person, place/time, and who follows commands. Psychiatric: Affect and mood are appropriate. Cardiovascular/Peripheral Vascular: Normal Pulses to each hand and foot  Musculoskeletal:  LOCATION:  Right FOOT/ANKLE  Integumentary: No rashes, skin patches, wounds, or abrasions to the right or left legs                             Warm and normal color. No regions of expressible drainage. Palpable fullness or mass is not present      Gait: Limp with gait is not present       Tenderness: mild inferomedial hindfoot along PLANTAR FASCIA REGION,                           with no NODULARITIES                              Negative squeeze test      Motor/Strength/Tone Exam: Normal DF, INV,EVERSION. Slightly diminished PF                                                       strength due to plantar fascial tenderness      Sensory Exam:   Intact Normal Sensation to ankle/foot      Stability Testing: No anterolateral or varus instability of the Ankle or Subtalar Joints                                     No peroneal tendon instability noted      ROM: Normal ROM noted to ankle/foot      Contractures: No Achilles or Gastrocnemius Contractures      Calf tenderness: Absent for calf or gastrocnemius muscle regions       Soft, supple, non tender, non taut lower extremity compartments       Alignment: Neutral Hindfoot  Wounds/Abrasions:   None present  Extremities:   No embolic phenomena to the toes or hands                          No significant edema to the foot and or toes.                           Lower extremities are warm and appear well perfused                          DVT: No evidence of DVT seen on examination at this time                                      No calf swelling, no tenderness to calf muscles  Lymphatic:  No Evidence of Lymphedema  Vascular: Medial Border of Tibia Region: Edema is not present                   Pulses: Dorsalis Pedis &  Posterior Tibial Pulses : Palpable yes                   Varicosities Lower Limbs :  None    Neuro: Negative bilateral Straight leg raise (seated position)              no Tinel's at PM [place] Bundle Tarsal Canal              no Proximal Tarsal Tunnel Tenderness              no Distal Tarsal Tunnel Tenderness               See Musculoskeletal section for pertinent individual extremity examination               No abnormal hand/wrist, foot/ankle, or facial/neck tremors. CHART REVIEW     Past Medical History:   Diagnosis Date    Arthritis     Bulging lumbar disc     Degenerative arthritis of finger     MP joint right index finger    Depression     H/O colonoscopy 12/2014    recommended yearly hemoccult stools    Hypertension     Low back pain     Lower extremity pain, left     Pinched nerve      Current Outpatient Medications   Medication Sig    triamcinolone acetonide (KENALOG) 40 mg/mL injection 1 mL by IntraMUSCular route once for 1 dose.  triamcinolone acetonide 0.025 % lotion Apply thin layer on affected area twice a day    meloxicam (MOBIC) 15 mg tablet Take 1 Tab by mouth daily (with breakfast).  famotidine (PEPCID) 40 mg tablet Take 1 Tab by mouth daily.  naproxen (NAPROSYN) 500 mg tablet TAKE ONE TABLET BY MOUTH TWICE DAILY WITH  MEALS    lisinopril-hydroCHLOROthiazide (PRINZIDE, ZESTORETIC) 20-25 mg per tablet TAKE 1 TABLET BY MOUTH ONCE DAILY    PARoxetine (PAXIL) 20 mg tablet Take 1 Tab by mouth daily.  triamcinolone acetonide 0.025 % lotion Apply  to affected area two (2) times a day.  gabapentin (NEURONTIN) 300 mg capsule Take 3 Caps by mouth nightly. Indications: NEUROPATHIC PAIN    fluticasone (VERAMYST) 27.5 mcg/actuation nasal spray 2 Sprays by Nasal route daily.  montelukast (SINGULAIR) 10 mg tablet Take 1 Tab by mouth daily.  calcium citrate-vitamin d2 250-100 mg-unit per tablet Take 1 Tab by mouth daily.  valACYclovir (VALTREX) 500 mg tablet Take 1 Tab by mouth two (2) times a day.  aspirin delayed-release 81 mg tablet Take 81 mg by mouth daily.     multivitamin (ONE A DAY) tablet Take 1 Tab by mouth daily. No current facility-administered medications for this visit. No Known Allergies  Past Surgical History:   Procedure Laterality Date    HX CARPAL TUNNEL RELEASE Right     hand    HX HYSTERECTOMY      HX ORTHOPAEDIC       Social History     Occupational History    Not on file   Tobacco Use    Smoking status: Never Smoker    Smokeless tobacco: Never Used   Substance and Sexual Activity    Alcohol use: No    Drug use: No    Sexual activity: Not Currently     Family History   Problem Relation Age of Onset    Cancer Mother     Ovarian Cancer Mother 68    Stroke Father         REVIEW OF SYSTEMS : 12/24/2018  ALL BELOW ARE Negative except : SEE HPI     Constitutional: Negative for fever, chills and weight loss. Neg Weight Loss  Cardiovascular: Negative for chest pain, claudication and leg swelling. SOB, SPENCE   Gastrointestinal/Urological: Negative for  pain, N/V/D/C, Blood in stool or urine,dysuria                         Hematuria, Incontinence, pelvic pain  Musculoskeletal: see HPI. Neurological: Negative for dizziness and weakness, headaches,Visual Changes             Confusion,  Or Seizures,   Psychiatric/Behavioral: Negative for depression, memory loss and substance abuse. Extremities:  Negative for hair changes, rash or skin lesion changes. Hematologic: Negative for Bleeding problems, bruising, pallor or swollen lymph nodes.   Peripheral Vascular: No calf pain, vascular vein tenderness to calf pain              No calf throbbing, posterior knee throbbing pain     DIAGNOSTIC IMAGING     No notes on file        PROCEDURE       JONATHAN PROCEDURE OUTPATIENT PROCEDURE    PROCEDURE:  Injection of the Right PLANTAR FASCIA     Chart reviewed for the following:     Reyes Raker, MD, have reviewed the History, Physical and updated the Allergic reactions for [place] performed immediately prior to start of procedure:       * Patient was identified by name Elaine Aguilar [facility] and date of birth   * Agreement on procedure being performed was verified  * Risks and Benefits explained to the patient: see below  * Procedure site verified and marked as necessary  * Patient was positioned for comfort  * Verbal Consent and Written Consent Verified by myself and my office staff. * Complications: None  *  All patient and/or family questions answered     The procedure was explained to the patient and possible adverse reactions were discussed. These risks included, but not limited to: bleeding, infection, septic arthritis, local skin irritation (skin discoloration, hyperpigmentation, hypopigmentation, thinning of the skin, development of a wound, skin necrosis), synovitis, subcutaneous fat pad atrophy, subcutaneous abscess, tendon rupture. Time: 12:03 PM  Date of procedure: 12/24/2018  Procedure performed by: Eliza Williamson MD  Provider assisted by:  MA  Patient assisted by: self  How tolerated by patient: tolerated the procedure well with no complications  Comments: none    After verbal and informed consent, and after all patient and family questions answered, the procedure was performed as below: The Right MEDIAL HINDFOOT area was prepped and cleansed with: sterile fashion using a follow solution: ALCOHOL/BETADIENE STERILE PREP. The injection was administered:  A solution of 40 mg of  KENALOG and 3 ml of LIDOCAINE % plain was used. The pain assessment score PRIOR/AFTER to the injection  7 /10 //  too soon at determine due to this immediate injection  10 pain severity, moderate intensity, aching and dull Pain quality    Post injection instructions were given: removed band aid 3 hours, Wash site with clean soap, No further dressings there after. Call me if any: pain, redness, drainage, fever.     [address] tolerated the procedure well and was advised on the signs of infection and instructed to go to the ER or call the office if [address] becomes concerned about the area being infected. Please see above section of this report. I have personally reviewed the results of the above study. The interpretation of this study is my professional opinion. Written by Brooke Johnson, as dictated by Dr. Kerry Maldonado. I, Dr. Kerry Maldonado, confirm that all documentation is accurate.

## 2018-12-24 NOTE — PATIENT INSTRUCTIONS
Please follow up in 1 month. You are advised to contact us if your condition worsens. Plantar Fasciitis: Exercises  Your Care Instructions  Here are some examples of typical rehabilitation exercises for your condition. Start each exercise slowly. Ease off the exercise if you start to have pain. Your doctor or physical therapist will tell you when you can start these exercises and which ones will work best for you. How to do the exercises  Towel stretch    1. Sit with your legs extended and knees straight. 2. Place a towel around your foot just under the toes. 3. Hold each end of the towel in each hand, with your hands above your knees. 4. Pull back with the towel so that your foot stretches toward you. 5. Hold the position for at least 15 to 30 seconds. 6. Repeat 2 to 4 times a session, up to 5 sessions a day. Calf stretch    1. Stand facing a wall with your hands on the wall at about eye level. Put the leg you want to stretch about a step behind your other leg. 2. Keeping your back heel on the floor, bend your front knee until you feel a stretch in the back leg. 3. Hold the stretch for 15 to 30 seconds. Repeat 2 to 4 times. Plantar fascia and calf stretch    1. Stand on a step as shown above. Be sure to hold on to the banister. 2. Slowly let your heels down over the edge of the step as you relax your calf muscles. You should feel a gentle stretch across the bottom of your foot and up the back of your leg to your knee. 3. Hold the stretch about 15 to 30 seconds, and then tighten your calf muscle a little to bring your heel back up to the level of the step. Repeat 2 to 4 times. Towel curls    1. While sitting, place your foot on a towel on the floor and scrunch the towel toward you with your toes. 2. Then, also using your toes, push the towel away from you. Mills pickups    1.  Put marbles on the floor next to a cup.  2. Using your toes, try to lift the marbles up from the floor and put them in the cup. Follow-up care is a key part of your treatment and safety. Be sure to make and go to all appointments, and call your doctor if you are having problems. It's also a good idea to know your test results and keep a list of the medicines you take. Where can you learn more? Go to http://christine-katlin.info/. Manishado Polanco in the search box to learn more about \"Plantar Fasciitis: Exercises. \"  Current as of: November 29, 2017  Content Version: 11.8  © 5561-4817 [employer]. Care instructions adapted under license by [employer] (which disclaims liability or warranty for this information). If you have questions about a medical condition or this instruction, always ask your healthcare professional. [address] any warranty or liability for your use of this information. Joint Injections: Care Instructions  Your Care Instructions    Joint injections are shots into a joint, such as the knee. They may be used to put in medicines, such as pain relievers. A corticosteroid, or steroid, shot is used to reduce inflammation in tendons or joints. It is often used to treat problems such as arthritis, tendinitis, and bursitis. Steroids can be injected directly into a painful, inflamed joint. They can also help reduce inflammation of a bursa. A bursa is a sac of fluid. It cushions and lubricates areas where tendons, ligaments, skin, muscles, or bones rub against each other. A steroid shot can sometimes help with short-term pain relief when other treatments haven't worked. If steroid shots help, pain may improve for weeks or months. Follow-up care is a key part of your treatment and safety. Be sure to make and go to all appointments, and call your doctor if you are having problems. It's also a good idea to know your test results and keep a list of the medicines you take. How can you care for yourself at home?   · Put ice or a cold pack on the area for 10 to 20 minutes at a time. Put a thin cloth between the ice and your skin. · Ask your doctor if you can take an over-the-counter pain medicine, such as acetaminophen (Tylenol), ibuprofen (Advil, Motrin), or naproxen (Aleve). Be safe with medicines. Read and follow all instructions on the label. · Avoid strenuous activities for several days. In particular, avoid ones that put stress on the area where you got the shot. · If you have dressings over the area, keep them clean and dry. You may remove them when your doctor tells you to. When should you call for help? Call your doctor now or seek immediate medical care if:    · You have signs of infection, such as:  ? Increased pain, swelling, warmth, or redness. ? Red streaks leading from the site. ? Pus draining from the site. ? A fever.    Watch closely for changes in your health, and be sure to contact your doctor if you have any problems. Where can you learn more? Go to http://christine-katlin.info/. Enter N616 in the search box to learn more about \"Joint Injections: Care Instructions. \"  Current as of: November 29, 2017  Content Version: 11.8  © 7463-4118 [employer], [employer]. Care instructions adapted under license by [employer] (which disclaims liability or warranty for this information). If you have questions about a medical condition or this instruction, always ask your healthcare professional. [place] [address] any warranty or liability for your use of this information.

## 2019-01-24 RX ORDER — PAROXETINE HYDROCHLORIDE 20 MG/1
20 TABLET, FILM COATED ORAL DAILY
Qty: 90 TAB | Refills: 1 | Status: SHIPPED | OUTPATIENT
Start: 2019-01-24 | End: 2019-07-31 | Stop reason: SDUPTHER

## 2019-01-24 NOTE — TELEPHONE ENCOUNTER
This patient contacted office for the following prescriptions to be filled:    Medication requested :   Requested Prescriptions     Pending Prescriptions Disp Refills    PARoxetine (PAXIL) 20 mg tablet 90 Tab 1     Sig: Take 1 Tab by mouth daily.      [facility] [place] [facility]: [facility]   Mail order or Local pharmacy [address] [facility]     Scheduled appointment if not seen by current providers in office: lov 8/16/2018 F/U 2/11/2019

## 2019-02-11 ENCOUNTER — TELEPHONE (OUTPATIENT)
Dept: FAMILY MEDICINE CLINIC | Age: 64
End: 2019-02-11

## 2019-02-11 ENCOUNTER — OFFICE VISIT (OUTPATIENT)
Dept: FAMILY MEDICINE CLINIC | Age: 64
End: 2019-02-11

## 2019-02-11 VITALS
SYSTOLIC BLOOD PRESSURE: 128 MMHG | WEIGHT: 184.2 LBS | DIASTOLIC BLOOD PRESSURE: 86 MMHG | RESPIRATION RATE: 17 BRPM | BODY MASS INDEX: 31.45 KG/M2 | HEART RATE: 79 BPM | OXYGEN SATURATION: 98 % | TEMPERATURE: 98.5 F | HEIGHT: 64 IN

## 2019-02-11 DIAGNOSIS — R23.2 HOT FLASHES: ICD-10-CM

## 2019-02-11 DIAGNOSIS — Z13.220 LIPID SCREENING: ICD-10-CM

## 2019-02-11 DIAGNOSIS — J30.2 SEASONAL ALLERGIC RHINITIS, UNSPECIFIED TRIGGER: ICD-10-CM

## 2019-02-11 DIAGNOSIS — J02.9 SORE THROAT: Primary | ICD-10-CM

## 2019-02-11 DIAGNOSIS — Z13.1 SCREENING FOR DIABETES MELLITUS (DM): ICD-10-CM

## 2019-02-11 DIAGNOSIS — I10 ESSENTIAL HYPERTENSION: ICD-10-CM

## 2019-02-11 DIAGNOSIS — R73.01 IFG (IMPAIRED FASTING GLUCOSE): ICD-10-CM

## 2019-02-11 LAB
S PYO AG THROAT QL: NEGATIVE
VALID INTERNAL CONTROL?: YES

## 2019-02-11 RX ORDER — MOMETASONE FUROATE 50 UG/1
2 SPRAY, METERED NASAL DAILY
Qty: 1 CONTAINER | Refills: 11 | Status: SHIPPED | OUTPATIENT
Start: 2019-02-11 | End: 2020-05-04

## 2019-02-11 NOTE — PROGRESS NOTES
Kailash Munoz, 59 y.o.,  female    SUBJECTIVE  Ff-up    HTN- taking meds without problems. Reviewed labs, elevated FBG. High bread consumption    Allergic rhinitis-responding well to prn flonase, zyrtec. However nasal spray not covered by insurance. She is more symptomatic with nasal congestion, post nasal drip and throat irritation. No fever, sob, wheezing, myalgia    Depression/hot flashes- says doing well for the most part, taking paxil. Chronic LBP- on neurontin, following dr. Zeferino Esteban to heel steroid injection     ROS:  See HPI, all others negative        Patient Active Problem List   Diagnosis Code    Hypertension I10    Lumbar radiculopathy M54.16    Herpes genitalis in women A60.09    Allergic rhinitis due to allergen J30.9    Hot flashes R23.2    Advance directive discussed with patient Z71.89    Facet arthropathy ([facility] [address] M46.90    Lumbar stenosis M48.061    Advance care planning Z71.89    Obesity E66.9    Mild single current episode of major depressive disorder (HCC) F32.0       Current Outpatient Prescriptions   Medication Sig Dispense Refill    varicella-zoster recombinant, PF, (SHINGRIX) 50 mcg/0.5 mL susr injection 0.5 mL by IntraMUSCular route once for 1 dose. 0.5 mL 1    PARoxetine (PAXIL) 20 mg tablet Take 1 Tab by mouth daily. 90 Tab 1    triamcinolone acetonide 0.025 % lotion Apply  to affected area two (2) times a day. 60 mL 1    lisinopril-hydroCHLOROthiazide (PRINZIDE, ZESTORETIC) 20-25 mg per tablet TAKE ONE TABLET BY MOUTH ONCE DAILY 90 Tab 1    gabapentin (NEURONTIN) 300 mg capsule Take 3 Caps by mouth nightly. Indications: NEUROPATHIC PAIN 270 Cap 1    fluticasone (VERAMYST) 27.5 mcg/actuation nasal spray 2 Sprays by Nasal route daily. 1 Bottle 5    naproxen (NAPROSYN) 500 mg tablet Take 1 Tab by mouth two (2) times daily (with meals). 180 Tab 1    montelukast (SINGULAIR) 10 mg tablet Take 1 Tab by mouth daily.  30 Tab 2    calcium citrate-vitamin d2 250-100 mg-unit per tablet Take 1 Tab by mouth daily.  valACYclovir (VALTREX) 500 mg tablet Take 1 Tab by mouth two (2) times a day. 6 Tab 5    aspirin delayed-release 81 mg tablet Take 81 mg by mouth daily.  multivitamin (ONE A DAY) tablet Take 1 Tab by mouth daily. No Known Allergies    Past Medical History:   Diagnosis Date    Arthritis     Bulging lumbar disc     Degenerative arthritis of finger     MP joint right index finger    Depression     H/O colonoscopy 12/2014    recommended yearly hemoccult stools    Hypertension     Low back pain     Lower extremity pain, left     Pinched nerve        Social History     Social History    Marital status:      Spouse name: N/A    Number of children: N/A    Years of education: N/A     Occupational History    Not on file.      Social History Main Topics    Smoking status: Never Smoker    Smokeless tobacco: Never Used    Alcohol use No    Drug use: No    Sexual activity: Not Currently     Other Topics Concern    Not on file     Social History Narrative       Family History   Problem Relation Age of Onset    Cancer Mother     Ovarian Cancer Mother 68    Stroke Father          OBJECTIVE    Physical Exam:     Visit Vitals  /86 (BP 1 Location: Left arm, BP Patient Position: Sitting)   Pulse 79   Temp 98.5 °F (36.9 °C) (Oral)   Resp 17   Ht 5' 4\" (1.626 m)   Wt 184 lb 3.2 oz (83.6 kg)   LMP  (LMP Unknown)   SpO2 98%   BMI 31.62 kg/m²         General: alert, well-appearing, AA, in no apparent distress or pain  HEENT: normal nasal mucosa, pharynx/tonsils clear, eac patente, TM intact  CVS: normal rate, regular rhythm, distinct S1 and S2  Lungs:clear to ausculation bilaterally, no crackles, wheezing or rhonchi noted  Extremities: no edema, no cyanosis  Skin: warm, no lesions, rashes noted  Psych:  mood and affect normal  Results for orders placed or performed in visit on 02/11/19   AMB POC RAPID STREP A   Result Value Ref Range VALID INTERNAL CONTROL POC Yes     Group A Strep Ag Negative Negative     ASSESSMENT/PLAN  Diagnoses and all orders for this visit:    1. Mild single current episode of major depressive disorder (HCC)  Stable, cont paxil    2. BMI 31.0-31.9,adult  encouraged wt loss/ exercise     3. Hot flashes  Benefiting from paxil  Discussed addition of otc black cohosh    4. Chronic seasonal allergic rhinitis, unspecified trigger  Weather changes trigger  Switch flonase to nasonex, due to insurance coverage  Cont zyrtec    5. Essential hypertension  Controlled, cont lisinopril/hctz  Calc cv risk 5.2% vs 3.4% for age  monitoring    10. Impaired fasting glucose  Tlcs, monitoring  Check lipid/a1c/cmp in 3 months    7. Sore throat  Strep test neg  Likely from #4    Follow-up Disposition:  Return in about 3 months, or if symptoms worsen or fail to improve. Patient understands plan of care. Patient has provided input and agrees with goals.

## 2019-02-11 NOTE — TELEPHONE ENCOUNTER
Giuseppe Chua is requesting a prior auth for RX Mometasone Furoate. Please go to key. [employer] and use KEY: [id number]. Thank you.

## 2019-02-11 NOTE — PROGRESS NOTES
1. Have you been to the ER, urgent care clinic since your last visit? Hospitalized since your last visit? No    2. Have you seen or consulted any other health care providers outside of the [address] [place] [address] since your last visit? Include any pap smears or colon screening.  No    Chief Complaint   Patient presents with    Hypertension    Allergic Rhinitis    Nasal Congestion     times 2 days

## 2019-02-11 NOTE — PROGRESS NOTES
Alexis Dean, 58 yo F, 1955  CC: Sore Throat/Allergy follow    Ms. Thong Harper presents for follow up for her allergies. She has had a sore throat lately that she believes is also related to her allergic sinusitis and rhinitis. She took a nasal allergy medication up to this point, but now it is not being covered by insurance. She takes zyrtec, which offers only residual relief. She notes no other palliative or provocative factors and denies any recent fevers. PMH discussed Today:  - Hot Flashes: No significant improvement or worsening, under fair control with PAXIL. - Ankle/Back Pain: Being seen by ortho, had a steroid shot in her ankle, which had led to notable improvement. Says back pain is fairly well tolerated for now. - Lipids and glucose elevated, trying to make lifestyle changes and lose weight    Patient Active Problem List   Diagnosis Code    Hypertension I10    Lumbar radiculopathy M54.16    Herpes genitalis in women A60.09    Allergic rhinitis due to allergen J30.9    Hot flashes R23.2    Advance directive discussed with patient Z71.89    Facet arthropathy ([facility] [address] M46.90    Lumbar stenosis M48.061    Advance care planning Z71.89    Obesity E66.9    Mild single current episode of major depressive disorder (HCC) F32.0                Current Outpatient Prescriptions   Medication Sig Dispense Refill    varicella-zoster recombinant, PF, (SHINGRIX) 50 mcg/0.5 mL susr injection 0.5 mL by IntraMUSCular route once for 1 dose. 0.5 mL 1    PARoxetine (PAXIL) 20 mg tablet Take 1 Tab by mouth daily. 90 Tab 1    triamcinolone acetonide 0.025 % lotion Apply  to affected area two (2) times a day. 60 mL 1    lisinopril-hydroCHLOROthiazide (PRINZIDE, ZESTORETIC) 20-25 mg per tablet TAKE ONE TABLET BY MOUTH ONCE DAILY 90 Tab 1    gabapentin (NEURONTIN) 300 mg capsule Take 3 Caps by mouth nightly.  Indications: NEUROPATHIC PAIN 270 Cap 1    fluticasone (VERAMYST) 27.5 mcg/actuation nasal spray 2 Sprays by Nasal route daily. 1 Bottle 5    naproxen (NAPROSYN) 500 mg tablet Take 1 Tab by mouth two (2) times daily (with meals). 180 Tab 1    montelukast (SINGULAIR) 10 mg tablet Take 1 Tab by mouth daily. 30 Tab 2    calcium citrate-vitamin d2 250-100 mg-unit per tablet Take 1 Tab by mouth daily.        valACYclovir (VALTREX) 500 mg tablet Take 1 Tab by mouth two (2) times a day. 6 Tab 5    aspirin delayed-release 81 mg tablet Take 81 mg by mouth daily.        multivitamin (ONE A DAY) tablet Take 1 Tab by mouth daily.             No Known Allergies          Past Medical History:   Diagnosis Date    Arthritis      Bulging lumbar disc      Degenerative arthritis of finger       MP joint right index finger    Depression      H/O colonoscopy 12/2014     recommended yearly hemoccult stools    Hypertension      Low back pain      Lower extremity pain, left      Pinched nerve           Social History            Social History    Marital status:        Spouse name: N/A    Number of children: N/A    Years of education: N/A          Occupational History    Not on file.           Social History Main Topics    Smoking status: Never Smoker    Smokeless tobacco: Never Used    Alcohol use No    Drug use: No    Sexual activity: Not Currently           Other Topics Concern    Not on file      Social History Narrative               Family History   Problem Relation Age of Onset    Cancer Mother      Ovarian Cancer Mother 68    Stroke Father            PHYSICAL EXAM:   Vitals 2/11/2019   Blood Pressure 128/86   Pulse 79   Temp 98.5   Resp 17   Height 5' 4\"   Weight 184 lb 3.2 oz   SpO2 98   BSA 1.94 m2   BMI 31.62 kg/m2   - General: Alert and oriented, not in distress  - HEENT: Ears show no erythema of the canal or TM. Nose shows no erythema of the canal or turbinates and there is no significant congestion.  Mouth shows no dental erosion and no erythema of the buccal, pharyngeal or gingival mucosa. Eyes show sharply demarcated vessels with no hemorrhage or edema.   - Neck: No LAD  - Heart: RRR with no murmurs rubs or gallops  - Lungs: CTA in all fields  - Abdomen: No brusing or deformity noted, normoactive bowel sounds in all quadrants, no dullness to percussion, no tenderness to palpation, no hepatosplenomegaly. - Extremities: 2+ bilateral radial and pedal pulses with no leg edema  - Neuro: 2+ bilateral patellar reflexes    LABS:    Glucose 104 Abnormally high   H  65 - 99 mg/dL Final   BUN 21   8 - 27 mg/dL Final   Creatinine 0.69   0.57 - 1.00 mg/dL Final   GFR est non-AA 94   >59 mL/min/1.73 Final   GFR est    >59 mL/min/1.73 Final   BUN/Creatinine ratio 30 Abnormally high   H  12 - 28  Final   Sodium 145 Abnormally high   H  134 - 144 mmol/L Final   Potassium 4.0   3.5 - 5.2 mmol/L Final   Chloride 104   96 - 106 mmol/L Final   CO2 27   20 - 29 mmol/L Final   Calcium 9.5   8.7 - 10.3 mg/dL Final     Cholesterol, total 204 Abnormally high   H  100 - 199 mg/dL Final   Triglyceride 75   0 - 149 mg/dL Final   HDL Cholesterol 76   >39 mg/dL Final   VLDL, calculated 15   5 - 40 mg/dL Final   LDL, calculated 113 Abnormally high   H             ASSESSMENT/PLAN  59year old female with. ..    1. Allergic sinusitis/rhinitis  - Continue use of zyrtec    2. Back Pain, seems well managed  - Managed by Ortho  - Patient appears to be diligently doing home exercises prescribed. 3. Ankle Pain, seems well managed  - Managed by Ortho  - Steroid shot received for pain  - Patient appears to be diligently doing home exercises prescribed. 4. HTN  - Well managed on medication    5. Prediabetic blood sugar  - Adjust diet and make lifestyle changes, as a sufficient measure for now    6. HL  - Adjust diet and make lifestyle changes, as a sufficient measure for now    7. Hot Flashes  - Fairly well managed with PAXIL  - Patient not an ideal candidate for HRT, and patient prefers against it    8.  Preventative Care:  - Mammogram uptodate and negative   - No longer needs packs due to hysterectomy. - DEXA at 73  - Patient scheduled for upcoming colonscopy  - Patient offered [facility] again, will keep considering it. *ATTENTION:  This note has been created by a medical student for educational purposes only. Please do not refer to the content of this note for clinical decision-making, billing, or other purposes. Please see attending physicians note to obtain clinical information on this patient. *

## 2019-02-11 NOTE — PATIENT INSTRUCTIONS
Prediabetes: Care Instructions  Your Care Instructions    Prediabetes is a warning sign that you are at risk for getting type 2 diabetes. It means that your blood sugar is higher than it should be. The food you eat turns into sugar, which your body uses for energy. Normally, an organ called the pancreas makes insulin, which allows the sugar in your blood to get into your body's cells. But when your body can't use insulin the right way, the sugar doesn't move into cells. It stays in your blood instead. This is called insulin resistance. The buildup of sugar in the blood causes prediabetes. The good news is that lifestyle changes may help you get your blood sugar back to normal and help you avoid or delay diabetes. Follow-up care is a key part of your treatment and safety. Be sure to make and go to all appointments, and call your doctor if you are having problems. It's also a good idea to know your test results and keep a list of the medicines you take. How can you care for yourself at home? · Watch your weight. A healthy weight helps your body use insulin properly. · Limit the amount of calories, sweets, and unhealthy fat you eat. Ask your doctor if you should see a dietitian. A registered dietitian can help you create meal plans that fit your lifestyle. · Get at least 30 minutes of exercise on most days of the week. Exercise helps control your blood sugar. It also helps you maintain a healthy weight. Walking is a good choice. You also may want to do other activities, such as running, swimming, cycling, or playing tennis or team sports. · Do not smoke. Smoking can make prediabetes worse. If you need help quitting, talk to your doctor about stop-smoking programs and medicines. These can increase your chances of quitting for good. · If your doctor prescribed medicines, take them exactly as prescribed. Call your doctor if you think you are having a problem with your medicine.  You will get more details on the specific medicines your doctor prescribes. When should you call for help? Watch closely for changes in your health, and be sure to contact your doctor if:    · You have any symptoms of diabetes. These may include:  ? Being thirsty more often. ? Urinating more. ? Being hungrier. ? Losing weight. ? Being very tired. ? Having blurry vision.     · You have a wound that will not heal.     · You have an infection that will not go away.     · You have problems with your blood pressure.     · You want more information about diabetes and how you can keep from getting it. Where can you learn more? Go to http://christine-katlin.info/. Enter I222 in the search box to learn more about \"Prediabetes: Care Instructions. \"  Current as of: July 25, 2018  Content Version: 11.9  © 7255-2998 [employer]. Care instructions adapted under license by [employer] (which disclaims liability or warranty for this information). If you have questions about a medical condition or this instruction, always ask your healthcare professional. [place] [address] any warranty or liability for your use of this information. DASH Diet: Care Instructions  Your Care Instructions    The DASH diet is an eating plan that can help lower your blood pressure. DASH stands for Dietary Approaches to Stop Hypertension. Hypertension is high blood pressure. The DASH diet focuses on eating foods that are high in calcium, potassium, and magnesium. These nutrients can lower blood pressure. The foods that are highest in these nutrients are fruits, vegetables, low-fat dairy products, nuts, seeds, and legumes. But taking calcium, potassium, and magnesium supplements instead of eating foods that are high in those nutrients does not have the same effect. The DASH diet also includes whole grains, fish, and poultry.   The DASH diet is one of several lifestyle changes your doctor may recommend to lower your high blood pressure. Your doctor may also want you to decrease the amount of sodium in your diet. Lowering sodium while following the DASH diet can lower blood pressure even further than just the DASH diet alone. Follow-up care is a key part of your treatment and safety. Be sure to make and go to all appointments, and call your doctor if you are having problems. It's also a good idea to know your test results and keep a list of the medicines you take. How can you care for yourself at home? Following the DASH diet  · Eat 4 to 5 servings of fruit each day. A serving is 1 medium-sized piece of fruit, ½ cup chopped or canned fruit, 1/4 cup dried fruit, or 4 ounces (½ cup) of fruit juice. Choose fruit more often than fruit juice. · Eat 4 to 5 servings of vegetables each day. A serving is 1 cup of lettuce or raw leafy vegetables, ½ cup of chopped or cooked vegetables, or 4 ounces (½ cup) of vegetable juice. Choose vegetables more often than vegetable juice. · Get 2 to 3 servings of low-fat and fat-free dairy each day. A serving is 8 ounces of milk, 1 cup of yogurt, or 1 ½ ounces of cheese. · Eat 6 to 8 servings of grains each day. A serving is 1 slice of bread, 1 ounce of dry cereal, or ½ cup of cooked rice, pasta, or cooked cereal. Try to choose whole-grain products as much as possible. · Limit lean meat, poultry, and fish to 2 servings each day. A serving is 3 ounces, about the size of a deck of cards. · Eat 4 to 5 servings of nuts, seeds, and legumes (cooked dried beans, lentils, and split peas) each week. A serving is 1/3 cup of nuts, 2 tablespoons of seeds, or ½ cup of cooked beans or peas. · Limit fats and oils to 2 to 3 servings each day. A serving is 1 teaspoon of vegetable oil or 2 tablespoons of salad dressing. · Limit sweets and added sugars to 5 servings or less a week. A serving is 1 tablespoon jelly or jam, ½ cup sorbet, or 1 cup of lemonade.   · Eat less than 2,300 milligrams (mg) of sodium a day. If you limit your sodium to 1,500 mg a day, you can lower your blood pressure even more. Tips for success  · Start small. Do not try to make dramatic changes to your diet all at once. You might feel that you are missing out on your favorite foods and then be more likely to not follow the plan. Make small changes, and stick with them. Once those changes become habit, add a few more changes. · Try some of the following:  ? Make it a goal to eat a fruit or vegetable at every meal and at snacks. This will make it easy to get the recommended amount of fruits and vegetables each day. ? Try yogurt topped with fruit and nuts for a snack or healthy dessert. ? Add lettuce, tomato, cucumber, and onion to sandwiches. ? Combine a ready-made pizza crust with low-fat mozzarella cheese and lots of vegetable toppings. Try using tomatoes, squash, spinach, broccoli, carrots, cauliflower, and onions. ? Have a variety of cut-up vegetables with a low-fat dip as an appetizer instead of chips and dip. ? Sprinkle sunflower seeds or chopped almonds over salads. Or try adding chopped walnuts or almonds to cooked vegetables. ? Try some vegetarian meals using beans and peas. Add garbanzo or kidney beans to salads. Make burritos and tacos with mashed mccracken beans or black beans. Where can you learn more? Go to http://christine-katlin.info/. Enter K336 in the search box to learn more about \"DASH Diet: Care Instructions. \"  Current as of: July 22, 2018  Content Version: 11.9  © 7528-5724 [employer]. Care instructions adapted under license by [employer] (which disclaims liability or warranty for this information). If you have questions about a medical condition or this instruction, always ask your healthcare professional. [address] any warranty or liability for your use of this information.     HERNANDEZ, or PILI breads  WHOLE wheat wraps

## 2019-02-14 NOTE — TELEPHONE ENCOUNTER
Authorization approved until 2/12/2021.  [address] JAC[address] Felix informed of approval.  Left detailed message to inform patient of approval.

## 2019-02-15 RX ORDER — TRIAMCINOLONE ACETONIDE 0.25 MG/ML
LOTION TOPICAL 2 TIMES DAILY
Qty: 60 ML | Refills: 1 | Status: SHIPPED | OUTPATIENT
Start: 2019-02-15 | End: 2019-02-21 | Stop reason: SDUPTHER

## 2019-02-15 NOTE — TELEPHONE ENCOUNTER
LOV 02/11/2019  F/U 05/10/2019  Please send into [facility] on [address] as stated in pharmacy section.

## 2019-02-21 ENCOUNTER — OFFICE VISIT (OUTPATIENT)
Dept: ORTHOPEDIC SURGERY | Age: 64
End: 2019-02-21

## 2019-02-21 VITALS
WEIGHT: 183.2 LBS | RESPIRATION RATE: 16 BRPM | HEART RATE: 88 BPM | TEMPERATURE: 98.3 F | DIASTOLIC BLOOD PRESSURE: 85 MMHG | BODY MASS INDEX: 31.28 KG/M2 | HEIGHT: 64 IN | OXYGEN SATURATION: 98 % | SYSTOLIC BLOOD PRESSURE: 145 MMHG

## 2019-02-21 DIAGNOSIS — M54.16 LUMBAR RADICULOPATHY: Primary | ICD-10-CM

## 2019-02-21 RX ORDER — PREDNISONE 10 MG/1
TABLET ORAL
Qty: 21 TAB | Refills: 0 | Status: SHIPPED | OUTPATIENT
Start: 2019-02-21 | End: 2019-03-28 | Stop reason: ALTCHOICE

## 2019-02-21 RX ORDER — PREGABALIN 150 MG/1
150 CAPSULE ORAL 2 TIMES DAILY
Qty: 60 CAP | Refills: 5 | Status: SHIPPED | OUTPATIENT
Start: 2019-02-21 | End: 2019-03-28 | Stop reason: ALTCHOICE

## 2019-02-21 RX ORDER — PREGABALIN 75 MG/1
75 CAPSULE ORAL 2 TIMES DAILY
Qty: 28 CAP | Refills: 0 | Status: SHIPPED | OUTPATIENT
Start: 2019-02-21 | End: 2019-03-28 | Stop reason: ALTCHOICE

## 2019-02-21 NOTE — PROGRESS NOTES
[address]  [address], [address]  [place], [facility]  Phone: [phone]  Fax: [phone]        Luís Solis  : 1955  PCP: Flavio Brito MD  2019    PROGRESS NOTE      ASSESSMENT AND PLAN    Chito Catherine comes in to the office today for 6 month f/u. She states that her pain is a little different now, and it is more localized to her left lower back with a tingling sensation in her LLE to her knee. She denies weakness. She has not been tolerating Gabapentin 300mg TID well as it causes somnolence. She rates her pain as a 5/10 today. On examination, she leans to the left, but her right shoulder sits lower than her left. She had tenderness to palpation of her left upper buttock. Positive SLR on the L. She is neurologically intact. Her pain today presents more as a lumbar radiculopathy than the myofascial pain and facet arthropathy we were previously treating. I prescribed Lyrica 75mg BID for 2 weeks, then 150mg BID moving forward. I also prescribed a 10mg Prednisone dose pack. I provided Jose Roberto exercises to add to her HEP. I offered a referral to PT, but she declines. Pt will f/u in 4 weeks or sooner as needed. Diagnoses and all orders for this visit:    1. Lumbar radiculopathy  -     pregabalin (LYRICA) 75 mg capsule; Take 1 Cap by mouth two (2) times a day. Max Daily Amount: 150 mg.  -     pregabalin (LYRICA) 150 mg capsule; Take 1 Cap by mouth two (2) times a day. Max Daily Amount: 300 mg.  -     predniSONE (STERAPRED DS) 10 mg dose pack; See administration instruction per 10mg dose pack         Follow-up Disposition:  Return in about 4 weeks (around 3/21/2019). HISTORY OF PRESENT ILLNESS  Chito Catherine is a 59 y.o. female. A&P / HPI from 2017: Jerson Pradhan was in the office today for a f/u appointment.  She has been taking Tylenol 3 prn and Gabapentin 1,200mg daily with relief so she will continue these medications. She was advised to increase her overall exercise level by doing activities such as walking and her at home PT exercises.      HISTORY OF PRESENT ILLNESS  Joe Frank is a 62 y.o. female. Pt presents to the office for a f/u for lumbar pain. Pt has been taking Tylenol 3 prn and she has been finding relief from Gabapentin 1,200mg daily, no side effects were noted.       Pt says increased lifting and bending exacerbates her pain when doing activities such as cleaning but she is learning how to limit herself. She also is continuing to have pain with walking. Pt reports she has not been exercising.       Excerpt from Dodie Bello70 on 04/27/2017:     HISTORY OF PRESENT ILLNESS:  The patient comes in today for followup of her chronic low back pain with radiating left lower extremity pain.  She rates her pain as a 10/10 with a flare and with activity, but today, she is rating it as a 5/10.  When she lies down, that does help her back and it will go down to a 0/10.  She does that one to two times a day. Sissy Street is doing exercises and stretching four to five times a week during the flare, but now she is doing it twice a week.  She has considered the facet injections we discussed previously. Sissy Street denies fever or bowel or bladder dysfunction. Sissy Street is taking Neurontin 300 mg in the morning and at lunch, and two at night, which is 600 mg. Sissy Street is taking Naproxen 500 mg twice a day without any side effects.  Her PCP did start her on some Flexeril, which helps some. Sissy Street is on Social Security disability. Sissy Street is a nonsmoker.     Updates from 10/26/17:  Pt presents for a PRN f/u. Since her last office visit she has been evaluated by Laura Bender who continued her medication regimen of Narproxen and Gabapentin. At this time, she rates her pain at an constant aching 5/10. She notes pressure while sitting will increase her pain.      Updates from 08/23/18:  Pt presents for 6 month f/u.  She continues to have some residual low back pain with some tingling. She also c/o right heel pain. She reports her heel pain is due to a bone spur in her heel, and her PCP provided her exercises for plantar fasciitis. 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 Updates from 02/21/19:  Pt presents for 6 month f/u. She states that her pain is a little different now, and it is more localized to her left lower back with a tingling sensation in her LLE to her knee. She has not been tolerating Gabapentin 300mg TID well as it causes somnolence. PAST MEDICAL HISTORY   Past Medical History:   Diagnosis Date    Arthritis     Bulging lumbar disc     Degenerative arthritis of finger     MP joint right index finger    Depression     H/O colonoscopy 12/2014    recommended yearly hemoccult stools    Hypertension     Low back pain     Lower extremity pain, left     Pinched nerve        Past Surgical History:   Procedure Laterality Date    HX CARPAL TUNNEL RELEASE Right     hand    HX HYSTERECTOMY      HX ORTHOPAEDIC     . MEDICATIONS    Current Outpatient Medications   Medication Sig Dispense Refill    triamcinolone acetonide 0.025 % lotion Apply  to affected area two (2) times a day. 60 mL 1    mometasone (NASONEX) 50 mcg/actuation nasal spray 2 Sprays by Both Nostrils route daily. 1 Container 11    PARoxetine (PAXIL) 20 mg tablet Take 1 Tab by mouth daily. 90 Tab 1    triamcinolone acetonide 0.025 % lotion Apply thin layer on affected area twice a day 60 mL 1    meloxicam (MOBIC) 15 mg tablet Take 1 Tab by mouth daily (with breakfast). 30 Tab 0    famotidine (PEPCID) 40 mg tablet Take 1 Tab by mouth daily. 30 Tab 0    naproxen (NAPROSYN) 500 mg tablet TAKE ONE TABLET BY MOUTH TWICE DAILY WITH  MEALS 180 Tab 1    lisinopril-hydroCHLOROthiazide (PRINZIDE, ZESTORETIC) 20-25 mg per tablet TAKE 1 TABLET BY MOUTH ONCE DAILY 90 Tab 1    gabapentin (NEURONTIN) 300 mg capsule Take 3 Caps by mouth nightly. Indications: NEUROPATHIC PAIN 270 Cap 1    montelukast (SINGULAIR) 10 mg tablet Take 1 Tab by mouth daily. 30 Tab 2    calcium citrate-vitamin d2 250-100 mg-unit per tablet Take 1 Tab by mouth daily.  valACYclovir (VALTREX) 500 mg tablet Take 1 Tab by mouth two (2) times a day. 6 Tab 5    aspirin delayed-release 81 mg tablet Take 81 mg by mouth daily.  multivitamin (ONE A DAY) tablet Take 1 Tab by mouth daily. ALLERGIES  No Known Allergies       SOCIAL HISTORY    Social History     Socioeconomic History    Marital status:      Spouse name: Not on file    Number of children: Not on file    Years of education: Not on file    Highest education level: Not on file   Tobacco Use    Smoking status: Never Smoker    Smokeless tobacco: Never Used   Substance and Sexual Activity    Alcohol use: No    Drug use: No    Sexual activity: Not Currently       FAMILY HISTORY  Family History   Problem Relation Age of Onset    Cancer Mother     Ovarian Cancer Mother 68    Stroke Father          REVIEW OF SYSTEMS  Review of Systems   Musculoskeletal: Positive for back pain. PHYSICAL EXAMINATION  Visit Vitals  LMP  (LMP Unknown)       Pain Assessment  12/24/2018   Location of Pain Foot   Pain Location Comment -   Location Modifiers Right   Severity of Pain 8   Quality of Pain Throbbing   Quality of Pain Comment -   Duration of Pain A few minutes   Frequency of Pain Several times daily   Date Pain First Started -   Aggravating Factors Standing;Stairs; Walking   Aggravating Factors Comment -   Limiting Behavior No   Relieving Factors Rest;Elevation   Relieving Factors Comment -   Result of Injury No           Constitutional:  Well developed, well nourished, in no acute distress. Psychiatric: Affect and mood are appropriate. Integumentary: No rashes or abrasions noted on exposed areas.         SPINE/MUSCULOSKELETAL EXAM    Lumbar spine:  No rash, ecchymosis, or gross obliquity.    No fasciculations.    No focal atrophy is noted.    No pain with hip ROM.    Range of motion is normal.  Mild tenderness to palpation. Tenderness to palpation at the right sciatic notch.    SI joints non-tender.    Trochanters non tender. Piriformis stretch test negative on right side.         Sensation in the bilateral legs grossly intact to light touch. Updates from 2/21/19:  Tenderness to palpation of left upper buttock. Positive SLR on the L  leans to the left, but her right shoulder sits lower than her left      MOTOR:      Biceps  Triceps Deltoids Wrist Ext Wrist Flex Hand Intrin   Right 5/5 5/5 5/5 5/5 5/5 5/5   Left 5/5 5/5 5/5 5/5 5/5 5/5             Hip Flex  Quads Hamstrings Ankle DF EHL Ankle PF   Right 5/5 5/5 5/5 5/5 5/5 5/5   Left 5/5 5/5 5/5 5/5 5/5 5/5     DTRs are 2+ biceps, triceps, brachioradialis, patella, and Achilles.      Negative Straight Leg raise.    Squat not tested.    No difficulty with tandem gait.    Normal heel walk.    Normal toe rise.       Ambulation without assistive device. FWB.       RADIOGRAPHS  Lumbar MRI films from 4/19/2016 personally reviewed with patient:  1) Degenerative grade 1 anterior listhesis of both L4 and L5 in the presence of  advanced facet arthropathy. No high-grade spinal stenosis. There are bilateral  foraminal stenoses. Some exiting nerve contact but no overt impingement. 10 minutes of face-to-face contact were spent with the patient during today's visit extensively discussing symptoms and treatment plan. All questions were answered. More than half of this visit today was spent on counseling.      Written by [facility] as dictated by Sol Gunn MD

## 2019-03-28 ENCOUNTER — OFFICE VISIT (OUTPATIENT)
Dept: ORTHOPEDIC SURGERY | Age: 64
End: 2019-03-28

## 2019-03-28 VITALS
HEART RATE: 88 BPM | DIASTOLIC BLOOD PRESSURE: 85 MMHG | SYSTOLIC BLOOD PRESSURE: 137 MMHG | OXYGEN SATURATION: 97 % | BODY MASS INDEX: 31.31 KG/M2 | HEIGHT: 64 IN | WEIGHT: 183.4 LBS | RESPIRATION RATE: 16 BRPM | TEMPERATURE: 97.9 F

## 2019-03-28 DIAGNOSIS — M54.16 LUMBAR RADICULOPATHY: Primary | ICD-10-CM

## 2019-03-28 RX ORDER — PREGABALIN 75 MG/1
75 CAPSULE ORAL 3 TIMES DAILY
Qty: 90 CAP | Refills: 5 | Status: SHIPPED | OUTPATIENT
Start: 2019-03-28 | End: 2019-12-10 | Stop reason: SDUPTHER

## 2019-03-28 NOTE — PROGRESS NOTES
[address]  [address], [address]  [place]  Phone: [phone]  Fax: [phone]        Sophie Lafleur  : 1955  PCP: Jerilyn Roper MD  3/28/2019    PROGRESS NOTE      HISTORY OF PRESENT ILLNESS  Shirley Fang is a 59 y.o. female. She was initially seen in 2017 for c/o lumbar pain radiating into her LLE. She found some relief from Tylenol #3 PRN and Gabapentin. Her pain was exacerbated with increased lifting, bending, cleaning, and walking. She found relief from lying down. She was not maintaining an HEP. She found some relief from Naproxen and Flexeril. She noted that pressure while sitting exacerbated her pain. Lumbar MRI 16: Degenerative grade 1 anterior listhesis of both L4 and L5 in the presence of advanced facet arthropathy. No high-grade spinal stenosis. There are bilateral foraminal stenoses. Some exiting nerve contact but no overt impingement. During a 6 month f/u she continued to have some residual low back pain with some tingling. She c/o right heel pain that she associated with a bone spur in the heel. Her PCP provided exercises for plantar fasciitis. 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 She returned for a 6 month f/u and stated that her pain was a little different. She was previously treated for myofascial pain and facet arthropathy. She c/o pain localized to her left lower back with a tingling sensation in her LLE to her knee. She denies weakness. She has not been tolerating Gabapentin 300mg TID well as it causes somnolence. Shirley Fang comes in to the office today for medication f/u. She did not tolerate the Lyrica 150 mg BID as it caused drowsiness, however, she did find some relief at the 75mg dose She has be8en focusing on a diet and cutting out starches from her diet over the last 3 weeks. She rates her pain as a 3/10 today.      ASSESSMENT  Her pain likely remains due to lumbar radiculopathy. PLAN  1. Lyrica 75mg TID. 2. Maintain HEP. Pt will f/u in 8 weeks or sooner as needed. Diagnoses and all orders for this visit:    1. Lumbar radiculopathy  -     pregabalin (LYRICA) 75 mg capsule; Take 1 Cap by mouth three (3) times daily. Max Daily Amount: 225 mg. PAST MEDICAL HISTORY   Past Medical History:   Diagnosis Date    Arthritis     Bulging lumbar disc     Degenerative arthritis of finger     MP joint right index finger    Depression     H/O colonoscopy 12/2014    recommended yearly hemoccult stools    Hypertension     Low back pain     Lower extremity pain, left     Pinched nerve        Past Surgical History:   Procedure Laterality Date    HX CARPAL TUNNEL RELEASE Right     hand    HX HYSTERECTOMY      HX ORTHOPAEDIC     . MEDICATIONS    Current Outpatient Medications   Medication Sig Dispense Refill    pregabalin (LYRICA) 75 mg capsule Take 1 Cap by mouth two (2) times a day. Max Daily Amount: 150 mg. 28 Cap 0    pregabalin (LYRICA) 150 mg capsule Take 1 Cap by mouth two (2) times a day. Max Daily Amount: 300 mg. 60 Cap 5    predniSONE (STERAPRED DS) 10 mg dose pack See administration instruction per 10mg dose pack 21 Tab 0    mometasone (NASONEX) 50 mcg/actuation nasal spray 2 Sprays by Both Nostrils route daily. 1 Container 11    PARoxetine (PAXIL) 20 mg tablet Take 1 Tab by mouth daily. 90 Tab 1    triamcinolone acetonide 0.025 % lotion Apply thin layer on affected area twice a day 60 mL 1    meloxicam (MOBIC) 15 mg tablet Take 1 Tab by mouth daily (with breakfast). 30 Tab 0    famotidine (PEPCID) 40 mg tablet Take 1 Tab by mouth daily. 30 Tab 0    naproxen (NAPROSYN) 500 mg tablet TAKE ONE TABLET BY MOUTH TWICE DAILY WITH  MEALS 180 Tab 1    lisinopril-hydroCHLOROthiazide (PRINZIDE, ZESTORETIC) 20-25 mg per tablet TAKE 1 TABLET BY MOUTH ONCE DAILY 90 Tab 1    montelukast (SINGULAIR) 10 mg tablet Take 1 Tab by mouth daily.  30 Tab 2    calcium citrate-vitamin d2 250-100 mg-unit per tablet Take 1 Tab by mouth daily.  valACYclovir (VALTREX) 500 mg tablet Take 1 Tab by mouth two (2) times a day. 6 Tab 5    aspirin delayed-release 81 mg tablet Take 81 mg by mouth daily.  multivitamin (ONE A DAY) tablet Take 1 Tab by mouth daily. ALLERGIES  No Known Allergies       SOCIAL HISTORY    Social History     Socioeconomic History    Marital status:      Spouse name: Not on file    Number of children: Not on file    Years of education: Not on file    Highest education level: Not on file   Tobacco Use    Smoking status: Never Smoker    Smokeless tobacco: Never Used   Substance and Sexual Activity    Alcohol use: No    Drug use: No    Sexual activity: Not Currently       FAMILY HISTORY  Family History   Problem Relation Age of Onset    Cancer Mother     Ovarian Cancer Mother 68    Stroke Father          REVIEW OF SYSTEMS  Review of Systems   Musculoskeletal: Positive for back pain. LLE paraesthesia          PHYSICAL EXAMINATION  Visit Vitals  /85   Pulse 88   Temp 97.9 °F (36.6 °C) (Oral)   Resp 16   Ht 5' 4\" (1.626 m)   Wt 183 lb 6.4 oz (83.2 kg)   LMP  (LMP Unknown)   SpO2 97%   BMI 31.48 kg/m²       Pain Assessment  3/28/2019   Location of Pain Back   Pain Location Comment -   Location Modifiers -   Severity of Pain 3   Quality of Pain Aching   Quality of Pain Comment -   Duration of Pain -   Frequency of Pain Intermittent   Date Pain First Started -   Aggravating Factors Standing;Walking   Aggravating Factors Comment -   Limiting Behavior -   Relieving Factors (No Data)   Relieving Factors Comment PREDINISONE HELPED    Result of Injury -     Constitutional:  Well developed, well nourished, in no acute distress. Psychiatric: Affect and mood are appropriate. Integumentary: No rashes or abrasions noted on exposed areas.         SPINE/MUSCULOSKELETAL EXAM    Lumbar spine:  No rash, ecchymosis, or gross obliquity.    No fasciculations.    No focal atrophy is noted.    No pain with hip ROM.    Range of motion is normal.  Mild tenderness to palpation. Tenderness to palpation at the right sciatic notch.    SI joints non-tender.    Trochanters non tender. Piriformis stretch test negative on right side.         Sensation in the bilateral legs grossly intact to light touch.     Updates from 2/21/19:  Tenderness to palpation of left upper buttock. Positive SLR on the L  leans to the left, but her right shoulder sits lower than her left  Neurologically intact    MOTOR:      Biceps  Triceps Deltoids Wrist Ext Wrist Flex Hand Intrin   Right 5/5 5/5 5/5 5/5 5/5 5/5   Left 5/5 5/5 5/5 5/5 5/5 5/5             Hip Flex  Quads Hamstrings Ankle DF EHL Ankle PF   Right 5/5 5/5 5/5 5/5 5/5 5/5   Left 5/5 5/5 5/5 5/5 5/5 5/5     DTRs are 2+ biceps, triceps, brachioradialis, patella, and Achilles.      Negative Straight Leg raise.    Squat not tested.    No difficulty with tandem gait.    Normal heel walk.    Normal toe rise.       Ambulation without assistive device. FWB.       RADIOGRAPHS  Lumbar MRI films from 4/19/2016 personally reviewed with patient:  1) Degenerative grade 1 anterior listhesis of both L4 and L5 in the presence of  advanced facet arthropathy. No high-grade spinal stenosis. There are bilateral  foraminal stenoses. Some exiting nerve contact but no overt impingement.     16 minutes of face-to-face contact were spent with the patient during today's visit extensively discussing symptoms and treatment plan. All questions were answered. More than half of this visit today was spent on counseling.      Written by Ronan Aguirre as dictated by Christine Live MD

## 2019-04-24 NOTE — TELEPHONE ENCOUNTER
This patient contacted office for the following prescriptions to be filled:    Medication requested :   Requested Prescriptions     Pending Prescriptions Disp Refills    triamcinolone acetonide 0.025 % lotion 60 mL 1     Sig: Apply thin layer on affected area twice a day     PCP: gladys   Pharmacy or Print:   Mail order or Local pharmacy [phone]    Scheduled appointment if not seen by current providers in office: lov 2/11/19 ucv 5/25/19

## 2019-04-26 RX ORDER — TRIAMCINOLONE ACETONIDE 0.25 MG/ML
LOTION TOPICAL
Qty: 60 ML | Refills: 1 | Status: SHIPPED | OUTPATIENT
Start: 2019-04-26 | End: 2019-07-12 | Stop reason: SDUPTHER

## 2019-05-09 ENCOUNTER — HOSPITAL ENCOUNTER (OUTPATIENT)
Dept: LAB | Age: 64
Discharge: HOME | End: 2019-05-09

## 2019-05-09 LAB — XX-LABCORP SPECIMEN COL,LCBCF: NORMAL

## 2019-05-09 PROCEDURE — 99001 SPECIMEN HANDLING PT-LAB: CPT

## 2019-05-10 ENCOUNTER — OFFICE VISIT (OUTPATIENT)
Dept: FAMILY MEDICINE CLINIC | Age: 64
End: 2019-05-10

## 2019-05-10 VITALS
HEIGHT: 64 IN | HEART RATE: 78 BPM | WEIGHT: 179 LBS | DIASTOLIC BLOOD PRESSURE: 89 MMHG | RESPIRATION RATE: 16 BRPM | BODY MASS INDEX: 30.56 KG/M2 | SYSTOLIC BLOOD PRESSURE: 138 MMHG | TEMPERATURE: 98.9 F | OXYGEN SATURATION: 98 %

## 2019-05-10 DIAGNOSIS — R23.2 HOT FLASHES: ICD-10-CM

## 2019-05-10 DIAGNOSIS — M48.062 SPINAL STENOSIS OF LUMBAR REGION WITH NEUROGENIC CLAUDICATION: ICD-10-CM

## 2019-05-10 DIAGNOSIS — F33.9 RECURRENT DEPRESSION (HCC): Primary | ICD-10-CM

## 2019-05-10 DIAGNOSIS — Z12.11 COLON CANCER SCREENING: ICD-10-CM

## 2019-05-10 DIAGNOSIS — I10 ESSENTIAL HYPERTENSION: ICD-10-CM

## 2019-05-10 DIAGNOSIS — R73.01 IFG (IMPAIRED FASTING GLUCOSE): ICD-10-CM

## 2019-05-10 LAB
ALBUMIN SERPL-MCNC: 4.3 G/DL (ref 3.6–4.8)
ALBUMIN/GLOB SERPL: 1.6 {RATIO} (ref 1.2–2.2)
ALP SERPL-CCNC: 58 IU/L (ref 39–117)
ALT SERPL-CCNC: 22 IU/L (ref 0–32)
AST SERPL-CCNC: 23 IU/L (ref 0–40)
BILIRUB SERPL-MCNC: 0.7 MG/DL (ref 0–1.2)
BUN SERPL-MCNC: 14 MG/DL (ref 8–27)
BUN/CREAT SERPL: 25 (ref 12–28)
CALCIUM SERPL-MCNC: 9.6 MG/DL (ref 8.7–10.3)
CHLORIDE SERPL-SCNC: 102 MMOL/L (ref 96–106)
CHOLEST SERPL-MCNC: 192 MG/DL (ref 100–199)
CO2 SERPL-SCNC: 22 MMOL/L (ref 20–29)
CREAT SERPL-MCNC: 0.57 MG/DL (ref 0.57–1)
GLOBULIN SER CALC-MCNC: 2.7 G/DL (ref 1.5–4.5)
GLUCOSE SERPL-MCNC: 86 MG/DL (ref 65–99)
HBA1C MFR BLD: 5.9 % (ref 4.8–5.6)
HDLC SERPL-MCNC: 69 MG/DL
INTERPRETATION, 910389: NORMAL
LDLC SERPL CALC-MCNC: 111 MG/DL (ref 0–99)
POTASSIUM SERPL-SCNC: 3.9 MMOL/L (ref 3.5–5.2)
PROT SERPL-MCNC: 7 G/DL (ref 6–8.5)
SODIUM SERPL-SCNC: 142 MMOL/L (ref 134–144)
SPECIMEN STATUS REPORT, ROLRST: NORMAL
TRIGL SERPL-MCNC: 62 MG/DL (ref 0–149)
VLDLC SERPL CALC-MCNC: 12 MG/DL (ref 5–40)

## 2019-05-10 NOTE — PROGRESS NOTES
Neda Lange, 59 y.o.,  female    SUBJECTIVE  Ff-up    HTN- taking meds without problems. prediabetes, reports improved diet since last visit, resultant weight loss. Reviewed labs. Depression/hot flashes- says doing well for the most part, taking paxil. Chronic LBP- on lyrica, following dr. Enedelia Urbina      ROS:  See HPI, all others negative        Patient Active Problem List   Diagnosis Code    Hypertension I10    Lumbar radiculopathy M54.16    Herpes genitalis in women A60.09    Allergic rhinitis due to allergen J30.9    Hot flashes R23.2    Advance directive discussed with patient Z70.80    Facet arthropathy ([facility] [address] M46.90    Lumbar stenosis M48.061    Advance care planning Z71.89    Obesity E66.9    Mild single current episode of major depressive disorder (HCC) F32.0       Current Outpatient Prescriptions   Medication Sig Dispense Refill    varicella-zoster recombinant, PF, (SHINGRIX) 50 mcg/0.5 mL susr injection 0.5 mL by IntraMUSCular route once for 1 dose. 0.5 mL 1    PARoxetine (PAXIL) 20 mg tablet Take 1 Tab by mouth daily. 90 Tab 1    triamcinolone acetonide 0.025 % lotion Apply  to affected area two (2) times a day. 60 mL 1    lisinopril-hydroCHLOROthiazide (PRINZIDE, ZESTORETIC) 20-25 mg per tablet TAKE ONE TABLET BY MOUTH ONCE DAILY 90 Tab 1    gabapentin (NEURONTIN) 300 mg capsule Take 3 Caps by mouth nightly. Indications: NEUROPATHIC PAIN 270 Cap 1    fluticasone (VERAMYST) 27.5 mcg/actuation nasal spray 2 Sprays by Nasal route daily. 1 Bottle 5    naproxen (NAPROSYN) 500 mg tablet Take 1 Tab by mouth two (2) times daily (with meals). 180 Tab 1    montelukast (SINGULAIR) 10 mg tablet Take 1 Tab by mouth daily. 30 Tab 2    calcium citrate-vitamin d2 250-100 mg-unit per tablet Take 1 Tab by mouth daily.  valACYclovir (VALTREX) 500 mg tablet Take 1 Tab by mouth two (2) times a day. 6 Tab 5    aspirin delayed-release 81 mg tablet Take 81 mg by mouth daily.       multivitamin (ONE A DAY) tablet Take 1 Tab by mouth daily. No Known Allergies    Past Medical History:   Diagnosis Date    Arthritis     Bulging lumbar disc     Degenerative arthritis of finger     MP joint right index finger    Depression     H/O colonoscopy 12/2014    recommended yearly hemoccult stools    Hypertension     Low back pain     Lower extremity pain, left     Pinched nerve        Social History     Social History    Marital status:      Spouse name: N/A    Number of children: N/A    Years of education: N/A     Occupational History    Not on file. Social History Main Topics    Smoking status: Never Smoker    Smokeless tobacco: Never Used    Alcohol use No    Drug use: No    Sexual activity: Not Currently     Other Topics Concern    Not on file     Social History Narrative       Family History   Problem Relation Age of Onset    Cancer Mother     Ovarian Cancer Mother 68    Stroke Father          OBJECTIVE    Physical Exam:     Visit Vitals  /89   Pulse 78   Temp 98.9 °F (37.2 °C) (Oral)   Resp 16   Ht 5' 4\" (1.626 m)   Wt 179 lb (81.2 kg)   LMP  (LMP Unknown)   SpO2 98%   BMI 30.73 kg/m²         General: alert, well-appearing, AA, in no apparent distress or pain  CVS: normal rate, regular rhythm, distinct S1 and S2  Lungs:clear to ausculation bilaterally, no crackles, wheezing or rhonchi noted  Extremities: no edema, no cyanosis  Skin: warm, no lesions, rashes noted  Psych:  mood and affect normal  Results for orders placed or performed during the hospital encounter of 05/09/19   [facility] SPECIMEN COL   Result Value Ref Range    [facility] SPECIMEN [facility]. Specimens collected/sent to [facility]. Please direct inquiries to ([phone]). ASSESSMENT/PLAN  Diagnoses and all orders for this visit:    1. Recurrent depression (HCC)  Stable, cont paxil    2. BMI 30.0-30.9,adult  encouraged wt loss/ exercise     3. Hot flashes  Benefiting from paxil    4. Essential hypertension  Fair control  cont lisinopril/hctz  Calc cv risk 5% vs 4% for age  monitoring    5. Impaired fasting glucose  Commended on wt loss  Tlcs, monitoring    6. Colon ca screening  Referral to dr. Neil Mackenzie    7. Spinal stenosis of lumbar region with neurogenic claudication  On lyrica  Following Dr. Ricki Mckinnon    Follow-up Disposition:  Return in about 3 months, or if symptoms worsen or fail to improve. Patient understands plan of care. Patient has provided input and agrees with goals.

## 2019-05-10 NOTE — PROGRESS NOTES
1. Have you been to the ER, urgent care clinic since your last visit? Hospitalized since your last visit? No    2. Have you seen or consulted any other health care providers outside of the [address] [place] [address] since your last visit? Include any pap smears or colon screening.  Dr. Felix [place] eye doctor

## 2019-06-14 ENCOUNTER — OFFICE VISIT (OUTPATIENT)
Dept: FAMILY MEDICINE CLINIC | Age: 64
End: 2019-06-14

## 2019-06-14 VITALS
HEART RATE: 72 BPM | SYSTOLIC BLOOD PRESSURE: 132 MMHG | DIASTOLIC BLOOD PRESSURE: 86 MMHG | RESPIRATION RATE: 16 BRPM | BODY MASS INDEX: 30.56 KG/M2 | WEIGHT: 179 LBS | TEMPERATURE: 98.3 F | OXYGEN SATURATION: 98 % | HEIGHT: 64 IN

## 2019-06-14 DIAGNOSIS — M47.816 LUMBAR FACET ARTHROPATHY: ICD-10-CM

## 2019-06-14 DIAGNOSIS — M21.371 RIGHT FOOT DROP: ICD-10-CM

## 2019-06-14 DIAGNOSIS — M79.671 RIGHT FOOT PAIN: Primary | ICD-10-CM

## 2019-06-14 DIAGNOSIS — I10 ESSENTIAL HYPERTENSION: ICD-10-CM

## 2019-06-14 RX ORDER — NAPROXEN 500 MG/1
TABLET ORAL
Qty: 180 TAB | Refills: 1 | Status: SHIPPED | OUTPATIENT
Start: 2019-06-14 | End: 2019-08-09

## 2019-06-14 NOTE — PROGRESS NOTES
1. Have you been to the ER, urgent care clinic since your last visit? Hospitalized since your last visit? No    2. Have you seen or consulted any other health care providers outside of the [address] [place] [address] since your last visit? Include any pap smears or colon screening.  No

## 2019-06-14 NOTE — PATIENT INSTRUCTIONS
Foot Pain: Care Instructions  Your Care Instructions  Foot injuries that cause pain and swelling are fairly common. Almost all sports or home repair projects can cause a misstep that ends up as foot pain. Normal wear and tear, especially as you get older, also can cause foot pain. Most minor foot injuries will heal on their own, and home treatment is usually all you need to do. If you have a severe injury, you may need tests and treatment. Follow-up care is a key part of your treatment and safety. Be sure to make and go to all appointments, and call your doctor if you are having problems. It's also a good idea to know your test results and keep a list of the medicines you take. How can you care for yourself at home? · Take pain medicines exactly as directed. ? If the doctor gave you a prescription medicine for pain, take it as prescribed. ? If you are not taking a prescription pain medicine, ask your doctor if you can take an over-the-counter medicine. · Rest and protect your foot. Take a break from any activity that may cause pain. · Put ice or a cold pack on your foot for 10 to 20 minutes at a time. Put a thin cloth between the ice and your skin. · Prop up the sore foot on a pillow when you ice it or anytime you sit or lie down during the next 3 days. Try to keep it above the level of your heart. This will help reduce swelling. · Your doctor may recommend that you wrap your foot with an elastic bandage. Keep your foot wrapped for as long as your doctor advises. · If your doctor recommends crutches, use them as directed. · Wear roomy footwear. · As soon as pain and swelling end, begin gentle exercises of your foot. Your doctor can tell you which exercises will help. When should you call for help? Call 911 anytime you think you may need emergency care.  For example, call if:    · Your foot turns pale, white, blue, or cold.    Call your doctor now or seek immediate medical care if:    · You cannot move or stand on your foot.     · Your foot looks twisted or out of its normal position.     · Your foot is not stable when you step down.     · You have signs of infection, such as:  ? Increased pain, swelling, warmth, or redness. ? Red streaks leading from the sore area. ? Pus draining from a place on your foot. ? A fever.     · Your foot is numb or tingly.    Watch closely for changes in your health, and be sure to contact your doctor if:    · You do not get better as expected.     · You have bruises from an injury that last longer than 2 weeks. Where can you learn more? Go to http://christine-katlin.info/. Enter K206 in the search box to learn more about \"Foot Pain: Care Instructions. \"  Current as of: September 20, 2018  Content Version: 11.9  © 5703-3699 [employer]. Care instructions adapted under license by [employer] (which disclaims liability or warranty for this information). If you have questions about a medical condition or this instruction, always ask your healthcare professional. [address] any warranty or liability for your use of this information.

## 2019-06-14 NOTE — PROGRESS NOTES
Timoteo Patel, 62 y.o.,  female    SUBJECTIVE  Right foot pain x 3 weeks    C/o R anterior foot pain, says she tripped on a tub prior but hit her left shin. She has noticed increasing pain with stepping forward. No redness or swelling. She has not taken anything for this PS 5/10  She has h/o HTN/pre-DM and chronic back pain, taking lyrica and prn mobic. ROS:  See HPI, all others negative        Patient Active Problem List   Diagnosis Code    Hypertension I10    Recurrent depression ([facility] [address] F33.9    Lumbar radiculopathy M54.16    Herpes genitalis in women A60.09    Allergic rhinitis due to allergen J30.9    Hot flashes R23.2    Advance directive discussed with patient Z70.80    Facet arthropathy M47.819    Lumbar stenosis M48.061    Advance care planning Z71.89    Obesity E66.9    Mild single current episode of major depressive disorder (HCC) F32.0    IFG (impaired fasting glucose) R73.01       Current Outpatient Medications   Medication Sig Dispense Refill    naproxen (NAPROSYN) 500 mg tablet TAKE ONE TABLET BY MOUTH TWICE DAILY WITH  MEALS 180 Tab 1    triamcinolone acetonide 0.025 % lotion Apply thin layer on affected area twice a day 60 mL 1    pregabalin (LYRICA) 75 mg capsule Take 1 Cap by mouth three (3) times daily. Max Daily Amount: 225 mg. 90 Cap 5    mometasone (NASONEX) 50 mcg/actuation nasal spray 2 Sprays by Both Nostrils route daily. 1 Container 11    PARoxetine (PAXIL) 20 mg tablet Take 1 Tab by mouth daily. 90 Tab 1    meloxicam (MOBIC) 15 mg tablet Take 1 Tab by mouth daily (with breakfast). 30 Tab 0    lisinopril-hydroCHLOROthiazide (PRINZIDE, ZESTORETIC) 20-25 mg per tablet TAKE 1 TABLET BY MOUTH ONCE DAILY 90 Tab 1    calcium citrate-vitamin d2 250-100 mg-unit per tablet Take 1 Tab by mouth daily.  valACYclovir (VALTREX) 500 mg tablet Take 1 Tab by mouth two (2) times a day. 6 Tab 5    aspirin delayed-release 81 mg tablet Take 81 mg by mouth daily.       multivitamin (ONE A DAY) tablet Take 1 Tab by mouth daily.  famotidine (PEPCID) 40 mg tablet Take 1 Tab by mouth daily. 30 Tab 0    montelukast (SINGULAIR) 10 mg tablet Take 1 Tab by mouth daily.  30 Tab 2       No Known Allergies    Past Medical History:   Diagnosis Date    Arthritis     Bulging lumbar disc     Degenerative arthritis of finger     MP joint right index finger    Depression     H/O colonoscopy 12/2014    recommended yearly hemoccult stools    Hypertension     Low back pain     Lower extremity pain, left     Pinched nerve        Social History     Socioeconomic History    Marital status:      Spouse name: Not on file    Number of children: Not on file    Years of education: Not on file    Highest education level: Not on file   Occupational History    Not on file   Social Needs    Financial resource strain: Not on file    Food insecurity:     Worry: Not on file     Inability: Not on file    Transportation needs:     Medical: Not on file     Non-medical: Not on file   Tobacco Use    Smoking status: Never Smoker    Smokeless tobacco: Never Used   Substance and Sexual Activity    Alcohol use: No    Drug use: No    Sexual activity: Not Currently   Lifestyle    Physical activity:     Days per week: Not on file     Minutes per session: Not on file    Stress: Not on file   Relationships    Social connections:     Talks on phone: Not on file     Gets together: Not on file     Attends Jew service: Not on file     Active member of club or organization: Not on file     Attends meetings of clubs or organizations: Not on file     Relationship status: Not on file    Intimate partner violence:     Fear of current or ex partner: Not on file     Emotionally abused: Not on file     Physically abused: Not on file     Forced sexual activity: Not on file   Other Topics Concern    Not on file   Social History Narrative    Not on file       Family History   Problem Relation Age of Onset    Cancer Mother     Ovarian Cancer Mother 68    Stroke Father          OBJECTIVE    Physical Exam:     Visit Vitals  /86 (BP 1 Location: Left arm, BP Patient Position: Sitting)   Pulse 72   Temp 98.3 °F (36.8 °C) (Oral)   Resp 16   Ht 5' 4\" (1.626 m)   Wt 179 lb (81.2 kg)   LMP  (LMP Unknown)   SpO2 98%   BMI 30.73 kg/m²       General: alert, well-appearing,in no apparent distress or pain  CVS: normal rate, regular rhythm, distinct S1 and S2  Lungs:clear to ausculation bilaterally, no crackles, wheezing or rhonchi noted  Extremities: R foot MTTP on anterolateral area. FROM, good DP pulses, no bruising or swelling  Skin: warm, no lesions, rashes noted  Psych:  mood and affect normal        ASSESSMENT/PLAN  Diagnoses and all orders for this visit:    1. Right foot pain  -     XR FOOT RT MIN 3 V; Future  Switch mobic to -     naproxen (NAPROSYN) 500 mg tablet; TAKE ONE TABLET BY MOUTH TWICE DAILY WITH  MEALS  If not improved by 2 weeks she will call us and will send to podiatry    2. Lumbar facet arthropathy    3. Essential hypertension  controlled    Follow-up and Dispositions    · Return if symptoms worsen or fail to improve, for keep appt in august.         Patient understands plan of care. Patient has provided input and agrees with goals.

## 2019-06-17 ENCOUNTER — HOSPITAL ENCOUNTER (OUTPATIENT)
Dept: GENERAL RADIOLOGY | Age: 64
Discharge: HOME | End: 2019-06-17
Payer: MEDICARE

## 2019-06-17 DIAGNOSIS — M79.671 RIGHT FOOT PAIN: ICD-10-CM

## 2019-06-17 PROCEDURE — 73630 X-RAY EXAM OF FOOT: CPT

## 2019-06-20 NOTE — PROGRESS NOTES
No fractures on foot xray. Similar spur is found, if no improvement advise ff-up with podiatry  pls notify pt.

## 2019-06-21 ENCOUNTER — TELEPHONE (OUTPATIENT)
Dept: FAMILY MEDICINE CLINIC | Age: 64
End: 2019-06-21

## 2019-06-24 NOTE — TELEPHONE ENCOUNTER
Patient identified with 2 identifiers (name and ). Patient aware of No fractures on foot xray.  Similar spur is found, if no improvement advise ff-up with podiatry

## 2019-07-12 RX ORDER — LISINOPRIL AND HYDROCHLOROTHIAZIDE 20; 25 MG/1; MG/1
TABLET ORAL
Qty: 90 TAB | Refills: 0 | Status: SHIPPED | OUTPATIENT
Start: 2019-07-12 | End: 2019-10-16 | Stop reason: SDUPTHER

## 2019-07-12 RX ORDER — TRIAMCINOLONE ACETONIDE 0.25 MG/ML
LOTION TOPICAL
Qty: 60 ML | Refills: 0 | Status: SHIPPED | OUTPATIENT
Start: 2019-07-12 | End: 2019-08-21 | Stop reason: SDUPTHER

## 2019-07-12 NOTE — TELEPHONE ENCOUNTER
.This patient contacted office for the following prescriptions to be filled:    Medication requested :   Requested Prescriptions     Pending Prescriptions Disp Refills    triamcinolone acetonide 0.025 % lotion 60 mL 1     Sig: Apply thin layer on affected area twice a day    lisinopril-hydroCHLOROthiazide (PRINZIDE, ZESTORETIC) 20-25 mg per tablet 90 Tab 1     PCP: [address] [place] [address] or Print: [facility]  Mail order or Local pharmacy [phone]    Scheduled appointment if not seen by current providers in office: LOV:6-14-19 NOV: 8-9-19

## 2019-07-31 NOTE — TELEPHONE ENCOUNTER
This patient contacted office for the following prescriptions to be filled:    Medication requested :   Requested Prescriptions     Pending Prescriptions Disp Refills    PARoxetine (PAXIL) 20 mg tablet 90 Tab 1     Sig: Take 1 Tab by mouth daily.      [facility] [place] or Print: [facility]  Mail order or Local pharmacy [phone]    Scheduled appointment if not seen by current providers in office: LOV:6-14-19 NOV: 8-9-19

## 2019-08-01 RX ORDER — PAROXETINE HYDROCHLORIDE 20 MG/1
20 TABLET, FILM COATED ORAL DAILY
Qty: 90 TAB | Refills: 1 | Status: SHIPPED | OUTPATIENT
Start: 2019-08-01 | End: 2020-02-10 | Stop reason: SDUPTHER

## 2019-08-09 ENCOUNTER — OFFICE VISIT (OUTPATIENT)
Dept: FAMILY MEDICINE CLINIC | Age: 64
End: 2019-08-09

## 2019-08-09 VITALS
HEIGHT: 64 IN | RESPIRATION RATE: 16 BRPM | SYSTOLIC BLOOD PRESSURE: 122 MMHG | TEMPERATURE: 98.8 F | HEART RATE: 82 BPM | WEIGHT: 179 LBS | BODY MASS INDEX: 30.56 KG/M2 | DIASTOLIC BLOOD PRESSURE: 76 MMHG | OXYGEN SATURATION: 98 %

## 2019-08-09 DIAGNOSIS — Z12.39 BREAST CANCER SCREENING: ICD-10-CM

## 2019-08-09 DIAGNOSIS — Z71.89 ADVANCED DIRECTIVES, COUNSELING/DISCUSSION: ICD-10-CM

## 2019-08-09 DIAGNOSIS — I10 ESSENTIAL HYPERTENSION: ICD-10-CM

## 2019-08-09 DIAGNOSIS — R23.2 HOT FLASHES: ICD-10-CM

## 2019-08-09 DIAGNOSIS — Z00.00 MEDICARE ANNUAL WELLNESS VISIT, SUBSEQUENT: ICD-10-CM

## 2019-08-09 DIAGNOSIS — H10.13 ALLERGIC CONJUNCTIVITIS OF BOTH EYES: ICD-10-CM

## 2019-08-09 DIAGNOSIS — R73.01 IFG (IMPAIRED FASTING GLUCOSE): ICD-10-CM

## 2019-08-09 DIAGNOSIS — F33.9 RECURRENT DEPRESSION (HCC): Primary | ICD-10-CM

## 2019-08-09 RX ORDER — AZELASTINE HYDROCHLORIDE 0.5 MG/ML
1 SOLUTION/ DROPS OPHTHALMIC 2 TIMES DAILY
Qty: 6 ML | Refills: 0 | Status: SHIPPED | OUTPATIENT
Start: 2019-08-09 | End: 2019-11-08

## 2019-08-09 NOTE — PATIENT INSTRUCTIONS
Medicare Wellness Visit, Female The best way to live healthy is to have a lifestyle where you eat a well-balanced diet, exercise regularly, limit alcohol use, and quit all forms of tobacco/nicotine, if applicable. Regular preventive services are another way to keep healthy. Preventive services (vaccines, screening tests, monitoring & exams) can help personalize your care plan, which helps you manage your own care. Screening tests can find health problems at the earliest stages, when they are easiest to treat. [facility] follows the current, evidence-based guidelines published by the [facility] ([facility]) when recommending preventive services for our patients. Because we follow these guidelines, sometimes recommendations change over time as research supports it. (For example, mammograms used to be recommended annually. Even though Medicare will still pay for an annual mammogram, the newer guidelines recommend a mammogram every two years for women of average risk.) Of course, you and your doctor may decide to screen more often for some diseases, based on your risk and your health status. Preventive services for you include: - Medicare offers their members a free annual wellness visit, which is time for you and your primary care provider to discuss and plan for your preventive service needs. Take advantage of this benefit every year! 
-All adults over the age of 72 should receive the recommended pneumonia vaccines. Current USPSTF guidelines recommend a series of two vaccines for the best pneumonia protection.  
-All adults should have a flu vaccine yearly and a tetanus vaccine every 10 years. All adults age 61 and older should receive a shingles vaccine once in their lifetime.   
-A bone mass density test is recommended when a woman turns 65 to screen for osteoporosis. This test is only recommended one time, as a screening. Some providers will use this same test as a disease monitoring tool if you already have osteoporosis. -All adults age 38-68 who are overweight should have a diabetes screening test once every three years.  
-Other screening tests and preventive services for persons with diabetes include: an eye exam to screen for diabetic retinopathy, a kidney function test, a foot exam, and stricter control over your cholesterol.  
-Cardiovascular screening for adults with routine risk involves an electrocardiogram (ECG) at intervals determined by your doctor.  
-Colorectal cancer screenings should be done for adults age 54-65 with no increased risk factors for colorectal cancer. There are a number of acceptable methods of screening for this type of cancer. Each test has its own benefits and drawbacks. Discuss with your doctor what is most appropriate for you during your annual wellness visit. The different tests include: colonoscopy (considered the best screening method), a fecal occult blood test, a fecal DNA test, and sigmoidoscopy. -Breast cancer screenings are recommended every other year for women of normal risk, age 54-69. 
-Cervical cancer screenings for women over age 72 are only recommended with certain risk factors.  
-All adults born between [facility] should be screened once for Hepatitis C. Here is a list of your current Health Maintenance items (your personalized list of preventive services) with a due date: 
Health Maintenance Due Topic Date Due  Shingles Vaccine (1 of 2) 04/26/2006  Flu Vaccine  08/01/2019 Lux Notice Annual Well Visit  08/17/2019  Colonoscopy  12/05/2019

## 2019-08-09 NOTE — PROGRESS NOTES
Chief Complaint   Patient presents with    Hypertension    Depression    Other     Pre-    Fatigue     lately    Itchy Eye     1. Have you been to the ER, urgent care clinic since your last visit? Hospitalized since your last visit? No    2. Have you seen or consulted any other health care providers outside of the [address] [place] [address] since your last visit? Include any pap smears or colon screening.  No

## 2019-08-09 NOTE — ACP (ADVANCE CARE PLANNING)
Advance Care Planning (ACP) Provider Note - Comprehensive     Date of ACP Conversation: 08/09/19  Persons included in Conversation:  patient  Length of ACP Conversation in minutes:  16 minutes    Authorized Decision Maker (if patient is incapable of making informed decisions): This person is:  has yet to delegate, thinking her daughter and son            General ACP for ALL Patients with Decision Making Capacity:   Importance of advance care planning, including choosing a healthcare agent to communicate patient's healthcare decisions if patient lost the ability to make decisions, such as after a sudden illness or accident  Understanding of the healthcare agent role was assessed and information provided  Exploration of values, goals, and preferences if recovery is not expected, even with continued medical treatment in the event of: Imminent death  Severe, permanent brain injury  \"In these circumstances, what matters most to you? \"  Care focused more on comfort or quality of life.   Other: still considering        Interventions Provided:  Recommended completion of Advance Directive form after review of ACP materials and conversation with prospective healthcare agent   Recommended communicating the plan and making copies for the healthcare agent, personal physician, and others as appropriate (e.g., health system)  Recommended review of completed ACP document annually or upon change in health status

## 2019-08-09 NOTE — PROGRESS NOTES
This is the Subsequent Medicare Annual Wellness Exam, performed 12 months or more after the Initial AWV or the last Subsequent AWV    I have reviewed the patient's medical history in detail and updated the computerized patient record. History     Past Medical History:   Diagnosis Date    Arthritis     Bulging lumbar disc     Degenerative arthritis of finger     MP joint right index finger    Depression     H/O colonoscopy 12/2014    recommended yearly hemoccult stools    Hypertension     Low back pain     Lower extremity pain, left     Pinched nerve       Past Surgical History:   Procedure Laterality Date    HX CARPAL TUNNEL RELEASE Right     hand    HX HYSTERECTOMY      HX ORTHOPAEDIC       Current Outpatient Medications   Medication Sig Dispense Refill    azelastine (OPTIVAR) 0.05 % ophthalmic solution Administer 1 Drop to both eyes two (2) times a day. Use in affected eye(s) 6 mL 0    PARoxetine (PAXIL) 20 mg tablet Take 1 Tab by mouth daily. 90 Tab 1    triamcinolone acetonide 0.025 % lotion Apply thin layer on affected area twice a day 60 mL 0    lisinopril-hydroCHLOROthiazide (PRINZIDE, ZESTORETIC) 20-25 mg per tablet TAKE 1 TABLET BY MOUTH ONCE DAILY 90 Tab 0    pregabalin (LYRICA) 75 mg capsule Take 1 Cap by mouth three (3) times daily. Max Daily Amount: 225 mg. 90 Cap 5    mometasone (NASONEX) 50 mcg/actuation nasal spray 2 Sprays by Both Nostrils route daily. 1 Container 11    meloxicam (MOBIC) 15 mg tablet Take 1 Tab by mouth daily (with breakfast). 30 Tab 0    famotidine (PEPCID) 40 mg tablet Take 1 Tab by mouth daily. 30 Tab 0    calcium citrate-vitamin d2 250-100 mg-unit per tablet Take 1 Tab by mouth daily.  aspirin delayed-release 81 mg tablet Take 81 mg by mouth daily.  multivitamin (ONE A DAY) tablet Take 1 Tab by mouth daily.  montelukast (SINGULAIR) 10 mg tablet Take 1 Tab by mouth daily.  30 Tab 2    valACYclovir (VALTREX) 500 mg tablet Take 1 Tab by mouth two (2) times a day. 6 Tab 5     No Known Allergies  Family History   Problem Relation Age of Onset    Cancer Mother     Ovarian Cancer Mother 68    Stroke Father      Social History     Tobacco Use    Smoking status: Never Smoker    Smokeless tobacco: Never Used   Substance Use Topics    Alcohol use: No     Patient Active Problem List   Diagnosis Code    Hypertension I10    Recurrent depression ([facility] F33.9    Lumbar radiculopathy M54.16    Herpes genitalis in women A60.09    Allergic rhinitis due to allergen J30.9    Hot flashes R23.2    Advance directive discussed with patient Z71.89    Facet arthropathy M47.819    Lumbar stenosis M48.061    Advance care planning Z71.89    Obesity E66.9    Mild single current episode of major depressive disorder ([facility] F32.0    IFG (impaired fasting glucose) R73.01       Depression Risk Factor Screening:     3 most recent PHQ Screens 5/12/2014   Little interest or pleasure in doing things Not at all   Feeling down, depressed, irritable, or hopeless Not at all   Total Score PHQ 2 0     Alcohol Risk Factor Screening: You do not drink alcohol or very rarely. Functional Ability and Level of Safety:   Hearing Loss  Hearing is good. Activities of Daily Living  The home contains: no safety equipment. Patient does total self care    Fall Risk  Fall Risk Assessment, last 12 mths 5/12/2014   Able to walk? Yes   Fall in past 12 months?  No       Abuse Screen  Patient is not abused    Cognitive Screening   Evaluation of Cognitive Function:  Has your family/caregiver stated any concerns about your memory: no      Patient Care Team   Patient Care Team:  Clyde Kim MD as PCP - General (Family Practice)  Lyubov Brown, DAVID Valladares MD (Orthopedic Surgery)  Ellen Herr MD (Orthopedic Surgery)    Assessment/Plan   Education and counseling provided:  Are appropriate based on today's review and evaluation  End-of-Life planning (with patient's consent)- discussed, provided form  Pneumococcal Vaccine- 15 at age 74 yo  Influenza Vaccine- annually   Screening Mammography 12/18  Screening Pap and pelvic (covered once every 2 years)- no longer indicated s/p hysterectomy  Colorectal cancer screening tests 12/2019, already referred. Says upcoming appt in November scheduled  Cardiovascular screening blood test- 5/2019  Bone mass measurement (DEXA)- start at age 72   Diabetes screening test 5/2019      Health Maintenance Due   Topic Date Due    Shingrix Vaccine Age 50> (1 of 2) 04/26/2006    Influenza Age 9 to Adult  08/01/2019    MEDICARE YEARLY EXAM  08/17/2019    COLONOSCOPY  12/05/2019       Raphael Joy, 59 y.o.,  female    SUBJECTIVE  Ff-up    HTN- taking meds without problems. prediabetes, reviewed labs in may. Depression/hot flashes- says doing well for the most part, taking paxil. Chronic LBP- on lyrica, following dr. Mary Olivares    Itchy watery b/l eyes for the past month. Says with allergic rhinitis as well. Says singulair is not covered by insurance and otc antihistamines are ineffective. ROS:  See HPI, all others negative        Patient Active Problem List   Diagnosis Code    Hypertension I10    Lumbar radiculopathy M54.16    Herpes genitalis in women A60.09    Allergic rhinitis due to allergen J30.9    Hot flashes R23.2    Advance directive discussed with patient Z70.80    Facet arthropathy ([facility] M46.90    Lumbar stenosis M48.061    Advance care planning Z71.89    Obesity E66.9    Mild single current episode of major depressive disorder (HCC) F32.0       Current Outpatient Prescriptions   Medication Sig Dispense Refill    varicella-zoster recombinant, PF, (SHINGRIX) 50 mcg/0.5 mL susr injection 0.5 mL by IntraMUSCular route once for 1 dose. 0.5 mL 1    PARoxetine (PAXIL) 20 mg tablet Take 1 Tab by mouth daily. 90 Tab 1    triamcinolone acetonide 0.025 % lotion Apply  to affected area two (2) times a day. 60 mL 1    lisinopril-hydroCHLOROthiazide (PRINZIDE, ZESTORETIC) 20-25 mg per tablet TAKE ONE TABLET BY MOUTH ONCE DAILY 90 Tab 1    gabapentin (NEURONTIN) 300 mg capsule Take 3 Caps by mouth nightly. Indications: NEUROPATHIC PAIN 270 Cap 1    fluticasone (VERAMYST) 27.5 mcg/actuation nasal spray 2 Sprays by Nasal route daily. 1 Bottle 5    naproxen (NAPROSYN) 500 mg tablet Take 1 Tab by mouth two (2) times daily (with meals). 180 Tab 1    montelukast (SINGULAIR) 10 mg tablet Take 1 Tab by mouth daily. 30 Tab 2    calcium citrate-vitamin d2 250-100 mg-unit per tablet Take 1 Tab by mouth daily.  valACYclovir (VALTREX) 500 mg tablet Take 1 Tab by mouth two (2) times a day. 6 Tab 5    aspirin delayed-release 81 mg tablet Take 81 mg by mouth daily.  multivitamin (ONE A DAY) tablet Take 1 Tab by mouth daily. No Known Allergies    Past Medical History:   Diagnosis Date    Arthritis     Bulging lumbar disc     Degenerative arthritis of finger     MP joint right index finger    Depression     H/O colonoscopy 12/2014    recommended yearly hemoccult stools    Hypertension     Low back pain     Lower extremity pain, left     Pinched nerve        Social History     Social History    Marital status:      Spouse name: N/A    Number of children: N/A    Years of education: N/A     Occupational History    Not on file.      Social History Main Topics    Smoking status: Never Smoker    Smokeless tobacco: Never Used    Alcohol use No    Drug use: No    Sexual activity: Not Currently     Other Topics Concern    Not on file     Social History Narrative       Family History   Problem Relation Age of Onset    Cancer Mother     Ovarian Cancer Mother 68    Stroke Father          OBJECTIVE    Physical Exam:     Visit Vitals  /76 (BP 1 Location: Left arm, BP Patient Position: Sitting)   Pulse 82   Temp 98.8 °F (37.1 °C) (Oral)   Resp 16   Ht 5' 4\" (1.626 m)   Wt 179 lb (81.2 kg)   LMP (LMP Unknown)   SpO2 98%   BMI 30.73 kg/m²         General: alert, well-appearing, AA, in no apparent distress or pain  HEENT throat and pharynx clear, eac patent, tm intact. Bilateral conjunctiva injected, no discharge. Breasts: breasts appear normal, no suspicious masses, no skin or nipple changes or axillary nodes. CVS: normal rate, regular rhythm, distinct S1 and S2  Lungs:clear to ausculation bilaterally, no crackles, wheezing or rhonchi noted  Abdomen: soft, NT, ND  Extremities: no edema, no cyanosis  Skin: warm, no lesions, rashes noted  Psych:  mood and affect normal  Results for orders placed or performed during the hospital encounter of 05/09/19   [facility] SPECIMEN COL   Result Value Ref Range    [facility] SPEC[facility]. Specimens collected/sent to [facility]. Please direct inquiries to ([phone]). ASSESSMENT/PLAN  Diagnoses and all orders for this visit:    1. Recurrent depression (HCC)  Stable, cont paxil    2. BMI 30.0-30.9,adult  encouraged wt loss/ exercise     3. Hot flashes  Benefiting from paxil    4. Essential hypertension  controlled  cont lisinopril/hctz  Calc cv risk 5% vs 4% for age  monitoring    11. Impaired fasting glucose  Commended on wt loss  Tlcs, monitoring  Check a1c, CMP in 3 months prior to next visit    6. Allergic conjunctivitis, bilateral  Trial optivar eye drops  If not improved in 2 weeks. Pt to call us and will send to ophthalmology     7. Spinal stenosis of lumbar region with neurogenic claudication  On lyrica  Following Dr. Anton Aguilar    Follow-up Disposition:  Return in about 3 months, or if symptoms worsen or fail to improve. Patient understands plan of care. Patient has provided input and agrees with goals.

## 2019-08-22 RX ORDER — TRIAMCINOLONE ACETONIDE 0.25 MG/ML
LOTION TOPICAL
Qty: 60 ML | Refills: 0 | Status: SHIPPED | OUTPATIENT
Start: 2019-08-22 | End: 2019-10-02 | Stop reason: SDUPTHER

## 2019-10-02 NOTE — TELEPHONE ENCOUNTER
This patient contacted office for the following prescriptions to be filled:    Medication requested :   Requested Prescriptions     Pending Prescriptions Disp Refills    triamcinolone acetonide 0.025 % lotion 60 mL 0     Sig: Apply thin layer on affected area twice a day     PCP: [address] [place] [address] or Print: [facility]   Mail order or Local pharmacy [facility]    Scheduled appointment if not seen by current providers in office: LOV 8/9/2019 f/u 11/8/2019

## 2019-10-03 RX ORDER — TRIAMCINOLONE ACETONIDE 0.25 MG/ML
LOTION TOPICAL
Qty: 60 ML | Refills: 0 | Status: SHIPPED | OUTPATIENT
Start: 2019-10-03 | End: 2019-11-21 | Stop reason: SDUPTHER

## 2019-10-04 RX ORDER — TRIAMCINOLONE ACETONIDE 0.25 MG/ML
LOTION TOPICAL
Qty: 1 BOTTLE | Refills: 0 | Status: SHIPPED | OUTPATIENT
Start: 2019-10-04 | End: 2019-11-08 | Stop reason: SDUPTHER

## 2019-10-15 RX ORDER — TRIAMCINOLONE ACETONIDE 0.25 MG/ML
LOTION TOPICAL
Qty: 1 BOTTLE | Refills: 0 | Status: CANCELLED | OUTPATIENT
Start: 2019-10-15

## 2019-10-15 RX ORDER — TRIAMCINOLONE ACETONIDE 0.25 MG/ML
LOTION TOPICAL
Qty: 60 ML | Refills: 0 | Status: CANCELLED | OUTPATIENT
Start: 2019-10-15

## 2019-10-15 NOTE — TELEPHONE ENCOUNTER
Medication E scribed 10/04/2019. Encounter closed.  Spoke with [phone] they verified medication had been E scribed on 10/04/2019

## 2019-10-15 NOTE — TELEPHONE ENCOUNTER
This patient contacted office for the following prescriptions to be filled:    Medication requested :   Requested Prescriptions     Pending Prescriptions Disp Refills    triamcinolone acetonide 0.025 % lotion 60 mL 0     Sig: Apply thin layer on affected area twice a day    triamcinolone acetonide 0.025 % lotion 1 Bottle 0     Sig:  APPLY A THIN LAYER OF LOTION TOPICALLY TO AFFECTED AREA TWICE A DAY     PCP: gladys   Pharmacy or Print: [facility]  Mail order or Local pharmacy [phone]    Scheduled appointment if not seen by current providers in office: lov 8/9/19 jaun 11/8/19

## 2019-10-17 RX ORDER — LISINOPRIL AND HYDROCHLOROTHIAZIDE 20; 25 MG/1; MG/1
TABLET ORAL
Qty: 90 TAB | Refills: 0 | Status: SHIPPED | OUTPATIENT
Start: 2019-10-17 | End: 2020-01-13 | Stop reason: SDUPTHER

## 2019-11-01 ENCOUNTER — HOSPITAL ENCOUNTER (OUTPATIENT)
Dept: LAB | Age: 64
Discharge: HOME | End: 2019-11-01

## 2019-11-01 LAB — XX-LABCORP SPECIMEN COL,LCBCF: NORMAL

## 2019-11-01 PROCEDURE — 99001 SPECIMEN HANDLING PT-LAB: CPT

## 2019-11-02 LAB
ALBUMIN SERPL-MCNC: 4 G/DL (ref 3.6–4.8)
ALBUMIN/GLOB SERPL: 1.4 {RATIO} (ref 1.2–2.2)
ALP SERPL-CCNC: 62 IU/L (ref 39–117)
ALT SERPL-CCNC: 18 IU/L (ref 0–32)
AST SERPL-CCNC: 20 IU/L (ref 0–40)
BILIRUB SERPL-MCNC: 0.6 MG/DL (ref 0–1.2)
BUN SERPL-MCNC: 19 MG/DL (ref 8–27)
BUN/CREAT SERPL: 28 (ref 12–28)
CALCIUM SERPL-MCNC: 9.8 MG/DL (ref 8.7–10.3)
CHLORIDE SERPL-SCNC: 105 MMOL/L (ref 96–106)
CO2 SERPL-SCNC: 25 MMOL/L (ref 20–29)
CREAT SERPL-MCNC: 0.67 MG/DL (ref 0.57–1)
GLOBULIN SER CALC-MCNC: 2.9 G/DL (ref 1.5–4.5)
GLUCOSE SERPL-MCNC: 103 MG/DL (ref 65–99)
HBA1C MFR BLD: 5.7 % (ref 4.8–5.6)
POTASSIUM SERPL-SCNC: 4.9 MMOL/L (ref 3.5–5.2)
PROT SERPL-MCNC: 6.9 G/DL (ref 6–8.5)
SODIUM SERPL-SCNC: 143 MMOL/L (ref 134–144)

## 2019-11-08 ENCOUNTER — OFFICE VISIT (OUTPATIENT)
Dept: FAMILY MEDICINE CLINIC | Age: 64
End: 2019-11-08

## 2019-11-08 VITALS
HEIGHT: 64 IN | TEMPERATURE: 98.5 F | RESPIRATION RATE: 16 BRPM | SYSTOLIC BLOOD PRESSURE: 136 MMHG | DIASTOLIC BLOOD PRESSURE: 90 MMHG | WEIGHT: 182 LBS | BODY MASS INDEX: 31.07 KG/M2 | HEART RATE: 86 BPM | OXYGEN SATURATION: 99 %

## 2019-11-08 DIAGNOSIS — J30.2 SEASONAL ALLERGIC RHINITIS, UNSPECIFIED TRIGGER: ICD-10-CM

## 2019-11-08 DIAGNOSIS — M79.641 RIGHT HAND PAIN: ICD-10-CM

## 2019-11-08 DIAGNOSIS — F33.9 RECURRENT DEPRESSION (HCC): Primary | ICD-10-CM

## 2019-11-08 DIAGNOSIS — R23.2 HOT FLASHES: ICD-10-CM

## 2019-11-08 DIAGNOSIS — M48.062 SPINAL STENOSIS OF LUMBAR REGION WITH NEUROGENIC CLAUDICATION: ICD-10-CM

## 2019-11-08 DIAGNOSIS — I10 ESSENTIAL HYPERTENSION: ICD-10-CM

## 2019-11-08 DIAGNOSIS — R73.01 IFG (IMPAIRED FASTING GLUCOSE): ICD-10-CM

## 2019-11-08 RX ORDER — DICLOFENAC SODIUM 10 MG/G
GEL TOPICAL 4 TIMES DAILY
Qty: 1 EACH | Refills: 0 | Status: SHIPPED | OUTPATIENT
Start: 2019-11-08 | End: 2019-11-21 | Stop reason: SDUPTHER

## 2019-11-08 NOTE — PROGRESS NOTES
1. Have you been to the ER, urgent care clinic since your last visit? Hospitalized since your last visit? No    2. Have you seen or consulted any other health care providers outside of the [address] [place] [address] since your last visit? Include any pap smears or colon screening.  No

## 2019-11-08 NOTE — PATIENT INSTRUCTIONS
DASH Diet: Care Instructions  Your Care Instructions    The DASH diet is an eating plan that can help lower your blood pressure. DASH stands for Dietary Approaches to Stop Hypertension. Hypertension is high blood pressure. The DASH diet focuses on eating foods that are high in calcium, potassium, and magnesium. These nutrients can lower blood pressure. The foods that are highest in these nutrients are fruits, vegetables, low-fat dairy products, nuts, seeds, and legumes. But taking calcium, potassium, and magnesium supplements instead of eating foods that are high in those nutrients does not have the same effect. The DASH diet also includes whole grains, fish, and poultry. The DASH diet is one of several lifestyle changes your doctor may recommend to lower your high blood pressure. Your doctor may also want you to decrease the amount of sodium in your diet. Lowering sodium while following the DASH diet can lower blood pressure even further than just the DASH diet alone. Follow-up care is a key part of your treatment and safety. Be sure to make and go to all appointments, and call your doctor if you are having problems. It's also a good idea to know your test results and keep a list of the medicines you take. How can you care for yourself at home? Following the DASH diet  · Eat 4 to 5 servings of fruit each day. A serving is 1 medium-sized piece of fruit, ½ cup chopped or canned fruit, 1/4 cup dried fruit, or 4 ounces (½ cup) of fruit juice. Choose fruit more often than fruit juice. · Eat 4 to 5 servings of vegetables each day. A serving is 1 cup of lettuce or raw leafy vegetables, ½ cup of chopped or cooked vegetables, or 4 ounces (½ cup) of vegetable juice. Choose vegetables more often than vegetable juice. · Get 2 to 3 servings of low-fat and fat-free dairy each day. A serving is 8 ounces of milk, 1 cup of yogurt, or 1 ½ ounces of cheese. · Eat 6 to 8 servings of grains each day.  A serving is 1 slice of bread, 1 ounce of dry cereal, or ½ cup of cooked rice, pasta, or cooked cereal. Try to choose whole-grain products as much as possible. · Limit lean meat, poultry, and fish to 2 servings each day. A serving is 3 ounces, about the size of a deck of cards. · Eat 4 to 5 servings of nuts, seeds, and legumes (cooked dried beans, lentils, and split peas) each week. A serving is 1/3 cup of nuts, 2 tablespoons of seeds, or ½ cup of cooked beans or peas. · Limit fats and oils to 2 to 3 servings each day. A serving is 1 teaspoon of vegetable oil or 2 tablespoons of salad dressing. · Limit sweets and added sugars to 5 servings or less a week. A serving is 1 tablespoon jelly or jam, ½ cup sorbet, or 1 cup of lemonade. · Eat less than 2,300 milligrams (mg) of sodium a day. If you limit your sodium to 1,500 mg a day, you can lower your blood pressure even more. Tips for success  · Start small. Do not try to make dramatic changes to your diet all at once. You might feel that you are missing out on your favorite foods and then be more likely to not follow the plan. Make small changes, and stick with them. Once those changes become habit, add a few more changes. · Try some of the following:  ? Make it a goal to eat a fruit or vegetable at every meal and at snacks. This will make it easy to get the recommended amount of fruits and vegetables each day. ? Try yogurt topped with fruit and nuts for a snack or healthy dessert. ? Add lettuce, tomato, cucumber, and onion to sandwiches. ? Combine a ready-made pizza crust with low-fat mozzarella cheese and lots of vegetable toppings. Try using tomatoes, squash, spinach, broccoli, carrots, cauliflower, and onions. ? Have a variety of cut-up vegetables with a low-fat dip as an appetizer instead of chips and dip. ? Sprinkle sunflower seeds or chopped almonds over salads. Or try adding chopped walnuts or almonds to cooked vegetables.   ? Try some vegetarian meals using beans and peas. Add garbanzo or kidney beans to salads. Make burritos and tacos with mashed mccracken beans or black beans. Where can you learn more? Go to http://christine-katlin.info/. Enter D411 in the search box to learn more about \"DASH Diet: Care Instructions. \"  Current as of: April 9, 2019  Content Version: 12.2  © 4636-1753 [employer]. Care instructions adapted under license by [employer] (which disclaims liability or warranty for this information). If you have questions about a medical condition or this instruction, always ask your healthcare professional. [address] any warranty or liability for your use of this information.

## 2019-11-08 NOTE — PROGRESS NOTES
Wily Michel, 59 y.o.,  female    SUBJECTIVE  Ff-up    HTN- taking meds without problems. Says ate high salt meal prior to visit today    prediabetes, reviewed labs- she admits to dietary inconsistencies    Depression/hot flashes- says doing well for the most part, taking paxil. Chronic LBP- on lyrica, following dr. Yared Doty    Allergic rhinitis- says doing fairly well on flonase, says insurance did not cover for singulair or ophthalmic drop. Reports known R hand arthritis for years, responding to prn steroid injection. Wondering other alternatives for 1st and 2nd MCP pain other than mobic/po nsaids and steroid injection. ROS:  See HPI, all others negative        Patient Active Problem List   Diagnosis Code    Hypertension I10    Lumbar radiculopathy M54.16    Herpes genitalis in women A60.09    Allergic rhinitis due to allergen J30.9    Hot flashes R23.2    Advance directive discussed with patient Z71.89    Facet arthropathy ([facility] [address] M46.90    Lumbar stenosis M48.061    Advance care planning Z71.89    Obesity E66.9    Mild single current episode of major depressive disorder (HCC) F32.0         Current Outpatient Medications:     diclofenac (VOLTAREN) 1 % gel, Apply  to affected area four (4) times daily. , Disp: 1 Each, Rfl: 0    lisinopril-hydroCHLOROthiazide (PRINZIDE, ZESTORETIC) 20-25 mg per tablet, TAKE 1 TABLET BY MOUTH ONCE DAILY, Disp: 90 Tab, Rfl: 0    triamcinolone acetonide 0.025 % lotion, Apply thin layer on affected area twice a day, Disp: 60 mL, Rfl: 0    PARoxetine (PAXIL) 20 mg tablet, Take 1 Tab by mouth daily. , Disp: 90 Tab, Rfl: 1    pregabalin (LYRICA) 75 mg capsule, Take 1 Cap by mouth three (3) times daily. Max Daily Amount: 225 mg., Disp: 90 Cap, Rfl: 5    mometasone (NASONEX) 50 mcg/actuation nasal spray, 2 Sprays by Both Nostrils route daily. , Disp: 1 Container, Rfl: 11    calcium citrate-vitamin d2 250-100 mg-unit per tablet, Take 1 Tab by mouth daily. , Disp: , Rfl:     aspirin delayed-release 81 mg tablet, Take 81 mg by mouth daily. , Disp: , Rfl:     multivitamin (ONE A DAY) tablet, Take 1 Tab by mouth daily. , Disp: , Rfl:     No Known Allergies    Past Medical History:   Diagnosis Date    Arthritis     Bulging lumbar disc     Degenerative arthritis of finger     MP joint right index finger    Depression     H/O colonoscopy 12/2014    recommended yearly hemoccult stools    Hypertension     Low back pain     Lower extremity pain, left     Pinched nerve        Social History     Social History    Marital status:      Spouse name: N/A    Number of children: N/A    Years of education: N/A     Occupational History    Not on file. Social History Main Topics    Smoking status: Never Smoker    Smokeless tobacco: Never Used    Alcohol use No    Drug use: No    Sexual activity: Not Currently     Other Topics Concern    Not on file     Social History Narrative       Family History   Problem Relation Age of Onset    Cancer Mother     Ovarian Cancer Mother 68    Stroke Father          OBJECTIVE    Physical Exam:     Visit Vitals  Visit Vitals  /90 (BP 1 Location: Left arm, BP Patient Position: Sitting)   Pulse 86   Temp 98.5 °F (36.9 °C) (Oral)   Resp 16   Ht 5' 4\" (1.626 m)   Wt 182 lb (82.6 kg)   LMP  (LMP Unknown)   SpO2 99%   BMI 31.24 kg/m²         General: alert, well-appearing, AA, in no apparent distress or pain  HEENT throat and pharynx clear, eac patent, tm intac  CVS: normal rate, regular rhythm, distinct S1 and S2  Lungs:clear to ausculation bilaterally, no crackles, wheezing or rhonchi noted  Abdomen: soft, NT, ND  Extremities: R hand FROM, no tenderness  Skin: warm, no lesions, rashes noted  Psych:  mood and affect normal  Results for orders placed or performed during the hospital encounter of 05/09/19   [facility] SPECIMEN COL   Result Value Ref Range    [facility] SPE[facility].         Specimens collected/sent to [facility]. Please direct inquiries to ([phone]). ASSESSMENT/PLAN  Diagnoses and all orders for this visit:    1. Recurrent depression (HCC)  Stable, cont paxil    2. BMI 31.0-31.9,adult  encouraged wt loss/ exercise     3. Hot flashes  Benefiting from paxil    4. Essential hypertension  Elevated today, previously controlled  cont lisinopril/hctz  Calc cv risk 5% vs 4% for age  monitoring    11. Impaired fasting glucose  Trending up  Cont with lifestyle efforts    6. Allergic rhinitis, seasonal  Controlled  Cont with prn flonase    7. Spinal stenosis of lumbar region with neurogenic claudication  On lyrica  Following Dr. Yen Pal    8. Right hand pain  Chronic, Known to have OA  Trial prn voltaren gel  HEP  Will monitor    Follow-up Disposition:  Return in about 3 months, or if symptoms worsen or fail to improve. Patient understands plan of care. Patient has provided input and agrees with goals.

## 2019-11-19 ENCOUNTER — OFFICE VISIT (OUTPATIENT)
Dept: FAMILY MEDICINE CLINIC | Age: 64
End: 2019-11-19

## 2019-11-19 ENCOUNTER — OFFICE VISIT (OUTPATIENT)
Dept: SURGERY | Age: 64
End: 2019-11-19

## 2019-11-19 VITALS
HEIGHT: 64 IN | RESPIRATION RATE: 16 BRPM | TEMPERATURE: 98.8 F | DIASTOLIC BLOOD PRESSURE: 86 MMHG | SYSTOLIC BLOOD PRESSURE: 149 MMHG | HEART RATE: 91 BPM | WEIGHT: 187 LBS | BODY MASS INDEX: 31.92 KG/M2

## 2019-11-19 VITALS
BODY MASS INDEX: 30.9 KG/M2 | SYSTOLIC BLOOD PRESSURE: 135 MMHG | TEMPERATURE: 97.3 F | RESPIRATION RATE: 16 BRPM | OXYGEN SATURATION: 97 % | HEIGHT: 64 IN | HEART RATE: 83 BPM | WEIGHT: 181 LBS | DIASTOLIC BLOOD PRESSURE: 88 MMHG

## 2019-11-19 DIAGNOSIS — Z12.11 COLON CANCER SCREENING: Primary | ICD-10-CM

## 2019-11-19 DIAGNOSIS — J41.1 PURULENT BRONCHITIS (HCC): Primary | ICD-10-CM

## 2019-11-19 RX ORDER — AZITHROMYCIN 250 MG/1
TABLET, FILM COATED ORAL
Qty: 6 TAB | Refills: 0 | Status: SHIPPED | OUTPATIENT
Start: 2019-11-19 | End: 2019-11-24

## 2019-11-19 RX ORDER — GUAIFENESIN DEXTROMETHORPHAN HYDROBROMIDE ORAL SOLUTION 10; 100 MG/5ML; MG/5ML
10 SOLUTION ORAL
Qty: 236 ML | Refills: 0 | Status: ON HOLD | OUTPATIENT
Start: 2019-11-19 | End: 2020-02-05

## 2019-11-19 NOTE — PROGRESS NOTES
Review of Systems   Constitutional: Positive for diaphoresis and malaise/fatigue. Negative for chills, fever and weight loss. HENT: Positive for congestion. Negative for ear discharge, ear pain, hearing loss, nosebleeds, sinus pain, sore throat and tinnitus. Eyes: Negative. Respiratory: Positive for cough. Negative for hemoptysis, sputum production, shortness of breath, wheezing and stridor. Cardiovascular: Negative. Gastrointestinal: Negative. Genitourinary: Negative. Musculoskeletal: Positive for back pain and joint pain. Negative for falls, myalgias and neck pain. Skin: Positive for itching. Negative for rash. Neurological: Positive for weakness. Negative for dizziness, tingling, tremors, sensory change, speech change, focal weakness, seizures, loss of consciousness and headaches. Endo/Heme/Allergies: Negative. Psychiatric/Behavioral: Negative. Colon Screen    Patient: Carmen Tran MRN: [id number]  SSN: [id number]    YOB: 1955  Age: 62 y.o. Sex: female        Subjective: Carmen Tran was referred by her PCP, Erin Keenan MD.  Patient referred for colonoscopy for   Screening colonoscopy. Patient denies rectal pain or bleeding. Abdominal surgeries as described below, specifically hysterectomy. Family history as described below, specifically none. Last colonoscopy was 5 years ago, patient had one benign polyp removed. This was done by Dr. Jose Ramesh and is scanned into media.     No Known Allergies    Past Medical History:   Diagnosis Date    Arthritis     Bulging lumbar disc     Degenerative arthritis of finger     MP joint right index finger    Depression     H/O colonoscopy 12/2014    recommended yearly hemoccult stools    Hypertension     Low back pain     Lower extremity pain, left     Pinched nerve      Past Surgical History:   Procedure Laterality Date    HX CARPAL TUNNEL RELEASE Right     hand    HX COLONOSCOPY  2014    benign polyp    HX HYSTERECTOMY      HX ORTHOPAEDIC      heel spurs      Family History   Problem Relation Age of Onset    Cancer Mother     Ovarian Cancer Mother 68    Stroke Father      Social History     Tobacco Use    Smoking status: Never Smoker    Smokeless tobacco: Never Used   Substance Use Topics    Alcohol use: No      Prior to Admission medications    Medication Sig Start Date End Date Taking? Authorizing Provider   diclofenac (VOLTAREN) 1 % gel Apply  to affected area four (4) times daily. 11/8/19  Yes Jennifer Colorado MD   lisinopril-hydroCHLOROthiazide (PRINZIDE, ZESTORETIC) 20-25 mg per tablet TAKE 1 TABLET BY MOUTH ONCE DAILY 10/17/19  Yes Jennifer Colorado MD   PARoxetine (PAXIL) 20 mg tablet Take 1 Tab by mouth daily. 8/1/19  Yes Jennifer Colorado MD   pregabalin (LYRICA) 75 mg capsule Take 1 Cap by mouth three (3) times daily. Max Daily Amount: 225 mg. 3/28/19  Yes Ashley Burnett MD   mometasone (NASONEX) 50 mcg/actuation nasal spray 2 Sprays by Both Nostrils route daily. 2/11/19  Yes Jennifer Colorado MD   calcium citrate-vitamin d2 250-100 mg-unit per tablet Take 1 Tab by mouth daily. 9/8/16  Yes Provider, Historical   aspirin delayed-release 81 mg tablet Take 81 mg by mouth daily. 11/2/15  Yes Provider, Historical   multivitamin (ONE A DAY) tablet Take 1 Tab by mouth daily. Yes Provider, Historical   triamcinolone acetonide 0.025 % lotion Apply thin layer on affected area twice a day 10/3/19   Jennifer Colorado MD          Review of Systems:      Risks colonoscopy described- colon injury, missed lesion, anesthesia problems, bleeding       Rubina Singh, LPN  November 19, 6722  11:41 AM

## 2019-11-19 NOTE — PROGRESS NOTES
Chief Complaint   Patient presents with    Cough    Nasal Congestion     chest congestion     1. Have you been to the ER, urgent care clinic since your last visit? Hospitalized since your last visit? No    2. Have you seen or consulted any other health care providers outside of the [address] [place] [address] since your last visit? Include any pap smears or colon screening. No     HPI  Hubert Choudhary comes in for acute care. She has cough and congestion. This has been ongoing for the past 2 weeks. Cough is productive of mucopurulent phlegm. This is blood-tinged at times. Denies wheeze but occasionally has shortness of breath. No fever or chills. This is purulent bronchitis. Discussed with the patient. He has taken over-the-counter medications without much relief. I will give azithromycin and guaifenesin DM. Discussed doing a chest x-ray given the chronicity of the cough. She will come in for this. Past Medical History  Past Medical History:   Diagnosis Date    Arthritis     Bulging lumbar disc     Degenerative arthritis of finger     MP joint right index finger    Depression     H/O colonoscopy 12/2014    recommended yearly hemoccult stools    Hypertension     Low back pain     Lower extremity pain, left     Pinched nerve        Surgical History  Past Surgical History:   Procedure Laterality Date    HX CARPAL TUNNEL RELEASE Right     hand    HX COLONOSCOPY  2014    benign polyp    HX HYSTERECTOMY      HX ORTHOPAEDIC      heel spurs        Medications  Current Outpatient Medications   Medication Sig Dispense Refill    diclofenac (VOLTAREN) 1 % gel Apply  to affected area four (4) times daily. 1 Each 0    lisinopril-hydroCHLOROthiazide (PRINZIDE, ZESTORETIC) 20-25 mg per tablet TAKE 1 TABLET BY MOUTH ONCE DAILY 90 Tab 0    triamcinolone acetonide 0.025 % lotion Apply thin layer on affected area twice a day 60 mL 0    PARoxetine (PAXIL) 20 mg tablet Take 1 Tab by mouth daily.  90 Tab 1    pregabalin (LYRICA) 75 mg capsule Take 1 Cap by mouth three (3) times daily. Max Daily Amount: 225 mg. 90 Cap 5    mometasone (NASONEX) 50 mcg/actuation nasal spray 2 Sprays by Both Nostrils route daily. 1 Container 11    calcium citrate-vitamin d2 250-100 mg-unit per tablet Take 1 Tab by mouth daily.  aspirin delayed-release 81 mg tablet Take 81 mg by mouth daily.  multivitamin (ONE A DAY) tablet Take 1 Tab by mouth daily.          Allergies  No Known Allergies    Family History  Family History   Problem Relation Age of Onset    Cancer Mother     Ovarian Cancer Mother [place]    Stroke Father        Social History  Social History     Socioeconomic History    Marital status:      Spouse name: Not on file    Number of children: Not on file    Years of education: Not on file    Highest education level: Not on file   Occupational History    Not on file   Social Needs    Financial resource strain: Not on file    Food insecurity:     Worry: Not on file     Inability: Not on file    Transportation needs:     Medical: Not on file     Non-medical: Not on file   Tobacco Use    Smoking status: Never Smoker    Smokeless tobacco: Never Used   Substance and Sexual Activity    Alcohol use: No    Drug use: No    Sexual activity: Not Currently   Lifestyle    Physical activity:     Days per week: Not on file     Minutes per session: Not on file    Stress: Not on file   Relationships    Social connections:     Talks on phone: Not on file     Gets together: Not on file     Attends Orthodox service: Not on file     Active member of club or organization: Not on file     Attends meetings of clubs or organizations: Not on file     Relationship status: Not on file    Intimate partner violence:     Fear of current or ex partner: Not on file     Emotionally abused: Not on file     Physically abused: Not on file     Forced sexual activity: Not on file   Other Topics Concern    Not on file   Social History Narrative    Not on file       Review of Systems  Review of Systems - Review of all systems is negative except as noted above in the HPI. Vital Signs  Visit Vitals  /88 (BP 1 Location: Left arm, BP Patient Position: Sitting)   Pulse 83   Temp 97.3 °F (36.3 °C) (Oral)   Resp 16   Ht 5' 4\" (1.626 m)   Wt 181 lb (82.1 kg)   LMP  (LMP Unknown)   SpO2 97%   BMI 31.07 kg/m²         Physical Exam  Physical Examination: General appearance - alert, well appearing, and in no distress, oriented to person, place, and time and acyanotic, in no respiratory distress  Mental status - alert, oriented to person, place, and time, affect appropriate to mood  Nose -nasal congestion with tender frontal and maxillary sinuses, mucopurulent  rhinorrhea  Mouth - mucous membranes moist, pharynx normal without lesions  Neck - supple, no significant adenopathy  Lymphatics - no palpable lymphadenopathy  Chest - clear to auscultation, no wheezes, rales or rhonchi, symmetric air entry  Heart - normal rate, regular rhythm, normal S1, S2, no murmurs, rubs, clicks or gallops  Abdomen - no rebound tenderness noted  Neurological - alert, oriented, normal speech, no focal findings or movement disorder noted  Musculoskeletal - full range of motion without pain  Extremities - intact peripheral pulses    Results  Results for orders placed or performed during the hospital encounter of 11/01/19   [facility] SPECIMEN COL   Result Value Ref Range    [facility] SPEC[facility]. Specimens collected/sent to [facility]. Please direct inquiries to ([phone]). ASSESSMENT and PLAN    ICD-10-CM ICD-9-CM    1.  Purulent bronchitis (HCC) J41.1 491.1 XR CHEST PA LAT      azithromycin (ZITHROMAX) 250 mg tablet      guaiFENesin-dextromethorphan (GUAIFENESIN-DM)  mg/5 mL liqd     reviewed diet, exercise and weight control  reviewed medications and side effects in detail  radiology results and schedule of future radiology studies reviewed with patient      I have discussed the diagnosis with the patient and the intended plan of care as seen in the above orders. The patient has received an after-visit summary and questions were answered concerning future plans. I have discussed medication, side effects, and warnings with the patient in detail. The patient verbalized understanding and is in agreement with the plan of care. The patient will contact the office with any additional concerns. Shante Lechuga MD    PLEASE NOTE:   This document has been produced using voice recognition software.  Unrecognized errors in transcription may be present

## 2019-11-20 DIAGNOSIS — J41.1 PURULENT BRONCHITIS (HCC): Primary | ICD-10-CM

## 2019-11-21 RX ORDER — DICLOFENAC SODIUM 10 MG/G
GEL TOPICAL 4 TIMES DAILY
Qty: 1 EACH | Refills: 0 | Status: SHIPPED | OUTPATIENT
Start: 2019-11-21 | End: 2020-01-13 | Stop reason: SDUPTHER

## 2019-11-21 RX ORDER — TRIAMCINOLONE ACETONIDE 0.25 MG/ML
LOTION TOPICAL
Qty: 60 ML | Refills: 0 | Status: SHIPPED | OUTPATIENT
Start: 2019-11-21 | End: 2019-12-30 | Stop reason: SDUPTHER

## 2019-11-21 NOTE — TELEPHONE ENCOUNTER
This patient contacted office for the following prescriptions to be filled:    Medication requested :   Requested Prescriptions     Pending Prescriptions Disp Refills    triamcinolone acetonide 0.025 % lotion 60 mL 0     Sig: Apply thin layer on affected area twice a day    diclofenac (VOLTAREN) 1 % gel 1 Each 0     Sig: Apply  to affected area four (4) times daily.        PCP: gladys  Pharmacy or Print: [facility]  Mail order or Local pharmacy [phone]    Scheduled appointment if not seen by current providers in office: lov 11/8/19 ajun 2/10/20

## 2019-11-29 ENCOUNTER — OFFICE VISIT (OUTPATIENT)
Dept: FAMILY MEDICINE CLINIC | Age: 64
End: 2019-11-29

## 2019-11-29 VITALS
HEIGHT: 64 IN | HEART RATE: 77 BPM | DIASTOLIC BLOOD PRESSURE: 71 MMHG | SYSTOLIC BLOOD PRESSURE: 116 MMHG | RESPIRATION RATE: 12 BRPM | BODY MASS INDEX: 30.63 KG/M2 | TEMPERATURE: 98.3 F | OXYGEN SATURATION: 98 % | WEIGHT: 179.4 LBS

## 2019-11-29 DIAGNOSIS — R68.89 FLU-LIKE SYMPTOMS: Primary | ICD-10-CM

## 2019-11-29 LAB
FLUAV+FLUBV AG NOSE QL IA.RAPID: NEGATIVE POS/NEG
FLUAV+FLUBV AG NOSE QL IA.RAPID: NEGATIVE POS/NEG
VALID INTERNAL CONTROL?: NO

## 2019-11-29 NOTE — PATIENT INSTRUCTIONS
Flu swab negative today. Instructions for flu-like illness: How can you care for yourself at home? · Get plenty of rest. 
· Drink plenty of fluids, enough so that your urine is light yellow or clear like water. If you have kidney, heart, or liver disease and have to limit fluids, talk with your doctor before you increase the amount of fluids you drink. · Ease nausea with small frequent meals. Keep crackers handy to try to keep something on your stomach. Drink hot broths (look for low sodium). · Take an over-the-counter pain medicine if needed, such as acetaminophen (Tylenol), ibuprofen (Advil, Motrin), or naproxen (Aleve), to relieve fever, headache, and muscle aches. Read and follow all instructions on the label. No one younger than 20 should take aspirin. It has been linked to Reye syndrome, a serious illness. · Do not smoke. Smoking can make the flu worse. If you need help quitting, talk to your doctor about stop-smoking programs and medicines. These can increase your chances of quitting for good. · Breathe moist air from a hot shower or from a sink filled with hot water to help clear a stuffy nose. · Before you use cough and cold medicines, check the label. These medicines may not be safe for young children or for people with certain health problems. · If the skin around your nose and lips becomes sore, put some petroleum jelly on the area. · To ease coughing: ? Drink fluids to soothe a scratchy throat. ? Suck on cough drops or plain hard candy. ? Take an over-the-counter cough medicine that contains dextromethorphan to help you get some sleep. Read and follow all instructions on the label. ? Raise your head at night with an extra pillow. This may help you rest if coughing keeps you awake. · Take any prescribed medicine exactly as directed. Call your doctor if you think you are having a problem with your medicine.  
To avoid spreading the flu 
 · Wash your hands regularly, and keep your hands away from your face. · Stay home from school, work, and other public places until you are feeling better and your fever has been gone for at least 24 hours. The fever needs to have gone away on its own without the help of medicine. · Ask people living with you to talk to their doctors about preventing the flu. They may get antiviral medicine to keep from getting the flu from you. · To prevent the flu in the future, get a flu vaccine every fall. Encourage people living with you to get the vaccine. · Cover your mouth when you cough or sneeze. When should you call for help? Call 911 anytime you think you may need emergency care. For example, call if: 
  · You have severe trouble breathing.  
 Call your doctor now or seek immediate medical care if: 
  · You have new or worse trouble breathing.  
  · You seem to be getting much sicker.  
  · You feel very sleepy or confused.  
  · You have a new or higher fever.  
  · You get a new rash.  
 Watch closely for changes in your health, and be sure to contact your doctor if: 
  · You begin to get better and then get worse.  
  · You are not getting better after 1 week.

## 2019-11-29 NOTE — PROGRESS NOTES
PROBLEM/SICK OFFICE NOTE (SOAP)    11/29/2019  2:06 PM    SUBJECTIVE:    Chief Complaint   Patient presents with    Flu Like Symptoms     x 1 week    Headache    Follow-up       HPI:  Julito Barrios is a 62 y.o. female presenting today for office visit. She is a patient of Dr. Crystal willis, following here for a sick visit today. She was recently seen by Dr. Jairo Knight for cough and nasal congestion. At that visit was prescribed azithromycin and guaifenesin DM, she also had a chest x-ray the next day which was unremarkable. She completed both medications. Since then her coughing has improved. But, she continues to have flu-like symptoms are such as headache, chills, vomiting, poor appetite. The vomiting occurred this morning once, and once the other day. She states vomiting is not associated with eating, and is mostly stomach acid coming up. Nausea was treated with over the counter Pepto bismol and she has been drinking ginger ale to calm stomach. Headache is reported as intermittent, relieved by tylenol. She reports chills but has not checked for fever, no fever in office today. She still reports lingering congestion, but states it has improved. Denies sore throat, ear pain, chest tightness or pain, shortness of breath, wheezing. Review of Systems:  Review of Systems   Constitutional: Positive for appetite change and chills. Negative for diaphoresis, fatigue, fever and unexpected weight change. HENT: Positive for congestion. Negative for ear pain, postnasal drip, rhinorrhea, sinus pressure, sinus pain, sore throat and trouble swallowing. Respiratory: Negative for chest tightness, shortness of breath and wheezing. Cardiovascular: Negative for chest pain, palpitations and leg swelling. Gastrointestinal: Positive for nausea and vomiting. Negative for abdominal pain, constipation and diarrhea. Neurological: Positive for headaches. Negative for speech difficulty, weakness and light-headedness. Hematological: Negative for adenopathy.          Depression- PHQ Screening   3 most recent PHQ Screens 5/12/2014   Little interest or pleasure in doing things Not at all   Feeling down, depressed, irritable, or hopeless Not at all   Total Score PHQ 2 0         History  Past Medical History:   Diagnosis Date    Arthritis     Bulging lumbar disc     Degenerative arthritis of finger     MP joint right index finger    Depression     H/O colonoscopy 12/2014    recommended yearly hemoccult stools    Hypertension     Low back pain     Lower extremity pain, left     Pinched nerve        Past Surgical History:   Procedure Laterality Date    HX CARPAL TUNNEL RELEASE Right     hand    HX COLONOSCOPY  2014    benign polyp    HX HYSTERECTOMY      HX ORTHOPAEDIC      heel spurs       Social History     Socioeconomic History    Marital status:      Spouse name: Not on file    Number of children: Not on file    Years of education: Not on file    Highest education level: Not on file   Occupational History    Not on file   Social Needs    Financial resource strain: Not on file    Food insecurity:     Worry: Not on file     Inability: Not on file    Transportation needs:     Medical: Not on file     Non-medical: Not on file   Tobacco Use    Smoking status: Never Smoker    Smokeless tobacco: Never Used   Substance and Sexual Activity    Alcohol use: No    Drug use: No    Sexual activity: Not Currently   Lifestyle    Physical activity:     Days per week: Not on file     Minutes per session: Not on file    Stress: Not on file   Relationships    Social connections:     Talks on phone: Not on file     Gets together: Not on file     Attends Rastafarian service: Not on file     Active member of club or organization: Not on file     Attends meetings of clubs or organizations: Not on file     Relationship status: Not on file    Intimate partner violence:     Fear of current or ex partner: Not on file Emotionally abused: Not on file     Physically abused: Not on file     Forced sexual activity: Not on file   Other Topics Concern    Not on file   Social History Narrative    Not on file       No Known Allergies    Current Outpatient Medications   Medication Sig Dispense Refill    triamcinolone acetonide 0.025 % lotion Apply thin layer on affected area twice a day 60 mL 0    guaiFENesin-dextromethorphan (GUAIFENESIN-DM)  mg/5 mL liqd Take 10 mL by mouth every six (6) hours as needed for Cough. 236 mL 0    lisinopril-hydroCHLOROthiazide (PRINZIDE, ZESTORETIC) 20-25 mg per tablet TAKE 1 TABLET BY MOUTH ONCE DAILY 90 Tab 0    PARoxetine (PAXIL) 20 mg tablet Take 1 Tab by mouth daily. 90 Tab 1    pregabalin (LYRICA) 75 mg capsule Take 1 Cap by mouth three (3) times daily. Max Daily Amount: 225 mg. 90 Cap 5    calcium citrate-vitamin d2 250-100 mg-unit per tablet Take 1 Tab by mouth daily.  aspirin delayed-release 81 mg tablet Take 81 mg by mouth daily.  diclofenac (VOLTAREN) 1 % gel Apply  to affected area four (4) times daily. 1 Each 0    mometasone (NASONEX) 50 mcg/actuation nasal spray 2 Sprays by Both Nostrils route daily. 1 Container 11    multivitamin (ONE A DAY) tablet Take 1 Tab by mouth daily. Patient Care Team:  Patient Care Team:  Cierra Matthews MD as PCP - General (Family Practice)  Cierra Matthews MD as PCP - [facility]  Thuy Rendon., RN  Dayday Kirk MD (Orthopedic Surgery)  Tomas Flores MD (Orthopedic Surgery)        OBJECTIVE:    Vitals:    11/29/19 1351   BP: 116/71   Pulse: 77   Resp: 12   SpO2: 98%   Weight: 179 lb 6.4 oz (81.4 kg)   Height: 5' 4\" (1.626 m)   PainSc:   0 - No pain       Physical Exam  Constitutional:       General: She is not in acute distress. Appearance: Normal appearance. She is not ill-appearing, toxic-appearing or diaphoretic. HENT:      Head: Normocephalic.       Right Ear: Tympanic membrane, ear canal and external ear normal.      Left Ear: Tympanic membrane, ear canal and external ear normal.      Nose: Nose normal.      Mouth/Throat:      Mouth: Mucous membranes are moist.      Pharynx: No oropharyngeal exudate or posterior oropharyngeal erythema. Eyes:      General: No scleral icterus. Right eye: No discharge. Left eye: No discharge. Pupils: Pupils are equal, round, and reactive to light. Neck:      Musculoskeletal: Normal range of motion and neck supple. Cardiovascular:      Rate and Rhythm: Normal rate and regular rhythm. Heart sounds: No murmur. Pulmonary:      Effort: Pulmonary effort is normal.      Breath sounds: Normal breath sounds. No wheezing or rhonchi. Abdominal:      General: There is no distension. Palpations: Abdomen is soft. Tenderness: There is no tenderness. There is no guarding or rebound. Musculoskeletal: Normal range of motion. Lymphadenopathy:      Cervical: No cervical adenopathy. Skin:     General: Skin is warm and dry. Neurological:      Mental Status: She is alert and oriented to person, place, and time. Assessment & Plan:    1. Flu-like symptoms    No obvious signs or symptoms of infection on exam. Overall has reported an improvement since being treated with antibiotics by Dr. Tiffanie Taveras. Due to continued chills, headache, etc. Rapid flu was ordered today. This was negative. Patient likely suffering from viral illness. Support and education given to include over the counter treatments such as plenty of rest, fluids, small frequent meals, tylenol or ibuprofen for headaches. Also encouraged good hand washing.     - AMB POC NAIF INFLUENZA A/B TEST- negative today. Orders Placed This Encounter    AMB POC NAIF INFLUENZA A/B TEST       Follow-up and Dispositions    · Return if symptoms worsen or fail to improve. Plan of care reviewed with patient.  Patient in agreement with plan and expresses understanding. I have discussed when to anticipate results and how results will be communicated, if applicable. Anticipatory guidance given and questions answered, patient encouraged to call or RTO if further questions or concerns.     Araceli Jimenes NP  11/29/19

## 2019-11-29 NOTE — PROGRESS NOTES
Thu Lang presents today for   Chief Complaint   Patient presents with    Flu Like Symptoms     x 1 week    Headache    Follow-up       Is someone accompanying this pt? Yes-daughter    Is the patient using any DME equipment during [address]? no    Depression Screening:  3 most recent PHQ Screens 5/12/2014   Little interest or pleasure in doing things Not at all   Feeling down, depressed, irritable, or hopeless Not at all   Total Score PHQ 2 0       Learning Assessment:  Learning Assessment 1/9/2015   PRIMARY LEARNER Patient   HIGHEST LEVEL OF EDUCATION - PRIMARY LEARNER  GRADUATED HIGH SCHOOL OR GED   BARRIERS PRIMARY LEARNER NONE   CO-LEARNER CAREGIVER No   PRIMARY LANGUAGE ENGLISH   LEARNER PREFERENCE PRIMARY DEMONSTRATION     LISTENING   ANSWERED BY self   RELATIONSHIP SELF       Abuse Screening:  Abuse Screening Questionnaire 5/10/2019   Do you ever feel afraid of your partner? N   Are you in a relationship with someone who physically or mentally threatens you? N   Is it safe for you to go home? Y       Fall Risk  Fall Risk Assessment, last 12 mths 5/12/2014   Able to walk? Yes   Fall in past 12 months? No       Health Maintenance reviewed and discussed and ordered per Provider. Health Maintenance Due   Topic Date Due    Shingrix Vaccine Age 49> (2 of 2) 11/07/2019    COLONOSCOPY  12/05/2019   . Coordination of Care:  1. Have you been to the ER, urgent care clinic since your last visit? Hospitalized since your last visit? no    2. Have you seen or consulted any other health care providers outside of the [address] [place] [address] since your last visit? Include any pap smears or colon screening.  no

## 2019-12-02 ENCOUNTER — OFFICE VISIT (OUTPATIENT)
Dept: FAMILY MEDICINE CLINIC | Age: 64
End: 2019-12-02

## 2019-12-02 ENCOUNTER — TELEPHONE (OUTPATIENT)
Dept: FAMILY MEDICINE CLINIC | Age: 64
End: 2019-12-02

## 2019-12-02 VITALS
HEIGHT: 64 IN | SYSTOLIC BLOOD PRESSURE: 140 MMHG | OXYGEN SATURATION: 99 % | HEART RATE: 98 BPM | TEMPERATURE: 98.7 F | WEIGHT: 180 LBS | RESPIRATION RATE: 18 BRPM | DIASTOLIC BLOOD PRESSURE: 77 MMHG | BODY MASS INDEX: 30.73 KG/M2

## 2019-12-02 DIAGNOSIS — H93.11 TINNITUS OF RIGHT EAR: ICD-10-CM

## 2019-12-02 DIAGNOSIS — I10 ESSENTIAL HYPERTENSION: ICD-10-CM

## 2019-12-02 DIAGNOSIS — L50.9 HIVES: Primary | ICD-10-CM

## 2019-12-02 RX ORDER — METHYLPREDNISOLONE 4 MG/1
TABLET ORAL
Qty: 1 DOSE PACK | Refills: 0 | Status: ON HOLD | OUTPATIENT
Start: 2019-12-02 | End: 2020-02-05

## 2019-12-02 RX ORDER — HYDROXYZINE 25 MG/1
25 TABLET, FILM COATED ORAL
Qty: 30 TAB | Refills: 0 | Status: SHIPPED | OUTPATIENT
Start: 2019-12-02 | End: 2019-12-12

## 2019-12-02 NOTE — PROGRESS NOTES
Chief Complaint   Patient presents with   Chris Valero     started yesterday    [address] in [place]     1. Have you been to the ER, urgent care clinic since your last visit? Hospitalized since your last visit? No    2. Have you seen or consulted any other health care providers outside of the [address] [place] [address] since your last visit? Include any pap smears or colon screening. No     HPI  Catrachita Lindo comes in for acute care. She has a rash that is all over her body. This noted since yesterday. It is a hive like rash. She does not remember taking anything that she could be allergic to. I will give medrol dosepak and hydroxyzine. She has ringing right ear for weeks. This comes on and off. No ear discharge. Denies hearing loss. Discussed referral to see the ENT physician. Patient would prefer to hold off but I did let her know that she may need to be referred to ENT physician if symptoms persist.  Patient has hypertension. Blood pressure is 140/77. She is on Prinzide. Continue current treatment plan. Past Medical History  Past Medical History:   Diagnosis Date    Arthritis     Bulging lumbar disc     Degenerative arthritis of finger     MP joint right index finger    Depression     H/O colonoscopy 12/2014    recommended yearly hemoccult stools    Hypertension     Low back pain     Lower extremity pain, left     Pinched nerve        Surgical History  Past Surgical History:   Procedure Laterality Date    HX CARPAL TUNNEL RELEASE Right     hand    HX COLONOSCOPY  2014    benign polyp    HX HYSTERECTOMY      HX ORTHOPAEDIC      heel spurs        Medications  Current Outpatient Medications   Medication Sig Dispense Refill    triamcinolone acetonide 0.025 % lotion Apply thin layer on affected area twice a day 60 mL 0    lisinopril-hydroCHLOROthiazide (PRINZIDE, ZESTORETIC) 20-25 mg per tablet TAKE 1 TABLET BY MOUTH ONCE DAILY 90 Tab 0    PARoxetine (PAXIL) 20 mg tablet Take 1 Tab by mouth daily. 90 Tab 1    pregabalin (LYRICA) 75 mg capsule Take 1 Cap by mouth three (3) times daily. Max Daily Amount: 225 mg. 90 Cap 5    mometasone (NASONEX) 50 mcg/actuation nasal spray 2 Sprays by Both Nostrils route daily. 1 Container 11    calcium citrate-vitamin d2 250-100 mg-unit per tablet Take 1 Tab by mouth daily.  aspirin delayed-release 81 mg tablet Take 81 mg by mouth daily.  multivitamin (ONE A DAY) tablet Take 1 Tab by mouth daily.  diclofenac (VOLTAREN) 1 % gel Apply  to affected area four (4) times daily. 1 Each 0    guaiFENesin-dextromethorphan (GUAIFENESIN-DM)  mg/5 mL liqd Take 10 mL by mouth every six (6) hours as needed for Cough.  236 mL 0       Allergies  No Known Allergies    Family History  Family History   Problem Relation Age of Onset    Cancer Mother     Ovarian Cancer Mother 68    Stroke Father        Social History  Social History     Socioeconomic History    Marital status:      Spouse name: Not on file    Number of children: Not on file    Years of education: Not on file    Highest education level: Not on file   Occupational History    Not on file   Social Needs    Financial resource strain: Not on file    Food insecurity:     Worry: Not on file     Inability: Not on file    Transportation needs:     Medical: Not on file     Non-medical: Not on file   Tobacco Use    Smoking status: Never Smoker    Smokeless tobacco: Never Used   Substance and Sexual Activity    Alcohol use: No    Drug use: No    Sexual activity: Not Currently   Lifestyle    Physical activity:     Days per week: Not on file     Minutes per session: Not on file    Stress: Not on file   Relationships    Social connections:     Talks on phone: Not on file     Gets together: Not on file     Attends Hinduism service: Not on file     Active member of club or organization: Not on file     Attends meetings of clubs or organizations: Not on file     Relationship status: Not on file   [facility] Intimate partner violence:     Fear of current or ex partner: Not on file     Emotionally abused: Not on file     Physically abused: Not on file     Forced sexual activity: Not on file   Other Topics Concern    Not on file   Social History Narrative    Not on file       Review of Systems  Review of Systems - Review of all systems is negative except as noted above in the HPI. Vital Signs  Visit Vitals  /77 (BP 1 Location: Left arm, BP Patient Position: Sitting)   Pulse 98   Temp 98.7 °F (37.1 °C) (Oral)   Resp 18   Ht 5' 4\" (1.626 m)   Wt 180 lb (81.6 kg)   LMP  (LMP Unknown)   SpO2 99%   BMI 30.90 kg/m²     Physical Exam  Physical Examination: General appearance - oriented to person, place, and time, overweight and acyanotic, in no respiratory distress  Mental status - affect appropriate to mood  Neck - supple, no significant adenopathy  Lymphatics - no palpable lymphadenopathy  Chest - clear to auscultation, no wheezes, rales or rhonchi, symmetric air entry  Heart - S1 and S2 normal  Abdomen - no rebound tenderness noted  Neurological - motor and sensory grossly normal bilaterally  Musculoskeletal - no joint tenderness, deformity or swelling  Extremities - intact peripheral pulses  Skin - DERMATITIS NOTED: Patient has hives rash on upper extremities and back and torso. Results  Results for orders placed or performed in visit on 11/29/19   AMB POC NAIF INFLUENZA A/B TEST   Result Value Ref Range    VALID INTERNAL CONTROL POC No     Influenza A Ag POC Negative Negative Pos/Neg    Influenza B Ag POC Negative Negative Pos/Neg       ASSESSMENT and PLAN    ICD-10-CM ICD-9-CM    1.  Hives L50.9 708.9 methylPREDNISolone (MEDROL DOSEPACK) 4 mg tablet      hydrOXYzine HCl (ATARAX) 25 mg tablet     reviewed diet, exercise and weight control  very strongly urged to quit smoking to reduce cardiovascular risk  reviewed medications and side effects in detail      I have discussed the diagnosis with the patient and the intended plan of care as seen in the above orders. The patient has received an after-visit summary and questions were answered concerning future plans. I have discussed medication, side effects, and warnings with the patient in detail. The patient verbalized understanding and is in agreement with the plan of care. The patient will contact the office with any additional concerns. Salvador Mcdonald MD    PLEASE NOTE:   This document has been produced using voice recognition software.  Unrecognized errors in transcription may be present

## 2019-12-10 ENCOUNTER — OFFICE VISIT (OUTPATIENT)
Dept: ORTHOPEDIC SURGERY | Age: 64
End: 2019-12-10

## 2019-12-10 VITALS
DIASTOLIC BLOOD PRESSURE: 86 MMHG | RESPIRATION RATE: 16 BRPM | SYSTOLIC BLOOD PRESSURE: 133 MMHG | WEIGHT: 180 LBS | TEMPERATURE: 98.5 F | OXYGEN SATURATION: 100 % | BODY MASS INDEX: 30.73 KG/M2 | HEIGHT: 64 IN | HEART RATE: 77 BPM

## 2019-12-10 DIAGNOSIS — M79.18 MYOFASCIAL PAIN: ICD-10-CM

## 2019-12-10 DIAGNOSIS — M54.59 MECHANICAL LOW BACK PAIN: ICD-10-CM

## 2019-12-10 DIAGNOSIS — M54.16 LUMBAR RADICULOPATHY: Primary | ICD-10-CM

## 2019-12-10 RX ORDER — PREGABALIN 75 MG/1
75 CAPSULE ORAL 3 TIMES DAILY
Qty: 90 CAP | Refills: 5 | Status: SHIPPED | OUTPATIENT
Start: 2019-12-10 | End: 2022-03-10 | Stop reason: SDUPTHER

## 2019-12-10 NOTE — PROGRESS NOTES
[address]  [address]  [place], [address] [place] [address] Street  Phone: [phone]  Fax: [phone]        Yusuf Murillo  : 1955  PCP: Elke Leonard MD  12/10/2019    PROGRESS NOTE      HISTORY OF PRESENT ILLNESS  Valente Larsen is a 62 y.o. female. She was initially seen in 2017 for c/o lumbar pain radiating into her LLE. She found some relief from Tylenol #3 PRN and Gabapentin. Her pain was exacerbated with increased lifting, bending, cleaning, and walking. She found relief from lying down. She was not maintaining an HEP. She found some relief from Naproxen and Flexeril. She noted that pressure while sitting exacerbated her pain. Lumbar MRI 16: Degenerative grade 1 anterior listhesis of both L4 and L5 in the presence of advanced facet arthropathy. No high-grade spinal stenosis. There are bilateral foraminal stenoses. Some exiting nerve contact but no overt impingement. During a 6 month f/u she continued to have some residual low back pain with some tingling. She c/o right heel pain that she associated with a bone spur in the heel. Her PCP provided exercises for plantar fasciitis. 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 She returned for a 6 month f/u and stated that her pain was a little different. She was previously treated for myofascial pain and facet arthropathy. She c/o pain localized to her left lower back with a tingling sensation in her LLE to her knee. She denies weakness. She has not been tolerating Gabapentin 300mg TID well as it causes somnolence. She did not tolerate the Lyrica 150 mg BID as it caused drowsiness, however, she did find some relief at the 75mg dose She has be8en focusing on a diet and cutting out starches from her diet over the last 3 weeks. Valente Larsen comes in to the office today for f/u. She has been maintaining well on Lyrica 75 mg TID.  Pt notes that her symptoms continue to wax and wane. She tends to have flare ups of her back pain after she is more active (cleaning house, etc.). She rates her pain as a 4/10 today. ASSESSMENT  Her symptoms likely remain due to myofascial pain and lumbar radiculopathy. We discussed options of: trigger point injections, formal PT, maintaining on own with HEP. PLAN  1. Refilled Lyrica 75 mg TID. 2. Maintain an HEP. Pt will f/u in 6 months or sooner as needed. Diagnoses and all orders for this visit:    1. Lumbar radiculopathy  -     pregabalin (LYRICA) 75 mg capsule; Take 1 Cap by mouth three (3) times daily. Max Daily Amount: 225 mg.    2. Mechanical low back pain    3. Myofascial pain       PAST MEDICAL HISTORY   Past Medical History:   Diagnosis Date    Arthritis     Bulging lumbar disc     Degenerative arthritis of finger     MP joint right index finger    Depression     H/O colonoscopy 12/2014    recommended yearly hemoccult stools    Hypertension     Low back pain     Lower extremity pain, left     Pinched nerve        Past Surgical History:   Procedure Laterality Date    HX CARPAL TUNNEL RELEASE Right     hand    HX COLONOSCOPY  2014    benign polyp    HX HYSTERECTOMY      HX ORTHOPAEDIC      heel spurs   . MEDICATIONS    Current Outpatient Medications   Medication Sig Dispense Refill    hydrOXYzine HCl (ATARAX) 25 mg tablet Take 1 Tab by mouth three (3) times daily as needed for Itching for up to 10 days. 30 Tab 0    triamcinolone acetonide 0.025 % lotion Apply thin layer on affected area twice a day 60 mL 0    diclofenac (VOLTAREN) 1 % gel Apply  to affected area four (4) times daily. 1 Each 0    lisinopril-hydroCHLOROthiazide (PRINZIDE, ZESTORETIC) 20-25 mg per tablet TAKE 1 TABLET BY MOUTH ONCE DAILY 90 Tab 0    PARoxetine (PAXIL) 20 mg tablet Take 1 Tab by mouth daily. 90 Tab 1    pregabalin (LYRICA) 75 mg capsule Take 1 Cap by mouth three (3) times daily.  Max Daily Amount: 225 mg. 90 Cap 5    mometasone (NASONEX) 50 mcg/actuation nasal spray 2 Sprays by Both Nostrils route daily. 1 Container 11    calcium citrate-vitamin d2 250-100 mg-unit per tablet Take 1 Tab by mouth daily.  aspirin delayed-release 81 mg tablet Take 81 mg by mouth daily.  multivitamin (ONE A DAY) tablet Take 1 Tab by mouth daily.  methylPREDNISolone (MEDROL DOSEPACK) 4 mg tablet Take tapering dose as prescribed. 1 Dose Pack 0    guaiFENesin-dextromethorphan (GUAIFENESIN-DM)  mg/5 mL liqd Take 10 mL by mouth every six (6) hours as needed for Cough. 236 mL 0        ALLERGIES  No Known Allergies       SOCIAL HISTORY    Social History     Socioeconomic History    Marital status:      Spouse name: Not on file    Number of children: Not on file    Years of education: Not on file    Highest education level: Not on file   Tobacco Use    Smoking status: Never Smoker    Smokeless tobacco: Never Used   Substance and Sexual Activity    Alcohol use: No    Drug use: No    Sexual activity: Not Currently       FAMILY HISTORY  Family History   Problem Relation Age of Onset    Cancer Mother     Ovarian Cancer Mother 68    Stroke Father          REVIEW OF SYSTEMS  Review of Systems   Musculoskeletal: Positive for back pain.         Episodic LLE paraesthesia           PHYSICAL EXAMINATION  Visit Vitals  /86   Pulse 77   Temp 98.5 °F (36.9 °C) (Oral)   Resp 16   Ht 5' 4\" (1.626 m)   Wt 180 lb (81.6 kg)   LMP  (LMP Unknown)   SpO2 100%   BMI 30.90 kg/m²       Pain Assessment  12/10/2019   Location of Pain Back   Pain Location Comment -   Location Modifiers -   Severity of Pain 4   Quality of Pain Dull;Aching   Quality of Pain Comment -   Duration of Pain A few hours   Frequency of Pain Intermittent   Date Pain First Started -   Aggravating Factors Standing;Walking;Bending   Aggravating Factors Comment sitting too long   Limiting Behavior -   Relieving Factors Rest;Ice   Relieving Factors Comment -   Result of Injury No           Constitutional:  Well developed, well nourished, in no acute distress. Psychiatric: Affect and mood are appropriate. Integumentary: No rashes or abrasions noted on exposed areas. SPINE/MUSCULOSKELETAL EXAM    Lumbar spine:  No rash, ecchymosis, or gross obliquity.    No fasciculations.    No focal atrophy is noted.    No pain with hip ROM.    Range of motion is normal.  Mild tenderness to palpation. Tenderness to palpation at the right sciatic notch.    SI joints non-tender.    Trochanters non tender. Piriformis stretch test negative on right side.         Sensation in the bilateral legs grossly intact to light touch.     Updates from 2/21/19:  Tenderness to palpation of left upper buttock. Positive SLR on the L  leans to the left, but her right shoulder sits lower than her left  Neurologically intact    Updates from 12/10/19:  Tenderness to palpation of lumbar paraspinals    MOTOR:      Biceps  Triceps Deltoids Wrist Ext Wrist Flex Hand Intrin   Right 5/5 5/5 5/5 5/5 5/5 5/5   Left 5/5 5/5 5/5 5/5 5/5 5/5             Hip Flex  Quads Hamstrings Ankle DF EHL Ankle PF   Right 5/5 5/5 5/5 5/5 5/5 5/5   Left 5/5 5/5 5/5 5/5 5/5 5/5     DTRs are 2+ biceps, triceps, brachioradialis, patella, and Achilles.      Negative Straight Leg raise.    Squat not tested.    No difficulty with tandem gait.    Normal heel walk.    Normal toe rise.       Ambulation without assistive device. FWB.       RADIOGRAPHS  Lumbar MRI films from 4/19/2016 personally reviewed with patient:  1) Degenerative grade 1 anterior listhesis of both L4 and L5 in the presence of  advanced facet arthropathy. No high-grade spinal stenosis. There are bilateral  foraminal stenoses. Some exiting nerve contact but no overt impingement.     7 minutes of face-to-face contact were spent with the patient during today's visit extensively discussing symptoms and treatment plan. All questions were answered.  More than half of this visit today was spent on counseling.      Written by Smith Babin as dictated by Félix Fraser MD

## 2019-12-10 NOTE — PATIENT INSTRUCTIONS
Back Stretches: Exercises Introduction Here are some examples of exercises for stretching your back. Start each exercise slowly. Ease off the exercise if you start to have pain. Your doctor or physical therapist will tell you when you can start these exercises and which ones will work best for you. How to do the exercises Overhead stretch 1. Stand comfortably with your feet shoulder-width apart. 2. Looking straight ahead, raise both arms over your head and reach toward the ceiling. Do not allow your head to tilt back. 3. Hold for 15 to 30 seconds, then lower your arms to your sides. 4. Repeat 2 to 4 times. Side stretch 1. Stand comfortably with your feet shoulder-width apart. 2. Raise one arm over your head, and then lean to the other side. 3. Slide your hand down your leg as you let the weight of your arm gently stretch your side muscles. Hold for 15 to 30 seconds. 4. Repeat 2 to 4 times on each side. Press-up 1. Lie on your stomach, supporting your body with your forearms. 2. Press your elbows down into the floor to raise your upper back. As you do this, relax your stomach muscles and allow your back to arch without using your back muscles. As your press up, do not let your hips or pelvis come off the floor. 3. Hold for 15 to 30 seconds, then relax. 4. Repeat 2 to 4 times. Relax and rest 
 
1. Lie on your back with a rolled towel under your neck and a pillow under your knees. Extend your arms comfortably to your sides. 2. Relax and breathe normally. 3. Remain in this position for about 10 minutes. 4. If you can, do this 2 or 3 times each day. Follow-up care is a key part of your treatment and safety. Be sure to make and go to all appointments, and call your doctor if you are having problems. It's also a good idea to know your test results and keep a list of the medicines you take. Where can you learn more? Go to http://christine-katlin.info/. Enter K168 in the search box to learn more about \"Back Stretches: Exercises. \" Current as of: June 26, 2019 Content Version: 12.2 © 0094-8329 [employer]. Care instructions adapted under license by [employer] (which disclaims liability or warranty for this information). If you have questions about a medical condition or this instruction, always ask your healthcare professional. [address] any warranty or liability for your use of this information. Low Back Pain: Exercises Introduction Here are some examples of exercises for you to try. The exercises may be suggested for a condition or for rehabilitation. Start each exercise slowly. Ease off the exercises if you start to have pain. You will be told when to start these exercises and which ones will work best for you. How to do the exercises Press-up 5. Lie on your stomach, supporting your body with your forearms. 6. Press your elbows down into the floor to raise your upper back. As you do this, relax your stomach muscles and allow your back to arch without using your back muscles. As your press up, do not let your hips or pelvis come off the floor. 7. Hold for 15 to 30 seconds, then relax. 8. Repeat 2 to 4 times. Alternate arm and leg (bird dog) exercise 5. Start on the floor, on your hands and knees. 6. Tighten your belly muscles. 7. Raise one leg off the floor, and hold it straight out behind you. Be careful not to let your hip drop down, because that will twist your trunk. 8. Hold for about 6 seconds, then lower your leg and switch to the other leg. 9. Repeat 8 to 12 times on each leg. 10. Over time, work up to holding for 10 to 30 seconds each time. 11. If you feel stable and secure with your leg raised, try raising the opposite arm straight out in front of you at the same time. Knee-to-chest exercise 5. Lie on your back with your knees bent and your feet flat on the floor. 6. Bring one knee to your chest, keeping the other foot flat on the floor (or keeping the other leg straight, whichever feels better on your lower back). 7. Keep your lower back pressed to the floor. Hold for at least 15 to 30 seconds. 8. Relax, and lower the knee to the starting position. 9. Repeat with the other leg. Repeat 2 to 4 times with each leg. 10. To get more stretch, put your other leg flat on the floor while pulling your knee to your chest. 
 
Curl-ups 5. Lie on the floor on your back with your knees bent at a 90-degree angle. Your feet should be flat on the floor, about 12 inches from your buttocks. 6. Cross your arms over your chest. If this bothers your neck, try putting your hands behind your neck (not your head), with your elbows spread apart. 7. Slowly tighten your belly muscles and raise your shoulder blades off the floor. 8. Keep your head in line with your body, and do not press your chin to your chest. 
9. Hold this position for 1 or 2 seconds, then slowly lower yourself back down to the floor. 10. Repeat 8 to 12 times. Pelvic tilt exercise 1. Lie on your back with your knees bent. 2. \"Brace\" your stomach. This means to tighten your muscles by pulling in and imagining your belly button moving toward your spine. You should feel like your back is pressing to the floor and your hips and pelvis are rocking back. 3. Hold for about 6 seconds while you breathe smoothly. 4. Repeat 8 to 12 times. Heel dig bridging 1. Lie on your back with both knees bent and your ankles bent so that only your heels are digging into the floor. Your knees should be bent about 90 degrees. 2. Then push your heels into the floor, squeeze your buttocks, and lift your hips off the floor until your shoulders, hips, and knees are all in a straight line.  
3. Hold for about 6 seconds as you continue to breathe normally, and then slowly lower your hips back down to the floor and rest for up to 10 seconds. 4. Do 8 to 12 repetitions. Hamstring stretch in doorway 1. Lie on your back in a doorway, with one leg through the open door. 2. Slide your leg up the wall to straighten your knee. You should feel a gentle stretch down the back of your leg. 3. Hold the stretch for at least 15 to 30 seconds. Do not arch your back, point your toes, or bend either knee. Keep one heel touching the floor and the other heel touching the wall. 4. Repeat with your other leg. 5. Do 2 to 4 times for each leg. Hip flexor stretch 1. Kneel on the floor with one knee bent and one leg behind you. Place your forward knee over your foot. Keep your other knee touching the floor. 2. Slowly push your hips forward until you feel a stretch in the upper thigh of your rear leg. 3. Hold the stretch for at least 15 to 30 seconds. Repeat with your other leg. 4. Do 2 to 4 times on each side. Wall sit 1. Stand with your back 10 to 12 inches away from a wall. 2. Lean into the wall until your back is flat against it. 3. Slowly slide down until your knees are slightly bent, pressing your lower back into the wall. 4. Hold for about 6 seconds, then slide back up the wall. 5. Repeat 8 to 12 times. Follow-up care is a key part of your treatment and safety. Be sure to make and go to all appointments, and call your doctor if you are having problems. It's also a good idea to know your test results and keep a list of the medicines you take. Where can you learn more? Go to http://christine-katlin.info/. Enter B069 in the search box to learn more about \"Low Back Pain: Exercises. \" Current as of: June 26, 2019 Content Version: 12.2 © 2381-8310 [employer]. Care instructions adapted under license by [employer] (which disclaims liability or warranty for this information).  If you have questions about a medical condition or this instruction, always ask your healthcare professional. [employer] disclaims any warranty or liability for your use of this information.

## 2019-12-20 ENCOUNTER — HOSPITAL ENCOUNTER (OUTPATIENT)
Dept: MAMMOGRAPHY | Age: 64
Discharge: HOME | End: 2019-12-20
Attending: FAMILY MEDICINE
Payer: MEDICARE

## 2019-12-20 DIAGNOSIS — Z12.39 BREAST CANCER SCREENING: ICD-10-CM

## 2019-12-20 PROCEDURE — 77063 BREAST TOMOSYNTHESIS BI: CPT

## 2019-12-30 RX ORDER — TRIAMCINOLONE ACETONIDE 0.25 MG/ML
LOTION TOPICAL
Qty: 60 ML | Refills: 0 | Status: SHIPPED | OUTPATIENT
Start: 2019-12-30 | End: 2020-02-10 | Stop reason: SDUPTHER

## 2019-12-30 NOTE — TELEPHONE ENCOUNTER
This patient contacted office for the following prescriptions to be filled:    Medication requested :  Requested Prescriptions     Pending Prescriptions Disp Refills    triamcinolone acetonide 0.025 % lotion 60 mL 0     Sig: Apply thin layer on affected area twice a day       PCP: [address] [place] [address] or Print: [facility]  Mail order or Local pharmacy [address]     Scheduled appointment if not seen by current providers in office: LOV 11/8/2019 f/u 2/10/2020

## 2020-01-13 RX ORDER — DICLOFENAC SODIUM 10 MG/G
GEL TOPICAL 4 TIMES DAILY
Qty: 1 EACH | Refills: 0 | Status: SHIPPED | OUTPATIENT
Start: 2020-01-13 | End: 2020-03-18 | Stop reason: SDUPTHER

## 2020-01-13 RX ORDER — LISINOPRIL AND HYDROCHLOROTHIAZIDE 20; 25 MG/1; MG/1
1 TABLET ORAL DAILY
Qty: 90 TAB | Refills: 1 | Status: SHIPPED | OUTPATIENT
Start: 2020-01-13 | End: 2020-06-03 | Stop reason: SDUPTHER

## 2020-02-04 ENCOUNTER — ANESTHESIA EVENT (OUTPATIENT)
Dept: ENDOSCOPY | Age: 65
End: 2020-02-04
Payer: MEDICARE

## 2020-02-05 ENCOUNTER — HOSPITAL ENCOUNTER (OUTPATIENT)
Age: 65
Setting detail: OUTPATIENT SURGERY
Discharge: HOME | End: 2020-02-05
Attending: COLON & RECTAL SURGERY | Admitting: COLON & RECTAL SURGERY
Payer: MEDICARE

## 2020-02-05 ENCOUNTER — ANESTHESIA (OUTPATIENT)
Dept: ENDOSCOPY | Age: 65
End: 2020-02-05
Payer: MEDICARE

## 2020-02-05 VITALS
HEART RATE: 63 BPM | TEMPERATURE: 98.6 F | OXYGEN SATURATION: 100 % | BODY MASS INDEX: 29.99 KG/M2 | SYSTOLIC BLOOD PRESSURE: 156 MMHG | RESPIRATION RATE: 20 BRPM | HEIGHT: 65 IN | DIASTOLIC BLOOD PRESSURE: 80 MMHG | WEIGHT: 180 LBS

## 2020-02-05 LAB
ANION GAP SERPL CALC-SCNC: 8 MMOL/L (ref 3–18)
BUN SERPL-MCNC: 9 MG/DL (ref 7–18)
BUN/CREAT SERPL: 14 (ref 12–20)
CALCIUM SERPL-MCNC: 9.6 MG/DL (ref 8.5–10.1)
CHLORIDE SERPL-SCNC: 106 MMOL/L (ref 100–111)
CO2 SERPL-SCNC: 27 MMOL/L (ref 21–32)
CREAT SERPL-MCNC: 0.66 MG/DL (ref 0.6–1.3)
GLUCOSE SERPL-MCNC: 105 MG/DL (ref 74–99)
POTASSIUM SERPL-SCNC: 3.4 MMOL/L (ref 3.5–5.5)
SODIUM SERPL-SCNC: 141 MMOL/L (ref 136–145)

## 2020-02-05 PROCEDURE — 76060000031 HC ANESTHESIA FIRST 0.5 HR: Performed by: COLON & RECTAL SURGERY

## 2020-02-05 PROCEDURE — 74011250636 HC RX REV CODE- 250/636: Performed by: NURSE ANESTHETIST, CERTIFIED REGISTERED

## 2020-02-05 PROCEDURE — 74011000250 HC RX REV CODE- 250: Performed by: ANESTHESIOLOGY

## 2020-02-05 PROCEDURE — 36415 COLL VENOUS BLD VENIPUNCTURE: CPT

## 2020-02-05 PROCEDURE — 80048 BASIC METABOLIC PNL TOTAL CA: CPT

## 2020-02-05 PROCEDURE — 76040000019: Performed by: COLON & RECTAL SURGERY

## 2020-02-05 PROCEDURE — 74011250636 HC RX REV CODE- 250/636: Performed by: ANESTHESIOLOGY

## 2020-02-05 RX ORDER — LIDOCAINE HYDROCHLORIDE 20 MG/ML
INJECTION, SOLUTION EPIDURAL; INFILTRATION; INTRACAUDAL; PERINEURAL AS NEEDED
Status: DISCONTINUED | OUTPATIENT
Start: 2020-02-05 | End: 2020-02-05 | Stop reason: HOSPADM

## 2020-02-05 RX ORDER — SODIUM CHLORIDE, SODIUM LACTATE, POTASSIUM CHLORIDE, CALCIUM CHLORIDE 600; 310; 30; 20 MG/100ML; MG/100ML; MG/100ML; MG/100ML
50 INJECTION, SOLUTION INTRAVENOUS CONTINUOUS
Status: DISCONTINUED | OUTPATIENT
Start: 2020-02-05 | End: 2020-02-05 | Stop reason: HOSPADM

## 2020-02-05 RX ORDER — PROPOFOL 10 MG/ML
INJECTION, EMULSION INTRAVENOUS AS NEEDED
Status: DISCONTINUED | OUTPATIENT
Start: 2020-02-05 | End: 2020-02-05 | Stop reason: HOSPADM

## 2020-02-05 RX ADMIN — PROPOFOL 20 MG: 10 INJECTION, EMULSION INTRAVENOUS at 10:40

## 2020-02-05 RX ADMIN — LIDOCAINE HYDROCHLORIDE 80 MG: 20 INJECTION, SOLUTION EPIDURAL; INFILTRATION; INTRACAUDAL; PERINEURAL at 10:33

## 2020-02-05 RX ADMIN — SODIUM CHLORIDE, SODIUM LACTATE, POTASSIUM CHLORIDE, AND CALCIUM CHLORIDE 50 ML/HR: 600; 310; 30; 20 INJECTION, SOLUTION INTRAVENOUS at 09:22

## 2020-02-05 RX ADMIN — PROPOFOL 30 MG: 10 INJECTION, EMULSION INTRAVENOUS at 10:34

## 2020-02-05 RX ADMIN — FAMOTIDINE 20 MG: 10 INJECTION, SOLUTION INTRAVENOUS at 09:22

## 2020-02-05 RX ADMIN — PROPOFOL 20 MG: 10 INJECTION, EMULSION INTRAVENOUS at 10:38

## 2020-02-05 RX ADMIN — PROPOFOL 50 MG: 10 INJECTION, EMULSION INTRAVENOUS at 10:35

## 2020-02-05 RX ADMIN — PROPOFOL 20 MG: 10 INJECTION, EMULSION INTRAVENOUS at 10:46

## 2020-02-05 RX ADMIN — SODIUM CHLORIDE, SODIUM LACTATE, POTASSIUM CHLORIDE, AND CALCIUM CHLORIDE: 600; 310; 30; 20 INJECTION, SOLUTION INTRAVENOUS at 10:29

## 2020-02-05 RX ADMIN — PROPOFOL 20 MG: 10 INJECTION, EMULSION INTRAVENOUS at 10:49

## 2020-02-05 RX ADMIN — PROPOFOL 20 MG: 10 INJECTION, EMULSION INTRAVENOUS at 10:43

## 2020-02-05 NOTE — ANESTHESIA PREPROCEDURE EVALUATION
Relevant Problems   No relevant active problems       Anesthetic History   No history of anesthetic complications            Review of Systems / Medical History  Patient summary reviewed, nursing notes reviewed and pertinent labs reviewed    Pulmonary  Within defined limits                 Neuro/Psych         Psychiatric history     Cardiovascular    Hypertension: well controlled              Exercise tolerance: >4 METS     GI/Hepatic/Renal  Within defined limits              Endo/Other        Arthritis     Other Findings              Physical Exam    Airway  Mallampati: II  TM Distance: 4 - 6 cm  Neck ROM: normal range of motion   Mouth opening: Normal     Cardiovascular  Regular rate and rhythm,  S1 and S2 normal,  no murmur, click, rub, or gallop  Rhythm: regular  Rate: normal         Dental    Dentition: Bridges     Pulmonary  Breath sounds clear to auscultation               Abdominal  GI exam deferred       Other Findings            Anesthetic Plan    ASA: 2  Anesthesia type: MAC          Induction: Intravenous  Anesthetic plan and risks discussed with: Patient

## 2020-02-05 NOTE — DISCHARGE INSTRUCTIONS
FOLLOW UP VISIT Appointment in: Other (Specify) Repeat colonoscopy in 10 years. Colonoscopy: What to Expect at [address] [place] [address]  After you have a colonoscopy, you will stay at the clinic for 1 to 2 hours until the medicines wear off. Then you can go home. But you will need to arrange for a ride. Your doctor will tell you when you can eat and do your other usual activities. Your doctor will talk to you about when you will need your next colonoscopy. Your doctor can help you decide how often you need to be checked. This will depend on the results of your test and your risk for colorectal cancer. After the test, you may be bloated or have gas pains. You may need to pass gas. If a biopsy was done or a polyp was removed, you may have streaks of blood in your stool (feces) for a few days. This care sheet gives you a general idea about how long it will take for you to recover. But each person recovers at a different pace. Follow the steps below to get better as quickly as possible. How can you care for yourself at home? Activity  · Rest when you feel tired. · You can do your normal activities when it feels okay to do so. Diet  · Follow your doctor's directions for eating. · Unless your doctor has told you not to, drink plenty of fluids. This helps to replace the fluids that were lost during the colon prep. · Do not drink alcohol. Medicines  · If polyps were removed or a biopsy was done during the test, your doctor may tell you not to take aspirin or other anti-inflammatory medicines for a few days. These include ibuprofen (Advil, Motrin) and naproxen (Aleve). Other instructions  · For your safety, do not drive or operate machinery until the medicine wears off and you can think clearly. Your doctor may tell you not to drive or operate machinery until the day after your test.  · Do not sign legal documents or make major decisions until the medicine wears off and you can think clearly.  The anesthesia can make it hard for you to fully understand what you are agreeing to. Follow-up care is a key part of your treatment and safety. Be sure to make and go to all appointments, and call your doctor if you are having problems. It's also a good idea to know your test results and keep a list of the medicines you take. When should you call for help? Call 911 anytime you think you may need emergency care. For example, call if:  · You passed out (lost consciousness). · You pass maroon or bloody stools. · You have severe belly pain. Call your doctor now or seek immediate medical care if:  · Your stools are black and tarlike. · Your stools have streaks of blood, but you did not have a biopsy or any polyps removed. · You have belly pain, or your belly is swollen and firm. · You vomit. · You have a fever. · You are very dizzy. Watch closely for changes in your health, and be sure to contact your doctor if you have any problems. Where can you learn more? Go to BA Insight.be  Enter E264 in the search box to learn more about \"Colonoscopy: What to Expect at Home. \"   © 7452-3394 Healthwise, Incorporated. Care instructions adapted under license by [facility] [employer] (which disclaims liability or warranty for this information). This care instruction is for use with your licensed healthcare professional. If you have questions about a medical condition or this instruction, always ask your healthcare professional. [place] [address] any warranty or liability for your use of this information. Content Version: 75.9.746329; Current as of: November 14, 2014      DISCHARGE SUMMARY from Nurse     POST-PROCEDURE INSTRUCTIONS:    Call your Physician if you:  ? Observe any excess bleeding. ? Develop a temperature over 100.5o F.  ? Experience abdominal, shoulder or chest pain. ?  Notice any signs of decreased circulation or nerve impairment to an extremity such as a change in color, persistent numbness, tingling, coldness or increase in pain. ? Vomit blood or you have nausea and vomiting lasting longer than 4 hours. ? Are unable to take medications. ? Are unable to urinate within 8 hours after discharge following general anesthesia or intravenous sedation. For the next 24 hours after receiving general anesthesia or intravenous sedation, or while taking prescription Narcotics, limit your activities:  ? Do NOT drive a motor vehicle, operate hazard machinery or power tools, or perform tasks that require coordination. The medication you received during your procedure may have some effect on your mental awareness. ? Do NOT make important personal or business decisions. The medication you received during your procedure may have some effect on your mental awareness. ? Do NOT drink alcoholic beverages. These drinks do not mix well with the medications that have been given to you. ? Upon discharge from the hospital, you must be accompanied by a responsible adult. ? Resume your diet as directed by your physician. ? Resume medications as your physician has prescribed. ? Please give a list of your current medications to your Primary Care Provider. ? Please update this list whenever your medications are discontinued, doses are changed, or new medications (including over-the-counter products) are added. ? Please carry medication information at all times in case of emergency situations. These are general instructions for a healthy lifestyle:    No smoking/ No tobacco products/ Avoid exposure to second hand smoke.  Surgeon General's Warning:  Quitting smoking now greatly reduces serious risk to your health. Obesity, smoking, and a sedentary lifestyle greatly increase your risk for illness.    A healthy diet, regular physical exercise & weight monitoring are important for maintaining a healthy lifestyle   You may be retaining fluid if you have a history of heart failure or if you experience any of the following symptoms:  Weight gain of 3 pounds or more overnight or 5 pounds in a week, increased swelling in our hands or feet or shortness of breath while lying flat in bed. Please call your doctor as soon as you notice any of these symptoms; do not wait until your next office visit. Recognize signs and symptoms of STROKE:  F  -  Face looks uneven  A  -  Arms unable to move or move unevenly  S  -  Speech slurred or non-existent  T  -  Time to call 911 - as soon as signs and symptoms begin - DO NOT go back to bed or wait to see If you get better - TIME IS BRAIN. Colorectal Screening   Colorectal cancer almost always develops from precancerous polyps (abnormal growths) in the colon or rectum. Screening tests can find precancerous polyps, so that they can be removed before they turn into cancer. Screening tests can also find colorectal cancer early, when treatment works best.  White Speak with your physician about when you should begin screening and how often you should be tested. Additional Information    If you have questions, please call [phone]. Remember, Phizzle is NOT to be used for urgent needs. For medical emergencies, dial 911. Educational references and/or instructions provided during this visit included:    See attached. APPOINTMENTS:    Please make a follow-up appointment with your physician. Discharge information has been reviewed with the patient. The patient verbalized understanding.

## 2020-02-05 NOTE — ANESTHESIA POSTPROCEDURE EVALUATION
Procedure(s):  COLONOSCOPY. MAC    Anesthesia Post Evaluation      Multimodal analgesia: multimodal analgesia used between 6 hours prior to anesthesia start to PACU discharge  Patient location during evaluation: bedside  Patient participation: complete - patient participated  Level of consciousness: awake and alert  Pain score: 0  Airway patency: patent  Anesthetic complications: no  Cardiovascular status: acceptable  Respiratory status: acceptable  Hydration status: acceptable  Post anesthesia nausea and vomiting:  none      Vitals Value Taken Time   BP 95/56 2/5/2020 10:55 AM   Temp     Pulse 77 2/5/2020 10:59 AM   Resp 14 2/5/2020 10:59 AM   SpO2 100 % 2/5/2020 10:59 AM   Vitals shown include unvalidated device data.

## 2020-02-05 NOTE — H&P
HPI: Mello Baez is a 62 y.o. female presenting with chief complain of history of polyps. Past Medical History:   Diagnosis Date    Arthritis     Bulging lumbar disc     Degenerative arthritis of finger     MP joint right index finger    Depression     H/O colonoscopy 12/2014    recommended yearly hemoccult stools    Hypertension     Low back pain     Lower extremity pain, left     Pinched nerve        Past Surgical History:   Procedure Laterality Date    HX CARPAL TUNNEL RELEASE Right     hand    HX COLONOSCOPY  2014    benign polyp    HX HYSTERECTOMY      HX ORTHOPAEDIC      heel spurs       Family History   Problem Relation Age of Onset    Cancer Mother     Ovarian Cancer Mother 68    Stroke Father        Social History     Socioeconomic History    Marital status:      Spouse name: Not on file    Number of children: Not on file    Years of education: Not on file    Highest education level: Not on file   Tobacco Use    Smoking status: Never Smoker    Smokeless tobacco: Never Used   Substance and Sexual Activity    Alcohol use: No    Drug use: No    Sexual activity: Not Currently       Review of Systems - neg    Outpatient Medications Marked as Taking for the 2/5/20 encounter [facility] Encounter)   Medication Sig Dispense Refill    lisinopril-hydroCHLOROthiazide (PRINZIDE, ZESTORETIC) 20-25 mg per tablet Take 1 Tab by mouth daily. 90 Tab 1    triamcinolone acetonide 0.025 % lotion Apply thin layer on affected area twice a day 60 mL 0    pregabalin (LYRICA) 75 mg capsule Take 1 Cap by mouth three (3) times daily. Max Daily Amount: 225 mg. 90 Cap 5    mometasone (NASONEX) 50 mcg/actuation nasal spray 2 Sprays by Both Nostrils route daily. 1 Container 11    calcium citrate-vitamin d2 250-100 mg-unit per tablet Take 1 Tab by mouth daily.  multivitamin (ONE A DAY) tablet Take 1 Tab by mouth daily.          No Known Allergies    Vitals:    02/05/20 0915   BP: 144/86 Pulse: 79   Resp: 20   Temp: 98.6 °F (37 °C)   SpO2: 100%   Weight: 81.6 kg (180 lb)   Height: 5' 5.25\" (1.657 m)       Physical Exam  Constitutional:       Appearance: She is well-developed. HENT:      Head: Normocephalic and atraumatic. Eyes:      Conjunctiva/sclera: Conjunctivae normal.   Abdominal:      General: There is no distension. Palpations: Abdomen is soft. Tenderness: There is no abdominal tenderness. Musculoskeletal: Normal range of motion. Lymphadenopathy:      Cervical: No cervical adenopathy. Skin:     General: Skin is warm and dry. Findings: No rash. Neurological:      Sensory: No sensory deficit. Psychiatric:         Speech: Speech normal.         Assessment / Plan    colonoscopy    The diagnoses and plan were discussed with the patient. All questions answered. Plan of care agreed to by all concerned.

## 2020-02-05 NOTE — PROCEDURES
[facility]  [address] [facility], [address] [facility],[address]   [place], [address]  [phone]                    Colonoscopy Procedure Note      Francis Correa  1955  [id number]                Date of Procedure: 2/5/2020    Preoperative diagnosis: Z12.11,  Colon cancer Screening, History of polyps    Postoperative diagnosis: Normal    :  Niko Haque MD    Assistant(s): Endoscopy Technician-1: Mitesh Fong  Endoscopy RN-1: Vincenzo Cabrera RN; Munira Gu RN    Sedation: MAC    Complications: None    Implants: None    Procedure Details:  Prior to the procedure, a history and physical were performed. The patients medications, allergies and sensitivities were reviewed and all questions were answered. After informed consent was obtained for the procedure, with all risks and benefits of procedure explained. The patient was taken to the endoscopy suite and placed in the left lateral decubitus position. Patient identification and proposed procedure were verified prior to the procedure by the nurse and I. After sequential anesthesia administered by anesthesiologist, a digital rectal exam was performed and was normal.  The Olympus video colonoscope was introduced through the anus and advanced to cecum, which was identified by the ileocecal valve and appendiceal orifice. The quality of preparation was good. The colonoscope was slowly withdrawn and the mucosa examined for any abnormalities. Cecal withdrawal time was greater than 6 minutes. The patient tolerated the procedure well. There were no complications. Findings/Interventions:   Polyps - none    EBL: none    Recommendations: -For colon cancer screening in this average-risk patient, colonoscopy may be repeated in 10 years. Resume normal medication(s).      Discharge Disposition:  Efe Rivera MD  2/5/2020  10:52 AM

## 2020-02-10 ENCOUNTER — OFFICE VISIT (OUTPATIENT)
Dept: FAMILY MEDICINE CLINIC | Age: 65
End: 2020-02-10

## 2020-02-10 VITALS
HEIGHT: 65 IN | WEIGHT: 180.6 LBS | TEMPERATURE: 98.5 F | BODY MASS INDEX: 30.09 KG/M2 | HEART RATE: 82 BPM | RESPIRATION RATE: 16 BRPM | DIASTOLIC BLOOD PRESSURE: 78 MMHG | OXYGEN SATURATION: 97 % | SYSTOLIC BLOOD PRESSURE: 126 MMHG

## 2020-02-10 DIAGNOSIS — L50.9 HIVES: ICD-10-CM

## 2020-02-10 DIAGNOSIS — M48.062 SPINAL STENOSIS OF LUMBAR REGION WITH NEUROGENIC CLAUDICATION: ICD-10-CM

## 2020-02-10 DIAGNOSIS — I10 ESSENTIAL HYPERTENSION: ICD-10-CM

## 2020-02-10 DIAGNOSIS — R23.2 HOT FLASHES: ICD-10-CM

## 2020-02-10 DIAGNOSIS — F33.9 RECURRENT DEPRESSION (HCC): Primary | ICD-10-CM

## 2020-02-10 DIAGNOSIS — R73.01 IFG (IMPAIRED FASTING GLUCOSE): ICD-10-CM

## 2020-02-10 DIAGNOSIS — J31.0 CHRONIC RHINITIS: ICD-10-CM

## 2020-02-10 RX ORDER — PAROXETINE HYDROCHLORIDE 20 MG/1
20 TABLET, FILM COATED ORAL DAILY
Qty: 90 TAB | Refills: 1 | Status: SHIPPED | OUTPATIENT
Start: 2020-02-10 | End: 2020-08-11 | Stop reason: SDUPTHER

## 2020-02-10 RX ORDER — TRIAMCINOLONE ACETONIDE 0.25 MG/ML
LOTION TOPICAL
Qty: 60 ML | Refills: 0 | Status: SHIPPED | OUTPATIENT
Start: 2020-02-10 | End: 2020-03-18 | Stop reason: SDUPTHER

## 2020-02-10 NOTE — PROGRESS NOTES
1. Have you been to the ER, urgent care clinic since your last visit? Hospitalized since your last visit? [facility] Dr. Wong Shen 2/05/2020    2. Have you seen or consulted any other health care providers outside of the [address] [place] [address] since your last visit? Include any pap smears or colon screening.  No    Chief Complaint   Patient presents with    Depression    Weight Management    Hypertension    Blood sugar problem    Allergic Rhinitis    Hand Pain     right hand pain follow-up    Other     spinal stenosis lumbar region

## 2020-02-10 NOTE — PATIENT INSTRUCTIONS
Prediabetes: Care Instructions  Your Care Instructions    Prediabetes is a warning sign that you are at risk for getting type 2 diabetes. It means that your blood sugar is higher than it should be. The food you eat turns into sugar, which your body uses for energy. Normally, an organ called the pancreas makes insulin, which allows the sugar in your blood to get into your body's cells. But when your body can't use insulin the right way, the sugar doesn't move into cells. It stays in your blood instead. This is called insulin resistance. The buildup of sugar in the blood causes prediabetes. The good news is that lifestyle changes may help you get your blood sugar back to normal and help you avoid or delay diabetes. Follow-up care is a key part of your treatment and safety. Be sure to make and go to all appointments, and call your doctor if you are having problems. It's also a good idea to know your test results and keep a list of the medicines you take. How can you care for yourself at home? · Watch your weight. A healthy weight helps your body use insulin properly. · Limit the amount of calories, sweets, and unhealthy fat you eat. Ask your doctor if you should see a dietitian. A registered dietitian can help you create meal plans that fit your lifestyle. · Get at least 30 minutes of exercise on most days of the week. Exercise helps control your blood sugar. It also helps you maintain a healthy weight. Walking is a good choice. You also may want to do other activities, such as running, swimming, cycling, or playing tennis or team sports. · Do not smoke. Smoking can make prediabetes worse. If you need help quitting, talk to your doctor about stop-smoking programs and medicines. These can increase your chances of quitting for good. · If your doctor prescribed medicines, take them exactly as prescribed. Call your doctor if you think you are having a problem with your medicine.  You will get more details on the specific medicines your doctor prescribes. When should you call for help? Watch closely for changes in your health, and be sure to contact your doctor if:    · You have any symptoms of diabetes. These may include:  ? Being thirsty more often. ? Urinating more. ? Being hungrier. ? Losing weight. ? Being very tired. ? Having blurry vision.     · You have a wound that will not heal.     · You have an infection that will not go away.     · You have problems with your blood pressure.     · You want more information about diabetes and how you can keep from getting it. Where can you learn more? Go to http://christine-katlin.info/. Enter I222 in the search box to learn more about \"Prediabetes: Care Instructions. \"  Current as of: April 16, 2019  Content Version: 12.2  © 3774-2695 [employer]. Care instructions adapted under license by [employer] (which disclaims liability or warranty for this information). If you have questions about a medical condition or this instruction, always ask your healthcare professional. [address] any warranty or liability for your use of this information. DASH Diet: Care Instructions  Your Care Instructions    The DASH diet is an eating plan that can help lower your blood pressure. DASH stands for Dietary Approaches to Stop Hypertension. Hypertension is high blood pressure. The DASH diet focuses on eating foods that are high in calcium, potassium, and magnesium. These nutrients can lower blood pressure. The foods that are highest in these nutrients are fruits, vegetables, low-fat dairy products, nuts, seeds, and legumes. But taking calcium, potassium, and magnesium supplements instead of eating foods that are high in those nutrients does not have the same effect. The DASH diet also includes whole grains, fish, and poultry.   The DASH diet is one of several lifestyle changes your doctor may recommend to lower your high blood pressure. Your doctor may also want you to decrease the amount of sodium in your diet. Lowering sodium while following the DASH diet can lower blood pressure even further than just the DASH diet alone. Follow-up care is a key part of your treatment and safety. Be sure to make and go to all appointments, and call your doctor if you are having problems. It's also a good idea to know your test results and keep a list of the medicines you take. How can you care for yourself at home? Following the DASH diet  · Eat 4 to 5 servings of fruit each day. A serving is 1 medium-sized piece of fruit, ½ cup chopped or canned fruit, 1/4 cup dried fruit, or 4 ounces (½ cup) of fruit juice. Choose fruit more often than fruit juice. · Eat 4 to 5 servings of vegetables each day. A serving is 1 cup of lettuce or raw leafy vegetables, ½ cup of chopped or cooked vegetables, or 4 ounces (½ cup) of vegetable juice. Choose vegetables more often than vegetable juice. · Get 2 to 3 servings of low-fat and fat-free dairy each day. A serving is 8 ounces of milk, 1 cup of yogurt, or 1 ½ ounces of cheese. · Eat 6 to 8 servings of grains each day. A serving is 1 slice of bread, 1 ounce of dry cereal, or ½ cup of cooked rice, pasta, or cooked cereal. Try to choose whole-grain products as much as possible. · Limit lean meat, poultry, and fish to 2 servings each day. A serving is 3 ounces, about the size of a deck of cards. · Eat 4 to 5 servings of nuts, seeds, and legumes (cooked dried beans, lentils, and split peas) each week. A serving is 1/3 cup of nuts, 2 tablespoons of seeds, or ½ cup of cooked beans or peas. · Limit fats and oils to 2 to 3 servings each day. A serving is 1 teaspoon of vegetable oil or 2 tablespoons of salad dressing. · Limit sweets and added sugars to 5 servings or less a week. A serving is 1 tablespoon jelly or jam, ½ cup sorbet, or 1 cup of lemonade.   · Eat less than 2,300 milligrams (mg) of sodium a day. If you limit your sodium to 1,500 mg a day, you can lower your blood pressure even more. Tips for success  · Start small. Do not try to make dramatic changes to your diet all at once. You might feel that you are missing out on your favorite foods and then be more likely to not follow the plan. Make small changes, and stick with them. Once those changes become habit, add a few more changes. · Try some of the following:  ? Make it a goal to eat a fruit or vegetable at every meal and at snacks. This will make it easy to get the recommended amount of fruits and vegetables each day. ? Try yogurt topped with fruit and nuts for a snack or healthy dessert. ? Add lettuce, tomato, cucumber, and onion to sandwiches. ? Combine a ready-made pizza crust with low-fat mozzarella cheese and lots of vegetable toppings. Try using tomatoes, squash, spinach, broccoli, carrots, cauliflower, and onions. ? Have a variety of cut-up vegetables with a low-fat dip as an appetizer instead of chips and dip. ? Sprinkle sunflower seeds or chopped almonds over salads. Or try adding chopped walnuts or almonds to cooked vegetables. ? Try some vegetarian meals using beans and peas. Add garbanzo or kidney beans to salads. Make burritos and tacos with mashed mccracken beans or black beans. Where can you learn more? Go to http://christine-katlin.info/. Enter T650 in the search box to learn more about \"DASH Diet: Care Instructions. \"  Current as of: April 9, 2019  Content Version: 12.2  © 4182-7542 [email or web address]. Care instructions adapted under license by [employer] (which disclaims liability or warranty for this information). If you have questions about a medical condition or this instruction, always ask your healthcare professional. [address] any warranty or liability for your use of this information.

## 2020-02-10 NOTE — PROGRESS NOTES
Leyla Gillis, 59 y.o.,  female    SUBJECTIVE  Ff-up    HTN- taking meds without problems. She also has prediabetes, reviewed labs with some improvement in a1c    Depression/hot flashes- says doing well for the most part, taking paxil. Chronic LBP- on lyrica, following dr. Cinthia Gitelman, reviewed recent note, no changes. Allergic rhinitis- says still symptomatic, flonase with fair response. She also gets hives on upper torso responding to topical steroids. She says antihistamines have not been effective, singulair not covered by insurance. She was supposed to see ENT Dr. Katty Aguillon for possible allergy testing, they do not take her insurance. ROS:  See HPI, all others negative        Patient Active Problem List   Diagnosis Code    Hypertension I10    Lumbar radiculopathy M54.16    Herpes genitalis in women A60.09    Allergic rhinitis due to allergen J30.9    Hot flashes R23.2    Advance directive discussed with patient Z71.89    Facet arthropathy ([facility] [address] M46.90    Lumbar stenosis M48.061    Advance care planning Z71.89    Obesity E66.9    Mild single current episode of major depressive disorder (HCC) F32.0         Current Outpatient Medications:     PARoxetine (PAXIL) 20 mg tablet, Take 1 Tab by mouth daily. , Disp: 90 Tab, Rfl: 1    triamcinolone acetonide 0.025 % lotion, Apply thin layer on affected area twice a day, Disp: 60 mL, Rfl: 0    lisinopril-hydroCHLOROthiazide (PRINZIDE, ZESTORETIC) 20-25 mg per tablet, Take 1 Tab by mouth daily. , Disp: 90 Tab, Rfl: 1    diclofenac (VOLTAREN) 1 % gel, Apply  to affected area four (4) times daily. , Disp: 1 Each, Rfl: 0    pregabalin (LYRICA) 75 mg capsule, Take 1 Cap by mouth three (3) times daily. Max Daily Amount: 225 mg., Disp: 90 Cap, Rfl: 5    mometasone (NASONEX) 50 mcg/actuation nasal spray, 2 Sprays by Both Nostrils route daily. , Disp: 1 Container, Rfl: 11    calcium citrate-vitamin d2 250-100 mg-unit per tablet, Take 1 Tab by mouth daily., Disp: , Rfl:     aspirin delayed-release 81 mg tablet, Take 81 mg by mouth daily. , Disp: , Rfl:     multivitamin (ONE A DAY) tablet, Take 1 Tab by mouth daily. , Disp: , Rfl:     No Known Allergies    Past Medical History:   Diagnosis Date    Arthritis     Bulging lumbar disc     Degenerative arthritis of finger     MP joint right index finger    Depression     H/O colonoscopy 12/2014    recommended yearly hemoccult stools    Hypertension     Low back pain     Lower extremity pain, left     Pinched nerve        Social History     Social History    Marital status:      Spouse name: N/A    Number of children: N/A    Years of education: N/A     Occupational History    Not on file.      Social History Main Topics    Smoking status: Never Smoker    Smokeless tobacco: Never Used    Alcohol use No    Drug use: No    Sexual activity: Not Currently     Other Topics Concern    Not on file     Social History Narrative       Family History   Problem Relation Age of Onset    Cancer Mother     Ovarian Cancer Mother 68    Stroke Father          OBJECTIVE    Physical Exam:     Visit Vitals  Visit Vitals  /78 (BP 1 Location: Left arm, BP Patient Position: Sitting)   Pulse 82   Temp 98.5 °F (36.9 °C) (Oral)   Resp 16   Ht 5' 5.25\" (1.657 m)   Wt 180 lb 9.6 oz (81.9 kg)   LMP  (LMP Unknown)   SpO2 97%   BMI 29.82 kg/m²         General: alert, well-appearing, AA, in no apparent distress or pain  HEENT throat and pharynx clear, eac patent, tm intac  CVS: normal rate, regular rhythm, distinct S1 and S2  Lungs:clear to ausculation bilaterally, no crackles, wheezing or rhonchi noted  Abdomen: soft, NT, ND  Extremities: R hand FROM, no tenderness  Skin: warm, no lesions, rashes noted  Psych:  mood and affect normal    Results for orders placed or performed during the hospital encounter of 77/03/28   METABOLIC PANEL, BASIC   Result Value Ref Range    Sodium 141 136 - 145 mmol/L    Potassium 3.4 (L) 3.5 - 5.5 mmol/L    Chloride 106 100 - 111 mmol/L    CO2 27 21 - 32 mmol/L    Anion gap 8 3.0 - 18 mmol/L    Glucose 105 (H) 74 - 99 mg/dL    BUN 9 7.0 - 18 MG/DL    Creatinine 0.66 0.6 - 1.3 MG/DL    BUN/Creatinine ratio 14 12 - 20      GFR est AA >60 >60 ml/min/1.73m2    GFR est non-AA >60 >60 ml/min/1.73m2    Calcium 9.6 8.5 - 10.1 MG/DL   '      ASSESSMENT/PLAN  Diagnoses and all orders for this visit:    1. Recurrent depression (HCC)  Stable, cont paxil    2. BMI 29,adult  Encouraged weight loss    3. Hot flashes  Benefiting from paxil    4. Essential hypertension  controlled  cont lisinopril/hctz  Calc cv risk 5% vs 4% for age  monitoring        Lipid panel/BMP prior to next visit    5. Impaired fasting glucose  improving  Cont with lifestyle efforts    6. Chronic rhinitis  symtpomatic  Cont with flonase, refractory to antihistamines, singulair not covered by insurance  Referral to ENT    7. Spinal stenosis of lumbar region with neurogenic claudication  On lyrica  Following Dr. David Downs      Follow-up Disposition:  Return in about 3 months, or if symptoms worsen or fail to improve. Patient understands plan of care. Patient has provided input and agrees with goals.

## 2020-03-18 NOTE — TELEPHONE ENCOUNTER
This patient contacted office for the following prescriptions to be filled:    Medication requested :   Requested Prescriptions     Pending Prescriptions Disp Refills    diclofenac (VOLTAREN) 1 % gel 1 Each 0     Sig: Apply  to affected area four (4) times daily.     triamcinolone acetonide 0.025 % lotion 60 mL 0     Sig: Apply thin layer on affected area twice a day     PCP: [address] [place] [address] or Print: [phone]  Mail order or [address]    Scheduled appointment if not seen by current providers in office: lov 2/10/2020 jaun 5/11/2020

## 2020-03-19 RX ORDER — DICLOFENAC SODIUM 10 MG/G
GEL TOPICAL 4 TIMES DAILY
Qty: 1 EACH | Refills: 0 | Status: SHIPPED | OUTPATIENT
Start: 2020-03-19 | End: 2020-06-03 | Stop reason: SDUPTHER

## 2020-03-19 RX ORDER — TRIAMCINOLONE ACETONIDE 0.25 MG/ML
LOTION TOPICAL
Qty: 60 ML | Refills: 0 | Status: SHIPPED | OUTPATIENT
Start: 2020-03-19 | End: 2021-10-13 | Stop reason: SDUPTHER

## 2020-05-05 ENCOUNTER — HOSPITAL ENCOUNTER (OUTPATIENT)
Dept: LAB | Age: 65
Discharge: HOME | End: 2020-05-05
Payer: MEDICARE

## 2020-05-05 DIAGNOSIS — I10 ESSENTIAL HYPERTENSION: ICD-10-CM

## 2020-05-05 LAB
ANION GAP SERPL CALC-SCNC: 4 MMOL/L (ref 3–18)
BUN SERPL-MCNC: 14 MG/DL (ref 7–18)
BUN/CREAT SERPL: 20 (ref 12–20)
CALCIUM SERPL-MCNC: 9.1 MG/DL (ref 8.5–10.1)
CHLORIDE SERPL-SCNC: 106 MMOL/L (ref 100–111)
CHOLEST SERPL-MCNC: 184 MG/DL
CO2 SERPL-SCNC: 32 MMOL/L (ref 21–32)
CREAT SERPL-MCNC: 0.69 MG/DL (ref 0.6–1.3)
GLUCOSE SERPL-MCNC: 94 MG/DL (ref 74–99)
HDLC SERPL-MCNC: 75 MG/DL (ref 40–60)
HDLC SERPL: 2.5 {RATIO} (ref 0–5)
LDLC SERPL CALC-MCNC: 94.2 MG/DL (ref 0–100)
LIPID PROFILE,FLP: ABNORMAL
POTASSIUM SERPL-SCNC: 4.1 MMOL/L (ref 3.5–5.5)
SODIUM SERPL-SCNC: 142 MMOL/L (ref 136–145)
TRIGL SERPL-MCNC: 74 MG/DL (ref ?–150)
VLDLC SERPL CALC-MCNC: 14.8 MG/DL

## 2020-05-05 PROCEDURE — 80048 BASIC METABOLIC PNL TOTAL CA: CPT

## 2020-05-05 PROCEDURE — 36415 COLL VENOUS BLD VENIPUNCTURE: CPT

## 2020-05-05 PROCEDURE — 80061 LIPID PANEL: CPT

## 2020-06-03 DIAGNOSIS — M54.16 LUMBAR RADICULOPATHY: ICD-10-CM

## 2020-06-03 NOTE — TELEPHONE ENCOUNTER
Guillermo Mason Requested Prescriptions     Pending Prescriptions Disp Refills    diclofenac (VOLTAREN) 1 % gel 1 Each 0     Sig: Apply  to affected area four (4) times daily.  lisinopril-hydroCHLOROthiazide (PRINZIDE, ZESTORETIC) 20-25 mg per tablet 90 Tab 1     Sig: Take 1 Tab by mouth daily.  pregabalin (Lyrica) 75 mg capsule 90 Cap 5     Sig: Take 1 Cap by mouth three (3) times daily. Max Daily Amount: 225 mg.     This patient contacted office for the following prescriptions to be filled:    PCP: Chris Jorge or Print: [phone]  Mail order or [address]     Scheduled appointment if not seen by current providers in office: lov 5/11/2020

## 2020-06-04 RX ORDER — PREGABALIN 75 MG/1
75 CAPSULE ORAL 3 TIMES DAILY
Qty: 90 CAP | Refills: 5 | OUTPATIENT
Start: 2020-06-04

## 2020-06-04 RX ORDER — DICLOFENAC SODIUM 10 MG/G
GEL TOPICAL 4 TIMES DAILY
Qty: 1 EACH | Refills: 0 | Status: SHIPPED | OUTPATIENT
Start: 2020-06-04 | End: 2021-07-13 | Stop reason: SDUPTHER

## 2020-06-04 RX ORDER — LISINOPRIL AND HYDROCHLOROTHIAZIDE 20; 25 MG/1; MG/1
1 TABLET ORAL DAILY
Qty: 90 TAB | Refills: 0 | Status: SHIPPED | OUTPATIENT
Start: 2020-06-04 | End: 2020-11-30 | Stop reason: SDUPTHER

## 2020-06-04 NOTE — TELEPHONE ENCOUNTER
Called patient. No answer. Voice mail is full.  patient will need to request her lyica from Dr. Melanie Blood

## 2020-07-09 ENCOUNTER — VIRTUAL VISIT (OUTPATIENT)
Dept: FAMILY MEDICINE CLINIC | Age: 65
End: 2020-07-09

## 2020-07-09 DIAGNOSIS — M48.062 SPINAL STENOSIS OF LUMBAR REGION WITH NEUROGENIC CLAUDICATION: ICD-10-CM

## 2020-07-09 DIAGNOSIS — R73.01 IFG (IMPAIRED FASTING GLUCOSE): ICD-10-CM

## 2020-07-09 DIAGNOSIS — R23.2 HOT FLASHES: ICD-10-CM

## 2020-07-09 DIAGNOSIS — J31.0 CHRONIC RHINITIS: ICD-10-CM

## 2020-07-09 DIAGNOSIS — F33.9 RECURRENT DEPRESSION (HCC): Primary | ICD-10-CM

## 2020-07-09 RX ORDER — MOMETASONE FUROATE 50 UG/1
SPRAY, METERED NASAL
Qty: 3 CONTAINER | Refills: 1 | Status: SHIPPED | OUTPATIENT
Start: 2020-07-09 | End: 2021-07-13

## 2020-07-09 NOTE — PROGRESS NOTES
Carmen Tran is a 60 y.o. female who was seen by synchronous (real-time) audio-video technology on 7/9/2020. Consent: Carmen Tran, who was seen by synchronous (real-time) audio-video technology, and/or her healthcare decision maker, is aware that this patient-initiated, Telehealth encounter on 7/9/2020 is a billable service, with coverage as determined by her insurance carrier. She is aware that she may receive a bill and has provided verbal consent to proceed: Yes. 712  Subjective: Carmen Tran is a 60 y.o. female who was seen for No chief complaint on file. Ff-up   no new concerns    HTN- taking meds without problems. She also has prediabetes    Depression/hot flashes- says doing well for the most part, taking paxil.       Chronic LBP- on lyrica, following dr. Keny Andrew    Allergic rhinitis-doing well, receiving allergy shots with dr. Mona Crook. Requesting refill on nasonex       Prior to Admission medications    Medication Sig Start Date End Date Taking? Authorizing Provider   mometasone (NASONEX) 50 mcg/actuation nasal spray Use 2 spray(s) in each nostril once daily 7/9/20  Yes Darius Camilo MD   diclofenac (VOLTAREN) 1 % gel Apply  to affected area four (4) times daily. 6/4/20  Yes Erin Keenan MD   lisinopril-hydroCHLOROthiazide (PRINZIDE, ZESTORETIC) 20-25 mg per tablet Take 1 Tab by mouth daily. 6/4/20  Yes Erin Keenan MD   triamcinolone acetonide 0.025 % lotion Apply thin layer on affected area twice a day 3/19/20  Yes Darius Camilo MD   PARoxetine (PAXIL) 20 mg tablet Take 1 Tab by mouth daily. 2/10/20  Yes Erin Keenan MD   pregabalin (LYRICA) 75 mg capsule Take 1 Cap by mouth three (3) times daily. Max Daily Amount: 225 mg. 12/10/19  Yes Kristine Zhang MD   calcium citrate-vitamin d2 250-100 mg-unit per tablet Take 1 Tab by mouth daily. 9/8/16  Yes Provider, Historical   aspirin delayed-release 81 mg tablet Take 81 mg by mouth daily.  11/2/15 Yes Provider, Historical   multivitamin (ONE A DAY) tablet Take 1 Tab by mouth daily. Yes Provider, Historical   mometasone (NASONEX) 50 mcg/actuation nasal spray Use 2 spray(s) in each nostril once daily 5/4/20 7/9/20  Kael Tracy MD     No Known Allergies    Patient Active Problem List    Diagnosis Date Noted    IFG (impaired fasting glucose) 02/11/2019    Mild single current episode of major depressive disorder ([facility] [address] 11/13/2017    Obesity 09/21/2017    Advance care planning 06/19/2017    Facet arthropathy 02/16/2017    Lumbar stenosis 02/16/2017    Advance directive discussed with patient 06/24/2016    Hot flashes 12/29/2015    Allergic rhinitis due to allergen 06/16/2015    Herpes genitalis in women 09/25/2014    Hypertension 05/12/2014    Recurrent depression ([facility] [address] 05/12/2014    Lumbar radiculopathy 05/12/2014     Current Outpatient Medications   Medication Sig Dispense Refill    mometasone (NASONEX) 50 mcg/actuation nasal spray Use 2 spray(s) in each nostril once daily 3 Container 1    diclofenac (VOLTAREN) 1 % gel Apply  to affected area four (4) times daily. 1 Each 0    lisinopril-hydroCHLOROthiazide (PRINZIDE, ZESTORETIC) 20-25 mg per tablet Take 1 Tab by mouth daily. 90 Tab 0    triamcinolone acetonide 0.025 % lotion Apply thin layer on affected area twice a day 60 mL 0    PARoxetine (PAXIL) 20 mg tablet Take 1 Tab by mouth daily. 90 Tab 1    pregabalin (LYRICA) 75 mg capsule Take 1 Cap by mouth three (3) times daily. Max Daily Amount: 225 mg. 90 Cap 5    calcium citrate-vitamin d2 250-100 mg-unit per tablet Take 1 Tab by mouth daily.  aspirin delayed-release 81 mg tablet Take 81 mg by mouth daily.  multivitamin (ONE A DAY) tablet Take 1 Tab by mouth daily.        No Known Allergies  Past Medical History:   Diagnosis Date    Arthritis     Bulging lumbar disc     Degenerative arthritis of finger     MP joint right index finger    Depression     H/O colonoscopy 12/2014    recommended yearly hemoccult stools    Hypertension     Low back pain     Lower extremity pain, left     Pinched nerve      Past Surgical History:   Procedure Laterality Date    COLONOSCOPY N/A 2/5/2020    COL[facility] at [facility] ENDOSCOPY    HX CARPAL TUNNEL RELEASE Right     hand    HX COLONOSCOPY  2014    benign polyp    HX HYSTERECTOMY      HX ORTHOPAEDIC      heel spurs     Family History   Problem Relation Age of Onset   [facility] Cancer Mother     Ovarian Cancer Mother 68    Stroke Father      Social History     Tobacco Use    Smoking status: Never Smoker    Smokeless tobacco: Never Used   Substance Use Topics    Alcohol use: No       ROS      Objective:     Visit Vitals  LMP  (LMP Unknown)      General: alert, cooperative, no distress   Mental  status: normal mood, behavior, speech, dress, motor activity, and thought processes, able to follow commands   HENT: NCAT   Neck: no visualized mass   Resp: no respiratory distress   Neuro: no gross deficits   Skin: no discoloration or lesions of concern on visible areas   Psychiatric: normal affect, consistent with stated mood, no evidence of hallucinations     Additional exam findings: We discussed the expected course, resolution and complications of the diagnosis(es) in detail. Medication risks, benefits, costs, interactions, and alternatives were discussed as indicated. I advised her to contact the office if her condition worsens, changes or fails to improve as anticipated. She expressed understanding with the diagnosis(es) and plan. Lex Campo is a 60 y.o. female who was evaluated by a video visit encounter for concerns as above. Patient identification was verified prior to start of the visit. A caregiver was present when appropriate.  Due to this being a TeleHealth encounter (During IYEZY-86 public health emergency), evaluation of the following organ systems was limited: Vitals/Constitutional/EENT/Resp/CV/GI//MS/Neuro/Skin/Heme-Lymph-Imm. Pursuant to the emergency declaration under the [facility], [facility] waiver authority and the [employer] Act, this Virtual  Visit was conducted, with patient's (and/or legal guardian's) consent, to reduce the patient's risk of exposure to COVID-19 and provide necessary medical care. Services were provided through a video synchronous discussion virtually to substitute for in-person clinic visit. Patient and provider were located at their individual homes. Assessment & Plan:     Recurrent depression (HCC)  Stable, cont paxil      Hot flashes  Benefiting from paxil     Essential hypertension  controlled  cont lisinopril/hctz  Calc cv risk 5% vs 4% for age  monitoring       Impaired fasting glucose  improving  Cont with lifestyle efforts   check cmp, a1c in 3 months prior to next visit    Chronic rhinitis  Improving  Receiving allergy shots dr. Panda Rondon with nasonex    Spinal stenosis of lumbar region with neurogenic claudication  On lyrica  Following Dr. Hdez How        Follow-up Disposition:  Return in about 3 months, or if symptoms worsen or fail to improve. Plan for [address] then     Patient understands plan of care.  Patient has provided input and agrees with goals.      Yana Max MD

## 2020-07-09 NOTE — PATIENT INSTRUCTIONS
DASH Diet: Care Instructions Your Care Instructions The DASH diet is an eating plan that can help lower your blood pressure. DASH stands for Dietary Approaches to Stop Hypertension. Hypertension is high blood pressure. The DASH diet focuses on eating foods that are high in calcium, potassium, and magnesium. These nutrients can lower blood pressure. The foods that are highest in these nutrients are fruits, vegetables, low-fat dairy products, nuts, seeds, and legumes. But taking calcium, potassium, and magnesium supplements instead of eating foods that are high in those nutrients does not have the same effect. The DASH diet also includes whole grains, fish, and poultry. The DASH diet is one of several lifestyle changes your doctor may recommend to lower your high blood pressure. Your doctor may also want you to decrease the amount of sodium in your diet. Lowering sodium while following the DASH diet can lower blood pressure even further than just the DASH diet alone. Follow-up care is a key part of your treatment and safety. Be sure to make and go to all appointments, and call your doctor if you are having problems. It's also a good idea to know your test results and keep a list of the medicines you take. How can you care for yourself at home? Following the DASH diet · Eat 4 to 5 servings of fruit each day. A serving is 1 medium-sized piece of fruit, ½ cup chopped or canned fruit, 1/4 cup dried fruit, or 4 ounces (½ cup) of fruit juice. Choose fruit more often than fruit juice. · Eat 4 to 5 servings of vegetables each day. A serving is 1 cup of lettuce or raw leafy vegetables, ½ cup of chopped or cooked vegetables, or 4 ounces (½ cup) of vegetable juice. Choose vegetables more often than vegetable juice. · Get 2 to 3 servings of low-fat and fat-free dairy each day. A serving is 8 ounces of milk, 1 cup of yogurt, or 1 ½ ounces of cheese. · Eat 6 to 8 servings of grains each day. A serving is 1 slice of bread, 1 ounce of dry cereal, or ½ cup of cooked rice, pasta, or cooked cereal. Try to choose whole-grain products as much as possible. · Limit lean meat, poultry, and fish to 2 servings each day. A serving is 3 ounces, about the size of a deck of cards. · Eat 4 to 5 servings of nuts, seeds, and legumes (cooked dried beans, lentils, and split peas) each week. A serving is 1/3 cup of nuts, 2 tablespoons of seeds, or ½ cup of cooked beans or peas. · Limit fats and oils to 2 to 3 servings each day. A serving is 1 teaspoon of vegetable oil or 2 tablespoons of salad dressing. · Limit sweets and added sugars to 5 servings or less a week. A serving is 1 tablespoon jelly or jam, ½ cup sorbet, or 1 cup of lemonade. · Eat less than 2,300 milligrams (mg) of sodium a day. If you limit your sodium to 1,500 mg a day, you can lower your blood pressure even more. Tips for success · Start small. Do not try to make dramatic changes to your diet all at once. You might feel that you are missing out on your favorite foods and then be more likely to not follow the plan. Make small changes, and stick with them. Once those changes become habit, add a few more changes. · Try some of the following: ? Make it a goal to eat a fruit or vegetable at every meal and at snacks. This will make it easy to get the recommended amount of fruits and vegetables each day. ? Try yogurt topped with fruit and nuts for a snack or healthy dessert. ? Add lettuce, tomato, cucumber, and onion to sandwiches. ? Combine a ready-made pizza crust with low-fat mozzarella cheese and lots of vegetable toppings. Try using tomatoes, squash, spinach, broccoli, carrots, cauliflower, and onions. ? Have a variety of cut-up vegetables with a low-fat dip as an appetizer instead of chips and dip. ? Sprinkle sunflower seeds or chopped almonds over salads.  Or try adding chopped walnuts or almonds to cooked vegetables. ? Try some vegetarian meals using beans and peas. Add garbanzo or kidney beans to salads. Make burritos and tacos with mashed mccracken beans or black beans. Where can you learn more? Go to http://christine-katlin.info/ Enter N384 in the search box to learn more about \"DASH Diet: Care Instructions. \" Current as of: December 16, 2019               Content Version: 12.5 © 2074-7318 [employer]. Care instructions adapted under license by [employer] (which disclaims liability or warranty for this information). If you have questions about a medical condition or this instruction, always ask your healthcare professional. [address] any warranty or liability for your use of this information.

## 2020-07-27 ENCOUNTER — PATIENT OUTREACH (OUTPATIENT)
Dept: CASE MANAGEMENT | Age: 65
End: 2020-07-27

## 2020-07-27 RX ORDER — CALCIUM CARB/VITAMIN D3/VIT K1 500-500-40
TABLET,CHEWABLE ORAL
COMMUNITY
Start: 2019-05-08

## 2020-07-27 RX ORDER — ASCORBIC ACID 500 MG
500 TABLET ORAL 2 TIMES DAILY WITH MEALS
COMMUNITY
Start: 2020-07-25 | End: 2022-10-10 | Stop reason: SDUPTHER

## 2020-07-27 RX ORDER — BISACODYL 5 MG
10 TABLET, DELAYED RELEASE (ENTERIC COATED) ORAL DAILY PRN
COMMUNITY
Start: 2020-07-25

## 2020-07-27 RX ORDER — BACITRACIN 500 [USP'U]/G
OINTMENT OPHTHALMIC
COMMUNITY
Start: 2019-05-08 | End: 2020-09-21

## 2020-07-27 RX ORDER — THERA TABS 400 MCG
1 TAB ORAL DAILY
COMMUNITY
End: 2020-07-27 | Stop reason: SDUPTHER

## 2020-07-27 RX ORDER — LANOLIN ALCOHOL/MO/W.PET/CERES
325 CREAM (GRAM) TOPICAL DAILY
COMMUNITY
Start: 2020-07-25 | End: 2020-07-27 | Stop reason: SDUPTHER

## 2020-07-27 RX ORDER — CHOLECALCIFEROL TAB 125 MCG (5000 UNIT) 125 MCG
5000 TAB ORAL DAILY
COMMUNITY
End: 2020-07-27 | Stop reason: SDUPTHER

## 2020-07-27 RX ORDER — GLUCOSAMINE SULFATE 1500 MG
1000 POWDER IN PACKET (EA) ORAL DAILY
COMMUNITY
Start: 2019-05-08

## 2020-07-27 NOTE — PROGRESS NOTES
Patient was admitted to [facility] on 2020 and discharged on 2020 for GI bleed. Patient was contacted within 1 business days of discharge. Top Discharge Challenges to be reviewed by the provider   Additional needs identified to be addressed with provider no  none  Discussed COVID-19 related testing which was not done at this time. Test results were not done. Patient informed of results, if available? n/a   Method of communication with provider : none       Advance Care Planning:   Does patient have an Advance Directive:  decision makers updated     Inpatient Readmission Risk score: 18%  Was this a readmission? no   Patient stated reason for the admission: blood in stool  Patients top risk factors for readmission: depression and medical condition  Interventions to address risk factors: Patient being seen by PCP    Care Transition Nurse (CTN) contacted the patient by telephone to perform post hospital discharge assessment. Verified name and  with patient as identifiers. Provided introduction to self, and explanation of the CTN role. CTN reviewed discharge instructions, medical action plan and red flags with patient who verbalized understanding. Patient given an opportunity to ask questions and does not have any further questions or concerns at this time. The patient agrees to contact the PCP office for questions related to their healthcare. Medication reconciliation was performed with patient, who verbalizes understanding of administration of home medications. Advised obtaining a 90-day supply of all daily and as-needed medications.    Referral to Pharm D needed: no     Home Health/Outpatient orders at discharge: [address]: n/a  Date of initial visit: [address] [facility] ordered at discharge: shower chair  [address]: JETHRO/Jace Heaton received: not at time of call    Covid Risk Education    Patient has following risk factors of: HTN, depression. Education provided regarding infection prevention, and signs and symptoms of COVID-19 and when to seek medical attention with patient who verbalized understanding. Discussed exposure protocols and quarantine From CDC: Are you at higher risk for severe illness?  and given an opportunity for questions and concerns. The patient agrees to contact the COVID-19 hotline [phone] or PCP office for questions related to COVID-19. For more information on steps you can take to protect yourself, see CDC's How to Protect Yourself     Patient/family/caregiver given information for Sonido Benson and agrees to enroll yes  Patient's preferred e-mail: [email or web address]  Patient's preferred phone number: [phone]    Discussed follow-up appointments. If no appointment was previously scheduled, appointment scheduling offered: yes  [facility] follow up appointment(s): No future appointments. Non-[facility] follow up appointment(s): n/a  Plan for follow-up call in 7-10 days based on severity of symptoms and risk factors. CTN provided contact information for future needs. Goals Addressed                 This Visit's Progress     Prevent complications post hospitalization. 1. CTN will monitor X 4 weeks    2. Ensure provider appt is scheduled within 7 days post-discharge    3. Confirm patient attended post-discharge provider apt 7/27/2020 Patient attempting to get appt with family doctor. Will place name on list for Vladislav Carvajal to assist.     4. Complete post-visit call to confirm attendance and update care needs      5. Review/educate common or potential \"red flags\" of condition worsening 7/27/2020 Reviewed signs/symptoms of GI bleed with patient. She verbalized understanding of what to look for. 6. Evaluate adherence to medications and priority barriers to resolve   7/27/2020 Patient stated that she has all medications and is taking them as prescribed.      7. Instruct on adherence to medications as ordered and assess for therapeutic response and side-effects     7/27/2020 Briefly reviewed side effects of medications. Patient verbalized understanding. 8. Discuss and evaluate ADL performance. Provide recommendations on energy conservation, particularly related to transition home from an inpatient admission. 7/27/2020 Patient stated that she is up and moving around. No difficulties at present.

## 2020-07-27 NOTE — PROGRESS NOTES
Patient is currently enrolled in a remote symptom monitoring program.  Start day 7.28.2020;  End Date 8.12.2020

## 2020-07-30 ENCOUNTER — VIRTUAL VISIT (OUTPATIENT)
Dept: FAMILY MEDICINE CLINIC | Age: 65
End: 2020-07-30

## 2020-07-30 DIAGNOSIS — R53.83 OTHER FATIGUE: ICD-10-CM

## 2020-07-30 DIAGNOSIS — K92.1 GASTROINTESTINAL HEMORRHAGE WITH MELENA: Primary | ICD-10-CM

## 2020-07-30 DIAGNOSIS — D62 ACUTE BLOOD LOSS ANEMIA: ICD-10-CM

## 2020-07-30 DIAGNOSIS — I10 ESSENTIAL HYPERTENSION: ICD-10-CM

## 2020-07-30 DIAGNOSIS — F33.9 RECURRENT DEPRESSION (HCC): ICD-10-CM

## 2020-07-30 RX ORDER — LANOLIN ALCOHOL/MO/W.PET/CERES
325 CREAM (GRAM) TOPICAL
COMMUNITY
End: 2021-07-13

## 2020-07-30 NOTE — PATIENT INSTRUCTIONS
Iron Deficiency Anemia: Care Instructions Your Care Instructions Anemia means that you don't have enough red blood cells. Red blood cells carry oxygen around your body. When you have anemia, it can make you pale, weak, and tired. Many things can cause anemia. The most common cause is loss of blood. This can happen if you have heavy menstrual periods. It can also happen if you have bleeding in your stomach or bowel. You can also get anemia if you don't have enough iron in your diet or if it's hard for your body to absorb iron. In some cases, pregnancy causes anemia. That's because a pregnant woman needs more iron. Your doctor may do more tests to find the cause of your anemia. If a disease or other health problem is causing it, your doctor will treat that problem. It's important to follow up with your doctor to make sure that your iron level returns to normal. 
Follow-up care is a key part of your treatment and safety. Be sure to make and go to all appointments, and call your doctor if you are having problems. It's also a good idea to know your test results and keep a list of the medicines you take. How can you care for yourself at home? · If your doctor recommended iron pills, take them as directed. ? Try to take the pills on an empty stomach. You can do this about 1 hour before or 2 hours after meals. But you may need to take iron with food to avoid an upset stomach. ? Do not take antacids or drink milk or anything with caffeine within 2 hours of when you take your iron. They can keep your body from absorbing the iron well. ? Vitamin C helps your body absorb iron. You may want to take iron pills with a glass of orange juice or some other food high in vitamin C. 
? Iron pills may cause stomach problems. These include heartburn, nausea, diarrhea, constipation, and cramps. It can help to drink plenty of fluids and include fruits, vegetables, and fiber in your diet. ? It's normal for iron pills to make your stool a greenish or grayish black. But internal bleeding can also cause dark stool. So it's important to tell your doctor about any color changes. ? Call your doctor if you think you are having a problem with your iron pills. Even after you start to feel better, it will take several months for your body to build up its supply of iron. ? If you miss a pill, don't take a double dose. ? Keep iron pills out of the reach of small children. Too much iron can be very dangerous. · Eat foods with a lot of iron. These include red meat, shellfish, poultry, and eggs. They also include beans, raisins, whole-grain bread, and leafy green vegetables. · Steam your vegetables. This is the best way to prepare them if you want to get as much iron as possible. · Be safe with medicines. Do not take nonsteroidal anti-inflammatory pain relievers unless your doctor tells you to. These include aspirin, naproxen (Aleve), and ibuprofen (Advil, Motrin). · Liquid iron can stain your teeth. But you can mix it with water or juice and drink it with a straw. Then it won't get on your teeth. When should you call for help? [id number] anytime you think you may need emergency care. For example, call if: 
· You passed out (lost consciousness). Call your doctor now or seek immediate medical care if: 
· You are short of breath. · You are dizzy or light-headed, or you feel like you may faint. · You have new or worse bleeding. Watch closely for changes in your health, and be sure to contact your doctor if: 
· You feel weaker or more tired than usual. 
· You do not get better as expected. Where can you learn more? Go to http://www.gray.com/ Enter Y178 in the search box to learn more about \"Iron Deficiency Anemia: Care Instructions. \" Current as of: November 8, 2019               Content Version: 12.5 © 7002-3251 [employer]. Care instructions adapted under license by [employer] (which disclaims liability or warranty for this information). If you have questions about a medical condition or this instruction, always ask your healthcare professional. [address] any warranty or liability for your use of this information.

## 2020-07-30 NOTE — PROGRESS NOTES
Radhames  is a 60 y.o. female who was seen by synchronous (real-time) audio-video technology on 7/30/2020. Consent: Radhames Isidro, who was seen by synchronous (real-time) audio-video technology, and/or her healthcare decision maker, is aware that this patient-initiated, Telehealth encounter on 7/30/2020 is a billable service, with coverage as determined by her insurance carrier. She is aware that she may receive a bill and has provided verbal consent to proceed: Yes. 712  Subjective: Radhames  is a 60 y.o. female who was seen for No chief complaint on file. Ff-up admission daniel gisella acute blood loss anemia    Date of admission 7/21-7/25  Date of communication with NN 7/27/2020    Presented to ED with fatigue and dark stools 7/21. No abdominal pain, changes in BM. Says routine CRCS 2/2020 was normal.  hgb droppsed 13>5.7, received 5 units pRBC and H/H trended discharge hgb 8.1. Bleeding thought to be due to diverticular bleed  CT noted possilbe R AVN, she denies any hip pain currently. CtAngio showing no active bleeding and discharged home with close GI follow up with Dr. Lucia Stern 8/4. HTN- BP meds were held temporarily held during admission, now prinzide resumed. BP this week 130/80's. She has home health nurse visits (daniel [phone])    She is feeling better, no longer with dark stools. Energy level improving. Daughter assisting her and requesting FMLA form off 7/27-8/9. She has been taking ASA for years for prophylaxis, no increase in NSAID use. Depression/hot flashes- doing fairly well, on paxil    Prior to Admission medications    Medication Sig Start Date End Date Taking? Authorizing Provider   ferrous sulfate 325 mg (65 mg iron) tablet Take 325 mg by mouth Daily (before breakfast). Yes Provider, Historical   ascorbic acid, vitamin C, (VITAMIN C) 500 mg tablet Take 500 mg by mouth two (2) times daily (with meals).  7/25/20  Yes Provider, Historical bacitracin ophthalmic ointment BID 5/8/19  Yes Provider, Historical   bisacodyL (DULCOLAX) 5 mg EC tablet Take 10 mg by mouth daily as needed. 7/25/20  Yes Provider, Historical   cholecalciferol (VITAMIN D3) 25 mcg (1,000 unit) cap  5/8/19  Yes Provider, Historical   multivit-min-ferrous fumarate (Multi Vitamin) 9 mg iron/15 mL liqd  5/8/19  Yes Provider, Historical   mometasone (NASONEX) 50 mcg/actuation nasal spray Use 2 spray(s) in each nostril once daily 7/9/20  Yes Maria Esther Camilo MD   diclofenac (VOLTAREN) 1 % gel Apply  to affected area four (4) times daily. 6/4/20  Yes Yesi Cisneros MD   lisinopril-hydroCHLOROthiazide (PRINZIDE, ZESTORETIC) 20-25 mg per tablet Take 1 Tab by mouth daily. 6/4/20  Yes Yesi Cisneros MD   triamcinolone acetonide 0.025 % lotion Apply thin layer on affected area twice a day 3/19/20  Yes Maria Esther Camilo MD   PARoxetine (PAXIL) 20 mg tablet Take 1 Tab by mouth daily. 2/10/20  Yes Yesi Cisneros MD   pregabalin (LYRICA) 75 mg capsule Take 1 Cap by mouth three (3) times daily. Max Daily Amount: 225 mg. 12/10/19  Yes William Lira MD   aspirin delayed-release 81 mg tablet Take 81 mg by mouth daily.  11/2/15 7/30/20  Provider, Historical     No Known Allergies    Patient Active Problem List    Diagnosis Date Noted    IFG (impaired fasting glucose) 02/11/2019    Mild single current episode of major depressive disorder ([facility] [address] 11/13/2017    Obesity 09/21/2017    Advance care planning 06/19/2017    Facet arthropathy 02/16/2017    Lumbar stenosis 02/16/2017    Advance directive discussed with patient 06/24/2016    Hot flashes 12/29/2015    Allergic rhinitis due to allergen 06/16/2015    Herpes genitalis in women 09/25/2014    Hypertension 05/12/2014    Recurrent depression ([facility] [address] 05/12/2014    Lumbar radiculopathy 05/12/2014     Current Outpatient Medications   Medication Sig Dispense Refill    ferrous sulfate 325 mg (65 mg iron) tablet Take 325 mg by mouth Daily (before breakfast).  ascorbic acid, vitamin C, (VITAMIN C) 500 mg tablet Take 500 mg by mouth two (2) times daily (with meals).  bacitracin ophthalmic ointment BID      bisacodyL (DULCOLAX) 5 mg EC tablet Take 10 mg by mouth daily as needed.  cholecalciferol (VITAMIN D3) 25 mcg (1,000 unit) cap       multivit-min-ferrous fumarate (Multi Vitamin) 9 mg iron/15 mL liqd       mometasone (NASONEX) 50 mcg/actuation nasal spray Use 2 spray(s) in each nostril once daily 3 Container 1    diclofenac (VOLTAREN) 1 % gel Apply  to affected area four (4) times daily. 1 Each 0    lisinopril-hydroCHLOROthiazide (PRINZIDE, ZESTORETIC) 20-25 mg per tablet Take 1 Tab by mouth daily. 90 Tab 0    triamcinolone acetonide 0.025 % lotion Apply thin layer on affected area twice a day 60 mL 0    PARoxetine (PAXIL) 20 mg tablet Take 1 Tab by mouth daily. 90 Tab 1    pregabalin (LYRICA) 75 mg capsule Take 1 Cap by mouth three (3) times daily.  Max Daily Amount: 225 mg. 90 Cap 5     No Known Allergies  Past Medical History:   Diagnosis Date    Arthritis     Bulging lumbar disc     Degenerative arthritis of finger     MP joint right index finger    Depression     H/O colonoscopy 12/2014    recommended yearly hemoccult stools    Hypertension     Low back pain     Lower extremity pain, left     Pinched nerve      Past Surgical History:   Procedure Laterality Date    COLONOSCOPY N/A 2/5/2020    [facility]    HX CARPAL TUNNEL RELEASE Right     hand    HX COLONOSCOPY  2014    benign polyp    HX HYSTERECTOMY      HX ORTHOPAEDIC      heel spurs     Family History   Problem Relation Age of Onset    Cancer Mother     Ovarian Cancer Mother 68    Stroke Father      Social History     Tobacco Use    Smoking status: Never Smoker    Smokeless tobacco: Never Used   Substance Use Topics    Alcohol use: No       ROS      Objective:     Visit Vitals  LMP (LMP Unknown)      General: alert, cooperative, no distress   Mental  status: normal mood, behavior, speech, dress, motor activity, and thought processes, able to follow commands   HENT: NCAT   Neck: no visualized mass   Resp: no respiratory distress   Neuro: no gross deficits   Skin: no discoloration or lesions of concern on visible areas   Psychiatric: normal affect, consistent with stated mood, no evidence of hallucinations     Additional exam findings: We discussed the expected course, resolution and complications of the diagnosis(es) in detail. Medication risks, benefits, costs, interactions, and alternatives were discussed as indicated. I advised her to contact the office if her condition worsens, changes or fails to improve as anticipated. She expressed understanding with the diagnosis(es) and plan. Radhames  is a 60 y.o. female who was evaluated by a video visit encounter for concerns as above. Patient identification was verified prior to start of the visit. A caregiver was present when appropriate. Due to this being a TeleHealth encounter (During JUSYD-23 public health emergency), evaluation of the following organ systems was limited: Vitals/Constitutional/EENT/Resp/CV/GI//MS/Neuro/Skin/Heme-Lymph-Imm. Pursuant to the emergency declaration under the [facility] waiver authority and the [employer] and [employer] General Act, this Virtual  Visit was conducted, with patient's (and/or legal guardian's) consent, to reduce the patient's risk of exposure to COVID-19 and provide necessary medical care. Services were provided through a video synchronous discussion virtually to substitute for in-person clinic visit. Patient and provider were located at their individual homes. Assessment & Plan:   Diagnoses and all orders for this visit:    1.  Gastrointestinal hemorrhage with melena  Thought to be due to diverticular bleed  She has been on asa for years and would consider possible EGD, has upcoming appt with Dr. Belkis Barnhart next week, will send my note  Cont po fe + ascorbic acid  Will have home health nurse check CBC, to fax order along with shower chair  I will complete FMLA form for daughter  -     CBC WITH AUTOMATED DIFF; Future    2. Recurrent depression (HCC)  Stable  Cont paxil    3. Essential hypertension  Now normotensive  Cont prinzide    4. Other fatigue  improving  Offered home PT/OT pt declines  Cont supportive care  5.  Acute blood loss anemia        Ff-up VV in 2 weeks    Everardo Rhodes MD

## 2020-08-03 ENCOUNTER — PATIENT OUTREACH (OUTPATIENT)
Dept: CASE MANAGEMENT | Age: 65
End: 2020-08-03

## 2020-08-03 NOTE — PROGRESS NOTES
Transitions of Care Coordination  Follow-Up    Date/Time:  8/3/2020 1:38 PM     CTN (Care Transitions Nurse) contacted patient for Transitions of Care Coordination  follow up. Spoke to patient. Introduced self/role and reason for call. Patient reported:   Reported that energy level is improving. Is having some issues with depression from time to time. Doing well on prescribed medications. Pertinent negatives:  Continues to recover. Specialist appointments since last outreach? yes  If so, specialist and date: PCP virtual visit 7/30/2020    Medications:   New medications since last outreach: no  Does patient need refills on any medications: no  Medication changes since last outreach (dose adjustments or discontinued meds): no    Home Health company: none  Date of discharge: n/a    Barriers to care? medical condition, support system    Discussed exposure protocols and quarantine from [address] you at higher risk for severe illness 2019 and given an opportunity for questions and concerns. From [facility]: Are you at higher risk for severe illness?  Wash your hands often.  Avoid close contact (6 feet, which is about two arm lengths) with people who are sick.  Put distance between yourself and other people if COVID-19 is spreading in your community.  Clean and disinfect frequently touched surfaces.  Avoid all cruise travel and non-essential air travel.  Call your healthcare professional if you have concerns about COVID-19 and your underlying condition or if you are sick. Patient reminded that there are physicians on call 24 hours a day / 7 days a week (M-F 5pm to 8am and from Friday 5pm until Monday 8a for the weekend) should the patient have questions or concerns. No future appointments. Other upcoming appointments:  n/a    Goals      Prevent complications post hospitalization. 1. CTN will monitor X 4 weeks    2.  Ensure provider appt is scheduled within 7 days post-discharge    3. Confirm patient attended post-discharge provider apt 7/27/2020 Patient attempting to get appt with family doctor. Will place name on list for Yandel Mari to assist.     4. Complete post-visit call to confirm attendance and update care needs  8/3/2020 Patient had virtual appointment with PCP    5. Review/educate common or potential \"red flags\" of condition worsening 7/27/2020 Reviewed signs/symptoms of GI bleed with patient. She verbalized understanding of what to look for. 6. Evaluate adherence to medications and priority barriers to resolve   7/27/2020 Patient stated that she has all medications and is taking them as prescribed. 8/3/2020 Patient stated that she has all medications and takes them as prescribed. 7. Instruct on adherence to medications as ordered and assess for therapeutic response and side-effects     7/27/2020 Briefly reviewed side effects of medications. Patient verbalized understanding. 8/3/2020 No questions regarding medications. 8. Discuss and evaluate ADL performance. Provide recommendations on energy conservation, particularly related to transition home from an inpatient admission. 7/27/2020 Patient stated that she is up and moving around. No difficulties at present. 8/3/2020 Patient continues to move about ad alhaji. No difficulties at present.

## 2020-08-10 ENCOUNTER — PATIENT OUTREACH (OUTPATIENT)
Dept: CASE MANAGEMENT | Age: 65
End: 2020-08-10

## 2020-08-10 NOTE — PROGRESS NOTES
Transitions of Care Coordination  Follow-Up    Date/Time:  8/10/2020 1:43 PM     CTN (Care Transitions Nurse) contacted patient for Transitions of Care Coordination  follow up. Spoke to patient. Introduced self/role and reason for call. Patient reported:   Patient stated that she is feeling good. She is getting her strength back. She stated that her daughter went back to work but she made sure that the patient had all that she needed for the day. Pertinent negatives:  No needs or complaints    Specialist appointments since last outreach? no  If so, specialist and date: n/a    Medications:   New medications since last outreach: no  Does patient need refills on any medications: n/a  Medication changes since last outreach (dose adjustments or discontinued meds): no    Home Health company: n/a  Date of discharge: n/a    Barriers to care? medical condition    Discussed exposure protocols and quarantine from [address] you at higher risk for severe illness 2019 and given an opportunity for questions and concerns. From [facility]: Are you at higher risk for severe illness?  Wash your hands often.  Avoid close contact (6 feet, which is about two arm lengths) with people who are sick.  Put distance between yourself and other people if COVID-19 is spreading in your community.  Clean and disinfect frequently touched surfaces.  Avoid all cruise travel and non-essential air travel.  Call your healthcare professional if you have concerns about COVID-19 and your underlying condition or if you are sick. Patient reminded that there are physicians on call 24 hours a day / 7 days a week (M-F 5pm to 8am and from Friday 5pm until Monday 8a for the weekend) should the patient have questions or concerns. No future appointments. Other upcoming appointments:  n/a    Goals      Prevent complications post hospitalization. 1. CTN will monitor X 4 weeks    2.  Ensure provider appt is scheduled within 7 days post-discharge    3. Confirm patient attended post-discharge provider apt 7/27/2020 Patient attempting to get appt with family doctor. Will place name on list for Kelly Duran to assist.     4. Complete post-visit call to confirm attendance and update care needs  8/3/2020 Patient had virtual appointment with PCP    5. Review/educate common or potential \"red flags\" of condition worsening 7/27/2020 Reviewed signs/symptoms of GI bleed with patient. She verbalized understanding of what to look for. 6. Evaluate adherence to medications and priority barriers to resolve   7/27/2020 Patient stated that she has all medications and is taking them as prescribed. 8/3/2020 Patient stated that she has all medications and takes them as prescribed. 8/10/2020 Patient stated that her daughter set the medications up for her before returning to work today. She stated that she has all of them. 7. Instruct on adherence to medications as ordered and assess for therapeutic response and side-effects     7/27/2020 Briefly reviewed side effects of medications. Patient verbalized understanding. 8/3/2020 No questions regarding medications. 8. Discuss and evaluate ADL performance. Provide recommendations on energy conservation, particularly related to transition home from an inpatient admission. 7/27/2020 Patient stated that she is up and moving around. No difficulties at present. 8/3/2020 Patient continues to move about ad alhaji. No difficulties at present. 8/10/2020 Patient stated that she is getting her strength back. She is feeling good. Able to do what she wants without difficulty.

## 2020-08-11 NOTE — TELEPHONE ENCOUNTER
This patient contacted office for the following prescriptions to be filled:    Last office visit: 7/30/2020  Follow up appointment:n/a  Medication requested :   Requested Prescriptions     Pending Prescriptions Disp Refills    PARoxetine (PAXIL) 20 mg tablet 90 Tab 1     Sig: Take 1 Tab by mouth daily.      PCP: gladys  Mail order or Local pharmacy name [facility] [phone]

## 2020-08-13 ENCOUNTER — TELEPHONE (OUTPATIENT)
Dept: FAMILY MEDICINE CLINIC | Age: 65
End: 2020-08-13

## 2020-08-13 RX ORDER — PAROXETINE HYDROCHLORIDE 20 MG/1
20 TABLET, FILM COATED ORAL DAILY
Qty: 90 TAB | Refills: 1 | Status: SHIPPED | OUTPATIENT
Start: 2020-08-13 | End: 2021-03-09 | Stop reason: SDUPTHER

## 2020-08-14 NOTE — TELEPHONE ENCOUNTER
Patient identified with 2 identifiers (name and ). Spoke with patient and advised okay to return to normal activity if Dr. Leighann Fritz says okay. Patient states Dr. Leighann Fritz has given her the okay as well.

## 2020-08-17 ENCOUNTER — PATIENT OUTREACH (OUTPATIENT)
Dept: CASE MANAGEMENT | Age: 65
End: 2020-08-17

## 2020-08-17 NOTE — PROGRESS NOTES
8/17/2020 2:06 PM    Attempted to call patient for routine follow up. She was unavailable at the time of the call. Left a voicemail message for her to return my call. CTN contact information given at time of call. Awaiting her return call. Will follow.

## 2020-08-17 NOTE — PROGRESS NOTES
Transitions of Care Coordination  Follow-Up    Date/Time:  8/17/2020 4:28 PM     CTN (Care Transitions Nurse) contacted patient for Transitions of Care Coordination  follow up. Spoke to patient. Introduced self/role and reason for call. Patient reported:   Patient stated that she is doing well. She has no complaints She was released from physician and is able to get back to her daily activities as tolerated. Pertinent negatives:  none    Specialist appointments since last outreach? no  If so, specialist and date: n/a    Medications:   New medications since last outreach: no  Does patient need refills on any medications: no  Medication changes since last outreach (dose adjustments or discontinued meds): no    Home Health company: none  Date of discharge: n/a    Barriers to care? medical condition, support system    Discussed exposure protocols and quarantine from [address] you at higher risk for severe illness 2019 and given an opportunity for questions and concerns. From [facility]: Are you at higher risk for severe illness?  Wash your hands often.  Avoid close contact (6 feet, which is about two arm lengths) with people who are sick.  Put distance between yourself and other people if COVID-19 is spreading in your community.  Clean and disinfect frequently touched surfaces.  Avoid all cruise travel and non-essential air travel.  Call your healthcare professional if you have concerns about COVID-19 and your underlying condition or if you are sick. Patient reminded that there are physicians on call 24 hours a day / 7 days a week (M-F 5pm to 8am and from Friday 5pm until Monday 8a for the weekend) should the patient have questions or concerns. No future appointments. Other upcoming appointments:N/a    Goals      Prevent complications post hospitalization. 1. CTN will monitor X 4 weeks    2. Ensure provider appt is scheduled within 7 days post-discharge    3.  Confirm patient attended post-discharge provider apt 7/27/2020 Patient attempting to get appt with family doctor. Will place name on list for Benny Goodman to assist.     4. Complete post-visit call to confirm attendance and update care needs  8/3/2020 Patient had virtual appointment with PCP    5. Review/educate common or potential \"red flags\" of condition worsening 7/27/2020 Reviewed signs/symptoms of GI bleed with patient. She verbalized understanding of what to look for. 6. Evaluate adherence to medications and priority barriers to resolve   7/27/2020 Patient stated that she has all medications and is taking them as prescribed. 8/3/2020 Patient stated that she has all medications and takes them as prescribed. 8/10/2020 Patient stated that her daughter set the medications up for her before returning to work today. She stated that she has all of them. 8/17/2020 Patient still has medications and is taking them as prescribed. 7. Instruct on adherence to medications as ordered and assess for therapeutic response and side-effects     7/27/2020 Briefly reviewed side effects of medications. Patient verbalized understanding. 8/3/2020 No questions regarding medications. 8/17/2020 Patient stated that she has no questions about her medications. 8. Discuss and evaluate ADL performance. Provide recommendations on energy conservation, particularly related to transition home from an inpatient admission. 7/27/2020 Patient stated that she is up and moving around. No difficulties at present. 8/3/2020 Patient continues to move about ad alhaji. No difficulties at present. 8/10/2020 Patient stated that she is getting her strength back. She is feeling good. Able to do what she wants without difficulty. 8/17/2020 Patient stated that she feels good today. She is able to complete all tasks she wants without difficulty.

## 2020-08-24 ENCOUNTER — PATIENT OUTREACH (OUTPATIENT)
Dept: CASE MANAGEMENT | Age: 65
End: 2020-08-24

## 2020-08-24 NOTE — PROGRESS NOTES
Patient has graduated from the Transitions of Care Coordination  program on 8/24/2020. Patient's symptoms are stable at this time. Patient/family has the ability to self-manage. Care management goals have been completed at this time. No further care transitions nurse follow up scheduled. Goals Addressed                 This Visit's Progress     COMPLETED: Prevent complications post hospitalization. 1. CTN will monitor X 4 weeks    2. Ensure provider appt is scheduled within 7 days post-discharge    3. Confirm patient attended post-discharge provider apt 7/27/2020 Patient attempting to get appt with family doctor. Will place name on list for David Arias to assist.     4. Complete post-visit call to confirm attendance and update care needs  8/3/2020 Patient had virtual appointment with PCP    5. Review/educate common or potential \"red flags\" of condition worsening 7/27/2020 Reviewed signs/symptoms of GI bleed with patient. She verbalized understanding of what to look for. 8/24/2020 Patient has no questions when she should seek medical attention. 6. Evaluate adherence to medications and priority barriers to resolve   7/27/2020 Patient stated that she has all medications and is taking them as prescribed. 8/3/2020 Patient stated that she has all medications and takes them as prescribed. 8/10/2020 Patient stated that her daughter set the medications up for her before returning to work today. She stated that she has all of them. 8/17/2020 Patient still has medications and is taking them as prescribed. 8/24/2020 Patient stated that she is still taking medications as directed. 7. Instruct on adherence to medications as ordered and assess for therapeutic response and side-effects     7/27/2020 Briefly reviewed side effects of medications. Patient verbalized understanding. 8/3/2020 No questions regarding medications. 8/17/2020 Patient stated that she has no questions about her medications.  8/24/2020 No questions about her medicaitions    8. Discuss and evaluate ADL performance. Provide recommendations on energy conservation, particularly related to transition home from an inpatient admission. 7/27/2020 Patient stated that she is up and moving around. No difficulties at present. 8/3/2020 Patient continues to move about ad alhaji. No difficulties at present. 8/10/2020 Patient stated that she is getting her strength back. She is feeling good. Able to do what she wants without difficulty. 8/17/2020 Patient stated that she feels good today. She is able to complete all tasks she wants without difficulty. 8/24/2020  Patient stated that she is doing well. She is able to do what she wants for most part. She has no problems completing ADL's. Patient has care transitions nurse contact information for any further questions, concerns, or needs. Patient's upcoming visits:  No future appointments.

## 2020-09-21 ENCOUNTER — VIRTUAL VISIT (OUTPATIENT)
Dept: FAMILY MEDICINE CLINIC | Age: 65
End: 2020-09-21

## 2020-09-21 DIAGNOSIS — I10 ESSENTIAL HYPERTENSION: ICD-10-CM

## 2020-09-21 DIAGNOSIS — R73.01 IFG (IMPAIRED FASTING GLUCOSE): ICD-10-CM

## 2020-09-21 DIAGNOSIS — R23.2 HOT FLASHES: ICD-10-CM

## 2020-09-21 DIAGNOSIS — Z00.00 MEDICARE ANNUAL WELLNESS VISIT, SUBSEQUENT: Primary | ICD-10-CM

## 2020-09-21 DIAGNOSIS — J31.0 CHRONIC RHINITIS: ICD-10-CM

## 2020-09-21 DIAGNOSIS — F33.9 RECURRENT DEPRESSION (HCC): ICD-10-CM

## 2020-09-21 DIAGNOSIS — K92.2 ACUTE GI BLEEDING: ICD-10-CM

## 2020-09-21 DIAGNOSIS — Z71.89 ADVANCE CARE PLANNING: ICD-10-CM

## 2020-09-21 DIAGNOSIS — M48.062 SPINAL STENOSIS OF LUMBAR REGION WITH NEUROGENIC CLAUDICATION: ICD-10-CM

## 2020-09-21 NOTE — PATIENT INSTRUCTIONS
Medicare Wellness Visit, Female The best way to live healthy is to have a lifestyle where you eat a well-balanced diet, exercise regularly, limit alcohol use, and quit all forms of tobacco/nicotine, if applicable. Regular preventive services are another way to keep healthy. Preventive services (vaccines, screening tests, monitoring & exams) can help personalize your care plan, which helps you manage your own care. Screening tests can find health problems at the earliest stages, when they are easiest to treat. [facility] follows the current, evidence-based guidelines published by the [facility] ([facility]) when recommending preventive services for our patients. Because we follow these guidelines, sometimes recommendations change over time as research supports it. (For example, mammograms used to be recommended annually. Even though Medicare will still pay for an annual mammogram, the newer guidelines recommend a mammogram every two years for women of average risk). Of course, you and your doctor may decide to screen more often for some diseases, based on your risk and your co-morbidities (chronic disease you are already diagnosed with). Preventive services for you include: - Medicare offers their members a free annual wellness visit, which is time for you and your primary care provider to discuss and plan for your preventive service needs. Take advantage of this benefit every year! 
-All adults over the age of 72 should receive the recommended pneumonia vaccines. Current USPSTF guidelines recommend a series of two vaccines for the best pneumonia protection.  
-All adults should have a flu vaccine yearly and a tetanus vaccine every 10 years.  
-All adults age 48 and older should receive the shingles vaccines (series of two vaccines). -All adults age 38-68 who are overweight should have a diabetes screening test once every three years. -All adults born between [birth year] and 1964 should be screened once for Hepatitis C. 
-Other screening tests and preventive services for persons with diabetes include: an eye exam to screen for diabetic retinopathy, a kidney function test, a foot exam, and stricter control over your cholesterol.  
-Cardiovascular screening for adults with routine risk involves an electrocardiogram (ECG) at intervals determined by your doctor.  
-Colorectal cancer screenings should be done for adults age 54-65 with no increased risk factors for colorectal cancer. There are a number of acceptable methods of screening for this type of cancer. Each test has its own benefits and drawbacks. Discuss with your doctor what is most appropriate for you during your annual wellness visit. The different tests include: colonoscopy (considered the best screening method), a fecal occult blood test, a fecal DNA test, and sigmoidoscopy. 
 
-A bone mass density test is recommended when a woman turns 65 to screen for osteoporosis. This test is only recommended one time, as a screening. Some providers will use this same test as a disease monitoring tool if you already have osteoporosis. -Breast cancer screenings are recommended every other year for women of normal risk, age 54-69. 
-Cervical cancer screenings for women over age 72 are only recommended with certain risk factors. Here is a list of your current Health Maintenance items (your personalized list of preventive services) with a due date: 
Health Maintenance Due Topic Date Due  Shingles Vaccine (2 of 2) 11/07/2019 [address] [place] [address] Annual Well Visit  08/09/2020  Yearly Flu Vaccine (1) 09/01/2020

## 2020-09-21 NOTE — PROGRESS NOTES
1. Have you been to the ER, urgent care clinic since your last visit? Hospitalized since your last visit? No    2. Have you seen or consulted any other health care providers outside of the [address] [place] [address] since your last visit? Include any pap smears or colon screening. No    This is the Subsequent Medicare Annual Wellness Exam, performed 12 months or more after the Initial AWV or the last Subsequent AWV    I have reviewed the patient's medical history in detail and updated the computerized patient record. History     Patient Active Problem List   Diagnosis Code    Hypertension I10    Recurrent depression ([facility] [address] F33.9    Lumbar radiculopathy M54.16    Herpes genitalis in women A60.09    Allergic rhinitis due to allergen J30.9    Hot flashes R23.2    Advance directive discussed with patient Z70.80    Facet arthropathy M47.819    Lumbar stenosis M48.061    Advance care planning Z71.89    Obesity E66.9    Mild single current episode of major depressive disorder (HCC) F32.0    IFG (impaired fasting glucose) R73.01     Past Medical History:   Diagnosis Date    Arthritis     Bulging lumbar disc     Degenerative arthritis of finger     MP joint right index finger    Depression     H/O colonoscopy 12/2014    recommended yearly hemoccult stools    Hypertension     Low back pain     Lower extremity pain, left     Pinched nerve       Past Surgical History:   Procedure Laterality Date    COLONOSCOPY N/A 2/5/2020    COLONOSCOPY performed by Shakira Demarco [facility] at [facility]    HX CARPAL TUNNEL RELEASE Right     hand    HX COLONOSCOPY  2014    benign polyp    HX HYSTERECTOMY      HX ORTHOPAEDIC      heel spurs     Current Outpatient Medications   Medication Sig Dispense Refill    PARoxetine (PAXIL) 20 mg tablet Take 1 Tab by mouth daily. 90 Tab 1    ferrous sulfate 325 mg (65 mg iron) tablet Take 325 mg by mouth Daily (before breakfast).       ascorbic acid, vitamin C, (VITAMIN C) 500 mg tablet Take 500 mg by mouth two (2) times daily (with meals).  bisacodyL (DULCOLAX) 5 mg EC tablet Take 10 mg by mouth daily as needed.  cholecalciferol (VITAMIN D3) 25 mcg (1,000 unit) cap       multivit-min-ferrous fumarate (Multi Vitamin) 9 mg iron/15 mL liqd       mometasone (NASONEX) 50 mcg/actuation nasal spray Use 2 spray(s) in each nostril once daily 3 Container 1    diclofenac (VOLTAREN) 1 % gel Apply  to affected area four (4) times daily. 1 Each 0    lisinopril-hydroCHLOROthiazide (PRINZIDE, ZESTORETIC) 20-25 mg per tablet Take 1 Tab by mouth daily. 90 Tab 0    triamcinolone acetonide 0.025 % lotion Apply thin layer on affected area twice a day 60 mL 0    pregabalin (LYRICA) 75 mg capsule Take 1 Cap by mouth three (3) times daily. Max Daily Amount: 225 mg. 90 Cap 5    bacitracin ophthalmic ointment BID       No Known Allergies    Family History   Problem Relation Age of Onset    Cancer Mother     Ovarian Cancer Mother 68    Stroke Father      Social History     Tobacco Use    Smoking status: Never Smoker    Smokeless tobacco: Never Used   Substance Use Topics    Alcohol use: No       Depression Risk Factor Screening:     3 most recent PHQ Screens 2/10/2020   Little interest or pleasure in doing things Not at all   Feeling down, depressed, irritable, or hopeless Not at all   Total Score PHQ 2 0       Alcohol Risk Screen   Do you average more than 1 drink per night or more than 7 drinks a week:  No    On any one occasion in the past three months have you have had more than 3 drinks containing alcohol:  No        Functional Ability and Level of Safety:   Hearing: Hearing is good. Activities of Daily Living: The home contains: no safety equipment. Patient does total self care     Ambulation: with no difficulty     Fall Risk:  Fall Risk Assessment, last 12 mths 5/12/2014   Able to walk? Yes   Fall in past 12 months?  No     Abuse Screen:  Patient is not abused       Cognitive Screening   Has your family/caregiver stated any concerns about your memory: no      Patient Care Team   Patient Care Team:  Lawyer Joaquin MD as PCP - General (Family Medicine)  Lawyer Joaquin MD as PCP - [facility] Empaneled Provider  Leilani Sousa., RN  Sammy Briceño MD (Orthopedic Surgery)  Harjeet Navarro MD (Orthopedic Surgery)  Bright Amaro MD (Gastroenterology)    Assessment/Plan   Education and counseling provided:  Are appropriate based on today's review and evaluation  End-of-Life planning (with patient's consent)  Pneumococcal Vaccine  Influenza Vaccine  Screening Mammography  Colorectal cancer screening tests  Cardiovascular screening blood test  Diabetes screening test    Diagnoses and all orders for this visit:    1. Medicare annual wellness visit, subsequent        Health Maintenance Due   Topic Date Due    Shingrix Vaccine Age 49> (2 of 2) 11/07/2019    Medicare Yearly Exam  08/09/2020    Flu Vaccine (1) 09/01/2020       Omero Ramirez, who was evaluated through a synchronous (real-time) audio-video encounter, and/or her healthcare decision maker, is aware that it is a billable service, with coverage as determined by her insurance carrier. She provided verbal consent to proceed: Yes, and patient identification was verified. It was conducted pursuant to the emergency declaration under the [address] [place] [address], [address] [place] [address] authority and the [employer] Act. A caregiver was present when appropriate. Ability to conduct physical exam was limited. I was in the office. The patient was at home. Omero Ramirez, 59 y.o.,  female    SUBJECTIVE  Ff-up    HTN- taking meds without problems. She also has prediabetes    Depression/hot flashes- says doing well for the most part, taking paxil. Chronic LBP- on lyrica, following dr. Ted Reyna, reviewed recent note, no changes.      Allergic rhinitis- fair response to  Flonase. She also gets hives on upper torso responding to topical steroids. She says antihistamines have not been effective, singulair not covered by insurance. Immunotherapy was a consideration, however considering due to cost    Acute GI bleed- thought to be due to diverticular bleed. No more active bleeding, GI notes no further intervention at this time. She is currently on po fe, was unable to get labs done prior to this visit, pt reminded of standing order. ROS:  See HPI, all others negative        Patient Active Problem List   Diagnosis Code    Hypertension I10    Lumbar radiculopathy M54.16    Herpes genitalis in women A60.09    Allergic rhinitis due to allergen J30.9    Hot flashes R23.2    Advance directive discussed with patient Z71.89    Facet arthropathy ([facility] [address] M46.90    Lumbar stenosis M48.061    Advance care planning Z71.89    Obesity E66.9    Mild single current episode of major depressive disorder (HCC) F32.0         Current Outpatient Medications:     PARoxetine (PAXIL) 20 mg tablet, Take 1 Tab by mouth daily. , Disp: 90 Tab, Rfl: 1    ferrous sulfate 325 mg (65 mg iron) tablet, Take 325 mg by mouth Daily (before breakfast). , Disp: , Rfl:     ascorbic acid, vitamin C, (VITAMIN C) 500 mg tablet, Take 500 mg by mouth two (2) times daily (with meals). , Disp: , Rfl:     bisacodyL (DULCOLAX) 5 mg EC tablet, Take 10 mg by mouth daily as needed. , Disp: , Rfl:     cholecalciferol (VITAMIN D3) 25 mcg (1,000 unit) cap, , Disp: , Rfl:     multivit-min-ferrous fumarate (Multi Vitamin) 9 mg iron/15 mL liqd, , Disp: , Rfl:     mometasone (NASONEX) 50 mcg/actuation nasal spray, Use 2 spray(s) in each nostril once daily, Disp: 3 Container, Rfl: 1    diclofenac (VOLTAREN) 1 % gel, Apply  to affected area four (4) times daily. , Disp: 1 Each, Rfl: 0    lisinopril-hydroCHLOROthiazide (PRINZIDE, ZESTORETIC) 20-25 mg per tablet, Take 1 Tab by mouth daily. , Disp: 90 Tab, Rfl: 0   triamcinolone acetonide 0.025 % lotion, Apply thin layer on affected area twice a day, Disp: 60 mL, Rfl: 0    pregabalin (LYRICA) 75 mg capsule, Take 1 Cap by mouth three (3) times daily. Max Daily Amount: 225 mg., Disp: 80 Cap, Rfl: 5    No Known Allergies    Past Medical History:   Diagnosis Date    Arthritis     Bulging lumbar disc     Degenerative arthritis of finger     MP joint right index finger    Depression     H/O colonoscopy 12/2014    recommended yearly hemoccult stools    Hypertension     Low back pain     Lower extremity pain, left     Pinched nerve        Social History     Social History    Marital status:      Spouse name: N/A    Number of children: N/A    Years of education: N/A     Occupational History    Not on file.      Social History Main Topics    Smoking status: Never Smoker    Smokeless tobacco: Never Used    Alcohol use No    Drug use: No    Sexual activity: Not Currently     Other Topics Concern    Not on file     Social History Narrative       Family History   Problem Relation Age of Onset    Cancer Mother     Ovarian Cancer Mother 68    Stroke Father          OBJECTIVE    Physical Exam:     Visit Vitals  Visit Vitals  LMP  (LMP Unknown)         General: alert, well-appearing, AA, in no apparent distress or pain  HEENT throat and pharynx clear, eac patent, tm intac  CVS: normal rate, regular rhythm, distinct S1 and S2  Lungs:clear to ausculation bilaterally, no crackles, wheezing or rhonchi noted  Abdomen: soft, NT, ND  Extremities: R hand FROM, no tenderness  Skin: warm, no lesions, rashes noted  Psych:  mood and affect normal    Results for orders placed or performed during the hospital encounter of 27/69/40   METABOLIC PANEL, BASIC   Result Value Ref Range    Sodium 142 136 - 145 mmol/L    Potassium 4.1 3.5 - 5.5 mmol/L    Chloride 106 100 - 111 mmol/L    CO2 32 21 - 32 mmol/L    Anion gap 4 3.0 - 18 mmol/L    Glucose 94 74 - 99 mg/dL    BUN 14 7.0 - 18 MG/DL    Creatinine 0.69 0.6 - 1.3 MG/DL    BUN/Creatinine ratio 20 12 - 20      GFR est AA >60 >60 ml/min/1.73m2    GFR est non-AA >60 >60 ml/min/1.73m2    Calcium 9.1 8.5 - 10.1 MG/DL   LIPID PANEL   Result Value Ref Range    LIPID PROFILE          Cholesterol, total 184 <200 MG/DL    Triglyceride 74 <150 MG/DL    HDL Cholesterol 75 (H) 40 - 60 MG/DL    LDL, calculated 94.2 0 - 100 MG/DL    VLDL, calculated 14.8 MG/DL    CHOL/HDL Ratio 2.5 0 - 5.0     '      ASSESSMENT/PLAN  Diagnoses and all orders for this visit:    1. Recurrent depression (HCC)  Stable, cont paxil    2. Acute GI bleed  Thought to be diverticular bleed, no further intervention needed at this time per GI  Currently on po fe  Await recheck cbc, to be done soon, pt reminded    3. Hot flashes  Benefiting from paxil    4. Essential hypertension  controlled  cont lisinopril/hctz  Calc cv risk 5% vs 4% for age  monitoring        Lipid panel/BMP prior to next visit    5. Impaired fasting glucose  improving  Cont with lifestyle efforts  Await labs to be done soon    6. Chronic rhinitis  Fair control  Cont with flonase, refractory to antihistamines, singulair not covered by insurance  ENT/allergy shots when able    7. Spinal stenosis of lumbar region with neurogenic claudication  On lyrica  Following Dr. Dante Acosta      Follow-up Disposition:  Return in about 3 months, or if symptoms worsen or fail to improve. In person     Patient understands plan of care. Patient has provided input and agrees with goals.

## 2020-09-21 NOTE — ACP (ADVANCE CARE PLANNING)
Advance Care Planning       Advance Care Planning (ACP) Physician/NP/PA (Provider) Conversation        Date of ACP Conversation: 9/21/2020    Conversation Conducted with:   Patient with [address] Maker:    Current Designated Health Care Decision Maker:   Primary Decision Maker: Sabra Jha - Daughter - [phone]    Secondary Decision Maker: Belén Duran (Sister) - Other Relative - [phone]  (If there is a [address] named in the [facility] Makers\" box in the ACP activity, but it is not visible above, be sure to open that field and then select the health care decision maker relationship (ie \"primary\") in the blank space to the right of the name.)    Note: Assess and validate information in current ACP documents, as indicated. Care Preferences:    Hospitalization: \"If your health worsens and it becomes clear that your chance of recovery is unlikely, what would your preference be regarding hospitalization? \"  If the patient would want hospitalization, answer \"yes\". If the patient would prefer comfort-focused treatment without hospitalization, answer \"no\". Yes      Ventilation: \"If you were in your present state of health and suddenly became very ill and were unable to breathe on your own, what would your preference be about the use of a ventilator (breathing machine) if it was available to you? \"    If patient would desire the use of a ventilator (breathing machine), answer \"yes\", if not answer \"no\":yes    \"If your health worsens and it becomes clear that your chance of recovery is unlikely, what would your preference be about the use of a ventilator (breathing machine) if it was available to you? \"   yes      Resuscitation:  \"CPR works best to restart the heart when there is a sudden event, like a heart attack, in someone who is otherwise healthy.  Unfortunately, CPR does not typically restart the heart for people who have serious health conditions or who are very sick. \"    \"In the event your heart stopped as a result of an underlying serious health condition, would you want attempts to be made to restart your heart (answer \"yes\" for attempt to resuscitate) or would you prefer a natural death (answer \"no\" for do not attempt to resuscitate)? \"   yes    NOTE: If the patient has a valid advance directive AND provides care preference(s) that are inconsistent with that prior directive, advise the patient to consider either: creating a new advance directive that complies with state-specific requirements; or, if that is not possible, orally revoking that prior directive in accordance with state-specific requirements, which must be documented in the EHR.     Conversation Outcomes / Follow-Up Plan:   Patient is to provide copy of existing Advance Directive    Length of Voluntary ACP Conversation in minutes:  16 minutes      Gume Mckenzie MD

## 2020-11-14 ENCOUNTER — HOSPITAL ENCOUNTER (OUTPATIENT)
Dept: LAB | Age: 65
Discharge: HOME | End: 2020-11-14
Payer: MEDICARE

## 2020-11-14 DIAGNOSIS — R73.01 IFG (IMPAIRED FASTING GLUCOSE): ICD-10-CM

## 2020-11-14 DIAGNOSIS — K92.1 GASTROINTESTINAL HEMORRHAGE WITH MELENA: ICD-10-CM

## 2020-11-14 LAB
ALBUMIN SERPL-MCNC: 3.6 G/DL (ref 3.4–5)
ALBUMIN/GLOB SERPL: 1 {RATIO} (ref 0.8–1.7)
ALP SERPL-CCNC: 68 U/L (ref 45–117)
ALT SERPL-CCNC: 32 U/L (ref 13–56)
ANION GAP SERPL CALC-SCNC: 4 MMOL/L (ref 3–18)
AST SERPL-CCNC: 22 U/L (ref 10–38)
BASOPHILS # BLD: 0 K/UL (ref 0–0.1)
BASOPHILS NFR BLD: 1 % (ref 0–2)
BILIRUB SERPL-MCNC: 0.5 MG/DL (ref 0.2–1)
BUN SERPL-MCNC: 11 MG/DL (ref 7–18)
BUN/CREAT SERPL: 16 (ref 12–20)
CALCIUM SERPL-MCNC: 9.7 MG/DL (ref 8.5–10.1)
CHLORIDE SERPL-SCNC: 104 MMOL/L (ref 100–111)
CO2 SERPL-SCNC: 32 MMOL/L (ref 21–32)
CREAT SERPL-MCNC: 0.7 MG/DL (ref 0.6–1.3)
DIFFERENTIAL METHOD BLD: ABNORMAL
EOSINOPHIL # BLD: 0.1 K/UL (ref 0–0.4)
EOSINOPHIL NFR BLD: 2 % (ref 0–5)
ERYTHROCYTE [DISTWIDTH] IN BLOOD BY AUTOMATED COUNT: 14.1 % (ref 11.6–14.5)
GLOBULIN SER CALC-MCNC: 3.5 G/DL (ref 2–4)
GLUCOSE SERPL-MCNC: 92 MG/DL (ref 74–99)
HBA1C MFR BLD: 5.9 % (ref 4.2–5.6)
HCT VFR BLD AUTO: 43.4 % (ref 35–45)
HGB BLD-MCNC: 14.3 G/DL (ref 12–16)
LYMPHOCYTES # BLD: 1.1 K/UL (ref 0.9–3.6)
LYMPHOCYTES NFR BLD: 31 % (ref 21–52)
MCH RBC QN AUTO: 28.2 PG (ref 24–34)
MCHC RBC AUTO-ENTMCNC: 32.9 G/DL (ref 31–37)
MCV RBC AUTO: 85.6 FL (ref 74–97)
MONOCYTES # BLD: 0.3 K/UL (ref 0.05–1.2)
MONOCYTES NFR BLD: 8 % (ref 3–10)
NEUTS SEG # BLD: 2.1 K/UL (ref 1.8–8)
NEUTS SEG NFR BLD: 58 % (ref 40–73)
PLATELET # BLD AUTO: 185 K/UL (ref 135–420)
PMV BLD AUTO: 10.7 FL (ref 9.2–11.8)
POTASSIUM SERPL-SCNC: 4.1 MMOL/L (ref 3.5–5.5)
PROT SERPL-MCNC: 7.1 G/DL (ref 6.4–8.2)
RBC # BLD AUTO: 5.07 M/UL (ref 4.2–5.3)
SODIUM SERPL-SCNC: 140 MMOL/L (ref 136–145)
WBC # BLD AUTO: 3.5 K/UL (ref 4.6–13.2)

## 2020-11-14 PROCEDURE — 80053 COMPREHEN METABOLIC PANEL: CPT

## 2020-11-14 PROCEDURE — 36415 COLL VENOUS BLD VENIPUNCTURE: CPT

## 2020-11-14 PROCEDURE — 83036 HEMOGLOBIN GLYCOSYLATED A1C: CPT

## 2020-11-14 PROCEDURE — 85025 COMPLETE CBC W/AUTO DIFF WBC: CPT

## 2020-11-17 NOTE — PROGRESS NOTES
Prediabetes is persistent, cont to work on weight loss  No longer with anemia  pls notify pt, due for ff-up in dec. pls sched and discuss further then.

## 2020-11-27 NOTE — PROGRESS NOTES
Patient identified with 2 identifiers (name and ). Patient aware of Prediabetes is persistent, cont to work on weight loss   No longer with anemia.  Patient has been scheduled for 12/15/2020

## 2020-11-30 NOTE — TELEPHONE ENCOUNTER
This patient contacted office for the following prescriptions to be filled:    Last office visit: 9/21/2020  Follow up appointment: 12/15/2020  Medication requested :   Requested Prescriptions     Pending Prescriptions Disp Refills    lisinopril-hydroCHLOROthiazide (PRINZIDE, ZESTORETIC) 20-25 mg per tablet 90 Tab 0     Sig: Take 1 Tab by mouth daily.      PCP: gladys  Mail order or Local pharmacy name [facility] [phone]

## 2020-12-01 RX ORDER — LISINOPRIL AND HYDROCHLOROTHIAZIDE 20; 25 MG/1; MG/1
1 TABLET ORAL DAILY
Qty: 90 TAB | Refills: 0 | Status: SHIPPED | OUTPATIENT
Start: 2020-12-01 | End: 2021-03-09 | Stop reason: SDUPTHER

## 2021-01-04 ENCOUNTER — TRANSCRIBE ORDER (OUTPATIENT)
Dept: SCHEDULING | Age: 66
End: 2021-01-04

## 2021-01-04 DIAGNOSIS — Z12.31 VISIT FOR SCREENING MAMMOGRAM: Primary | ICD-10-CM

## 2021-03-09 ENCOUNTER — HOSPITAL ENCOUNTER (OUTPATIENT)
Dept: MAMMOGRAPHY | Age: 66
Discharge: HOME | End: 2021-03-09
Attending: FAMILY MEDICINE
Payer: MEDICARE

## 2021-03-09 DIAGNOSIS — Z12.31 VISIT FOR SCREENING MAMMOGRAM: ICD-10-CM

## 2021-03-09 PROCEDURE — 77063 BREAST TOMOSYNTHESIS BI: CPT

## 2021-03-09 RX ORDER — PAROXETINE HYDROCHLORIDE 20 MG/1
20 TABLET, FILM COATED ORAL DAILY
Qty: 90 TAB | Refills: 1 | Status: SHIPPED | OUTPATIENT
Start: 2021-03-09 | End: 2021-10-13 | Stop reason: SDUPTHER

## 2021-03-09 RX ORDER — LISINOPRIL AND HYDROCHLOROTHIAZIDE 20; 25 MG/1; MG/1
1 TABLET ORAL DAILY
Qty: 90 TAB | Refills: 0 | Status: SHIPPED | OUTPATIENT
Start: 2021-03-09 | End: 2021-06-14 | Stop reason: SDUPTHER

## 2021-03-09 NOTE — TELEPHONE ENCOUNTER
This patient contacted office for the following prescriptions to be filled:    Last office visit: 9/21/2020  Follow up appointment: 3/11/2021  Medication requested :   Requested Prescriptions     Pending Prescriptions Disp Refills    PARoxetine (PAXIL) 20 mg tablet 90 Tab 1     Sig: Take 1 Tab by mouth daily.  lisinopril-hydroCHLOROthiazide (PRINZIDE, ZESTORETIC) 20-25 mg per tablet 90 Tab 0     Sig: Take 1 Tab by mouth daily.      PCP: Rosa kidd or Local pharmacy name [address] [phone]

## 2021-04-13 ENCOUNTER — OFFICE VISIT (OUTPATIENT)
Dept: FAMILY MEDICINE CLINIC | Age: 66
End: 2021-04-13
Payer: MEDICARE

## 2021-04-13 VITALS
SYSTOLIC BLOOD PRESSURE: 136 MMHG | TEMPERATURE: 97.4 F | HEIGHT: 65 IN | OXYGEN SATURATION: 99 % | RESPIRATION RATE: 16 BRPM | DIASTOLIC BLOOD PRESSURE: 86 MMHG | BODY MASS INDEX: 30.99 KG/M2 | WEIGHT: 186 LBS | HEART RATE: 82 BPM

## 2021-04-13 DIAGNOSIS — R73.01 IFG (IMPAIRED FASTING GLUCOSE): Primary | ICD-10-CM

## 2021-04-13 DIAGNOSIS — M48.062 SPINAL STENOSIS OF LUMBAR REGION WITH NEUROGENIC CLAUDICATION: ICD-10-CM

## 2021-04-13 DIAGNOSIS — F33.9 RECURRENT DEPRESSION (HCC): ICD-10-CM

## 2021-04-13 DIAGNOSIS — M79.632 PAIN OF LEFT FOREARM: ICD-10-CM

## 2021-04-13 DIAGNOSIS — D64.9 ANEMIA, UNSPECIFIED TYPE: ICD-10-CM

## 2021-04-13 DIAGNOSIS — I10 ESSENTIAL HYPERTENSION: ICD-10-CM

## 2021-04-13 PROCEDURE — G8417 CALC BMI ABV UP PARAM F/U: HCPCS | Performed by: FAMILY MEDICINE

## 2021-04-13 PROCEDURE — G8427 DOCREV CUR MEDS BY ELIG CLIN: HCPCS | Performed by: FAMILY MEDICINE

## 2021-04-13 PROCEDURE — G9717 DOC PT DX DEP/BP F/U NT REQ: HCPCS | Performed by: FAMILY MEDICINE

## 2021-04-13 PROCEDURE — G8754 DIAS BP LESS 90: HCPCS | Performed by: FAMILY MEDICINE

## 2021-04-13 PROCEDURE — G9899 SCRN MAM PERF RSLTS DOC: HCPCS | Performed by: FAMILY MEDICINE

## 2021-04-13 PROCEDURE — 99214 OFFICE O/P EST MOD 30 MIN: CPT | Performed by: FAMILY MEDICINE

## 2021-04-13 PROCEDURE — G8752 SYS BP LESS 140: HCPCS | Performed by: FAMILY MEDICINE

## 2021-04-13 PROCEDURE — 3017F COLORECTAL CA SCREEN DOC REV: CPT | Performed by: FAMILY MEDICINE

## 2021-04-13 RX ORDER — CYCLOBENZAPRINE HCL 10 MG
10 TABLET ORAL
Qty: 20 TAB | Refills: 0 | Status: SHIPPED | OUTPATIENT
Start: 2021-04-13

## 2021-04-13 RX ORDER — NAPROXEN 500 MG/1
500 TABLET ORAL 2 TIMES DAILY WITH MEALS
Qty: 30 TAB | Refills: 0 | Status: SHIPPED | OUTPATIENT
Start: 2021-04-13

## 2021-04-13 NOTE — PROGRESS NOTES
1. Have you been to the ER, urgent care clinic since your last visit? Hospitalized since your last visit? No    2. Have you seen or consulted any other health care providers outside of the [address] [place] [address] since your last visit? Include any pap smears or colon screening.  No

## 2021-04-13 NOTE — PROGRESS NOTES
Cresencio Koyanagi, 60 y.o.,  female    SUBJECTIVE  Ff-up and acute concern    HTN- taking meds without problems. She also has prediabetes    Depression/hot flashes- says doing well for the most part, taking paxil.       Chronic LBP- on lyrica, following dr. Ravin Sands    Allergic rhinitis-doing well    C/o L forearm pain past week. She is R handed, no known trauma, changes in activity, no redness. Denies neck pain,shoulder or elbow pain. She has not tried anything for this. H/o acute GI bleed, thought to be diverticular bleed following GI. No longer with anemia. She denies any recurrence of symptoms    ROS:  See HPI, all others negative        Patient Active Problem List   Diagnosis Code    Hypertension I10    Recurrent depression ([facility] [address] F33.9    Lumbar radiculopathy M54.16    Herpes genitalis in women A60.09    Allergic rhinitis due to allergen J30.9    Hot flashes R23.2    Advance directive discussed with patient Z70.80    Facet arthropathy M47.819    Lumbar stenosis M48.061    Advance care planning Z71.89    Obesity E66.9    Mild single current episode of major depressive disorder (HCC) F32.0    IFG (impaired fasting glucose) R73.01       Current Outpatient Medications   Medication Sig Dispense Refill    allergy injection every seven (7) days.  cyclobenzaprine (FLEXERIL) 10 mg tablet Take 1 Tab by mouth three (3) times daily as needed for Muscle Spasm(s). 20 Tab 0    naproxen (NAPROSYN) 500 mg tablet Take 1 Tab by mouth two (2) times daily (with meals). 30 Tab 0    PARoxetine (PAXIL) 20 mg tablet Take 1 Tab by mouth daily. 90 Tab 1    lisinopril-hydroCHLOROthiazide (PRINZIDE, ZESTORETIC) 20-25 mg per tablet Take 1 Tab by mouth daily. 90 Tab 0    ascorbic acid, vitamin C, (VITAMIN C) 500 mg tablet Take 500 mg by mouth two (2) times daily (with meals).  bisacodyL (DULCOLAX) 5 mg EC tablet Take 10 mg by mouth daily as needed.       cholecalciferol (VITAMIN D3) 25 mcg (1,000 unit) cap  multivit-min-ferrous fumarate (Multi Vitamin) 9 mg iron/15 mL liqd       diclofenac (VOLTAREN) 1 % gel Apply  to affected area four (4) times daily. 1 Each 0    triamcinolone acetonide 0.025 % lotion Apply thin layer on affected area twice a day 60 mL 0    pregabalin (LYRICA) 75 mg capsule Take 1 Cap by mouth three (3) times daily. Max Daily Amount: 225 mg. 90 Cap 5    ferrous sulfate 325 mg (65 mg iron) tablet Take 325 mg by mouth Daily (before breakfast).       mometasone (NASONEX) 50 mcg/actuation nasal spray Use 2 spray(s) in each nostril once daily 3 Container 1       No Known Allergies    Past Medical History:   Diagnosis Date    Arthritis     Bulging lumbar disc     Degenerative arthritis of finger     MP joint right index finger    Depression     H/O colonoscopy 12/2014    recommended yearly hemoccult stools    Hypertension     Low back pain     Lower extremity pain, left     Pinched nerve        Social History     Socioeconomic History    Marital status:      Spouse name: Not on file    Number of children: Not on file    Years of education: Not on file    Highest education level: Not on file   Occupational History    Not on file   Social Needs    Financial resource strain: Not on file    Food insecurity     Worry: Not on file     Inability: Not on file    Transportation needs     Medical: Not on file     Non-medical: Not on file   Tobacco Use    Smoking status: Never Smoker    Smokeless tobacco: Never Used   Substance and Sexual Activity    Alcohol use: No    Drug use: No    Sexual activity: Not Currently   Lifestyle    Physical activity     Days per week: Not on file     Minutes per session: Not on file    Stress: Not on file   Relationships    Social connections     Talks on phone: Not on file     Gets together: Not on file     Attends Yazidism service: Not on file     Active member of club or organization: Not on file     Attends meetings of clubs or organizations: Not on file     Relationship status: Not on file    Intimate partner violence     Fear of current or ex partner: Not on file     Emotionally abused: Not on file     Physically abused: Not on file     Forced sexual activity: Not on file   Other Topics Concern    Not on file   Social History Narrative    Not on file       Family History   Problem Relation Age of Onset    Cancer Mother     Ovarian Cancer Mother 68    Stroke Father          OBJECTIVE    Physical Exam:     Visit Vitals  /86 (BP 1 Location: Left upper arm, BP Patient Position: Sitting, BP Cuff Size: Adult)   Pulse 82   Temp 97.4 °F (36.3 °C) (Oral)   Resp 16   Ht 5' 5.25\" (1.657 m)   Wt 186 lb (84.4 kg)   LMP  (LMP Unknown)   SpO2 99%   BMI 30.72 kg/m²       General: alert, well-appearing, obese,AA, in no apparent distress or pain  Head: atraumatic. Non-tender maxillary and frontal sinuses  Neck: supple, no adenopathy palpated  CVS: normal rate, regular rhythm, distinct S1 and S2  Lungs:clear to ausculation bilaterally, no crackles, wheezing or rhonchi noted  Abdomen: normoactive bowel sounds, soft, non-tender  Extremities: L forearm MTTP on anterior area, FROM elbow, shoulder, neck. No discoloration, swelling  Skin: warm, no lesions, rashes noted  Psych:  mood and affect normal    Results for orders placed or performed during the hospital encounter of 28/35/80   METABOLIC PANEL, COMPREHENSIVE   Result Value Ref Range    Sodium 140 136 - 145 mmol/L    Potassium 4.1 3.5 - 5.5 mmol/L    Chloride 104 100 - 111 mmol/L    CO2 32 21 - 32 mmol/L    Anion gap 4 3.0 - 18 mmol/L    Glucose 92 74 - 99 mg/dL    BUN 11 7.0 - 18 MG/DL    Creatinine 0.70 0.6 - 1.3 MG/DL    BUN/Creatinine ratio 16 12 - 20      GFR est AA >60 >60 ml/min/1.73m2    GFR est non-AA >60 >60 ml/min/1.73m2    Calcium 9.7 8.5 - 10.1 MG/DL    Bilirubin, total 0.5 0.2 - 1.0 MG/DL    ALT (SGPT) 32 13 - 56 U/L    AST (SGOT) 22 10 - 38 U/L    Alk.  phosphatase 68 45 - 117 U/L Protein, total 7.1 6.4 - 8.2 g/dL    Albumin 3.6 3.4 - 5.0 g/dL    Globulin 3.5 2.0 - 4.0 g/dL    A-G Ratio 1.0 0.8 - 1.7     HEMOGLOBIN A1C W/O EAG   Result Value Ref Range    Hemoglobin A1c 5.9 (H) 4.2 - 5.6 %   CBC WITH AUTOMATED DIFF   Result Value Ref Range    WBC 3.5 (L) 4.6 - 13.2 K/uL    RBC 5.07 4.20 - 5.30 M/uL    HGB 14.3 12.0 - 16.0 g/dL    HCT 43.4 35.0 - 45.0 %    MCV 85.6 74.0 - 97.0 FL    MCH 28.2 24.0 - 34.0 PG    MCHC 32.9 31.0 - 37.0 g/dL    RDW 14.1 11.6 - 14.5 %    PLATELET 004 651 - 511 K/uL    MPV 10.7 9.2 - 11.8 FL    NEUTROPHILS 58 40 - 73 %    LYMPHOCYTES 31 21 - 52 %    MONOCYTES 8 3 - 10 %    EOSINOPHILS 2 0 - 5 %    BASOPHILS 1 0 - 2 %    ABS. NEUTROPHILS 2.1 1.8 - 8.0 K/UL    ABS. LYMPHOCYTES 1.1 0.9 - 3.6 K/UL    ABS. MONOCYTES 0.3 0.05 - 1.2 K/UL    ABS. EOSINOPHILS 0.1 0.0 - 0.4 K/UL    ABS. BASOPHILS 0.0 0.0 - 0.1 K/UL    DF AUTOMATED           ASSESSMENT/PLAN  Diagnoses and all orders for this visit:    1. IFG (impaired fasting glucose)  Monitoring, tlcs  -     HEMOGLOBIN A1C WITH EAG; Future  -     METABOLIC PANEL, COMPREHENSIVE; Future    2. Essential hypertension  Controlled  Cont prinzide  -     METABOLIC PANEL, COMPREHENSIVE; Future  -     LIPID PANEL; Future    3. Anemia, unspecified type  H/o Acute GI bleed, resolved thought to be diverticular bleed on GI work up  -     [address] DIFF; Future    4. Pain of left forearm  Suspect MSK strain  Trial:  -     cyclobenzaprine (FLEXERIL) 10 mg tablet; Take 1 Tab by mouth three (3) times daily as needed for Muscle Spasm(s). -     naproxen (NAPROSYN) 500 mg tablet; Take 1 Tab by mouth two (2) times daily (with meals). 5. Recurrent depression (HCC)  Stable  Cont paxil      6.  Spinal stenosis of lumbar region with neurogenic claudication  On lyrica  Following PMNR      Follow-up and Dispositions    · Return in about 3 months (around 7/13/2021), or if symptoms worsen or fail to improve, for routine chronic illness care, fasting labs a week prior to your next visit. Patient understands plan of care. Patient has provided input and agrees with goals.

## 2021-04-13 NOTE — PATIENT INSTRUCTIONS
A Healthy Heart: Care Instructions Your Care Instructions Coronary artery disease, also called heart disease, occurs when a substance called plaque builds up in the vessels that supply oxygen-rich blood to your heart muscle. This can narrow the blood vessels and reduce blood flow. A heart attack happens when blood flow is completely blocked. A high-fat diet, smoking, and other factors increase the risk of heart disease. Your doctor has found that you have a chance of having heart disease. You can do lots of things to keep your heart healthy. It may not be easy, but you can change your diet, exercise more, and quit smoking. These steps really work to lower your chance of heart disease. Follow-up care is a key part of your treatment and safety. Be sure to make and go to all appointments, and call your doctor if you are having problems. It's also a good idea to know your test results and keep a list of the medicines you take. How can you care for yourself at home? Diet 
  · Use less salt when you cook and eat. This helps lower your blood pressure. Taste food before salting. Add only a little salt when you think you need it. With time, your taste buds will adjust to less salt.  
  · Eat fewer snack items, fast foods, canned soups, and other high-salt, high-fat, processed foods.  
  · Read food labels and try to avoid saturated and trans fats. They increase your risk of heart disease by raising cholesterol levels.  
  · Limit the amount of solid fat-butter, margarine, and shortening-you eat. Use olive, peanut, or canola oil when you cook. Bake, broil, and steam foods instead of frying them.  
  · Eat a variety of fruit and vegetables every day. Dark green, deep orange, red, or yellow fruits and vegetables are especially good for you. Examples include spinach, carrots, peaches, and berries.  
  · Foods high in fiber can reduce your cholesterol and provide important vitamins and minerals.  High-fiber foods include whole-grain cereals and breads, oatmeal, beans, brown rice, citrus fruits, and apples.  
  · Eat lean proteins. Heart-healthy proteins include seafood, lean meats and poultry, eggs, beans, peas, nuts, seeds, and soy products.  
  · Limit drinks and foods with added sugar. These include candy, desserts, and soda pop. Lifestyle changes 
  · If your doctor recommends it, get more exercise. Walking is a good choice. Bit by bit, increase the amount you walk every day. Try for at least 30 minutes on most days of the week. You also may want to swim, bike, or do other activities.  
  · Do not smoke. If you need help quitting, talk to your doctor about stop-smoking programs and medicines. These can increase your chances of quitting for good. Quitting smoking may be the most important step you can take to protect your heart. It is never too late to quit.  
  · Limit alcohol to 2 drinks a day for men and 1 drink a day for women. Too much alcohol can cause health problems.  
  · Manage other health problems such as diabetes, high blood pressure, and high cholesterol. If you think you may have a problem with alcohol or drug use, talk to your doctor. Medicines 
  · Take your medicines exactly as prescribed. Call your doctor if you think you are having a problem with your medicine.  
  · If your doctor recommends aspirin, take the amount directed each day. Make sure you take aspirin and not another kind of pain reliever, such as acetaminophen (Tylenol). When should you call for help? Call 911 if you have symptoms of a heart attack. These may include: 
  · Chest pain or pressure, or a strange feeling in the chest.  
  · Sweating.  
  · Shortness of breath.  
  · Pain, pressure, or a strange feeling in the back, neck, jaw, or upper belly or in one or both shoulders or arms.  
  · Lightheadedness or sudden weakness.  
  · A fast or irregular heartbeat.   
After you call 911, the  may tell you to chew 1 adult-strength or 2 to 4 low-dose aspirin. Wait for an ambulance. Do not try to drive yourself. Watch closely for changes in your health, and be sure to contact your doctor if you have any problems. Where can you learn more? Go to http://www.gray.com/ Enter E910 in the search box to learn more about \"A Healthy Heart: Care Instructions. \" Current as of: August 31, 2020               Content Version: 12.8 © 2006-2021 [employer]. Care instructions adapted under license by [employer] (which disclaims liability or warranty for this information). If you have questions about a medical condition or this instruction, always ask your healthcare professional. [address] any warranty or liability for your use of this information.

## 2021-06-14 NOTE — TELEPHONE ENCOUNTER
This patient contacted office for the following prescriptions to be filled:    Last office visit: 4/13/21  Follow up appointment: 7/13/21  Medication requested :   Requested Prescriptions     Pending Prescriptions Disp Refills    lisinopril-hydroCHLOROthiazide (PRINZIDE, ZESTORETIC) 20-25 mg per tablet 90 Tablet 0     Sig: Take 1 Tablet by mouth daily.      PCP: [email or web address]  Mail order or Local pharmacy name [facility] [phone]

## 2021-06-15 RX ORDER — LISINOPRIL AND HYDROCHLOROTHIAZIDE 20; 25 MG/1; MG/1
1 TABLET ORAL DAILY
Qty: 90 TABLET | Refills: 0 | Status: SHIPPED | OUTPATIENT
Start: 2021-06-15 | End: 2021-09-21

## 2021-06-18 ENCOUNTER — HOSPITAL ENCOUNTER (OUTPATIENT)
Dept: LAB | Age: 66
Discharge: HOME | End: 2021-06-18
Payer: MEDICARE

## 2021-06-18 LAB
ALBUMIN SERPL-MCNC: 3.7 G/DL (ref 3.4–5)
ALBUMIN SERPL-MCNC: 3.8 G/DL (ref 3.4–5)
ALBUMIN/GLOB SERPL: 1.1 {RATIO} (ref 0.8–1.7)
ALP SERPL-CCNC: 70 U/L (ref 45–117)
ALT SERPL-CCNC: 39 U/L (ref 13–56)
ANION GAP SERPL CALC-SCNC: 5 MMOL/L (ref 3–18)
AST SERPL-CCNC: 21 U/L (ref 10–38)
BILIRUB DIRECT SERPL-MCNC: 0.2 MG/DL (ref 0–0.2)
BILIRUB SERPL-MCNC: 0.8 MG/DL (ref 0.2–1)
BUN SERPL-MCNC: 14 MG/DL (ref 7–18)
BUN/CREAT SERPL: 24 (ref 12–20)
CALCIUM SERPL-MCNC: 9.5 MG/DL (ref 8.5–10.1)
CHLORIDE SERPL-SCNC: 106 MMOL/L (ref 100–111)
CO2 SERPL-SCNC: 31 MMOL/L (ref 21–32)
CREAT SERPL-MCNC: 0.58 MG/DL (ref 0.6–1.3)
ERYTHROCYTE [DISTWIDTH] IN BLOOD BY AUTOMATED COUNT: 13 % (ref 11.6–14.5)
GLOBULIN SER CALC-MCNC: 3.6 G/DL (ref 2–4)
GLUCOSE SERPL-MCNC: 86 MG/DL (ref 74–99)
HCT VFR BLD AUTO: 42.1 % (ref 35–45)
HGB BLD-MCNC: 13.6 G/DL (ref 12–16)
MCH RBC QN AUTO: 28.6 PG (ref 24–34)
MCHC RBC AUTO-ENTMCNC: 32.3 G/DL (ref 31–37)
MCV RBC AUTO: 88.4 FL (ref 74–97)
PHOSPHATE SERPL-MCNC: 3.1 MG/DL (ref 2.5–4.9)
PLATELET # BLD AUTO: 225 K/UL (ref 135–420)
PMV BLD AUTO: 11 FL (ref 9.2–11.8)
POTASSIUM SERPL-SCNC: 4 MMOL/L (ref 3.5–5.5)
PROT SERPL-MCNC: 7.4 G/DL (ref 6.4–8.2)
RBC # BLD AUTO: 4.76 M/UL (ref 4.2–5.3)
SODIUM SERPL-SCNC: 142 MMOL/L (ref 136–145)
WBC # BLD AUTO: 3.7 K/UL (ref 4.6–13.2)

## 2021-06-18 PROCEDURE — 36415 COLL VENOUS BLD VENIPUNCTURE: CPT

## 2021-06-18 PROCEDURE — 80076 HEPATIC FUNCTION PANEL: CPT

## 2021-06-18 PROCEDURE — 85027 COMPLETE CBC AUTOMATED: CPT

## 2021-06-18 PROCEDURE — 80069 RENAL FUNCTION PANEL: CPT

## 2021-07-06 ENCOUNTER — TELEPHONE (OUTPATIENT)
Dept: FAMILY MEDICINE CLINIC | Age: 66
End: 2021-07-06

## 2021-07-06 DIAGNOSIS — M79.631 PAIN IN BOTH FOREARMS: Primary | ICD-10-CM

## 2021-07-06 DIAGNOSIS — M79.632 PAIN IN BOTH FOREARMS: Primary | ICD-10-CM

## 2021-07-06 NOTE — TELEPHONE ENCOUNTER
Pt states that when she was here in April for her left arm pain she was told that if it didn't get better she could get an Xray done. She states that she is now have the pain in both arms and would like to go ahead and get the XRay . Please advise.

## 2021-07-06 NOTE — TELEPHONE ENCOUNTER
Bilateral forearm xray order placed  Pls keep appt July 13 to further evaluate this concern, being bilateral could be other than [facility].

## 2021-07-08 NOTE — TELEPHONE ENCOUNTER
Left detailed message for patient that x ray orders have been placed.  Reminded patient of appt on 07/13/2021

## 2021-07-10 ENCOUNTER — HOSPITAL ENCOUNTER (OUTPATIENT)
Dept: GENERAL RADIOLOGY | Age: 66
Discharge: HOME | End: 2021-07-10
Payer: MEDICARE

## 2021-07-10 ENCOUNTER — HOSPITAL ENCOUNTER (OUTPATIENT)
Dept: LAB | Age: 66
Discharge: HOME | End: 2021-07-10
Payer: MEDICARE

## 2021-07-10 DIAGNOSIS — R73.01 IFG (IMPAIRED FASTING GLUCOSE): ICD-10-CM

## 2021-07-10 DIAGNOSIS — D64.9 ANEMIA, UNSPECIFIED TYPE: ICD-10-CM

## 2021-07-10 DIAGNOSIS — I10 ESSENTIAL HYPERTENSION: ICD-10-CM

## 2021-07-10 DIAGNOSIS — M79.631 PAIN IN BOTH FOREARMS: ICD-10-CM

## 2021-07-10 DIAGNOSIS — M79.632 PAIN IN BOTH FOREARMS: ICD-10-CM

## 2021-07-10 LAB
ALBUMIN SERPL-MCNC: 3.6 G/DL (ref 3.4–5)
ALBUMIN/GLOB SERPL: 1 {RATIO} (ref 0.8–1.7)
ALP SERPL-CCNC: 64 U/L (ref 45–117)
ALT SERPL-CCNC: 39 U/L (ref 13–56)
ANION GAP SERPL CALC-SCNC: 1 MMOL/L (ref 3–18)
AST SERPL-CCNC: 25 U/L (ref 10–38)
BASOPHILS # BLD: 0 K/UL (ref 0–0.1)
BASOPHILS NFR BLD: 1 % (ref 0–2)
BILIRUB SERPL-MCNC: 0.8 MG/DL (ref 0.2–1)
BUN SERPL-MCNC: 15 MG/DL (ref 7–18)
BUN/CREAT SERPL: 20 (ref 12–20)
CALCIUM SERPL-MCNC: 9.2 MG/DL (ref 8.5–10.1)
CHLORIDE SERPL-SCNC: 106 MMOL/L (ref 100–111)
CHOLEST SERPL-MCNC: 205 MG/DL
CO2 SERPL-SCNC: 33 MMOL/L (ref 21–32)
CREAT SERPL-MCNC: 0.76 MG/DL (ref 0.6–1.3)
DIFFERENTIAL METHOD BLD: ABNORMAL
EOSINOPHIL # BLD: 0.1 K/UL (ref 0–0.4)
EOSINOPHIL NFR BLD: 3 % (ref 0–5)
ERYTHROCYTE [DISTWIDTH] IN BLOOD BY AUTOMATED COUNT: 12.9 % (ref 11.6–14.5)
EST. AVERAGE GLUCOSE BLD GHB EST-MCNC: 120 MG/DL
GLOBULIN SER CALC-MCNC: 3.5 G/DL (ref 2–4)
GLUCOSE SERPL-MCNC: 95 MG/DL (ref 74–99)
HBA1C MFR BLD: 5.8 % (ref 4.2–5.6)
HCT VFR BLD AUTO: 42 % (ref 35–45)
HDLC SERPL-MCNC: 76 MG/DL (ref 40–60)
HDLC SERPL: 2.7 {RATIO} (ref 0–5)
HGB BLD-MCNC: 13.6 G/DL (ref 12–16)
LDLC SERPL CALC-MCNC: 115 MG/DL (ref 0–100)
LIPID PROFILE,FLP: ABNORMAL
LYMPHOCYTES # BLD: 0.9 K/UL (ref 0.9–3.6)
LYMPHOCYTES NFR BLD: 30 % (ref 21–52)
MCH RBC QN AUTO: 28.2 PG (ref 24–34)
MCHC RBC AUTO-ENTMCNC: 32.4 G/DL (ref 31–37)
MCV RBC AUTO: 87.1 FL (ref 74–97)
MONOCYTES # BLD: 0.3 K/UL (ref 0.05–1.2)
MONOCYTES NFR BLD: 11 % (ref 3–10)
NEUTS SEG # BLD: 1.7 K/UL (ref 1.8–8)
NEUTS SEG NFR BLD: 55 % (ref 40–73)
PLATELET # BLD AUTO: 201 K/UL (ref 135–420)
PMV BLD AUTO: 10.7 FL (ref 9.2–11.8)
POTASSIUM SERPL-SCNC: 3.8 MMOL/L (ref 3.5–5.5)
PROT SERPL-MCNC: 7.1 G/DL (ref 6.4–8.2)
RBC # BLD AUTO: 4.82 M/UL (ref 4.2–5.3)
SODIUM SERPL-SCNC: 140 MMOL/L (ref 136–145)
TRIGL SERPL-MCNC: 70 MG/DL (ref ?–150)
VLDLC SERPL CALC-MCNC: 14 MG/DL
WBC # BLD AUTO: 3.1 K/UL (ref 4.6–13.2)

## 2021-07-10 PROCEDURE — 80061 LIPID PANEL: CPT

## 2021-07-10 PROCEDURE — 85025 COMPLETE CBC W/AUTO DIFF WBC: CPT

## 2021-07-10 PROCEDURE — 80053 COMPREHEN METABOLIC PANEL: CPT

## 2021-07-10 PROCEDURE — 36415 COLL VENOUS BLD VENIPUNCTURE: CPT

## 2021-07-10 PROCEDURE — 73090 X-RAY EXAM OF FOREARM: CPT

## 2021-07-10 PROCEDURE — 83036 HEMOGLOBIN GLYCOSYLATED A1C: CPT

## 2021-07-13 ENCOUNTER — OFFICE VISIT (OUTPATIENT)
Dept: FAMILY MEDICINE CLINIC | Age: 66
End: 2021-07-13
Payer: MEDICARE

## 2021-07-13 VITALS
HEART RATE: 90 BPM | HEIGHT: 65 IN | WEIGHT: 185 LBS | BODY MASS INDEX: 30.82 KG/M2 | SYSTOLIC BLOOD PRESSURE: 136 MMHG | DIASTOLIC BLOOD PRESSURE: 82 MMHG | OXYGEN SATURATION: 98 % | TEMPERATURE: 97 F | RESPIRATION RATE: 16 BRPM

## 2021-07-13 DIAGNOSIS — D64.9 ANEMIA, UNSPECIFIED TYPE: ICD-10-CM

## 2021-07-13 DIAGNOSIS — M77.12 LATERAL EPICONDYLITIS OF BOTH ELBOWS: ICD-10-CM

## 2021-07-13 DIAGNOSIS — M48.062 SPINAL STENOSIS OF LUMBAR REGION WITH NEUROGENIC CLAUDICATION: ICD-10-CM

## 2021-07-13 DIAGNOSIS — M77.11 LATERAL EPICONDYLITIS OF BOTH ELBOWS: ICD-10-CM

## 2021-07-13 DIAGNOSIS — R73.01 IFG (IMPAIRED FASTING GLUCOSE): ICD-10-CM

## 2021-07-13 DIAGNOSIS — I10 ESSENTIAL HYPERTENSION: ICD-10-CM

## 2021-07-13 DIAGNOSIS — F33.9 RECURRENT DEPRESSION (HCC): ICD-10-CM

## 2021-07-13 DIAGNOSIS — E78.2 MIXED HYPERLIPIDEMIA: Primary | ICD-10-CM

## 2021-07-13 PROCEDURE — 99214 OFFICE O/P EST MOD 30 MIN: CPT | Performed by: FAMILY MEDICINE

## 2021-07-13 PROCEDURE — G8536 NO DOC ELDER MAL SCRN: HCPCS | Performed by: FAMILY MEDICINE

## 2021-07-13 PROCEDURE — G8400 PT W/DXA NO RESULTS DOC: HCPCS | Performed by: FAMILY MEDICINE

## 2021-07-13 PROCEDURE — G8417 CALC BMI ABV UP PARAM F/U: HCPCS | Performed by: FAMILY MEDICINE

## 2021-07-13 PROCEDURE — 1101F PT FALLS ASSESS-DOCD LE1/YR: CPT | Performed by: FAMILY MEDICINE

## 2021-07-13 PROCEDURE — 1090F PRES/ABSN URINE INCON ASSESS: CPT | Performed by: FAMILY MEDICINE

## 2021-07-13 PROCEDURE — G9717 DOC PT DX DEP/BP F/U NT REQ: HCPCS | Performed by: FAMILY MEDICINE

## 2021-07-13 PROCEDURE — G9899 SCRN MAM PERF RSLTS DOC: HCPCS | Performed by: FAMILY MEDICINE

## 2021-07-13 PROCEDURE — G8427 DOCREV CUR MEDS BY ELIG CLIN: HCPCS | Performed by: FAMILY MEDICINE

## 2021-07-13 PROCEDURE — 3017F COLORECTAL CA SCREEN DOC REV: CPT | Performed by: FAMILY MEDICINE

## 2021-07-13 PROCEDURE — G8752 SYS BP LESS 140: HCPCS | Performed by: FAMILY MEDICINE

## 2021-07-13 PROCEDURE — G8754 DIAS BP LESS 90: HCPCS | Performed by: FAMILY MEDICINE

## 2021-07-13 RX ORDER — DICLOFENAC SODIUM 10 MG/G
GEL TOPICAL 4 TIMES DAILY
Qty: 1 EACH | Refills: 0 | Status: SHIPPED | OUTPATIENT
Start: 2021-07-13 | End: 2021-10-13 | Stop reason: SDUPTHER

## 2021-07-13 RX ORDER — PRAVASTATIN SODIUM 20 MG/1
20 TABLET ORAL
Qty: 90 TABLET | Refills: 0 | Status: SHIPPED | OUTPATIENT
Start: 2021-07-13 | End: 2021-11-10 | Stop reason: SDUPTHER

## 2021-07-13 NOTE — PATIENT INSTRUCTIONS
Tennis Elbow: Exercises  Introduction  Here are some examples of exercises for you to try. The exercises may be suggested for a condition or for rehabilitation. Start each exercise slowly. Ease off the exercises if you start to have pain. You will be told when to start these exercises and which ones will work best for you. How to do the exercises  Wrist flexor stretch   1. Extend your arm in front of you with your palm up. 2. Bend your wrist, pointing your hand toward the floor. 3. With your other hand, gently bend your wrist farther until you feel a mild to moderate stretch in your forearm. 4. Hold for at least 15 to 30 seconds. Repeat 2 to 4 times. Wrist extensor stretch   1. Repeat steps 1 to 4 of the stretch above but begin with your extended hand palm down. Ball or sock squeeze   1. Hold a tennis ball (or a rolled-up sock) in your hand. 2. Make a fist around the ball (or sock) and squeeze. 3. Hold for about 6 seconds, and then relax for up to 10 seconds. 4. Repeat 8 to 12 times. 5. Switch the ball (or sock) to your other hand and do 8 to 12 times. Wrist deviation   1. Sit so that your arm is supported but your hand hangs off the edge of a flat surface, such as a table. 2. Hold your hand out like you are shaking hands with someone. 3. Move your hand up and down. 4. Repeat this motion 8 to 12 times. 5. Switch arms. 6. Try to do this exercise twice with each hand. Wrist curls   1. Place your forearm on a table with your hand hanging over the edge of the table, palm up. 2. Place a 1- to 2-pound weight in your hand. This may be a dumbbell, a can of food, or a filled water bottle. 3. Slowly raise and lower the weight while keeping your forearm on the table and your palm facing up. 4. Repeat this motion 8 to 12 times. 5. Switch arms, and do steps 1 through 4.  6. Repeat with your hand facing down toward the floor. Switch arms. Biceps curls   1.  Sit leaning forward with your legs slightly spread and your left hand on your left thigh. 2. Place your right elbow on your right thigh, and hold the weight with your forearm horizontal.  3. Slowly curl the weight up and toward your chest.  4. Repeat this motion 8 to 12 times. 5. Switch arms, and do steps 1 through 4. Follow-up care is a key part of your treatment and safety. Be sure to make and go to all appointments, and call your doctor if you are having problems. It's also a good idea to know your test results and keep a list of the medicines you take. Where can you learn more? Go to http://www.gray.com/  Enter K617 in the search box to learn more about \"Tennis Elbow: Exercises. \"  Current as of: November 16, 2020               Content Version: 12.8  © 8687-4373 [employer]. Care instructions adapted under license by [employer] (which disclaims liability or warranty for this information). If you have questions about a medical condition or this instruction, always ask your healthcare professional. [address] any warranty or liability for your use of this information.

## 2021-07-13 NOTE — PROGRESS NOTES
Olevia Homans, 73 y.o.,  female    SUBJECTIVE  Ff-up     HTN- taking meds without problems. She also has prediabetes, reviewed labs a1c 5.8, borderline high cholesterol    Depression/hot flashes- says doing well for the most part, taking paxil.       Chronic LBP- on lyrica, following dr. Francia Chiang    Allergic rhinitis-doing well    C/o L now bilateral lateral elbow pain. Felt naprosyn given was \"too strong\" for her. She had xrays showing wrist arthritis, no elbow abnormality. She is R handed, no known trauma, changes in activity      ROS:  See HPI, all others negative        Patient Active Problem List   Diagnosis Code    Hypertension I10    Recurrent depression ([address] F33.9    Lumbar radiculopathy M54.16    Herpes genitalis in women A60.09    Allergic rhinitis due to allergen J30.9    Hot flashes R23.2    Advance directive discussed with patient Z70.80    Facet arthropathy M47.819    Lumbar stenosis M48.061    Advance care planning Z71.89    Obesity E66.9    Mild single current episode of major depressive disorder (HCC) F32.0    IFG (impaired fasting glucose) R73.01       Current Outpatient Medications   Medication Sig Dispense Refill    pravastatin (PRAVACHOL) 20 mg tablet Take 1 Tablet by mouth nightly. 90 Tablet 0    diclofenac (VOLTAREN) 1 % gel Apply  to affected area four (4) times daily. 1 Each 0    lisinopril-hydroCHLOROthiazide (PRINZIDE, ZESTORETIC) 20-25 mg per tablet Take 1 Tablet by mouth daily. 90 Tablet 0    allergy injection every seven (7) days.  cyclobenzaprine (FLEXERIL) 10 mg tablet Take 1 Tab by mouth three (3) times daily as needed for Muscle Spasm(s). 20 Tab 0    naproxen (NAPROSYN) 500 mg tablet Take 1 Tab by mouth two (2) times daily (with meals). 30 Tab 0    PARoxetine (PAXIL) 20 mg tablet Take 1 Tab by mouth daily. 90 Tab 1    ascorbic acid, vitamin C, (VITAMIN C) 500 mg tablet Take 500 mg by mouth two (2) times daily (with meals).       bisacodyL (DULCOLAX) 5 mg EC tablet Take 10 mg by mouth daily as needed.  cholecalciferol (VITAMIN D3) 25 mcg (1,000 unit) cap       multivit-min-ferrous fumarate (Multi Vitamin) 9 mg iron/15 mL liqd       triamcinolone acetonide 0.025 % lotion Apply thin layer on affected area twice a day 60 mL 0    pregabalin (LYRICA) 75 mg capsule Take 1 Cap by mouth three (3) times daily. Max Daily Amount: 225 mg. 90 Cap 5       No Known Allergies    Past Medical History:   Diagnosis Date    Arthritis     Bulging lumbar disc     Degenerative arthritis of finger     MP joint right index finger    Depression     H/O colonoscopy 12/2014    recommended yearly hemoccult stools    Hypertension     Low back pain     Lower extremity pain, left     Pinched nerve        Social History     Socioeconomic History    Marital status:      Spouse name: Not on file    Number of children: Not on file    Years of education: Not on file    Highest education level: Not on file   Occupational History    Not on file   Tobacco Use    Smoking status: Never Smoker    Smokeless tobacco: Never Used   Substance and Sexual Activity    Alcohol use: No    Drug use: No    Sexual activity: Not Currently   Other Topics Concern    Not on file   Social History Narrative    Not on file     Social Determinants of Health     Financial Resource Strain:     Difficulty of Paying Living Expenses:    Food Insecurity:     Worried About Running Out of Food in the Last Year:     Ran Out of Food in the Last Year:    Transportation Needs:     Lack of Transportation (Medical):      Lack of Transportation (Non-Medical):    Physical Activity:     Days of Exercise per Week:     Minutes of Exercise per Session:    Stress:     Feeling of Stress :    Social Connections:     Frequency of Communication with Friends and Family:     Frequency of Social Gatherings with Friends and Family:     Attends Baptism Services:     Active Member of Clubs or Organizations:     Attends Club or Organization Meetings:     Marital Status:    Intimate Partner Violence:     Fear of Current or Ex-Partner:     Emotionally Abused:     Physically Abused:     Sexually Abused:        Family History   Problem Relation Age of Onset    Cancer Mother     Ovarian Cancer Mother 68    Stroke Father          OBJECTIVE    Physical Exam:     Visit Vitals  /82 (BP 1 Location: Left upper arm, BP Patient Position: Sitting, BP Cuff Size: Adult)   Pulse 90   Temp 97 °F (36.1 °C) (Oral)   Resp 16   Ht 5' 5.25\" (1.657 m)   Wt 185 lb (83.9 kg)   LMP  (LMP Unknown)   SpO2 98%   BMI 30.55 kg/m²       General: alert, well-appearing, obese,AA, in no apparent distress or pain  Head: atraumatic. Non-tender maxillary and frontal sinuses  Neck: supple, no adenopathy palpated  CVS: normal rate, regular rhythm, distinct S1 and S2  Lungs:clear to ausculation bilaterally, no crackles, wheezing or rhonchi noted  Abdomen: normoactive bowel sounds, soft, non-tender  Extremities:MTTP on bilateral lateral elbow R>L, FROM,  no swelling/discoloration  Skin: warm, no lesions, rashes noted  Psych:  mood and affect normal    Results for orders placed or performed during the hospital encounter of 07/10/21   HEMOGLOBIN A1C WITH EAG   Result Value Ref Range    Hemoglobin A1c 5.8 (H) 4.2 - 5.6 %    Est. average glucose 749 mg/dL   METABOLIC PANEL, COMPREHENSIVE   Result Value Ref Range    Sodium 140 136 - 145 mmol/L    Potassium 3.8 3.5 - 5.5 mmol/L    Chloride 106 100 - 111 mmol/L    CO2 33 (H) 21 - 32 mmol/L    Anion gap 1 (L) 3.0 - 18 mmol/L    Glucose 95 74 - 99 mg/dL    BUN 15 7.0 - 18 MG/DL    Creatinine 0.76 0.6 - 1.3 MG/DL    BUN/Creatinine ratio 20 12 - 20      GFR est AA >60 >60 ml/min/1.73m2    GFR est non-AA >60 >60 ml/min/1.73m2    Calcium 9.2 8.5 - 10.1 MG/DL    Bilirubin, total 0.8 0.2 - 1.0 MG/DL    ALT (SGPT) 39 13 - 56 U/L    AST (SGOT) 25 10 - 38 U/L    Alk.  phosphatase 64 45 - 117 U/L Protein, total 7.1 6.4 - 8.2 g/dL    Albumin 3.6 3.4 - 5.0 g/dL    Globulin 3.5 2.0 - 4.0 g/dL    A-G Ratio 1.0 0.8 - 1.7     LIPID PANEL   Result Value Ref Range    LIPID PROFILE          Cholesterol, total 205 (H) <200 MG/DL    Triglyceride 70 <150 MG/DL    HDL Cholesterol 76 (H) 40 - 60 MG/DL    LDL, calculated 115 (H) 0 - 100 MG/DL    VLDL, calculated 14 MG/DL    CHOL/HDL Ratio 2.7 0 - 5.0     CBC WITH AUTOMATED DIFF   Result Value Ref Range    WBC 3.1 (L) 4.6 - 13.2 K/uL    RBC 4.82 4.20 - 5.30 M/uL    HGB 13.6 12.0 - 16.0 g/dL    HCT 42.0 35.0 - 45.0 %    MCV 87.1 74.0 - 97.0 FL    MCH 28.2 24.0 - 34.0 PG    MCHC 32.4 31.0 - 37.0 g/dL    RDW 12.9 11.6 - 14.5 %    PLATELET 174 467 - 440 K/uL    MPV 10.7 9.2 - 11.8 FL    NEUTROPHILS 55 40 - 73 %    LYMPHOCYTES 30 21 - 52 %    MONOCYTES 11 (H) 3 - 10 %    EOSINOPHILS 3 0 - 5 %    BASOPHILS 1 0 - 2 %    ABS. NEUTROPHILS 1.7 (L) 1.8 - 8.0 K/UL    ABS. LYMPHOCYTES 0.9 0.9 - 3.6 K/UL    ABS. MONOCYTES 0.3 0.05 - 1.2 K/UL    ABS. EOSINOPHILS 0.1 0.0 - 0.4 K/UL    ABS. BASOPHILS 0.0 0.0 - 0.1 K/UL    DF AUTOMATED           ASSESSMENT/PLAN  Diagnoses and all orders for this visit:    1. IFG (impaired fasting glucose)  Persistent  TLCs, monitoring    2. Essential hypertension  Controlled  Cont prinzide  Start statin  Labs prior to next visit  -     METABOLIC PANEL, COMPREHENSIVE; Future  -     LIPID PANEL; Future    3. Anemia, unspecified type  H/o Acute GI bleed, resolved thought to be diverticular bleed on GI work up  Normal hgb, mildly low wbc, monitoring  Labs prior to next visit  -     CBC WITH AUTOMATED DIFF; Future    4. Bilateral lateral epicondylitis  Start voltaren gel  HEP provided  If not better in 2 weeks, to call us will send to ortho    5. Recurrent depression (HCC)  Stable  Cont paxil      6. Spinal stenosis of lumbar region with neurogenic claudication  On lyrica  Following PMNR    7.  Mixed hyperlipidemia  Calc cv risk 7.5% vs 5% for age  We discussed options, agreed on mod int statin  Start pravachol 20 mg qhs  Check lipid panel/cmp prior to next visit    Follow-up and Dispositions    · Return in about 3 months (around 10/13/2021), or if symptoms worsen or fail to improve, for fasting labs a week prior to your next visit, routine chronic illness care. Patient understands plan of care. Patient has provided input and agrees with goals.

## 2021-08-07 ENCOUNTER — HOSPITAL ENCOUNTER (OUTPATIENT)
Dept: LAB | Age: 66
Discharge: HOME | End: 2021-08-07
Payer: MEDICARE

## 2021-08-07 DIAGNOSIS — I10 ESSENTIAL HYPERTENSION: ICD-10-CM

## 2021-08-07 DIAGNOSIS — E78.2 MIXED HYPERLIPIDEMIA: ICD-10-CM

## 2021-08-07 LAB
ALBUMIN SERPL-MCNC: 3.5 G/DL (ref 3.4–5)
ALBUMIN/GLOB SERPL: 1 {RATIO} (ref 0.8–1.7)
ALP SERPL-CCNC: 62 U/L (ref 45–117)
ALT SERPL-CCNC: 35 U/L (ref 13–56)
ANION GAP SERPL CALC-SCNC: 2 MMOL/L (ref 3–18)
AST SERPL-CCNC: 23 U/L (ref 10–38)
BASOPHILS # BLD: 0 K/UL (ref 0–0.1)
BASOPHILS NFR BLD: 1 % (ref 0–2)
BILIRUB SERPL-MCNC: 1 MG/DL (ref 0.2–1)
BUN SERPL-MCNC: 14 MG/DL (ref 7–18)
BUN/CREAT SERPL: 21 (ref 12–20)
CALCIUM SERPL-MCNC: 8.9 MG/DL (ref 8.5–10.1)
CHLORIDE SERPL-SCNC: 109 MMOL/L (ref 100–111)
CHOLEST SERPL-MCNC: 148 MG/DL
CO2 SERPL-SCNC: 29 MMOL/L (ref 21–32)
CREAT SERPL-MCNC: 0.68 MG/DL (ref 0.6–1.3)
DIFFERENTIAL METHOD BLD: ABNORMAL
EOSINOPHIL # BLD: 0.1 K/UL (ref 0–0.4)
EOSINOPHIL NFR BLD: 2 % (ref 0–5)
ERYTHROCYTE [DISTWIDTH] IN BLOOD BY AUTOMATED COUNT: 13.2 % (ref 11.6–14.5)
GLOBULIN SER CALC-MCNC: 3.5 G/DL (ref 2–4)
GLUCOSE SERPL-MCNC: 81 MG/DL (ref 74–99)
HCT VFR BLD AUTO: 41.7 % (ref 35–45)
HDLC SERPL-MCNC: 70 MG/DL (ref 40–60)
HDLC SERPL: 2.1 {RATIO} (ref 0–5)
HGB BLD-MCNC: 13.6 G/DL (ref 12–16)
LDLC SERPL CALC-MCNC: 67 MG/DL (ref 0–100)
LIPID PROFILE,FLP: ABNORMAL
LYMPHOCYTES # BLD: 1 K/UL (ref 0.9–3.6)
LYMPHOCYTES NFR BLD: 30 % (ref 21–52)
MCH RBC QN AUTO: 28.6 PG (ref 24–34)
MCHC RBC AUTO-ENTMCNC: 32.6 G/DL (ref 31–37)
MCV RBC AUTO: 87.8 FL (ref 74–97)
MONOCYTES # BLD: 0.4 K/UL (ref 0.05–1.2)
MONOCYTES NFR BLD: 11 % (ref 3–10)
NEUTS SEG # BLD: 1.9 K/UL (ref 1.8–8)
NEUTS SEG NFR BLD: 56 % (ref 40–73)
PLATELET # BLD AUTO: 184 K/UL (ref 135–420)
PMV BLD AUTO: 11 FL (ref 9.2–11.8)
POTASSIUM SERPL-SCNC: 4 MMOL/L (ref 3.5–5.5)
PROT SERPL-MCNC: 7 G/DL (ref 6.4–8.2)
RBC # BLD AUTO: 4.75 M/UL (ref 4.2–5.3)
SODIUM SERPL-SCNC: 140 MMOL/L (ref 136–145)
TRIGL SERPL-MCNC: 55 MG/DL (ref ?–150)
VLDLC SERPL CALC-MCNC: 11 MG/DL
WBC # BLD AUTO: 3.3 K/UL (ref 4.6–13.2)

## 2021-08-07 PROCEDURE — 36415 COLL VENOUS BLD VENIPUNCTURE: CPT

## 2021-08-07 PROCEDURE — 85025 COMPLETE CBC W/AUTO DIFF WBC: CPT

## 2021-08-07 PROCEDURE — 80061 LIPID PANEL: CPT

## 2021-08-07 PROCEDURE — 80053 COMPREHEN METABOLIC PANEL: CPT

## 2021-08-18 NOTE — PROGRESS NOTES
Patient identified with 2 identifiers (name and ).   Patient aware of Cholesterol level has improved, cont pravachol  No anemia on repeat CBC

## 2021-09-21 RX ORDER — LISINOPRIL AND HYDROCHLOROTHIAZIDE 20; 25 MG/1; MG/1
TABLET ORAL
Qty: 90 TABLET | Refills: 0 | Status: SHIPPED | OUTPATIENT
Start: 2021-09-21 | End: 2021-10-13 | Stop reason: SDUPTHER

## 2021-10-13 ENCOUNTER — OFFICE VISIT (OUTPATIENT)
Dept: FAMILY MEDICINE CLINIC | Age: 66
End: 2021-10-13
Payer: MEDICARE

## 2021-10-13 VITALS
HEART RATE: 73 BPM | SYSTOLIC BLOOD PRESSURE: 118 MMHG | DIASTOLIC BLOOD PRESSURE: 82 MMHG | OXYGEN SATURATION: 97 % | TEMPERATURE: 97.2 F | HEIGHT: 65 IN | BODY MASS INDEX: 29.89 KG/M2 | WEIGHT: 179.4 LBS | RESPIRATION RATE: 16 BRPM

## 2021-10-13 DIAGNOSIS — Z23 ENCOUNTER FOR IMMUNIZATION: ICD-10-CM

## 2021-10-13 DIAGNOSIS — R73.03 PREDIABETES: ICD-10-CM

## 2021-10-13 DIAGNOSIS — Z23 NEEDS FLU SHOT: ICD-10-CM

## 2021-10-13 DIAGNOSIS — I10 PRIMARY HYPERTENSION: ICD-10-CM

## 2021-10-13 DIAGNOSIS — M48.062 SPINAL STENOSIS OF LUMBAR REGION WITH NEUROGENIC CLAUDICATION: ICD-10-CM

## 2021-10-13 DIAGNOSIS — Z00.00 MEDICARE ANNUAL WELLNESS VISIT, SUBSEQUENT: ICD-10-CM

## 2021-10-13 DIAGNOSIS — Z78.0 POST-MENOPAUSAL: Primary | ICD-10-CM

## 2021-10-13 DIAGNOSIS — J30.2 SEASONAL ALLERGIC RHINITIS, UNSPECIFIED TRIGGER: ICD-10-CM

## 2021-10-13 DIAGNOSIS — F33.9 RECURRENT DEPRESSION (HCC): ICD-10-CM

## 2021-10-13 PROCEDURE — G0009 ADMIN PNEUMOCOCCAL VACCINE: HCPCS | Performed by: FAMILY MEDICINE

## 2021-10-13 PROCEDURE — G8536 NO DOC ELDER MAL SCRN: HCPCS | Performed by: FAMILY MEDICINE

## 2021-10-13 PROCEDURE — G0008 ADMIN INFLUENZA VIRUS VAC: HCPCS | Performed by: FAMILY MEDICINE

## 2021-10-13 PROCEDURE — G8427 DOCREV CUR MEDS BY ELIG CLIN: HCPCS | Performed by: FAMILY MEDICINE

## 2021-10-13 PROCEDURE — 90694 VACC AIIV4 NO PRSRV 0.5ML IM: CPT | Performed by: FAMILY MEDICINE

## 2021-10-13 PROCEDURE — 90732 PPSV23 VACC 2 YRS+ SUBQ/IM: CPT | Performed by: FAMILY MEDICINE

## 2021-10-13 PROCEDURE — G9717 DOC PT DX DEP/BP F/U NT REQ: HCPCS | Performed by: FAMILY MEDICINE

## 2021-10-13 PROCEDURE — G8400 PT W/DXA NO RESULTS DOC: HCPCS | Performed by: FAMILY MEDICINE

## 2021-10-13 PROCEDURE — 99214 OFFICE O/P EST MOD 30 MIN: CPT | Performed by: FAMILY MEDICINE

## 2021-10-13 PROCEDURE — G9899 SCRN MAM PERF RSLTS DOC: HCPCS | Performed by: FAMILY MEDICINE

## 2021-10-13 PROCEDURE — G8754 DIAS BP LESS 90: HCPCS | Performed by: FAMILY MEDICINE

## 2021-10-13 PROCEDURE — G8752 SYS BP LESS 140: HCPCS | Performed by: FAMILY MEDICINE

## 2021-10-13 PROCEDURE — G8417 CALC BMI ABV UP PARAM F/U: HCPCS | Performed by: FAMILY MEDICINE

## 2021-10-13 PROCEDURE — 3017F COLORECTAL CA SCREEN DOC REV: CPT | Performed by: FAMILY MEDICINE

## 2021-10-13 PROCEDURE — G0439 PPPS, SUBSEQ VISIT: HCPCS | Performed by: FAMILY MEDICINE

## 2021-10-13 PROCEDURE — 1101F PT FALLS ASSESS-DOCD LE1/YR: CPT | Performed by: FAMILY MEDICINE

## 2021-10-13 PROCEDURE — 1090F PRES/ABSN URINE INCON ASSESS: CPT | Performed by: FAMILY MEDICINE

## 2021-10-13 RX ORDER — LEVOCETIRIZINE DIHYDROCHLORIDE 5 MG/1
5 TABLET, FILM COATED ORAL DAILY
COMMUNITY
Start: 2021-09-14

## 2021-10-13 RX ORDER — FEXOFENADINE HYDROCHLORIDE 180 MG/1
180 TABLET, FILM COATED ORAL DAILY
COMMUNITY
Start: 2021-07-19

## 2021-10-13 RX ORDER — TRIAMCINOLONE ACETONIDE 0.25 MG/ML
LOTION TOPICAL
Qty: 60 ML | Refills: 0 | Status: SHIPPED | OUTPATIENT
Start: 2021-10-13 | End: 2021-11-12 | Stop reason: SDUPTHER

## 2021-10-13 RX ORDER — PAROXETINE HYDROCHLORIDE 20 MG/1
20 TABLET, FILM COATED ORAL DAILY
Qty: 90 TABLET | Refills: 1 | Status: SHIPPED | OUTPATIENT
Start: 2021-10-13 | End: 2022-05-19 | Stop reason: SDUPTHER

## 2021-10-13 RX ORDER — HYDROXYZINE 25 MG/1
TABLET, FILM COATED ORAL
COMMUNITY
Start: 2021-09-08

## 2021-10-13 RX ORDER — LISINOPRIL AND HYDROCHLOROTHIAZIDE 20; 25 MG/1; MG/1
1 TABLET ORAL DAILY
Qty: 90 TABLET | Refills: 1 | Status: SHIPPED | OUTPATIENT
Start: 2021-10-13 | End: 2022-07-07

## 2021-10-13 RX ORDER — DICLOFENAC SODIUM 10 MG/G
GEL TOPICAL 4 TIMES DAILY
Qty: 1 EACH | Refills: 1 | Status: SHIPPED | OUTPATIENT
Start: 2021-10-13

## 2021-10-13 NOTE — PROGRESS NOTES
This is the Subsequent Medicare Annual Wellness Exam, performed 12 months or more after the Initial AWV or the last Subsequent AWV    I have reviewed the patient's medical history in detail and updated the computerized patient record. Assessment/Plan   Education and counseling provided:  Are appropriate based on today's review and evaluation  Pneumococcal Vaccine- 23 given today  Influenza Vaccine- given today  Screening Mammography- 3/2021  Colorectal cancer screening tests- 2020 update 2025  Cardiovascular screening blood test 10/2021  Bone mass measurement (DEXA) due order placed  Diabetes screening test 10/2021    1. Post-menopausal  -     DEXA BONE DENSITY STUDY AXIAL; Future  2. Encounter for immunization  -     PNEUMOCOCCAL POLYSACCHARIDE VACCINE, 23-VALENT, ADULT OR IMMUNOSUPPRESSED PT DOSE,  -     ADMIN INFLUENZA VIRUS VAC  3. Needs flu shot  -     FLU (FLUAD QUAD INFLUENZA VACCINE,QUAD,ADJUVANTED)       Depression Risk Factor Screening     3 most recent PHQ Screens 2/10/2020   Little interest or pleasure in doing things Not at all   Feeling down, depressed, irritable, or hopeless Not at all   Total Score PHQ 2 0       Alcohol Risk Screen    Do you average more than 1 drink per night or more than 7 drinks a week:  No    On any one occasion in the past three months have you have had more than 3 drinks containing alcohol:  No        Functional Ability and Level of Safety    Hearing: Hearing is good. Activities of Daily Living: The home contains: no safety equipment. Patient does total self care      Ambulation: with no difficulty     Fall Risk:  Fall Risk Assessment, last 12 mths 7/13/2021   Able to walk? Yes   Fall in past 12 months?  0      Abuse Screen:  Patient is not abused       Cognitive Screening    Has your family/caregiver stated any concerns about your memory: no      Health Maintenance Due     Health Maintenance Due   Topic Date Due    Bone Densitometry (Dexa) Screening  Never done    Pneumococcal 65+ years (1 of 1 - PPSV23) Never done    Flu Vaccine (1) 09/01/2021       Patient Care Team   Patient Care Team:  Rachel Grajeda MD as PCP - General (Family Medicine)  Rachel Grajeda MD as PCP - [facility]  Duane Hemp., DAVID Gary MD (Orthopedic Surgery)  Pearl Gifford MD (Orthopedic Surgery)  Luisa Quevedo MD (Gastroenterology)  Nasim Buchanan NP (Nurse Practitioner)    History     Patient Active Problem List   Diagnosis Code    Hypertension I10    Recurrent depression ([address] F33.9    Lumbar radiculopathy M54.16    Herpes genitalis in women A60.09    Allergic rhinitis due to allergen J30.9    Hot flashes R23.2    Advance directive discussed with patient Z71.89    Facet arthropathy M47.819    Lumbar stenosis M48.061    Advance care planning Z71.89    Obesity E66.9    Mild single current episode of major depressive disorder ([facility] [address] F32.0    IFG (impaired fasting glucose) R73.01     Past Medical History:   Diagnosis Date    Arthritis     Bulging lumbar disc     Degenerative arthritis of finger     MP joint right index finger    Depression     H/O colonoscopy 12/2014    recommended yearly hemoccult stools    Hypertension     Low back pain     Lower extremity pain, left     Pinched nerve       Past Surgical History:   Procedure Laterality Date    COLONOSCOPY N/A 2/5/2020    COLONOSCOPY performed by Ilda Hodges MD at [facility]    HX CARPAL TUNNEL RELEASE Right     hand    HX COLONOSCOPY  2014    benign polyp    HX HYSTERECTOMY      HX ORTHOPAEDIC      heel spurs     Current Outpatient Medications   Medication Sig Dispense Refill    Allergy Relief, fexofenadine, 180 mg tablet Take 180 mg by mouth daily.  hydrOXYzine HCL (ATARAX) 25 mg tablet take 1 tablet by mouth AT NIGHT      levocetirizine (XYZAL) 5 mg tablet Take 5 mg by mouth daily.       PARoxetine (PAXIL) 20 mg tablet Take 1 Tablet by mouth daily. 90 Tablet 1    triamcinolone acetonide 0.025 % lotion Apply thin layer on affected area twice a day 60 mL 0    lisinopril-hydroCHLOROthiazide (PRINZIDE, ZESTORETIC) 20-25 mg per tablet Take 1 Tablet by mouth daily. 90 Tablet 1    diclofenac (VOLTAREN) 1 % gel Apply  to affected area four (4) times daily. 1 Each 1    pravastatin (PRAVACHOL) 20 mg tablet Take 1 Tablet by mouth nightly. 90 Tablet 0    allergy injection every seven (7) days.  cyclobenzaprine (FLEXERIL) 10 mg tablet Take 1 Tab by mouth three (3) times daily as needed for Muscle Spasm(s). 20 Tab 0    naproxen (NAPROSYN) 500 mg tablet Take 1 Tab by mouth two (2) times daily (with meals). 30 Tab 0    ascorbic acid, vitamin C, (VITAMIN C) 500 mg tablet Take 500 mg by mouth two (2) times daily (with meals).  bisacodyL (DULCOLAX) 5 mg EC tablet Take 10 mg by mouth daily as needed.  cholecalciferol (VITAMIN D3) 25 mcg (1,000 unit) cap       multivit-min-ferrous fumarate (Multi Vitamin) 9 mg iron/15 mL liqd       pregabalin (LYRICA) 75 mg capsule Take 1 Cap by mouth three (3) times daily. Max Daily Amount: 225 mg. 90 Cap 5     No Known Allergies    Family History   Problem Relation Age of Onset    Cancer Mother     Ovarian Cancer Mother 68    Stroke Father      Social History     Tobacco Use    Smoking status: Never Smoker    Smokeless tobacco: Never Used   Substance Use Topics    Alcohol use: No         MD Casimiro Hinson, 73 y.o.,  female    SUBJECTIVE  Ff-up     HTN- taking meds without problems.  She also has prediabetes, reviewed labs cholesterol levels have improved    HL- on pravachol, reviewed labs LDL 67    Depression/hot flashes- says doing well for the most part, taking paxil.       Chronic LBP- on lyrica, following dr. Blaise Quinones    Allergic rhinitis-doing well    ROS:  See HPI, all others negative        Patient Active Problem List   Diagnosis Code    Hypertension I10    Recurrent depression ([address] F33.9    Lumbar radiculopathy M54.16    Herpes genitalis in women A60.09    Allergic rhinitis due to allergen J30.9    Hot flashes R23.2    Advance directive discussed with patient Z70.80    Facet arthropathy M47.819    Lumbar stenosis M48.061    Advance care planning Z71.89    Obesity E66.9    Mild single current episode of major depressive disorder (HCC) F32.0    IFG (impaired fasting glucose) R73.01       Current Outpatient Medications   Medication Sig Dispense Refill    Allergy Relief, fexofenadine, 180 mg tablet Take 180 mg by mouth daily.  hydrOXYzine HCL (ATARAX) 25 mg tablet take 1 tablet by mouth AT NIGHT      levocetirizine (XYZAL) 5 mg tablet Take 5 mg by mouth daily.  PARoxetine (PAXIL) 20 mg tablet Take 1 Tablet by mouth daily. 90 Tablet 1    triamcinolone acetonide 0.025 % lotion Apply thin layer on affected area twice a day 60 mL 0    lisinopril-hydroCHLOROthiazide (PRINZIDE, ZESTORETIC) 20-25 mg per tablet Take 1 Tablet by mouth daily. 90 Tablet 1    diclofenac (VOLTAREN) 1 % gel Apply  to affected area four (4) times daily. 1 Each 1    pravastatin (PRAVACHOL) 20 mg tablet Take 1 Tablet by mouth nightly. 90 Tablet 0    allergy injection every seven (7) days.  cyclobenzaprine (FLEXERIL) 10 mg tablet Take 1 Tab by mouth three (3) times daily as needed for Muscle Spasm(s). 20 Tab 0    naproxen (NAPROSYN) 500 mg tablet Take 1 Tab by mouth two (2) times daily (with meals). 30 Tab 0    ascorbic acid, vitamin C, (VITAMIN C) 500 mg tablet Take 500 mg by mouth two (2) times daily (with meals).  bisacodyL (DULCOLAX) 5 mg EC tablet Take 10 mg by mouth daily as needed.  cholecalciferol (VITAMIN D3) 25 mcg (1,000 unit) cap       multivit-min-ferrous fumarate (Multi Vitamin) 9 mg iron/15 mL liqd       pregabalin (LYRICA) 75 mg capsule Take 1 Cap by mouth three (3) times daily.  Max Daily Amount: 225 mg. 90 Cap 5       No Known Allergies    Past Medical History:   Diagnosis Date    Arthritis     Bulging lumbar disc     Degenerative arthritis of finger     MP joint right index finger    Depression     H/O colonoscopy 12/2014    recommended yearly hemoccult stools    Hypertension     Low back pain     Lower extremity pain, left     Pinched nerve        Social History     Socioeconomic History    Marital status:      Spouse name: Not on file    Number of children: Not on file    Years of education: Not on file    Highest education level: Not on file   Occupational History    Not on file   Tobacco Use    Smoking status: Never Smoker    Smokeless tobacco: Never Used   Substance and Sexual Activity    Alcohol use: No    Drug use: No    Sexual activity: Not Currently   Other Topics Concern    Not on file   Social History Narrative    Not on file     Social Determinants of Health     Financial Resource Strain:     Difficulty of Paying Living Expenses:    Food Insecurity:     Worried About Running Out of Food in the Last Year:     [address] in the Last Year:    Transportation Needs:     Lack of Transportation (Medical):      Lack of Transportation (Non-Medical):    Physical Activity:     Days of Exercise per Week:     Minutes of Exercise per Session:    Stress:     Feeling of Stress :    Social Connections:     Frequency of Communication with Friends and Family:     Frequency of Social Gatherings with Friends and Family:     Attends Voodoo Services:     Active Member of Clubs or Organizations:     Attends Club or Organization Meetings:     Marital Status:    Intimate Partner Violence:     Fear of Current or Ex-Partner:     Emotionally Abused:     Physically Abused:     Sexually Abused:        Family History   Problem Relation Age of Onset    Cancer Mother     Ovarian Cancer Mother 68    Stroke Father          OBJECTIVE    Physical Exam:     Visit Vitals  /82 (BP 1 Location: Left upper arm, BP Patient Position: Sitting, BP Cuff Size: Adult)   Pulse 73   Temp 97.2 °F (36.2 °C) (Temporal)   Resp 16   Ht 5' 5.25\" (1.657 m)   Wt 179 lb 6.4 oz (81.4 kg)   LMP  (LMP Unknown)   SpO2 97%   BMI 29.63 kg/m²       General: alert, well-appearing, obese,AA, in no apparent distress or pain  Head: atraumatic. Non-tender maxillary and frontal sinuses  Neck: supple, no adenopathy palpated  CVS: normal rate, regular rhythm, distinct S1 and S2  Lungs:clear to ausculation bilaterally, no crackles, wheezing or rhonchi noted  Abdomen: normoactive bowel sounds, soft, non-tender  Extremities:MTTP on bilateral lateral elbow R>L, FROM,  no swelling/discoloration  Skin: warm, no lesions, rashes noted  Psych:  mood and affect normal    Results for orders placed or performed during the hospital encounter of 08/07/21   LIPID PANEL   Result Value Ref Range    LIPID PROFILE          Cholesterol, total 148 <200 MG/DL    Triglyceride 55 <150 MG/DL    HDL Cholesterol 70 (H) 40 - 60 MG/DL    LDL, calculated 67 0 - 100 MG/DL    VLDL, calculated 11 MG/DL    CHOL/HDL Ratio 2.1 0 - 5.0     METABOLIC PANEL, COMPREHENSIVE   Result Value Ref Range    Sodium 140 136 - 145 mmol/L    Potassium 4.0 3.5 - 5.5 mmol/L    Chloride 109 100 - 111 mmol/L    CO2 29 21 - 32 mmol/L    Anion gap 2 (L) 3.0 - 18 mmol/L    Glucose 81 74 - 99 mg/dL    BUN 14 7.0 - 18 MG/DL    Creatinine 0.68 0.6 - 1.3 MG/DL    BUN/Creatinine ratio 21 (H) 12 - 20      GFR est AA >60 >60 ml/min/1.73m2    GFR est non-AA >60 >60 ml/min/1.73m2    Calcium 8.9 8.5 - 10.1 MG/DL    Bilirubin, total 1.0 0.2 - 1.0 MG/DL    ALT (SGPT) 35 13 - 56 U/L    AST (SGOT) 23 10 - 38 U/L    Alk.  phosphatase 62 45 - 117 U/L    Protein, total 7.0 6.4 - 8.2 g/dL    Albumin 3.5 3.4 - 5.0 g/dL    Globulin 3.5 2.0 - 4.0 g/dL    A-G Ratio 1.0 0.8 - 1.7     CBC WITH AUTOMATED DIFF   Result Value Ref Range    WBC 3.3 (L) 4.6 - 13.2 K/uL    RBC 4.75 4.20 - 5.30 M/uL    HGB 13.6 12.0 - 16.0 g/dL    HCT 41.7 35.0 - 45.0 %    MCV 87.8 74.0 - 97.0 FL    MCH 28.6 24.0 - 34.0 PG    MCHC 32.6 31.0 - 37.0 g/dL    RDW 13.2 11.6 - 14.5 %    PLATELET 737 139 - 405 K/uL    MPV 11.0 9.2 - 11.8 FL    NEUTROPHILS 56 40 - 73 %    LYMPHOCYTES 30 21 - 52 %    MONOCYTES 11 (H) 3 - 10 %    EOSINOPHILS 2 0 - 5 %    BASOPHILS 1 0 - 2 %    ABS. NEUTROPHILS 1.9 1.8 - 8.0 K/UL    ABS. LYMPHOCYTES 1.0 0.9 - 3.6 K/UL    ABS. MONOCYTES 0.4 0.05 - 1.2 K/UL    ABS. EOSINOPHILS 0.1 0.0 - 0.4 K/UL    ABS. BASOPHILS 0.0 0.0 - 0.1 K/UL    DF AUTOMATED           ASSESSMENT/PLAN  Diagnoses and all orders for this visit:    1. IFG (impaired fasting glucose)  Persistent  TLCs, monitoring    2. Essential hypertension  Controlled  Cont prinzide  Cont pravachol    3. Recurrent depression (HCC)  Stable  Cont paxil    4. Spinal stenosis of lumbar region with neurogenic claudication  On lyrica  Following PMNR    5. Mixed hyperlipidemia  Good range on pravachol 20 mg qhs, to cont    Patient understands plan of care. Patient has provided input and agrees with goals.

## 2021-10-13 NOTE — PROGRESS NOTES
1. \"Have you been to the ER, urgent care clinic since your last visit? Hospitalized since your last visit? \" No    2. \"Have you seen or consulted any other health care providers outside of the [address] [place] [address] since your last visit? \" [facility] 10/13/21 for allergy injection. 3. For patients aged 39-70: Has the patient had a colonoscopy? Yes, HM satisfied with blue hyperlink 2/05/2020    If the patient is female:    4. For patients aged 41-77: Has the patient had a mammogram within the past 2 years? Yes, HM satisfied with blue hyperlink 3/09/21    5. For patients aged 21-65: Has the patient had a pap smear?  No hysterectomy    Chief Complaint   Patient presents with    Blood sugar problem    Hypertension    Anemia    Depression    Cholesterol Problem

## 2021-10-13 NOTE — PROGRESS NOTES
[id number], 0.5 ml given IM in right deltoid. Lot # [id number], exp date 09/13/2022. Patient tolerated injection well. No adverse reaction noted. Fluad 0.5 ml given IM in left deltoid. Lot # [id number], exp date 06/30/2022. Patient tolerated injection well. No adverse reaction noted.

## 2021-10-13 NOTE — PATIENT INSTRUCTIONS
Medicare Wellness Visit, Female     The best way to live healthy is to have a lifestyle where you eat a well-balanced diet, exercise regularly, limit alcohol use, and quit all forms of tobacco/nicotine, if applicable. Regular preventive services are another way to keep healthy. Preventive services (vaccines, screening tests, monitoring & exams) can help personalize your care plan, which helps you manage your own care. Screening tests can find health problems at the earliest stages, when they are easiest to treat. [facility] follows the current, evidence-based guidelines published by the [facility] Kaleb Bella ([facility]) when recommending preventive services for our patients. Because we follow these guidelines, sometimes recommendations change over time as research supports it. (For example, mammograms used to be recommended annually. Even though Medicare will still pay for an annual mammogram, the newer guidelines recommend a mammogram every two years for women of average risk). Of course, you and your doctor may decide to screen more often for some diseases, based on your risk and your co-morbidities (chronic disease you are already diagnosed with). Preventive services for you include:  - Medicare offers their members a free annual wellness visit, which is time for you and your primary care provider to discuss and plan for your preventive service needs. Take advantage of this benefit every year!  -All adults over the age of 72 should receive the recommended pneumonia vaccines. Current USPSTF guidelines recommend a series of two vaccines for the best pneumonia protection.   -All adults should have a flu vaccine yearly and a tetanus vaccine every 10 years.   -All adults age 48 and older should receive the shingles vaccines (series of two vaccines).       -All adults age 38-68 who are overweight should have a diabetes screening test once every three years.   -All adults born between 80 and 1965 should be screened once for Hepatitis C.  -Other screening tests and preventive services for persons with diabetes include: an eye exam to screen for diabetic retinopathy, a kidney function test, a foot exam, and stricter control over your cholesterol.   -Cardiovascular screening for adults with routine risk involves an electrocardiogram (ECG) at intervals determined by your doctor.   -Colorectal cancer screenings should be done for adults age 54-65 with no increased risk factors for colorectal cancer. There are a number of acceptable methods of screening for this type of cancer. Each test has its own benefits and drawbacks. Discuss with your doctor what is most appropriate for you during your annual wellness visit. The different tests include: colonoscopy (considered the best screening method), a fecal occult blood test, a fecal DNA test, and sigmoidoscopy.    -A bone mass density test is recommended when a woman turns 65 to screen for osteoporosis. This test is only recommended one time, as a screening. Some providers will use this same test as a disease monitoring tool if you already have osteoporosis. -Breast cancer screenings are recommended every other year for women of normal risk, age 54-69.  -Cervical cancer screenings for women over age 72 are only recommended with certain risk factors. Here is a list of your current Health Maintenance items (your personalized list of preventive services) with a due date:  Health Maintenance Due   Topic Date Due    Bone Mineral Density   Never done    Pneumococcal Vaccine (1 of 1 - PPSV23) Never done    Yearly Flu Vaccine (1) 09/01/2021         Medicare Wellness Visit, Female     The best way to live healthy is to have a lifestyle where you eat a well-balanced diet, exercise regularly, limit alcohol use, and quit all forms of tobacco/nicotine, if applicable. Regular preventive services are another way to keep healthy. Preventive services (vaccines, screening tests, monitoring & exams) can help personalize your care plan, which helps you manage your own care. Screening tests can find health problems at the earliest stages, when they are easiest to treat. [facility] follows the current, evidence-based guidelines published by the [facility] Kaleb Bella ([facility]) when recommending preventive services for our patients. Because we follow these guidelines, sometimes recommendations change over time as research supports it. (For example, mammograms used to be recommended annually. Even though Medicare will still pay for an annual mammogram, the newer guidelines recommend a mammogram every two years for women of average risk). Of course, you and your doctor may decide to screen more often for some diseases, based on your risk and your co-morbidities (chronic disease you are already diagnosed with). Preventive services for you include:  - Medicare offers their members a free annual wellness visit, which is time for you and your primary care provider to discuss and plan for your preventive service needs. Take advantage of this benefit every year!  -All adults over the age of 72 should receive the recommended pneumonia vaccines. Current USPSTF guidelines recommend a series of two vaccines for the best pneumonia protection.   -All adults should have a flu vaccine yearly and a tetanus vaccine every 10 years.   -All adults age 48 and older should receive the shingles vaccines (series of two vaccines). -All adults age 38-68 who are overweight should have a diabetes screening test once every three years.   -All adults born between 80 and 1965 should be screened once for Hepatitis C.  -Other screening tests and preventive services for persons with diabetes include: an eye exam to screen for diabetic retinopathy, a kidney function test, a foot exam, and stricter control over your cholesterol. -Cardiovascular screening for adults with routine risk involves an electrocardiogram (ECG) at intervals determined by your doctor.   -Colorectal cancer screenings should be done for adults age 54-65 with no increased risk factors for colorectal cancer. There are a number of acceptable methods of screening for this type of cancer. Each test has its own benefits and drawbacks. Discuss with your doctor what is most appropriate for you during your annual wellness visit. The different tests include: colonoscopy (considered the best screening method), a fecal occult blood test, a fecal DNA test, and sigmoidoscopy.    -A bone mass density test is recommended when a woman turns 65 to screen for osteoporosis. This test is only recommended one time, as a screening. Some providers will use this same test as a disease monitoring tool if you already have osteoporosis. -Breast cancer screenings are recommended every other year for women of normal risk, age 54-69.  -Cervical cancer screenings for women over age 72 are only recommended with certain risk factors.      Here is a list of your current Health Maintenance items (your personalized list of preventive services) with a due date:  Health Maintenance Due   Topic Date Due    Bone Mineral Density   Never done    Pneumococcal Vaccine (1 of 1 - PPSV23) Never done    Yearly Flu Vaccine (1) 09/01/2021

## 2021-11-10 RX ORDER — PRAVASTATIN SODIUM 20 MG/1
20 TABLET ORAL
Qty: 90 TABLET | Refills: 1 | Status: SHIPPED | OUTPATIENT
Start: 2021-11-10 | End: 2022-07-05 | Stop reason: SDUPTHER

## 2021-11-10 NOTE — TELEPHONE ENCOUNTER
Day Baker MD, patient out of refills of pravastatin. Rx pended for your signature/modification as appropriate    LOV: 10/13/21  Next: 1/13/22    Thank you,  Ruby Portillo, PharmD, [facility], [address]  Toll free: [phone], option 2  ====================================================    CLINICAL PHARMACY: [facility]  Identified care gap per [employer]; fills at [facility] Aid: ACE/ARB and Statin adherence    Last Visit: 10/13/21    Patient identified as LIS = 1, therefore patient may be able to receive a 90-day supply for the same cost as a 30-day supply    ASSESSMENT  ACE/ARB ADHERENCE    Per Insurance Records through 11/6/21 (2020 [facility] = n/a%; YTD [facility] = 93%; Potential Fail Date: PASS):   Lisinopril/hydrochlorothiazide last filled on 9/21/21 for 90 day supply. Next refill due: 12/20/21    BP Readings from Last 3 Encounters:   10/13/21 118/82   07/13/21 136/82   04/13/21 136/86     Estimated Creatinine Clearance: 84.9 mL/min (by C-G formula based on SCr of 0.68 mg/dL). [facility]    Per Insurance Records through 11/6/21 (2020 [facility] n[facility]   Pravastatin 20 mg last filled on 7/13/21 for 90 day supply.  Next refill due: 10/11/21  Per chart review, no refills on file    Lab Results   Component Value Date/Time    Cholesterol, total 148 08/07/2021 11:48 AM    HDL Cholesterol 70 (H) 08/07/2021 11:48 AM    LDL, calculated 67 08/07/2021 11:48 AM    VLDL, calculated 11 08/07/2021 11:48 AM    Triglyceride 55 08/07/2021 11:48 AM    CHOL/HDL Ratio 2.1 08/07/2021 11:48 AM     ALT (SGPT)   Date Value Ref Range Status   08/07/2021 35 13 - 56 U/L Final     AST (SGOT)   Date Value Ref Range Status   08/07/2021 23 10 - 38 U/L Final     The 10-year ASCVD risk score (Carla Mathew et al., 2013) is: 5.4%    Values used to calculate the score:      Age: 73 years      Sex: Female      Is Non- : Yes      Diabetic: No Tobacco smoker: No      Systolic Blood Pressure: 433 mmHg      Is BP treated: Yes      HDL Cholesterol: 70 MG/DL      Total Cholesterol: 148 MG/DL     PLAN  The following are interventions that have been identified:  - Patient overdue refilling pravastatin and active on home medication list  - Patient needs refills for pravastatin - request sent to PCP    Future Appointments   Date Time Provider Madyson Carmen   1/13/2022  8:15 AM Karma De Leon MD [facility] BS AMB       Pedro Manual, PharmD, [address] free: [phone], option 2

## 2021-11-12 NOTE — TELEPHONE ENCOUNTER
This patient contacted office for the following prescriptions to be filled:    Medication requested :   Requested Prescriptions     Pending Prescriptions Disp Refills    triamcinolone acetonide 0.025 % lotion 60 mL 0     Sig: Apply thin layer on affected area twice a day     PCP: [address] [place] [address] or Print: [facility] or Local pharmacy [address] [facility]     Scheduled appointment if not seen by current providers in office:  LOV 10/13/2021 f/u 1/13/2022

## 2021-11-15 RX ORDER — TRIAMCINOLONE ACETONIDE 0.25 MG/ML
LOTION TOPICAL
Qty: 60 ML | Refills: 0 | Status: SHIPPED | OUTPATIENT
Start: 2021-11-15 | End: 2022-01-13 | Stop reason: SDUPTHER

## 2021-11-15 NOTE — TELEPHONE ENCOUNTER
Another attempt to contact patient. Unable to leave message. Letter sent.     For Rodney Tomasz in place: No   Recommendation Provided To: Provider: 1 via Note to Provider    Intervention Detail: Refill(s) Provided   Gap Closed?: Yes   Intervention Accepted By: Provider: 1   Time Spent (min): 10

## 2022-01-12 ENCOUNTER — HOSPITAL ENCOUNTER (OUTPATIENT)
Dept: BONE DENSITY | Age: 67
Discharge: HOME | End: 2022-01-12
Attending: FAMILY MEDICINE
Payer: MEDICARE

## 2022-01-12 DIAGNOSIS — Z78.0 POST-MENOPAUSAL: ICD-10-CM

## 2022-01-12 PROCEDURE — 77080 DXA BONE DENSITY AXIAL: CPT

## 2022-01-13 ENCOUNTER — OFFICE VISIT (OUTPATIENT)
Dept: FAMILY MEDICINE CLINIC | Age: 67
End: 2022-01-13
Payer: MEDICARE

## 2022-01-13 VITALS
SYSTOLIC BLOOD PRESSURE: 128 MMHG | TEMPERATURE: 97.6 F | WEIGHT: 183.8 LBS | RESPIRATION RATE: 16 BRPM | BODY MASS INDEX: 30.62 KG/M2 | HEART RATE: 78 BPM | OXYGEN SATURATION: 98 % | HEIGHT: 65 IN | DIASTOLIC BLOOD PRESSURE: 70 MMHG

## 2022-01-13 DIAGNOSIS — F33.9 RECURRENT DEPRESSION (HCC): ICD-10-CM

## 2022-01-13 DIAGNOSIS — I10 ESSENTIAL HYPERTENSION: ICD-10-CM

## 2022-01-13 DIAGNOSIS — M85.80 OSTEOPENIA, UNSPECIFIED LOCATION: ICD-10-CM

## 2022-01-13 DIAGNOSIS — M48.062 SPINAL STENOSIS OF LUMBAR REGION WITH NEUROGENIC CLAUDICATION: ICD-10-CM

## 2022-01-13 DIAGNOSIS — R73.01 IFG (IMPAIRED FASTING GLUCOSE): Primary | ICD-10-CM

## 2022-01-13 PROCEDURE — G8536 NO DOC ELDER MAL SCRN: HCPCS | Performed by: FAMILY MEDICINE

## 2022-01-13 PROCEDURE — 1090F PRES/ABSN URINE INCON ASSESS: CPT | Performed by: FAMILY MEDICINE

## 2022-01-13 PROCEDURE — G8754 DIAS BP LESS 90: HCPCS | Performed by: FAMILY MEDICINE

## 2022-01-13 PROCEDURE — G9717 DOC PT DX DEP/BP F/U NT REQ: HCPCS | Performed by: FAMILY MEDICINE

## 2022-01-13 PROCEDURE — G8427 DOCREV CUR MEDS BY ELIG CLIN: HCPCS | Performed by: FAMILY MEDICINE

## 2022-01-13 PROCEDURE — G8399 PT W/DXA RESULTS DOCUMENT: HCPCS | Performed by: FAMILY MEDICINE

## 2022-01-13 PROCEDURE — 3017F COLORECTAL CA SCREEN DOC REV: CPT | Performed by: FAMILY MEDICINE

## 2022-01-13 PROCEDURE — 1101F PT FALLS ASSESS-DOCD LE1/YR: CPT | Performed by: FAMILY MEDICINE

## 2022-01-13 PROCEDURE — G8752 SYS BP LESS 140: HCPCS | Performed by: FAMILY MEDICINE

## 2022-01-13 PROCEDURE — 99214 OFFICE O/P EST MOD 30 MIN: CPT | Performed by: FAMILY MEDICINE

## 2022-01-13 PROCEDURE — G8417 CALC BMI ABV UP PARAM F/U: HCPCS | Performed by: FAMILY MEDICINE

## 2022-01-13 PROCEDURE — G9899 SCRN MAM PERF RSLTS DOC: HCPCS | Performed by: FAMILY MEDICINE

## 2022-01-13 RX ORDER — CALCIUM CARBONATE/VITAMIN D3 600 MG-20
2 TABLET,CHEWABLE ORAL
Qty: 180 TABLET | Refills: 3 | Status: SHIPPED | OUTPATIENT
Start: 2022-01-13

## 2022-01-13 RX ORDER — TRIAMCINOLONE ACETONIDE 0.25 MG/ML
LOTION TOPICAL
Qty: 60 ML | Refills: 0 | Status: SHIPPED | OUTPATIENT
Start: 2022-01-13 | End: 2022-03-29

## 2022-01-13 RX ORDER — BETAMETHASONE VALERATE 1.2 MG/G
CREAM TOPICAL
COMMUNITY
Start: 2021-12-17 | End: 2022-05-19 | Stop reason: SDUPTHER

## 2022-01-13 RX ORDER — FLUTICASONE PROPIONATE 50 MCG
SPRAY, SUSPENSION (ML) NASAL
COMMUNITY
Start: 2021-12-31 | End: 2022-05-19 | Stop reason: SDUPTHER

## 2022-01-13 NOTE — PROGRESS NOTES
Moise Beck, 73 y.o.,  female    SUBJECTIVE  Ff-up     HTN- taking meds without problems. She also has prediabetes, reviewed labs cholesterol levels have improved    HL- on pravachol, reviewed labs LDL 67    Depression/hot flashes- says doing well for the most part, taking paxil.       Chronic LBP- on lyrica, following dr. Destinee Padilla    Allergic rhinitis-doing well    ROS:  See HPI, all others negative        Patient Active Problem List   Diagnosis Code    Hypertension I10    Recurrent depression ([facility] [address] F33.9    Lumbar radiculopathy M54.16    Herpes genitalis in women A60.09    Allergic rhinitis due to allergen J30.9    Hot flashes R23.2    Advance directive discussed with patient Z70.80    Facet arthropathy M47.819    Lumbar stenosis M48.061    Advance care planning Z71.89    Obesity E66.9    Mild single current episode of major depressive disorder (HCC) F32.0    IFG (impaired fasting glucose) R73.01       Current Outpatient Medications   Medication Sig Dispense Refill    betamethasone valerate (VALISONE) 0.1 % topical cream apply twice a day to affected area OF SKIN if needed for itching      fluticasone propionate (FLONASE) 50 mcg/actuation nasal spray instill 1 spray into each nostril once daily      triamcinolone acetonide 0.025 % lotion Apply thin layer on affected area twice a day 60 mL 0    calcium carbonate-vitamin D3 (Caltrate 600 plus D) 600 mg-20 mcg (800 unit) chew Take 2 Tablets by mouth once over twenty-four (24) hours. 180 Tablet 3    pravastatin (PRAVACHOL) 20 mg tablet Take 1 Tablet by mouth nightly. 90 Tablet 1    Allergy Relief, fexofenadine, 180 mg tablet Take 180 mg by mouth daily.  hydrOXYzine HCL (ATARAX) 25 mg tablet take 1 tablet by mouth AT NIGHT      levocetirizine (XYZAL) 5 mg tablet Take 5 mg by mouth daily.  PARoxetine (PAXIL) 20 mg tablet Take 1 Tablet by mouth daily.  90 Tablet 1    lisinopril-hydroCHLOROthiazide (PRINZIDE, ZESTORETIC) 20-25 mg per tablet Take 1 Tablet by mouth daily. 90 Tablet 1    diclofenac (VOLTAREN) 1 % gel Apply  to affected area four (4) times daily. 1 Each 1    allergy injection every seven (7) days.  cyclobenzaprine (FLEXERIL) 10 mg tablet Take 1 Tab by mouth three (3) times daily as needed for Muscle Spasm(s). 20 Tab 0    naproxen (NAPROSYN) 500 mg tablet Take 1 Tab by mouth two (2) times daily (with meals). 30 Tab 0    ascorbic acid, vitamin C, (VITAMIN C) 500 mg tablet Take 500 mg by mouth two (2) times daily (with meals).  bisacodyL (DULCOLAX) 5 mg EC tablet Take 10 mg by mouth daily as needed.  cholecalciferol (VITAMIN D3) 25 mcg (1,000 unit) cap       multivit-min-ferrous fumarate (Multi Vitamin) 9 mg iron/15 mL liqd       pregabalin (LYRICA) 75 mg capsule Take 1 Cap by mouth three (3) times daily.  Max Daily Amount: 225 mg. 90 Cap 5       No Known Allergies    Past Medical History:   Diagnosis Date    Arthritis     Bulging lumbar disc     Degenerative arthritis of finger     MP joint right index finger    Depression     H/O colonoscopy 12/2014    recommended yearly hemoccult stools    Hypertension     Low back pain     Lower extremity pain, left     Pinched nerve        Social History     Socioeconomic History    Marital status:      Spouse name: Not on file    Number of children: Not on file    Years of education: Not on file    Highest education level: Not on file   Occupational History    Not on file   Tobacco Use    Smoking status: Never Smoker    Smokeless tobacco: Never Used   Substance and Sexual Activity    Alcohol use: No    Drug use: No    Sexual activity: Not Currently   Other Topics Concern    Not on file   Social History Narrative    Not on file     Social Determinants of Health     Financial Resource Strain:     Difficulty of Paying Living Expenses: Not on file   Food Insecurity:     Worried About Running Out of Food in the Last Year: Not on file    Taylor of Food in the Last Year: Not on file   Transportation Needs:     Lack of Transportation (Medical): Not on file    Lack of Transportation (Non-Medical): Not on file   Physical Activity:     Days of Exercise per Week: Not on file    Minutes of Exercise per Session: Not on file   Stress:     Feeling of Stress : Not on file   Social Connections:     Frequency of Communication with Friends and Family: Not on file    Frequency of Social Gatherings with Friends and Family: Not on file    Attends Gnosticist Services: Not on file    Active Member of [address] [place] [address] or Organizations: Not on file    Attends Club or Organization Meetings: Not on file    Marital Status: Not on file   Intimate Partner Violence:     Fear of Current or Ex-Partner: Not on file    Emotionally Abused: Not on file    Physically Abused: Not on file    Sexually Abused: Not on file   Housing Stability:     Unable to Pay for Housing in the Last Year: Not on file    Number of [place] in the Last Year: Not on file    Unstable Housing in the Last Year: Not on file       Family History   Problem Relation Age of Onset    Cancer Mother     Ovarian Cancer Mother 68    Stroke Father          OBJECTIVE    Physical Exam:     Visit Vitals  /70 (BP 1 Location: Left upper arm, BP Patient Position: Sitting, BP Cuff Size: Adult)   Pulse 78   Temp 97.6 °F (36.4 °C) (Temporal)   Resp 16   Ht 5' 5.25\" (1.657 m)   Wt 183 lb 12.8 oz (83.4 kg)   LMP  (LMP Unknown)   SpO2 98%   BMI 30.35 kg/m²       General: alert, well-appearing, obese,AA, in no apparent distress or pain  Head: atraumatic.  Non-tender maxillary and frontal sinuses  Neck: supple, no adenopathy palpated  CVS: normal rate, regular rhythm, distinct S1 and S2  Lungs:clear to ausculation bilaterally, no crackles, wheezing or rhonchi noted  Abdomen: normoactive bowel sounds, soft, non-tender  Skin: warm, no lesions, rashes noted  Psych:  mood and affect normal    Results for orders placed or performed during the hospital encounter of 08/07/21   LIPID PANEL   Result Value Ref Range    LIPID PROFILE          Cholesterol, total 148 <200 MG/DL    Triglyceride 55 <150 MG/DL    HDL Cholesterol 70 (H) 40 - 60 MG/DL    LDL, calculated 67 0 - 100 MG/DL    VLDL, calculated 11 MG/DL    CHOL/HDL Ratio 2.1 0 - 5.0     METABOLIC PANEL, COMPREHENSIVE   Result Value Ref Range    Sodium 140 136 - 145 mmol/L    Potassium 4.0 3.5 - 5.5 mmol/L    Chloride 109 100 - 111 mmol/L    CO2 29 21 - 32 mmol/L    Anion gap 2 (L) 3.0 - 18 mmol/L    Glucose 81 74 - 99 mg/dL    BUN 14 7.0 - 18 MG/DL    Creatinine 0.68 0.6 - 1.3 MG/DL    BUN/Creatinine ratio 21 (H) 12 - 20      GFR est AA >60 >60 ml/min/1.73m2    GFR est non-AA >60 >60 ml/min/1.73m2    Calcium 8.9 8.5 - 10.1 MG/DL    Bilirubin, total 1.0 0.2 - 1.0 MG/DL    ALT (SGPT) 35 13 - 56 U/L    AST (SGOT) 23 10 - 38 U/L    Alk. phosphatase 62 45 - 117 U/L    Protein, total 7.0 6.4 - 8.2 g/dL    Albumin 3.5 3.4 - 5.0 g/dL    Globulin 3.5 2.0 - 4.0 g/dL    A-G Ratio 1.0 0.8 - 1.7     CBC WITH AUTOMATED DIFF   Result Value Ref Range    WBC 3.3 (L) 4.6 - 13.2 K/uL    RBC 4.75 4.20 - 5.30 M/uL    HGB 13.6 12.0 - 16.0 g/dL    HCT 41.7 35.0 - 45.0 %    MCV 87.8 74.0 - 97.0 FL    MCH 28.6 24.0 - 34.0 PG    MCHC 32.6 31.0 - 37.0 g/dL    RDW 13.2 11.6 - 14.5 %    PLATELET 541 019 - 317 K/uL    MPV 11.0 9.2 - 11.8 FL    NEUTROPHILS 56 40 - 73 %    LYMPHOCYTES 30 21 - 52 %    MONOCYTES 11 (H) 3 - 10 %    EOSINOPHILS 2 0 - 5 %    BASOPHILS 1 0 - 2 %    ABS. NEUTROPHILS 1.9 1.8 - 8.0 K/UL    ABS. LYMPHOCYTES 1.0 0.9 - 3.6 K/UL    ABS. MONOCYTES 0.4 0.05 - 1.2 K/UL    ABS. EOSINOPHILS 0.1 0.0 - 0.4 K/UL    ABS. BASOPHILS 0.0 0.0 - 0.1 K/UL    DF AUTOMATED           ASSESSMENT/PLAN  Diagnoses and all orders for this visit:    1. IFG (impaired fasting glucose)  Persistent  TLCs, monitoring  Check cmp/a1c    2. Essential hypertension  Controlled  Cont prinzide  Cont pravachol    3.   Recurrent depression (HCC)  Stable  Cont paxil    4. Spinal stenosis of lumbar region with neurogenic claudication  On lyrica  Following PMNR    5. Mixed hyperlipidemia  Good range on pravachol 20 mg qhs, to cont    6. Osteopenia, unspecified  frax 8%/.7%  Ca/vit d/wt bearing exercises  Update dexa 12/2023    Patient understands plan of care. Patient has provided input and agrees with goals.

## 2022-01-13 NOTE — PATIENT INSTRUCTIONS
A Healthy Heart: Care Instructions  Your Care Instructions     Coronary artery disease, also called heart disease, occurs when a substance called plaque builds up in the vessels that supply oxygen-rich blood to your heart muscle. This can narrow the blood vessels and reduce blood flow. A heart attack happens when blood flow is completely blocked. A high-fat diet, smoking, and other factors increase the risk of heart disease. Your doctor has found that you have a chance of having heart disease. You can do lots of things to keep your heart healthy. It may not be easy, but you can change your diet, exercise more, and quit smoking. These steps really work to lower your chance of heart disease. Follow-up care is a key part of your treatment and safety. Be sure to make and go to all appointments, and call your doctor if you are having problems. It's also a good idea to know your test results and keep a list of the medicines you take. How can you care for yourself at home? Diet    · Use less salt when you cook and eat. This helps lower your blood pressure. Taste food before salting. Add only a little salt when you think you need it. With time, your taste buds will adjust to less salt.     · Eat fewer snack items, fast foods, canned soups, and other high-salt, high-fat, processed foods.     · Read food labels and try to avoid saturated and trans fats. They increase your risk of heart disease by raising cholesterol levels.     · Limit the amount of solid fat-butter, margarine, and shortening-you eat. Use olive, peanut, or canola oil when you cook. Bake, broil, and steam foods instead of frying them.     · Eat a variety of fruit and vegetables every day. Dark green, deep orange, red, or yellow fruits and vegetables are especially good for you. Examples include spinach, carrots, peaches, and berries.     · Foods high in fiber can reduce your cholesterol and provide important vitamins and minerals.  High-fiber foods include whole-grain cereals and breads, oatmeal, beans, brown rice, citrus fruits, and apples.     · Eat lean proteins. Heart-healthy proteins include seafood, lean meats and poultry, eggs, beans, peas, nuts, seeds, and soy products.     · Limit drinks and foods with added sugar. These include candy, desserts, and soda pop. Lifestyle changes    · If your doctor recommends it, get more exercise. Walking is a good choice. Bit by bit, increase the amount you walk every day. Try for at least 30 minutes on most days of the week. You also may want to swim, bike, or do other activities.     · Do not smoke. If you need help quitting, talk to your doctor about stop-smoking programs and medicines. These can increase your chances of quitting for good. Quitting smoking may be the most important step you can take to protect your heart. It is never too late to quit.     · Limit alcohol to 2 drinks a day for men and 1 drink a day for women. Too much alcohol can cause health problems.     · Manage other health problems such as diabetes, high blood pressure, and high cholesterol. If you think you may have a problem with alcohol or drug use, talk to your doctor. Medicines    · Take your medicines exactly as prescribed. Call your doctor if you think you are having a problem with your medicine.     · If your doctor recommends aspirin, take the amount directed each day. Make sure you take aspirin and not another kind of pain reliever, such as acetaminophen (Tylenol). When should you call for help? Call 911 if you have symptoms of a heart attack. These may include:    · Chest pain or pressure, or a strange feeling in the chest.     · Sweating.     · Shortness of breath.     · Pain, pressure, or a strange feeling in the back, neck, jaw, or upper belly or in one or both shoulders or arms.     · Lightheadedness or sudden weakness.     · A fast or irregular heartbeat.    After you call 911, the  may tell you to chew 1 adult-strength or 2 to 4 low-dose aspirin. Wait for an ambulance. Do not try to drive yourself. Watch closely for changes in your health, and be sure to contact your doctor if you have any problems. Where can you learn more? Go to http://www.bell.com/  Enter F075 in the search box to learn more about \"A Healthy Heart: Care Instructions. \"  Current as of: April 29, 2021               Content Version: 13.0  © 2006-2021 [employer]. Care instructions adapted under license by [employer] (which disclaims liability or warranty for this information). If you have questions about a medical condition or this instruction, always ask your healthcare professional. [address] any warranty or liability for your use of this information.

## 2022-01-13 NOTE — PROGRESS NOTES
1. Have you been to the ER, urgent care clinic since your last visit? Hospitalized since your last visit? [facility] 1/2022 for left ear infection. 2. Have you seen or consulted any other health care providers outside of the [address] [place] [address] since your last visit? Include any pap smears or colon screening.  No    Chief Complaint   Patient presents with    Hypertension    Cholesterol Problem    Blood sugar problem    Depression     recurrent depression

## 2022-02-18 ENCOUNTER — TRANSCRIBE ORDER (OUTPATIENT)
Dept: SCHEDULING | Age: 67
End: 2022-02-18

## 2022-02-18 DIAGNOSIS — Z12.31 VISIT FOR SCREENING MAMMOGRAM: Primary | ICD-10-CM

## 2022-03-10 ENCOUNTER — OFFICE VISIT (OUTPATIENT)
Dept: ORTHOPEDIC SURGERY | Age: 67
End: 2022-03-10
Payer: MEDICARE

## 2022-03-10 VITALS
SYSTOLIC BLOOD PRESSURE: 122 MMHG | HEART RATE: 88 BPM | WEIGHT: 181.4 LBS | RESPIRATION RATE: 16 BRPM | BODY MASS INDEX: 29.96 KG/M2 | OXYGEN SATURATION: 98 % | DIASTOLIC BLOOD PRESSURE: 78 MMHG | TEMPERATURE: 97.9 F

## 2022-03-10 DIAGNOSIS — M54.16 LUMBAR RADICULOPATHY: ICD-10-CM

## 2022-03-10 PROCEDURE — 1101F PT FALLS ASSESS-DOCD LE1/YR: CPT | Performed by: PHYSICAL MEDICINE & REHABILITATION

## 2022-03-10 PROCEDURE — G8427 DOCREV CUR MEDS BY ELIG CLIN: HCPCS | Performed by: PHYSICAL MEDICINE & REHABILITATION

## 2022-03-10 PROCEDURE — G8752 SYS BP LESS 140: HCPCS | Performed by: PHYSICAL MEDICINE & REHABILITATION

## 2022-03-10 PROCEDURE — 1090F PRES/ABSN URINE INCON ASSESS: CPT | Performed by: PHYSICAL MEDICINE & REHABILITATION

## 2022-03-10 PROCEDURE — 3017F COLORECTAL CA SCREEN DOC REV: CPT | Performed by: PHYSICAL MEDICINE & REHABILITATION

## 2022-03-10 PROCEDURE — G8399 PT W/DXA RESULTS DOCUMENT: HCPCS | Performed by: PHYSICAL MEDICINE & REHABILITATION

## 2022-03-10 PROCEDURE — G9899 SCRN MAM PERF RSLTS DOC: HCPCS | Performed by: PHYSICAL MEDICINE & REHABILITATION

## 2022-03-10 PROCEDURE — G8417 CALC BMI ABV UP PARAM F/U: HCPCS | Performed by: PHYSICAL MEDICINE & REHABILITATION

## 2022-03-10 PROCEDURE — 99213 OFFICE O/P EST LOW 20 MIN: CPT | Performed by: PHYSICAL MEDICINE & REHABILITATION

## 2022-03-10 PROCEDURE — G8536 NO DOC ELDER MAL SCRN: HCPCS | Performed by: PHYSICAL MEDICINE & REHABILITATION

## 2022-03-10 PROCEDURE — G8754 DIAS BP LESS 90: HCPCS | Performed by: PHYSICAL MEDICINE & REHABILITATION

## 2022-03-10 PROCEDURE — G9717 DOC PT DX DEP/BP F/U NT REQ: HCPCS | Performed by: PHYSICAL MEDICINE & REHABILITATION

## 2022-03-10 RX ORDER — PREGABALIN 75 MG/1
75 CAPSULE ORAL 3 TIMES DAILY
Qty: 90 CAPSULE | Refills: 5 | Status: SHIPPED | OUTPATIENT
Start: 2022-03-10 | End: 2022-08-30 | Stop reason: SDUPTHER

## 2022-03-10 RX ORDER — AMITRIPTYLINE HYDROCHLORIDE 25 MG/1
75 TABLET, FILM COATED ORAL
Qty: 90 TABLET | Refills: 2 | Status: SHIPPED | OUTPATIENT
Start: 2022-03-10 | End: 2022-08-30 | Stop reason: SDUPTHER

## 2022-03-10 NOTE — PROGRESS NOTES
[address]  [address]  [place]  Phone: [phone]  Fax: [phone]        Eva Gomez  : 1955  PCP: Lg Bernal MD  3/10/2022    PROGRESS NOTE      HISTORY OF PRESENT ILLNESS  Marci Blanchard is a 73 y.o. female who was initially seen in 2017 for c/o lumbar pain radiating into her LLE. She found some relief from Tylenol #3 PRN and Gabapentin. Her pain was exacerbated with increased lifting, bending, cleaning, and walking. She found relief from lying down. She was not maintaining an HEP. She found some relief from Naproxen and Flexeril. She noted that pressure while sitting exacerbated her pain. Lumbar MRI 16: Degenerative grade 1 anterior listhesis of both L4 and L5 in the presence of advanced facet arthropathy. No high-grade spinal stenosis. There are bilateral foraminal stenoses. Some exiting nerve contact but no overt impingement. She continued to have some residual low back pain with some tingling in the LLE. She c/o right heel pain that she associated with a bone spur in the heel. Her PCP provided exercises for plantar fasciitis. 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 She was intolerant to GABAPENTIN secondary to somnolence. She did not tolerate the Lyrica 150 mg BID as it caused drowsiness, however, she did find some relief at the 75mg dose She was focusing on a diet and cutting out starches. She was maintaining well on Lyrica 75 mg TID. Her symptoms continued to wax and wane. She tended to have flare ups of her back pain after she was more active (cleaning house, etc). Marci Blanchard comes in to the office today for f/u. She feels somnolence with Lyrica 75 mg TID. She continues to have low back pain radiating into the LLE that is worse at night. She has difficulty sleeping. Pain Score: 6/10.       ASSESSMENT  Marci Blanchard is a 73 y.o. female with c/o low back pain radiating into the LLE. Her symptoms are likely due to a left-sided radiculopathy. PLAN  1. Refill Lyrica 75 mg TID. She can trial 75 mg BID to see if she has less somnolence. 2. Begin Amitriptyline 3 x 25 mg QHS. She can call if she needs to try Nortriptyline instead. Pt will f/u in 3 months or sooner as needed. Diagnoses and all orders for this visit:    1. Lumbar radiculopathy         PAST MEDICAL HISTORY   Past Medical History:   Diagnosis Date    Arthritis     Bulging lumbar disc     Degenerative arthritis of finger     MP joint right index finger    Depression     H/O colonoscopy 12/2014    recommended yearly hemoccult stools    Hypertension     Low back pain     Lower extremity pain, left     Pinched nerve        Past Surgical History:   Procedure Laterality Date    COLONOSCOPY N/A 2/5/2020    COLONOSCOPY performed by Rolando Cosme MD at [facility] ENDOSCOPY    HX [address] Right     hand    HX COLONOSCOPY  2014    benign polyp    HX HYSTERECTOMY      HX ORTHOPAEDIC      heel spurs   . MEDICATIONS    Current Outpatient Medications   Medication Sig Dispense Refill    betamethasone valerate (VALISONE) 0.1 % topical cream apply twice a day to affected area OF SKIN if needed for itching      fluticasone propionate (FLONASE) 50 mcg/actuation nasal spray instill 1 spray into each nostril once daily      triamcinolone acetonide 0.025 % lotion Apply thin layer on affected area twice a day 60 mL 0    calcium carbonate-vitamin D3 (Caltrate 600 plus D) 600 mg-20 mcg (800 unit) chew Take 2 Tablets by mouth once over twenty-four (24) hours. 180 Tablet 3    pravastatin (PRAVACHOL) 20 mg tablet Take 1 Tablet by mouth nightly. 90 Tablet 1    Allergy Relief, fexofenadine, 180 mg tablet Take 180 mg by mouth daily.  hydrOXYzine HCL (ATARAX) 25 mg tablet take 1 tablet by mouth AT NIGHT      levocetirizine (XYZAL) 5 mg tablet Take 5 mg by mouth daily.       PARoxetine (PAXIL) 20 mg tablet Take 1 Tablet by mouth daily. 90 Tablet 1    lisinopril-hydroCHLOROthiazide (PRINZIDE, ZESTORETIC) 20-25 mg per tablet Take 1 Tablet by mouth daily. 90 Tablet 1    diclofenac (VOLTAREN) 1 % gel Apply  to affected area four (4) times daily. 1 Each 1    allergy injection every seven (7) days.  cyclobenzaprine (FLEXERIL) 10 mg tablet Take 1 Tab by mouth three (3) times daily as needed for Muscle Spasm(s). 20 Tab 0    naproxen (NAPROSYN) 500 mg tablet Take 1 Tab by mouth two (2) times daily (with meals). 30 Tab 0    ascorbic acid, vitamin C, (VITAMIN C) 500 mg tablet Take 500 mg by mouth two (2) times daily (with meals).  bisacodyL (DULCOLAX) 5 mg EC tablet Take 10 mg by mouth daily as needed.  cholecalciferol (VITAMIN D3) 25 mcg (1,000 unit) cap       multivit-min-ferrous fumarate (Multi Vitamin) 9 mg iron/15 mL liqd       pregabalin (LYRICA) 75 mg capsule Take 1 Cap by mouth three (3) times daily. Max Daily Amount: 225 mg. 90 Cap 5        ALLERGIES  No Known Allergies       SOCIAL HISTORY    Social History     Socioeconomic History    Marital status:    Tobacco Use    Smoking status: Never Smoker    Smokeless tobacco: Never Used   Substance and Sexual Activity    Alcohol use: No    Drug use: No    Sexual activity: Not Currently       FAMILY HISTORY  Family History   Problem Relation Age of Onset    Cancer Mother     Ovarian Cancer Mother 68    Stroke Father          REVIEW OF SYSTEMS  Review of Systems   Constitutional: Negative for chills, fever and weight loss. Respiratory: Negative for shortness of breath. Cardiovascular: Negative for chest pain. Gastrointestinal: Negative for constipation. Negative for fecal incontinence    Genitourinary: Negative for dysuria. Negative for urinary incontinence   Musculoskeletal: Positive for back pain. Skin: Negative for rash. Neurological: Positive for tingling ( LLE).  Negative for dizziness, tremors, focal weakness and headaches. Endo/Heme/Allergies: Does not bruise/bleed easily. Psychiatric/Behavioral: The patient does not have insomnia. PHYSICAL EXAMINATION  Visit Vitals  /78 (BP 1 Location: Right arm, BP Patient Position: Sitting)   Pulse 88   Temp 97.9 °F (36.6 °C) (Temporal)   Resp 16   Wt 181 lb 6.4 oz (82.3 kg)   LMP  (LMP Unknown)   SpO2 98%   BMI 29.96 kg/m²       Pain Assessment  3/10/2022   Location of Pain Back;Leg   Pain Location Comment -   Location Modifiers Left   Severity of Pain 6   Quality of Pain Other (Comment)   Quality of Pain Comment stabbing   Duration of Pain Persistent   Frequency of Pain Constant   Date Pain First Started -   Aggravating Factors Standing;Walking; Other (Comment)   Aggravating Factors Comment lying   Limiting Behavior Some   Relieving Factors Nothing   Relieving Factors Comment -   Result of Injury No           Constitutional:  Well developed, well nourished, in no acute distress. Psychiatric: Affect and mood are appropriate. Integumentary: No rashes or abrasions noted on exposed areas. SPINE/MUSCULOSKELETAL EXAM    Lumbar spine:  No rash, ecchymosis, or gross obliquity.    No fasciculations.    No focal atrophy is noted.    No pain with hip ROM.    Range of motion is normal.  Mild tenderness to palpation. Tenderness to palpation at the right sciatic notch.    SI joints non-tender.    Trochanters non tender. Piriformis stretch test negative on right side.         Sensation in the bilateral legs grossly intact to light touch.     Updates from 2/21/19:  Tenderness to palpation of left upper buttock.   Positive SLR on the L  leans to the left, but her right shoulder sits lower than her left  Neurologically intact     Updates from 12/10/19:  Tenderness to palpation of lumbar paraspinals    MOTOR:      Biceps  Triceps Deltoids Wrist Ext Wrist Flex Hand Intrin   Right 5/5 5/5 5/5 5/5 5/5 5/5   Left 5/5 5/5 5/5 5/5 5/5 5/5 Hip Flex  Quads Hamstrings Ankle DF EHL Ankle PF   Right 5/5 5/5 5/5 5/5 5/5 5/5   Left 5/5 5/5 5/5 5/5 5/5 5/5     DTRs are 2+ biceps, triceps, brachioradialis, patella, and Achilles.      Negative Straight Leg raise.    Squat not tested.    No difficulty with tandem gait.    Normal heel walk.    Normal toe rise.       Ambulation without assistive device. FWB.       RADIOGRAPHS  Lumbar MRI images taken on 4/19/16 personally reviewed with patient:  Study presumes presence of five lumbar vertebra. There is grade 1  spondylolisthesis of both L4 and L5. No pars defect. Normal vertebral body  heights. Unremarkable marrow signal. Mild disc space narrowing with disc  desiccation at L5-S1. Less so at L4-5. Normal conus at upper L2.          Axial imaging correlation:     T12-L1: Minimal disc bulge. Patent canal and foramina.     L1-2: Patent canal and foramina.     L2-3: Patent canal and foramina.     L3-4: Mild facet arthropathy. Patent canal and foramina.     L4-5: Listhesis with uncovering of the disc. No focal disc herniation. Bilateral  facet arthropathy. Low-grade facet inflammation. An overall mild concentric  spinal stenosis. Crossing nerve abutment but no impingement. Mild foraminal  stenoses without exiting nerve impingement. Axial T2 series 2 images 12-13.     L5-S1: Listhesis with uncovering of the disc. No focal disc herniation, with  minimal disc bulging. Pronounced facet arthropathy. Mild narrowing at the  lateral recesses but overall no significant spinal stenosis. However, there is  moderate severity foraminal stenosis. Exiting nerve contact in the right  foramen, but no overt impingement. Axial T2 images 8-9; also axial T1 image 5 of  series 3.     Incidental imaging of regional soft tissues is unremarkable.      IMPRESSION:     1. Degenerative grade 1 anterior listhesis of both L4 and L5 in the presence of  advanced facet arthropathy. No high-grade spinal stenosis.  There are bilateral  foraminal stenoses. Some exiting nerve contact but no overt impingement. Details  as above. 8 minutes of face-to-face contact were spent with the patient during today's visit extensively discussing symptoms and treatment plan. All questions were answered. More than half of this visit today was spent on counseling.      Written by Lio Scales as dictated by Estephania Mora MD

## 2022-03-10 NOTE — LETTER
3/10/2022    Patient: Franklin Bess   YOB: 1955   Date of Visit: 3/10/2022     Annette Rincon MD  [address] [place] [address]  [address] [place] [address]  Dirk Weinberg    Dear Annette Rincon MD,      Thank you for referring Ms. Nancy Otero to [address] [place] [address] [facility] for evaluation. My notes for this consultation are attached. If you have questions, please do not hesitate to call me. I look forward to following your patient along with you.       Sincerely,    Keyla Jackson MD

## 2022-03-11 ENCOUNTER — HOSPITAL ENCOUNTER (OUTPATIENT)
Dept: MAMMOGRAPHY | Age: 67
Discharge: HOME | End: 2022-03-11
Attending: FAMILY MEDICINE
Payer: MEDICARE

## 2022-03-11 DIAGNOSIS — Z12.31 VISIT FOR SCREENING MAMMOGRAM: ICD-10-CM

## 2022-03-11 PROCEDURE — 77063 BREAST TOMOSYNTHESIS BI: CPT

## 2022-03-18 PROBLEM — M48.061 LUMBAR STENOSIS: Status: ACTIVE | Noted: 2017-02-16

## 2022-03-18 PROBLEM — M85.80 OSTEOPENIA: Status: ACTIVE | Noted: 2022-01-13

## 2022-03-18 PROBLEM — M47.819 FACET ARTHROPATHY: Status: ACTIVE | Noted: 2017-02-16

## 2022-03-19 PROBLEM — R73.01 IFG (IMPAIRED FASTING GLUCOSE): Status: ACTIVE | Noted: 2019-02-11

## 2022-03-19 PROBLEM — F32.0 MILD SINGLE CURRENT EPISODE OF MAJOR DEPRESSIVE DISORDER (HCC): Status: ACTIVE | Noted: 2017-11-13

## 2022-03-20 PROBLEM — Z71.89 ADVANCE CARE PLANNING: Status: ACTIVE | Noted: 2017-06-19

## 2022-03-20 PROBLEM — E66.9 OBESITY: Status: ACTIVE | Noted: 2017-09-21

## 2022-03-29 RX ORDER — TRIAMCINOLONE ACETONIDE 0.25 MG/ML
LOTION TOPICAL
Qty: 60 ML | Refills: 0 | Status: SHIPPED | OUTPATIENT
Start: 2022-03-29 | End: 2022-04-28

## 2022-04-13 ENCOUNTER — HOSPITAL ENCOUNTER (OUTPATIENT)
Dept: LAB | Age: 67
Discharge: HOME | End: 2022-04-13
Payer: MEDICARE

## 2022-04-13 DIAGNOSIS — R73.01 IFG (IMPAIRED FASTING GLUCOSE): ICD-10-CM

## 2022-04-13 LAB
ALBUMIN SERPL-MCNC: 3.6 G/DL (ref 3.4–5)
ALBUMIN/GLOB SERPL: 1 {RATIO} (ref 0.8–1.7)
ALP SERPL-CCNC: 82 U/L (ref 45–117)
ALT SERPL-CCNC: 29 U/L (ref 13–56)
ANION GAP SERPL CALC-SCNC: 4 MMOL/L (ref 3–18)
AST SERPL-CCNC: 24 U/L (ref 10–38)
BILIRUB SERPL-MCNC: 0.6 MG/DL (ref 0.2–1)
BUN SERPL-MCNC: 15 MG/DL (ref 7–18)
BUN/CREAT SERPL: 18 (ref 12–20)
CALCIUM SERPL-MCNC: 9.3 MG/DL (ref 8.5–10.1)
CHLORIDE SERPL-SCNC: 105 MMOL/L (ref 100–111)
CO2 SERPL-SCNC: 31 MMOL/L (ref 21–32)
CREAT SERPL-MCNC: 0.85 MG/DL (ref 0.6–1.3)
EST. AVERAGE GLUCOSE BLD GHB EST-MCNC: 117 MG/DL
GLOBULIN SER CALC-MCNC: 3.6 G/DL (ref 2–4)
GLUCOSE SERPL-MCNC: 84 MG/DL (ref 74–99)
HBA1C MFR BLD: 5.7 % (ref 4.2–5.6)
POTASSIUM SERPL-SCNC: 3.9 MMOL/L (ref 3.5–5.5)
PROT SERPL-MCNC: 7.2 G/DL (ref 6.4–8.2)
SODIUM SERPL-SCNC: 140 MMOL/L (ref 136–145)

## 2022-04-13 PROCEDURE — 36415 COLL VENOUS BLD VENIPUNCTURE: CPT

## 2022-04-13 PROCEDURE — 83036 HEMOGLOBIN GLYCOSYLATED A1C: CPT

## 2022-04-13 PROCEDURE — 80053 COMPREHEN METABOLIC PANEL: CPT

## 2022-04-28 RX ORDER — TRIAMCINOLONE ACETONIDE 0.25 MG/ML
LOTION TOPICAL
Qty: 60 ML | Refills: 0 | Status: SHIPPED | OUTPATIENT
Start: 2022-04-28 | End: 2022-07-14 | Stop reason: SDUPTHER

## 2022-05-19 ENCOUNTER — OFFICE VISIT (OUTPATIENT)
Dept: FAMILY MEDICINE CLINIC | Age: 67
End: 2022-05-19
Payer: MEDICARE

## 2022-05-19 VITALS
HEIGHT: 60 IN | WEIGHT: 180 LBS | BODY MASS INDEX: 35.34 KG/M2 | HEART RATE: 82 BPM | OXYGEN SATURATION: 95 % | SYSTOLIC BLOOD PRESSURE: 134 MMHG | RESPIRATION RATE: 16 BRPM | DIASTOLIC BLOOD PRESSURE: 82 MMHG | TEMPERATURE: 98 F

## 2022-05-19 DIAGNOSIS — M85.80 OSTEOPENIA, UNSPECIFIED LOCATION: ICD-10-CM

## 2022-05-19 DIAGNOSIS — R73.01 IFG (IMPAIRED FASTING GLUCOSE): ICD-10-CM

## 2022-05-19 DIAGNOSIS — I10 ESSENTIAL HYPERTENSION: Primary | ICD-10-CM

## 2022-05-19 DIAGNOSIS — H66.92 ACUTE LEFT OTITIS MEDIA: ICD-10-CM

## 2022-05-19 DIAGNOSIS — R23.2 HOT FLASHES: ICD-10-CM

## 2022-05-19 DIAGNOSIS — F33.9 RECURRENT DEPRESSION (HCC): ICD-10-CM

## 2022-05-19 DIAGNOSIS — J30.2 SEASONAL ALLERGIC RHINITIS, UNSPECIFIED TRIGGER: ICD-10-CM

## 2022-05-19 DIAGNOSIS — M48.061 SPINAL STENOSIS OF LUMBAR REGION WITHOUT NEUROGENIC CLAUDICATION: ICD-10-CM

## 2022-05-19 PROCEDURE — 99214 OFFICE O/P EST MOD 30 MIN: CPT | Performed by: FAMILY MEDICINE

## 2022-05-19 PROCEDURE — G8752 SYS BP LESS 140: HCPCS | Performed by: FAMILY MEDICINE

## 2022-05-19 PROCEDURE — G9717 DOC PT DX DEP/BP F/U NT REQ: HCPCS | Performed by: FAMILY MEDICINE

## 2022-05-19 PROCEDURE — G8417 CALC BMI ABV UP PARAM F/U: HCPCS | Performed by: FAMILY MEDICINE

## 2022-05-19 PROCEDURE — 1101F PT FALLS ASSESS-DOCD LE1/YR: CPT | Performed by: FAMILY MEDICINE

## 2022-05-19 PROCEDURE — G8754 DIAS BP LESS 90: HCPCS | Performed by: FAMILY MEDICINE

## 2022-05-19 PROCEDURE — 1090F PRES/ABSN URINE INCON ASSESS: CPT | Performed by: FAMILY MEDICINE

## 2022-05-19 PROCEDURE — 3017F COLORECTAL CA SCREEN DOC REV: CPT | Performed by: FAMILY MEDICINE

## 2022-05-19 PROCEDURE — G8427 DOCREV CUR MEDS BY ELIG CLIN: HCPCS | Performed by: FAMILY MEDICINE

## 2022-05-19 PROCEDURE — G8399 PT W/DXA RESULTS DOCUMENT: HCPCS | Performed by: FAMILY MEDICINE

## 2022-05-19 PROCEDURE — G9899 SCRN MAM PERF RSLTS DOC: HCPCS | Performed by: FAMILY MEDICINE

## 2022-05-19 PROCEDURE — G8536 NO DOC ELDER MAL SCRN: HCPCS | Performed by: FAMILY MEDICINE

## 2022-05-19 RX ORDER — FLUTICASONE PROPIONATE 50 MCG
SPRAY, SUSPENSION (ML) NASAL
Qty: 1 EACH | Refills: 2 | Status: SHIPPED | OUTPATIENT
Start: 2022-05-19

## 2022-05-19 RX ORDER — AMOXICILLIN AND CLAVULANATE POTASSIUM 875; 125 MG/1; MG/1
1 TABLET, FILM COATED ORAL EVERY 12 HOURS
Qty: 20 TABLET | Refills: 0 | Status: SHIPPED | OUTPATIENT
Start: 2022-05-19 | End: 2022-05-29

## 2022-05-19 RX ORDER — PAROXETINE HYDROCHLORIDE 20 MG/1
20 TABLET, FILM COATED ORAL DAILY
Qty: 90 TABLET | Refills: 1 | Status: SHIPPED | OUTPATIENT
Start: 2022-05-19

## 2022-05-19 RX ORDER — BETAMETHASONE VALERATE 1.2 MG/G
CREAM TOPICAL
Qty: 15 G | Refills: 1 | Status: SHIPPED | OUTPATIENT
Start: 2022-05-19 | End: 2022-08-15

## 2022-05-19 NOTE — PROGRESS NOTES
Randy Apley, 78 y.o.,  female    SUBJECTIVE  Ff-up     HTN- taking meds without problems. She also has prediabetes    HL- on pravachol    Depression/hot flashes- says doing well for the most part, taking paxil.       Chronic LBP- on lyrica, following dr. Christiano Ponce    Allergic rhinitis-doing well, however c/o L ear pain and fullness for a week, no fever, no discharge. ROS:  See HPI, all others negative        Patient Active Problem List   Diagnosis Code    Essential hypertension I10    Recurrent depression ([facility] [address] [address] F33.9    Lumbar radiculopathy M54.16    Herpes genitalis in women A60.09    Allergic rhinitis due to allergen J30.9    Hot flashes R23.2    Advance directive discussed with patient Z70.80    Facet arthropathy M47.819    Lumbar stenosis M48.061    Advance care planning Z71.89    Obesity E66.9    Mild single current episode of major depressive disorder ([facility] F32.0    IFG (impaired fasting glucose) R73.01    Osteopenia M85.80       Current Outpatient Medications   Medication Sig Dispense Refill    betamethasone valerate (VALISONE) 0.1 % topical cream apply twice a day to affected area OF SKIN if needed for itching 15 g 1    PARoxetine (PAXIL) 20 mg tablet Take 1 Tablet by mouth daily. 90 Tablet 1    fluticasone propionate (FLONASE) 50 mcg/actuation nasal spray instill 1 spray into each nostril once daily 1 Each 2    amoxicillin-clavulanate (AUGMENTIN) 875-125 mg per tablet Take 1 Tablet by mouth every twelve (12) hours for 10 days. 20 Tablet 0    triamcinolone acetonide 0.025 % lotion apply thin layer to affected area twice a day 60 mL 0    pregabalin (Lyrica) 75 mg capsule Take 1 Capsule by mouth three (3) times daily. Max Daily Amount: 225 mg. 90 Capsule 5    amitriptyline (ELAVIL) 25 mg tablet Take 3 Tablets by mouth nightly.  90 Tablet 2    calcium carbonate-vitamin D3 (Caltrate 600 plus D) 600 mg-20 mcg (800 unit) chew Take 2 Tablets by mouth once over twenty-four (24) hours. 180 Tablet 3    pravastatin (PRAVACHOL) 20 mg tablet Take 1 Tablet by mouth nightly. 90 Tablet 1    hydrOXYzine HCL (ATARAX) 25 mg tablet take 1 tablet by mouth AT NIGHT      levocetirizine (XYZAL) 5 mg tablet Take 5 mg by mouth daily.  lisinopril-hydroCHLOROthiazide (PRINZIDE, ZESTORETIC) 20-25 mg per tablet Take 1 Tablet by mouth daily. 90 Tablet 1    diclofenac (VOLTAREN) 1 % gel Apply  to affected area four (4) times daily. 1 Each 1    allergy injection every seven (7) days.  cyclobenzaprine (FLEXERIL) 10 mg tablet Take 1 Tab by mouth three (3) times daily as needed for Muscle Spasm(s). 20 Tab 0    naproxen (NAPROSYN) 500 mg tablet Take 1 Tab by mouth two (2) times daily (with meals). 30 Tab 0    ascorbic acid, vitamin C, (VITAMIN C) 500 mg tablet Take 500 mg by mouth two (2) times daily (with meals).  bisacodyL (DULCOLAX) 5 mg EC tablet Take 10 mg by mouth daily as needed.  cholecalciferol (VITAMIN D3) 25 mcg (1,000 unit) cap       multivit-min-ferrous fumarate (Multi Vitamin) 9 mg iron/15 mL liqd       Allergy Relief, fexofenadine, 180 mg tablet Take 180 mg by mouth daily.  (Patient not taking: Reported on 5/19/2022)         No Known Allergies    Past Medical History:   Diagnosis Date    Arthritis     Bulging lumbar disc     Degenerative arthritis of finger     MP joint right index finger    Depression     H/O colonoscopy 12/2014    recommended yearly hemoccult stools    Hypertension     Low back pain     Lower extremity pain, left     Pinched nerve        Social History     Socioeconomic History    Marital status:      Spouse name: Not on file    Number of children: Not on file    Years of education: Not on file    Highest education level: Not on file   Occupational History    Not on file   Tobacco Use    Smoking status: Never Smoker    Smokeless tobacco: Never Used   Substance and Sexual Activity    Alcohol use: No    Drug use: No    Sexual activity: Not Currently   Other Topics Concern    Not on file   Social History Narrative    Not on file     Social Determinants of Health     Financial Resource Strain:     Difficulty of Paying Living Expenses: Not on file   Food Insecurity:     Worried About Running Out of Food in the Last Year: Not on file    Taylor of Food in the Last Year: Not on file   Transportation Needs:     Lack of Transportation (Medical): Not on file    Lack of Transportation (Non-Medical): Not on file   Physical Activity:     Days of Exercise per Week: Not on file    Minutes of Exercise per Session: Not on file   Stress:     Feeling of Stress : Not on file   Social Connections:     Frequency of Communication with Friends and Family: Not on file    Frequency of Social Gatherings with Friends and Family: Not on file    Attends Christianity Services: Not on file    Active Member of [address] [place] [address] [employer] or Organizations: Not on file    Attends Club or Organization Meetings: Not on file    Marital Status: Not on file   Intimate Partner Violence:     Fear of Current or Ex-Partner: Not on file    Emotionally Abused: Not on file    Physically Abused: Not on file    Sexually Abused: Not on file   Housing Stability:     Unable to Pay for Housing in the Last Year: Not on file    Number of [place] in the Last Year: Not on file    Unstable Housing in the Last Year: Not on file       Family History   Problem Relation Age of Onset    Cancer Mother     Ovarian Cancer Mother 68    Stroke Father          OBJECTIVE    Physical Exam:     Visit Vitals  /82 (BP 1 Location: Left upper arm, BP Patient Position: Sitting, BP Cuff Size: Adult)   Pulse 82   Temp 98 °F (36.7 °C) (Temporal)   Resp 16   Ht 5' 0.25\" (1.53 m)   Wt 180 lb (81.6 kg)   LMP  (LMP Unknown)   SpO2 95%   BMI 34.86 kg/m²       General: alert, well-appearing, obese,AA, in no apparent distress or pain  Head: atraumatic.  Non-tender maxillary and frontal sinuses  Neck: supple, no adenopathy palpated  ENT: eac patent, L ear TM red  CVS: normal rate, regular rhythm, distinct S1 and S2  Lungs:clear to ausculation bilaterally, no crackles, wheezing or rhonchi noted  Abdomen: normoactive bowel sounds, soft, non-tender  Skin: warm, no lesions, rashes noted  Psych:  mood and affect normal    Results for orders placed or performed during the hospital encounter of 34/81/62   METABOLIC PANEL, COMPREHENSIVE   Result Value Ref Range    Sodium 140 136 - 145 mmol/L    Potassium 3.9 3.5 - 5.5 mmol/L    Chloride 105 100 - 111 mmol/L    CO2 31 21 - 32 mmol/L    Anion gap 4 3.0 - 18 mmol/L    Glucose 84 74 - 99 mg/dL    BUN 15 7.0 - 18 MG/DL    Creatinine 0.85 0.6 - 1.3 MG/DL    BUN/Creatinine ratio 18 12 - 20      GFR est AA >60 >60 ml/min/1.73m2    GFR est non-AA >60 >60 ml/min/1.73m2    Calcium 9.3 8.5 - 10.1 MG/DL    Bilirubin, total 0.6 0.2 - 1.0 MG/DL    ALT (SGPT) 29 13 - 56 U/L    AST (SGOT) 24 10 - 38 U/L    Alk. phosphatase 82 45 - 117 U/L    Protein, total 7.2 6.4 - 8.2 g/dL    Albumin 3.6 3.4 - 5.0 g/dL    Globulin 3.6 2.0 - 4.0 g/dL    A-G Ratio 1.0 0.8 - 1.7     HEMOGLOBIN A1C WITH EAG   Result Value Ref Range    Hemoglobin A1c 5.7 (H) 4.2 - 5.6 %    Est. average glucose 117 mg/dL         ASSESSMENT/PLAN  Diagnoses and all orders for this visit:    1. IFG (impaired fasting glucose)  Mild 5.7  TLCs, monitoring  Check cmp prior to next visit    2. Essential hypertension  Controlled  Cont prinzide  Cont pravachol  Cbc/cmp/lipid panel prior to next visit    3. Recurrent depression (HCC)  Stable  Cont paxil    4. Spinal stenosis of lumbar region with neurogenic claudication  On lyrica  Following PMNR    5. Mixed hyperlipidemia  Good range on pravachol 20 mg qhs, to cont  Lipid panel prior to next visit    6. Osteopenia, unspecified  frax 8%/.7%  Ca/vit d/wt bearing exercises  Update dexa 12/2023    7. Acute left otitis media  Start augmentin, resume flonase    8.  Seasonal allergic rhinitis, unspecified trigger  Cont xyzal, flonase    9 BMI 34    Ff-up in 4 weeks, fasting labs prior    Patient understands plan of care. Patient has provided input and agrees with goals.

## 2022-05-19 NOTE — PROGRESS NOTES
1. Have you been to the ER, urgent care clinic since your last visit? Hospitalized since your last visit? No    2. Have you seen or consulted any other health care providers outside of the [address] [place] [address] since your last visit? Include any pap smears or colon screening.  No

## 2022-05-19 NOTE — PATIENT INSTRUCTIONS
A Healthy Heart: Care Instructions  Your Care Instructions     Coronary artery disease, also called heart disease, occurs when a substance called plaque builds up in the vessels that supply oxygen-rich blood to your heart muscle. This can narrow the blood vessels and reduce blood flow. A heart attack happens when blood flow is completely blocked. A high-fat diet, smoking, and other factors increase the risk of heart disease. Your doctor has found that you have a chance of having heart disease. You can do lots of things to keep your heart healthy. It may not be easy, but you can change your diet, exercise more, and quit smoking. These steps really work to lower your chance of heart disease. Follow-up care is a key part of your treatment and safety. Be sure to make and go to all appointments, and call your doctor if you are having problems. It's also a good idea to know your test results and keep a list of the medicines you take. How can you care for yourself at home? Diet    · Use less salt when you cook and eat. This helps lower your blood pressure. Taste food before salting. Add only a little salt when you think you need it. With time, your taste buds will adjust to less salt.     · Eat fewer snack items, fast foods, canned soups, and other high-salt, high-fat, processed foods.     · Read food labels and try to avoid saturated and trans fats. They increase your risk of heart disease by raising cholesterol levels.     · Limit the amount of solid fat-butter, margarine, and shortening-you eat. Use olive, peanut, or canola oil when you cook. Bake, broil, and steam foods instead of frying them.     · Eat a variety of fruit and vegetables every day. Dark green, deep orange, red, or yellow fruits and vegetables are especially good for you. Examples include spinach, carrots, peaches, and berries.     · Foods high in fiber can reduce your cholesterol and provide important vitamins and minerals.  High-fiber foods include whole-grain cereals and breads, oatmeal, beans, brown rice, citrus fruits, and apples.     · Eat lean proteins. Heart-healthy proteins include seafood, lean meats and poultry, eggs, beans, peas, nuts, seeds, and soy products.     · Limit drinks and foods with added sugar. These include candy, desserts, and soda pop. Lifestyle changes    · If your doctor recommends it, get more exercise. Walking is a good choice. Bit by bit, increase the amount you walk every day. Try for at least 30 minutes on most days of the week. You also may want to swim, bike, or do other activities.     · Do not smoke. If you need help quitting, talk to your doctor about stop-smoking programs and medicines. These can increase your chances of quitting for good. Quitting smoking may be the most important step you can take to protect your heart. It is never too late to quit.     · Limit alcohol to 2 drinks a day for men and 1 drink a day for women. Too much alcohol can cause health problems.     · Manage other health problems such as diabetes, high blood pressure, and high cholesterol. If you think you may have a problem with alcohol or drug use, talk to your doctor. Medicines    · Take your medicines exactly as prescribed. Call your doctor if you think you are having a problem with your medicine.     · If your doctor recommends aspirin, take the amount directed each day. Make sure you take aspirin and not another kind of pain reliever, such as acetaminophen (Tylenol). When should you call for help? Call 911 if you have symptoms of a heart attack. These may include:    · Chest pain or pressure, or a strange feeling in the chest.     · Sweating.     · Shortness of breath.     · Pain, pressure, or a strange feeling in the back, neck, jaw, or upper belly or in one or both shoulders or arms.     · Lightheadedness or sudden weakness.     · A fast or irregular heartbeat.    After you call 911, the  may tell you to chew 1 adult-strength or 2 to 4 low-dose aspirin. Wait for an ambulance. Do not try to drive yourself. Watch closely for changes in your health, and be sure to contact your doctor if you have any problems. Where can you learn more? Go to http://www.bell.com/  Enter F075 in the search box to learn more about \"A Healthy Heart: Care Instructions. \"  Current as of: January 10, 2022               Content Version: 13.2  © 2006-2022 [employer]. Care instructions adapted under license by [employer] (which disclaims liability or warranty for this information). If you have questions about a medical condition or this instruction, always ask your healthcare professional. [address] any warranty or liability for your use of this information.

## 2022-06-14 ENCOUNTER — VIRTUAL VISIT (OUTPATIENT)
Dept: ORTHOPEDIC SURGERY | Age: 67
End: 2022-06-14
Payer: MEDICARE

## 2022-06-14 DIAGNOSIS — M54.16 LUMBAR RADICULOPATHY: Primary | ICD-10-CM

## 2022-06-14 PROCEDURE — 1090F PRES/ABSN URINE INCON ASSESS: CPT | Performed by: PHYSICAL MEDICINE & REHABILITATION

## 2022-06-14 PROCEDURE — 3017F COLORECTAL CA SCREEN DOC REV: CPT | Performed by: PHYSICAL MEDICINE & REHABILITATION

## 2022-06-14 PROCEDURE — G9717 DOC PT DX DEP/BP F/U NT REQ: HCPCS | Performed by: PHYSICAL MEDICINE & REHABILITATION

## 2022-06-14 PROCEDURE — G8417 CALC BMI ABV UP PARAM F/U: HCPCS | Performed by: PHYSICAL MEDICINE & REHABILITATION

## 2022-06-14 PROCEDURE — G8427 DOCREV CUR MEDS BY ELIG CLIN: HCPCS | Performed by: PHYSICAL MEDICINE & REHABILITATION

## 2022-06-14 PROCEDURE — 1123F ACP DISCUSS/DSCN MKR DOCD: CPT | Performed by: PHYSICAL MEDICINE & REHABILITATION

## 2022-06-14 PROCEDURE — 1101F PT FALLS ASSESS-DOCD LE1/YR: CPT | Performed by: PHYSICAL MEDICINE & REHABILITATION

## 2022-06-14 PROCEDURE — G8756 NO BP MEASURE DOC: HCPCS | Performed by: PHYSICAL MEDICINE & REHABILITATION

## 2022-06-14 PROCEDURE — G8399 PT W/DXA RESULTS DOCUMENT: HCPCS | Performed by: PHYSICAL MEDICINE & REHABILITATION

## 2022-06-14 PROCEDURE — G9899 SCRN MAM PERF RSLTS DOC: HCPCS | Performed by: PHYSICAL MEDICINE & REHABILITATION

## 2022-06-14 PROCEDURE — 99441 PR PHYS/QHP TELEPHONE EVALUATION 5-10 MIN: CPT | Performed by: PHYSICAL MEDICINE & REHABILITATION

## 2022-06-14 PROCEDURE — G8536 NO DOC ELDER MAL SCRN: HCPCS | Performed by: PHYSICAL MEDICINE & REHABILITATION

## 2022-06-14 RX ORDER — BISMUTH SUBSALICYLATE 262 MG
1 TABLET,CHEWABLE ORAL DAILY
COMMUNITY

## 2022-06-14 NOTE — PROGRESS NOTES
Atul Batista presents today for   Chief Complaint   Patient presents with    Back Pain    Hip Pain     left    Leg Pain     left       Is someone accompanying this pt? no    Is the patient using any DME equipment during OV? no    Depression Screening:  3 most recent PHQ Screens 5/19/2022   Little interest or pleasure in doing things Not at all   Feeling down, depressed, irritable, or hopeless Not at all   Total Score PHQ 2 0       Learning Assessment:  Learning Assessment 2/10/2020   PRIMARY LEARNER Patient   HIGHEST LEVEL OF EDUCATION - PRIMARY LEARNER  GRADUATED HIGH SCHOOL OR GED   BARRIERS PRIMARY LEARNER NONE   CO-LEARNER CAREGIVER No   PRIMARY LANGUAGE ENGLISH    NEED No   LEARNER PREFERENCE PRIMARY DEMONSTRATION     -   LEARNING SPECIAL TOPICS No   ANSWERED BY patient   RELATIONSHIP SELF       Abuse Screening:  Abuse Screening Questionnaire 4/13/2021   Do you ever feel afraid of your partner? N   Are you in a relationship with someone who physically or mentally threatens you? N   Is it safe for you to go home? Y       Fall Risk  Fall Risk Assessment, last 12 mths 5/19/2022   Able to walk? Yes   Fall in past 12 months? 0   Do you feel unsteady? -   Are you worried about falling -       Coordination of Care:  1. Have you been to the ER, urgent care clinic since your last visit? no  Hospitalized since your last visit? no    2. Have you seen or consulted any other health care providers outside of the [address] [place] [address] since your last visit? Yes, pcp Include any pap smears or colon screening.  no

## 2022-06-14 NOTE — PROGRESS NOTES
[address]  [address]  [address]  Phone: [phone]  Fax: [phone]        Kelvin Matters  : 1955  PCP: Adelaida Garcia MD  2022    PROGRESS NOTE    Consent: Abel Doll was evaluated through  a synchronous (real-time) audio-video encounter. The patient (or guardian if applicable) is aware that this is a billable service, which includes applicable co-pays. This Virtual Visit was conducted with patient's (and/or legal guardian's) consent. This visit was conducted pursuant to the emergency declaration under the [address] [place] [address] authority and the [employer] General Act. Patient identification was verified, and a caregiver was present when appropriate. The patient was located in a state where the provider was licensed to provide care. The visit was conducted in the office with the patient being in home through a Telephone platform. Visit time start: 10:26 AM end: 10:31 AM      HISTORY OF PRESENT ILLNESS  Abel Doll is a 78 y.o. female who was initially seen in 2017 for c/o lumbar pain radiating into her LLE. She found some relief from Tylenol #3 PRN and Gabapentin. Her pain was exacerbated with increased lifting, bending, cleaning, and walking. She found relief from lying down. She was not maintaining an HEP. She found some relief from Naproxen and Flexeril. She noted that pressure while sitting exacerbated her pain. Lumbar MRI 16: Degenerative grade 1 anterior listhesis of both L4 and L5 in the presence of advanced facet arthropathy. No high-grade spinal stenosis. There are bilateral foraminal stenoses. Some exiting nerve contact but no overt impingement. She continued to have some residual low back pain with some tingling in the LLE. She c/o right heel pain that she associated with a bone spur in the heel.  Her PCP provided exercises for plantar fasciitis. 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 She was intolerant to GABAPENTIN secondary to somnolence. She did not tolerate the Lyrica 150 mg BID as it caused drowsiness, however, she did find some relief at the 75mg dose She was focusing on a diet and cutting out starches. She was maintaining well on Lyrica 75 mg TID. Her symptoms continued to wax and wane. She tended to have flare ups of her back pain after she was more active (cleaning house, etc). She felt somnolence with Lyrica 75 mg TID. She continued to have low back pain radiating into the LLE that was worse at night. She had difficulty sleeping. Radhames Bowman is seen virtually for f/u. She continues to have some low back pain radiating into the LLE. She has been taking Lyrica 75 mg TID with benefit. She has also been tolerating Amitriptyline 25 mg QHS. She notes that Amitriptyline 50 mg QHS was a little too much, so she went back to 25 mg with benefit. She has been trying to maintain a consistent HEP of walking. Pain Scale: 0/10    ASSESSMENT  Radhames Bowman is a 73 y.o. female with c/o low back pain radiating into the LLE. Her symptoms are likely due to a left-sided radiculopathy. PLAN  1. Continue Lyrica 75 mg TID and Amitriptyline up to  3 x 25 mg QHS. She can call if she needs refills before her next OV. 2. Maintain consistent HEP. Pt will f/u in 3 months or sooner as needed. Diagnoses and all orders for this visit:    1.  Lumbar radiculopathy               PAST MEDICAL HISTORY   Past Medical History:   Diagnosis Date    Arthritis     Bulging lumbar disc     Degenerative arthritis of finger     MP joint right index finger    Depression     H/O colonoscopy 12/2014    recommended yearly hemoccult stools    Hypertension     Low back pain     Lower extremity pain, left     Pinched nerve        Past Surgical History:   Procedure Laterality Date    COLONOSCOPY N/A 2/5/2020    COL[facility] ENDOSCOPY    HX CARPAL TUNNEL RELEASE Right     hand    HX COLONOSCOPY  2014    benign polyp    HX HYSTERECTOMY      HX ORTHOPAEDIC      heel spurs   . MEDICATIONS    Current Outpatient Medications   Medication Sig Dispense Refill    multivitamin (ONE A DAY) tablet Take 1 Tablet by mouth daily.  betamethasone valerate (VALISONE) 0.1 % topical cream apply twice a day to affected area OF SKIN if needed for itching 15 g 1    PARoxetine (PAXIL) 20 mg tablet Take 1 Tablet by mouth daily. 90 Tablet 1    fluticasone propionate (FLONASE) 50 mcg/actuation nasal spray instill 1 spray into each nostril once daily 1 Each 2    triamcinolone acetonide 0.025 % lotion apply thin layer to affected area twice a day 60 mL 0    pregabalin (Lyrica) 75 mg capsule Take 1 Capsule by mouth three (3) times daily. Max Daily Amount: 225 mg. 90 Capsule 5    amitriptyline (ELAVIL) 25 mg tablet Take 3 Tablets by mouth nightly. 90 Tablet 2    calcium carbonate-vitamin D3 (Caltrate 600 plus D) 600 mg-20 mcg (800 unit) chew Take 2 Tablets by mouth once over twenty-four (24) hours. 180 Tablet 3    pravastatin (PRAVACHOL) 20 mg tablet Take 1 Tablet by mouth nightly. 90 Tablet 1    Allergy Relief, fexofenadine, 180 mg tablet Take 180 mg by mouth daily.  hydrOXYzine HCL (ATARAX) 25 mg tablet take 1 tablet by mouth AT NIGHT      lisinopril-hydroCHLOROthiazide (PRINZIDE, ZESTORETIC) 20-25 mg per tablet Take 1 Tablet by mouth daily. 90 Tablet 1    diclofenac (VOLTAREN) 1 % gel Apply  to affected area four (4) times daily. 1 Each 1    allergy injection by SubCUTAneous route every fourteen (14) days.  naproxen (NAPROSYN) 500 mg tablet Take 1 Tab by mouth two (2) times daily (with meals). 30 Tab 0    ascorbic acid, vitamin C, (VITAMIN C) 500 mg tablet Take 500 mg by mouth two (2) times daily (with meals).       cholecalciferol (VITAMIN D3) 25 mcg (1,000 unit) cap Take 1,000 Units by mouth daily.  levocetirizine (XYZAL) 5 mg tablet Take 5 mg by mouth daily.  cyclobenzaprine (FLEXERIL) 10 mg tablet Take 1 Tab by mouth three (3) times daily as needed for Muscle Spasm(s). 20 Tab 0    bisacodyL (DULCOLAX) 5 mg EC tablet Take 10 mg by mouth daily as needed.  multivit-min-ferrous fumarate (Multi Vitamin) 9 mg iron/15 mL liqd           ALLERGIES  No Known Allergies       SOCIAL HISTORY    Social History     Socioeconomic History    Marital status:    Tobacco Use    Smoking status: Never Smoker    Smokeless tobacco: Never Used   Substance and Sexual Activity    Alcohol use: No    Drug use: No    Sexual activity: Not Currently       FAMILY HISTORY  Family History   Problem Relation Age of Onset    Cancer Mother     Ovarian Cancer Mother 68    Stroke Father          REVIEW OF SYSTEMS  Review of Systems   Constitutional: Negative for chills, fever and weight loss. Respiratory: Negative for shortness of breath. Cardiovascular: Negative for chest pain. Gastrointestinal: Negative for constipation. Negative for fecal incontinence    Genitourinary: Negative for dysuria. Negative for urinary incontinence   Musculoskeletal: Positive for back pain. Skin: Negative for rash. Neurological: Positive for tingling ( LLE). Negative for dizziness, tremors, focal weakness and headaches. Endo/Heme/Allergies: Does not bruise/bleed easily. Psychiatric/Behavioral: The patient does not have insomnia.          PHYSICAL EXAMINATION  Visit Vitals  LMP  (LMP Unknown)         Pain Assessment  6/14/2022   Location of Pain Back   Pain Location Comment left hip and side of left leg   Location Modifiers -   Severity of Pain 0   Quality of Pain Throbbing;Aching   Quality of Pain Comment -   Duration of Pain Persistent   Frequency of Pain Intermittent   Date Pain First Started -   Aggravating Factors (No Data)   Aggravating Factors Comment lying down to go to bed   Limiting Behavior Yes   Relieving Factors Ice   Relieving Factors Comment take my medication   Result of Injury -           The limitations of a telemedicine visit including the inability to perform a detailed physical examination may miss significant findings was presented to the patient, and the patient still elected to proceed with the telemedicine visit. RADIOGRAPHS  Lumbar MRI images taken on 4/19/16 personally reviewed with patient:  Study presumes presence of five lumbar vertebra. There is grade 1  spondylolisthesis of both L4 and L5. No pars defect. Normal vertebral body  heights. Unremarkable marrow signal. Mild disc space narrowing with disc  desiccation at L5-S1. Less so at L4-5. Normal conus at upper L2.          Axial imaging correlation:     T12-L1: Minimal disc bulge. Patent canal and foramina.     L1-2: Patent canal and foramina.     L2-3: Patent canal and foramina.     L3-4: Mild facet arthropathy. Patent canal and foramina.     L4-5: Listhesis with uncovering of the disc. No focal disc herniation. Bilateral  facet arthropathy. Low-grade facet inflammation. An overall mild concentric  spinal stenosis. Crossing nerve abutment but no impingement. Mild foraminal  stenoses without exiting nerve impingement. Axial T2 series 2 images 12-13.     L5-S1: Listhesis with uncovering of the disc. No focal disc herniation, with  minimal disc bulging. Pronounced facet arthropathy. Mild narrowing at the  lateral recesses but overall no significant spinal stenosis. However, there is  moderate severity foraminal stenosis. Exiting nerve contact in the right  foramen, but no overt impingement. Axial T2 images 8-9; also axial T1 image 5 of  series 3.     Incidental imaging of regional soft tissues is unremarkable.      IMPRESSION:     1. Degenerative grade 1 anterior listhesis of both L4 and L5 in the presence of  advanced facet arthropathy. No high-grade spinal stenosis.  There are bilateral  foraminal stenoses. Some exiting nerve contact but no overt impingement. Details  as above.  reviewed    Ms. Crys Anderson has a reminder for a \"due or due soon\" health maintenance. I have asked that she contact her primary care provider for follow-up on this health maintenance. Pursuant to the emergency declaration under the [address] [place] [address] authority and the [email or web address] Act, this Virtual  Visit was conducted, with patient's consent, to reduce the patient's risk of exposure to COVID-19 and provide continuity of care for an established patient. Services were provided through a video synchronous discussion virtually to substitute for in-person clinic visit. CPT Codes 25470-11108 for Established Patients may apply to this Telehealth Visit  Time-based coding, delete if not needed: I spent at least 5 minutes with this established patient, and >50% of the time was spent counseling and/or coordinating care. Written by Issac Tejeda, as dictated by Dr. Vipin Garcia.

## 2022-07-05 NOTE — TELEPHONE ENCOUNTER
This pharmacy faxed over request for the following prescriptions to be filled:    Medication requested :   Requested Prescriptions     Pending Prescriptions Disp Refills    pravastatin (PRAVACHOL) 20 mg tablet 90 Tablet 1     Sig: Take 1 Tablet by mouth nightly.      PCP: [address] [place] [address] or Print: [facility]  Mail order or Local pharmacy [address] [phone]    Scheduled appointment if not seen by current providers in office: lov 5/19/2022 f/u 8/22/2022

## 2022-07-07 RX ORDER — PRAVASTATIN SODIUM 20 MG/1
20 TABLET ORAL
Qty: 90 TABLET | Refills: 3 | Status: SHIPPED | OUTPATIENT
Start: 2022-07-07

## 2022-07-14 NOTE — TELEPHONE ENCOUNTER
This patient contacted office for the following prescriptions to be filled:    Medication requested :   Requested Prescriptions     Pending Prescriptions Disp Refills    triamcinolone acetonide 0.025 % lotion 60 mL 0     Sig: Sig: apply thin layer to affected area twice a day     PCP: [address] [place] [address] or Print: [facility]  Mail order or Local pharmacy [address]     Scheduled appointment if not seen by current providers in office: LOV 5/19/2022 f/u /8/22/2022

## 2022-07-18 RX ORDER — TRIAMCINOLONE ACETONIDE 0.25 MG/ML
LOTION TOPICAL
Qty: 60 ML | Refills: 0 | Status: SHIPPED | OUTPATIENT
Start: 2022-07-18

## 2022-08-10 ENCOUNTER — HOSPITAL ENCOUNTER (OUTPATIENT)
Dept: LAB | Age: 67
Discharge: HOME | End: 2022-08-10

## 2022-08-10 DIAGNOSIS — I10 ESSENTIAL HYPERTENSION: ICD-10-CM

## 2022-08-10 LAB — XX-LABCORP SPECIMEN COL,LCBCF: NORMAL

## 2022-08-10 PROCEDURE — 99001 SPECIMEN HANDLING PT-LAB: CPT

## 2022-08-11 LAB
ALBUMIN SERPL-MCNC: 4.1 G/DL (ref 3.8–4.8)
ALBUMIN/GLOB SERPL: 1.4 {RATIO} (ref 1.2–2.2)
ALP SERPL-CCNC: 77 IU/L (ref 44–121)
ALT SERPL-CCNC: 17 IU/L (ref 0–32)
AST SERPL-CCNC: 18 IU/L (ref 0–40)
BASOPHILS # BLD AUTO: 0 X10E3/UL (ref 0–0.2)
BASOPHILS NFR BLD AUTO: 1 %
BILIRUB SERPL-MCNC: 0.5 MG/DL (ref 0–1.2)
BUN SERPL-MCNC: 16 MG/DL (ref 8–27)
BUN/CREAT SERPL: 21 (ref 12–28)
CALCIUM SERPL-MCNC: 9.4 MG/DL (ref 8.7–10.3)
CHLORIDE SERPL-SCNC: 103 MMOL/L (ref 96–106)
CHOLEST SERPL-MCNC: 139 MG/DL (ref 100–199)
CO2 SERPL-SCNC: 25 MMOL/L (ref 20–29)
CREAT SERPL-MCNC: 0.78 MG/DL (ref 0.57–1)
EGFR: 84 ML/MIN/1.73
EOSINOPHIL # BLD AUTO: 0.1 X10E3/UL (ref 0–0.4)
EOSINOPHIL NFR BLD AUTO: 3 %
ERYTHROCYTE [DISTWIDTH] IN BLOOD BY AUTOMATED COUNT: 14.1 % (ref 11.7–15.4)
GLOBULIN SER CALC-MCNC: 3 G/DL (ref 1.5–4.5)
GLUCOSE SERPL-MCNC: 96 MG/DL (ref 65–99)
HCT VFR BLD AUTO: 34 % (ref 34–46.6)
HDLC SERPL-MCNC: 71 MG/DL
HGB BLD-MCNC: 10.5 G/DL (ref 11.1–15.9)
IMM GRANULOCYTES # BLD AUTO: 0 X10E3/UL (ref 0–0.1)
IMM GRANULOCYTES NFR BLD AUTO: 0 %
IMP & REVIEW OF LAB RESULTS: NORMAL
LDLC SERPL CALC-MCNC: 57 MG/DL (ref 0–99)
LYMPHOCYTES # BLD AUTO: 1.4 X10E3/UL (ref 0.7–3.1)
LYMPHOCYTES NFR BLD AUTO: 33 %
MCH RBC QN AUTO: 24.2 PG (ref 26.6–33)
MCHC RBC AUTO-ENTMCNC: 30.9 G/DL (ref 31.5–35.7)
MCV RBC AUTO: 79 FL (ref 79–97)
MONOCYTES # BLD AUTO: 0.5 X10E3/UL (ref 0.1–0.9)
MONOCYTES NFR BLD AUTO: 11 %
NEUTROPHILS # BLD AUTO: 2.2 X10E3/UL (ref 1.4–7)
NEUTROPHILS NFR BLD AUTO: 52 %
PLATELET # BLD AUTO: 284 X10E3/UL (ref 150–450)
POTASSIUM SERPL-SCNC: 4.2 MMOL/L (ref 3.5–5.2)
PROT SERPL-MCNC: 7.1 G/DL (ref 6–8.5)
RBC # BLD AUTO: 4.33 X10E6/UL (ref 3.77–5.28)
SODIUM SERPL-SCNC: 140 MMOL/L (ref 134–144)
TRIGL SERPL-MCNC: 48 MG/DL (ref 0–149)
VLDLC SERPL CALC-MCNC: 11 MG/DL (ref 5–40)
WBC # BLD AUTO: 4.2 X10E3/UL (ref 3.4–10.8)

## 2022-08-15 RX ORDER — BETAMETHASONE VALERATE 1.2 MG/G
CREAM TOPICAL
Qty: 15 G | Refills: 0 | Status: SHIPPED | OUTPATIENT
Start: 2022-08-15

## 2022-08-22 DIAGNOSIS — M54.16 LUMBAR RADICULOPATHY: ICD-10-CM

## 2022-08-22 NOTE — TELEPHONE ENCOUNTER
Last Visit: 6/14/22 with MD Lamar Hairston  Next Appointment: Advised to follow-up in 3 months  Previous Refill Encounter(s): 3/10/22 Elavil #90 with 2 refills, Lyrica #90 with 5 refills    Requested Prescriptions     Pending Prescriptions Disp Refills    amitriptyline (ELAVIL) 25 mg tablet 90 Tablet 2     Sig: Take 3 Tablets by mouth nightly. pregabalin (Lyrica) 75 mg capsule 90 Capsule 5     Sig: Take 1 Capsule by mouth three (3) times daily. Max Daily Amount: 225 mg. For [facility] in place:   Recommendation Provided To:    Intervention Detail: New Rx: 2, reason: Patient Preference  Gap Closed?:   Intervention Accepted By:   Time Spent (min): 5

## 2022-08-23 RX ORDER — PREGABALIN 75 MG/1
75 CAPSULE ORAL 3 TIMES DAILY
Qty: 90 CAPSULE | Refills: 5 | OUTPATIENT
Start: 2022-08-23

## 2022-08-23 RX ORDER — AMITRIPTYLINE HYDROCHLORIDE 25 MG/1
75 TABLET, FILM COATED ORAL
Qty: 90 TABLET | Refills: 2 | OUTPATIENT
Start: 2022-08-23

## 2022-08-26 DIAGNOSIS — M54.16 LUMBAR RADICULOPATHY: ICD-10-CM

## 2022-08-26 NOTE — TELEPHONE ENCOUNTER
Refills were last issued in March 2022. The lyrica was done for 6 months and the elavil  3 months. She had a virtual appt with Dr. Blaise Quinones in June. Please review.

## 2022-08-27 DIAGNOSIS — D64.9 ANEMIA, UNSPECIFIED TYPE: Primary | ICD-10-CM

## 2022-08-28 RX ORDER — PREGABALIN 75 MG/1
75 CAPSULE ORAL 3 TIMES DAILY
Qty: 90 CAPSULE | Refills: 5 | OUTPATIENT
Start: 2022-08-28

## 2022-08-28 RX ORDER — AMITRIPTYLINE HYDROCHLORIDE 25 MG/1
75 TABLET, FILM COATED ORAL
Qty: 90 TABLET | Refills: 0 | OUTPATIENT
Start: 2022-08-28

## 2022-08-29 DIAGNOSIS — M54.16 LUMBAR RADICULOPATHY: ICD-10-CM

## 2022-08-29 NOTE — TELEPHONE ENCOUNTER
Patient called requesting medication refills on lyrica & elavil,patient stated that she needs it. Patient contact [phone]        pregabalin (Lyrica) 75 mg capsule [[id number]]     Order Details  Dose: 75 mg Route: Oral Frequency: 3 TIMES DAILY   Dispense Quantity: 90 Capsule Refills: 5          Sig: Take 1 Capsule by mouth three (3) times daily. Max Daily Amount: 225 mg. Start Date: 03/10/22 End Date: --   Written Date: 03/10/22 Expiration Date: --       Diagnosis Association: Lumbar radiculopathy (M54.16)   Original Order:  pregabalin (LYRICA) 75 mg capsule [[id number]]   Providers    Authorizing Provider:    Lilly Galindo MD   [address] [place] [address],[address], [address] [facility] [address]  [address]   Phone:  [phone]   Fax:  [phone]   FLORINA #:  [id number]   NPI:  [id number]        Ordering User:  Lilly Galindo MD          [address] [facility], [address] [facility] [address], [address] [facility] [address]   Phone:  [phone]  Fax:  [phone]   FLORINA #:  --   CHRISTIAN Reason: --           amitriptyline (ELAVIL) 25 mg tablet [[id number]]     Order Details  Dose: 75 mg Route: Oral Frequency: EVERY BEDTIME   Dispense Quantity: 90 Tablet Refills: 2          Sig: Take 3 Tablets by mouth nightly.          Start Date: 03/10/22 End Date: --   Written Date: 03/10/22 Expiration Date: --       Diagnosis Association: Lumbar radiculopathy (M54.16)   Providers    Authorizing Provider:    Lilly Galindo MD   [address] [place] [address],[address], [address] [address]   Phone:  [phone]   Fax:  [phone]   FLORINA #:  [id number]   NPI:  [id number]        Ordering User:  Lilly Galindo[address] [place], [address] [facility] [address], [address] [facility] [address]   Phone:  [phone]  Fax:  [phone]   FLORINA #:  --   CHRISTIAN Reason: --

## 2022-08-30 RX ORDER — PREGABALIN 75 MG/1
75 CAPSULE ORAL 3 TIMES DAILY
Qty: 90 CAPSULE | Refills: 0 | Status: SHIPPED | OUTPATIENT
Start: 2022-08-30 | End: 2022-10-06 | Stop reason: SDUPTHER

## 2022-08-30 RX ORDER — AMITRIPTYLINE HYDROCHLORIDE 25 MG/1
75 TABLET, FILM COATED ORAL
Qty: 90 TABLET | Refills: 0 | Status: SHIPPED | OUTPATIENT
Start: 2022-08-30 | End: 2022-10-06 | Stop reason: SDUPTHER

## 2022-08-30 NOTE — TELEPHONE ENCOUNTER
Not an urgent request. I called and spoke to the pt. She had a virtual appt with Dr. Bella Ivory in June. Her last issued prescriptions were done in March 2022. Refills have been pended to the provider for review. Last fill on  for lyrica was 08/13/22. She is aware that these medications may not be refilled until 09/06/22 due to providers being out of the office. No questions or concerns voiced from the pt at this time.

## 2022-09-01 NOTE — TELEPHONE ENCOUNTER
Pt was contacted and notified of the 1 month refill on both meds. The pt was identified using 2 pt identifiers. A follow up was made for 10/06/22 at 1115. Pt is aware of the office location.

## 2022-09-28 ENCOUNTER — OFFICE VISIT (OUTPATIENT)
Dept: FAMILY MEDICINE CLINIC | Age: 67
End: 2022-09-28
Payer: MEDICARE

## 2022-09-28 VITALS
HEART RATE: 76 BPM | WEIGHT: 175 LBS | RESPIRATION RATE: 16 BRPM | TEMPERATURE: 98.7 F | HEIGHT: 60 IN | SYSTOLIC BLOOD PRESSURE: 130 MMHG | OXYGEN SATURATION: 98 % | DIASTOLIC BLOOD PRESSURE: 76 MMHG | BODY MASS INDEX: 34.36 KG/M2

## 2022-09-28 DIAGNOSIS — I10 ESSENTIAL HYPERTENSION: Primary | ICD-10-CM

## 2022-09-28 DIAGNOSIS — D64.9 NORMOCYTIC ANEMIA: ICD-10-CM

## 2022-09-28 DIAGNOSIS — M48.061 SPINAL STENOSIS OF LUMBAR REGION WITHOUT NEUROGENIC CLAUDICATION: ICD-10-CM

## 2022-09-28 DIAGNOSIS — F33.9 RECURRENT DEPRESSION (HCC): ICD-10-CM

## 2022-09-28 DIAGNOSIS — R73.01 IFG (IMPAIRED FASTING GLUCOSE): ICD-10-CM

## 2022-09-28 DIAGNOSIS — R23.2 HOT FLASHES: ICD-10-CM

## 2022-09-28 DIAGNOSIS — J30.2 SEASONAL ALLERGIC RHINITIS, UNSPECIFIED TRIGGER: ICD-10-CM

## 2022-09-28 DIAGNOSIS — Z23 ENCOUNTER FOR IMMUNIZATION: ICD-10-CM

## 2022-09-28 DIAGNOSIS — M85.80 OSTEOPENIA, UNSPECIFIED LOCATION: ICD-10-CM

## 2022-09-28 PROCEDURE — G9717 DOC PT DX DEP/BP F/U NT REQ: HCPCS | Performed by: FAMILY MEDICINE

## 2022-09-28 PROCEDURE — 1101F PT FALLS ASSESS-DOCD LE1/YR: CPT | Performed by: FAMILY MEDICINE

## 2022-09-28 PROCEDURE — G8536 NO DOC ELDER MAL SCRN: HCPCS | Performed by: FAMILY MEDICINE

## 2022-09-28 PROCEDURE — 1123F ACP DISCUSS/DSCN MKR DOCD: CPT | Performed by: FAMILY MEDICINE

## 2022-09-28 PROCEDURE — 1090F PRES/ABSN URINE INCON ASSESS: CPT | Performed by: FAMILY MEDICINE

## 2022-09-28 PROCEDURE — G8399 PT W/DXA RESULTS DOCUMENT: HCPCS | Performed by: FAMILY MEDICINE

## 2022-09-28 PROCEDURE — 99214 OFFICE O/P EST MOD 30 MIN: CPT | Performed by: FAMILY MEDICINE

## 2022-09-28 PROCEDURE — G8417 CALC BMI ABV UP PARAM F/U: HCPCS | Performed by: FAMILY MEDICINE

## 2022-09-28 PROCEDURE — G8754 DIAS BP LESS 90: HCPCS | Performed by: FAMILY MEDICINE

## 2022-09-28 PROCEDURE — G9899 SCRN MAM PERF RSLTS DOC: HCPCS | Performed by: FAMILY MEDICINE

## 2022-09-28 PROCEDURE — G8427 DOCREV CUR MEDS BY ELIG CLIN: HCPCS | Performed by: FAMILY MEDICINE

## 2022-09-28 PROCEDURE — G8752 SYS BP LESS 140: HCPCS | Performed by: FAMILY MEDICINE

## 2022-09-28 PROCEDURE — G0008 ADMIN INFLUENZA VIRUS VAC: HCPCS | Performed by: FAMILY MEDICINE

## 2022-09-28 PROCEDURE — 90694 VACC AIIV4 NO PRSRV 0.5ML IM: CPT | Performed by: FAMILY MEDICINE

## 2022-09-28 PROCEDURE — 3017F COLORECTAL CA SCREEN DOC REV: CPT | Performed by: FAMILY MEDICINE

## 2022-09-28 NOTE — PROGRESS NOTES
Jamin Grayson, 78 y.o.,  female    SUBJECTIVE  Ff-up     HTN- taking meds without problems. She also has prediabetes. Noted mild anemia hgb 10.5 from usually 13 on labs, no symptoms, CRCS utd 2020. No abdominal pain, changes in diet. HL- on pravachol, reviewed labs LDL 57    Depression/hot flashes- says doing well for the most part, taking paxil. Chronic LBP- on lyrica, following dr. Elise Watkins    Allergic rhinitis-doing well, taking prn xyzal.     ROS:  See HPI, all others negative        Patient Active Problem List   Diagnosis Code    Essential hypertension I10    Recurrent depression ([facility] [address] F33.9    Lumbar radiculopathy M54.16    Herpes genitalis in women A60.09    Allergic rhinitis due to allergen J30.9    Hot flashes R23.2    Advance directive discussed with patient Z71.89    Facet arthropathy M47.819    Lumbar stenosis M48.061    Advance care planning Z71.89    Obesity E66.9    Mild single current episode of major depressive disorder ([facility] [address] F32.0    IFG (impaired fasting glucose) R73.01    Osteopenia M85.80       Current Outpatient Medications   Medication Sig Dispense Refill    pregabalin (Lyrica) 75 mg capsule Take 1 Capsule by mouth three (3) times daily. Max Daily Amount: 225 mg. 90 Capsule 0    amitriptyline (ELAVIL) 25 mg tablet Take 3 Tablets by mouth nightly. 90 Tablet 0    betamethasone valerate (VALISONE) 0.1 % topical cream APPLY TO AFFECTED AREA ON SKIN TWICE DAILY AS NEEDED FOR ITCHING 15 g 0    triamcinolone acetonide 0.025 % lotion Sig: apply thin layer to affected area twice a day 60 mL 0    lisinopril-hydroCHLOROthiazide (PRINZIDE, ZESTORETIC) 20-25 mg per tablet Take 1 tablet by mouth once daily 90 Tablet 1    pravastatin (PRAVACHOL) 20 mg tablet Take 1 Tablet by mouth nightly. 90 Tablet 3    multivitamin (ONE A DAY) tablet Take 1 Tablet by mouth daily. PARoxetine (PAXIL) 20 mg tablet Take 1 Tablet by mouth daily.  90 Tablet 1    calcium carbonate-vitamin D3 (Caltrate 600 plus D) 600 mg-20 mcg (800 unit) chew Take 2 Tablets by mouth once over twenty-four (24) hours. 180 Tablet 3    Allergy Relief, fexofenadine, 180 mg tablet Take 180 mg by mouth daily. levocetirizine (XYZAL) 5 mg tablet Take 5 mg by mouth daily. allergy injection by SubCUTAneous route every fourteen (14) days. cyclobenzaprine (FLEXERIL) 10 mg tablet Take 1 Tab by mouth three (3) times daily as needed for Muscle Spasm(s). 20 Tab 0    ascorbic acid, vitamin C, (VITAMIN C) 500 mg tablet Take 500 mg by mouth two (2) times daily (with meals). cholecalciferol (VITAMIN D3) 25 mcg (1,000 unit) cap Take 1,000 Units by mouth daily. multivit-min-ferrous fumarate (Multi Vitamin) 9 mg iron/15 mL liqd       fluticasone propionate (FLONASE) 50 mcg/actuation nasal spray instill 1 spray into each nostril once daily 1 Each 2    hydrOXYzine HCL (ATARAX) 25 mg tablet take 1 tablet by mouth AT NIGHT      diclofenac (VOLTAREN) 1 % gel Apply  to affected area four (4) times daily. 1 Each 1    naproxen (NAPROSYN) 500 mg tablet Take 1 Tab by mouth two (2) times daily (with meals). 30 Tab 0    bisacodyL (DULCOLAX) 5 mg EC tablet Take 10 mg by mouth daily as needed.          No Known Allergies    Past Medical History:   Diagnosis Date    Arthritis     Bulging lumbar disc     Degenerative arthritis of finger     MP joint right index finger    Depression     H/O colonoscopy 12/2014    recommended yearly hemoccult stools    Hypertension     Low back pain     Lower extremity pain, left     Pinched nerve        Social History     Socioeconomic History    Marital status:      Spouse name: Not on file    Number of children: Not on file    Years of education: Not on file    Highest education level: Not on file   Occupational History    Not on file   Tobacco Use    Smoking status: Never    Smokeless tobacco: Never   Substance and Sexual Activity    Alcohol use: No    Drug use: No    Sexual activity: Not Currently   Other Topics Concern    Not on file   Social History Narrative    Not on file     Social Determinants of Health     Financial Resource Strain: Not on file   Food Insecurity: Not on file   Transportation Needs: Not on file   Physical Activity: Not on file   Stress: Not on file   Social Connections: Not on file   Intimate Partner Violence: Not on file   Housing Stability: Not on file       Family History   Problem Relation Age of Onset    Cancer Mother     Ovarian Cancer Mother 68    Stroke Father          OBJECTIVE    Physical Exam:     Visit Vitals  /76 (BP 1 Location: Left arm, BP Patient Position: Sitting, BP Cuff Size: Large adult)   Pulse 76   Temp 98.7 °F (37.1 °C) (Temporal)   Resp 16   Ht 5' 0.25\" (1.53 m)   Wt 175 lb (79.4 kg)   LMP  (LMP Unknown)   SpO2 98%   BMI 33.89 kg/m²       General: alert, well-appearing, obese,AA, in no apparent distress or pain  CVS: normal rate, regular rhythm, distinct S1 and S2  Lungs:clear to ausculation bilaterally, no crackles, wheezing or rhonchi noted  Abdomen: normoactive bowel sounds, soft, non-tender  Skin: warm, no lesions, rashes noted  Psych:  mood and affect normal    Results for orders placed or performed during the hospital encounter of 08/10/22   [facility] SPECIMEN COL   Result Value Ref Range    [facility] SPECIMEN [facility]. Specimens collected/sent to [facility]. Please direct inquiries to ([phone]). ASSESSMENT/PLAN  Diagnoses and all orders for this visit:    1. IFG (impaired fasting glucose)  Tlcs, monitoring    2. Essential hypertension  Controlled  Cont prinzide  Cont pravachol    3. Recurrent depression (HCC)  Stable  Cont paxil    4. Spinal stenosis of lumbar region with neurogenic claudication  On lyrica  Following PMNR    5. Mixed hyperlipidemia  Good range on pravachol 20 mg qhs, to cont  Lipid panel 8/2022    6. Osteopenia, unspecified  frax 8%/.7%  Ca/vit d/wt bearing exercises  Update dexa 12/2023    7.  Normocytic anemia  Start work up:  Iron profile, cbc, ferritin, vitamin b12 and folate    8. Seasonal allergic rhinitis, unspecified trigger  Cont xyzal, flonase    9 BMI 33    10. Needs flu shot  Flu vaccine given    Ff-up in 6 weeks, labs soon, plan on MWV then    Patient understands plan of care. Patient has provided input and agrees with goals.

## 2022-09-28 NOTE — PROGRESS NOTES
Chief Complaint   Patient presents with    Allergic Rhinitis    Cholesterol Problem    Depression    Hypertension    Osteopenia    Other     IFG            1. \"Have you been to the ER, urgent care clinic since your last visit? Hospitalized since your last visit? \" No    2. \"Have you seen or consulted any other health care providers outside of the [address] [place] [address] since your last visit? \" No     3. For patients aged 39-70: Has the patient had a colonoscopy / FIT/ Cologuard? Yes - no Care Gap present      If the patient is female:    4. For patients aged 41-77: Has the patient had a mammogram within the past 2 years? Yes - no Care Gap present      5. For patients aged 21-65: Has the patient had a pap smear? No hx of hysterectomy       Physician order obtained. Patient completed adult immunization consent form. Allergies, contraindications and recommendations reviewed with patient. Seasonal influenza vaccine administered IM left deltoid. Patient tolerated well. Patient remained in office for 15 minutes after injection and no adverse reactions were noted.     Lot # [id number]  Exp Date: 05-  [facility] # [id number]

## 2022-10-05 NOTE — PROGRESS NOTES
[address]  [address], [address]  [place], [address] [place] [address] Street  Phone: [phone]  Fax: [phone]       Farzad Alonso  : 1955  PCP: Arun Guzman MD  10/6/2022    PROGRESS NOTE    HISTORY OF PRESENT ILLNESS  Gracie Ly is a 78 y.o. F who was initially seen in 2017 for c/o lumbar pain radiating into her LLE. She found some relief from Tylenol #3 PRN and Gabapentin. Her pain was exacerbated with increased lifting, bending, cleaning, and walking. She found relief from lying down. She was not maintaining an HEP. She found some relief from Naproxen and Flexeril. She noted that pressure while sitting exacerbated her pain. Lumbar MRI 16: Degenerative grade 1 anterior listhesis of both L4 and L5 in the presence of advanced facet arthropathy. No high-grade spinal stenosis. There are bilateral foraminal stenoses. Some exiting nerve contact but no overt impingement. She continued to have some residual low back pain with some tingling in the LLE. She c/o right heel pain that she associated with a bone spur in the heel. Her PCP provided exercises for plantar fasciitis. 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 She was intolerant to GABAPENTIN secondary to somnolence. She did not tolerate the Lyrica 150 mg BID as it caused drowsiness, however, she did find some relief at the 75mg dose She was focusing on a diet and cutting out starches. She was maintaining well on Lyrica 75 mg TID. Her symptoms continued to wax and wane. She tended to have flare ups of her back pain after she was more active (cleaning house, etc). She felt somnolence with Lyrica 75 mg TID. She continued to have low back pain radiating into the LLE that was worse at night. She had difficulty sleeping. During last OV on 22, she continued to have some low back pain radiating into the LLE. She has been taking Lyrica 75 mg TID with benefit.  She has also been tolerating Amitriptyline 25 mg QHS. She notes that Amitriptyline 50 mg QHS was a little too much, so she went back to 25 mg with benefit. She has been trying to maintain a consistent HEP of walking. Del Shelley was seen today for follow up of pain in right lower back. Pt continues with Amitriptyline 25mg BID and Lyrica 75mg TID with benefit. Pt walks for exercise. Pain rating was 2/10 today. Denies radiation of pain down legs. Pain Score: 2/10    Treatments patient has tried:  Physical therapy: Yes  Doing HEP: No  Non-opioid medications: yes - gabapentin (intolerant), lyrica, amitriptyline  Spinal injections: Unknown  Spinal surgery- Unknown  Last Lumbar Spine MRI: 2016        ASSESSMENT  Del Shelley comes in to the office today for follow up of right lower back pain. Her symptoms are likely due to a left-sided radiculopathy. PLAN  Refill of Lyrica 75mg TID  Refill of Amitryptyline 25mg BID   Counseled pt on exercise and diet guidelines for healthy adults     Pt will f/u in 3 months or sooner if needed. Diagnoses and all orders for this visit:    1. Lumbar radiculopathy  -     amitriptyline (ELAVIL) 25 mg tablet; Take 2 Tablets by mouth nightly for 90 days. -     pregabalin (Lyrica) 75 mg capsule; Take 1 Capsule by mouth three (3) times daily. Max Daily Amount: 225 mg.          PAST MEDICAL HISTORY   Past Medical History:   Diagnosis Date    Arthritis     Bulging lumbar disc     Degenerative arthritis of finger     MP joint right index finger    Depression     H/O colonoscopy 12/2014    recommended yearly hemoccult stools    Hypertension     Low back pain     Lower extremity pain, left     Pinched nerve        Past Surgical History:   Procedure Laterality Date    COLONOSCOPY N/A 2/5/2020    COLONOSCOPY performed by Michelle Goode MD at [address] [place] [address] Right     hand    HX COLONOSCOPY  2014    benign polyp    HX HYSTERECTOMY      HX ORTHOPAEDIC      heel spurs       MEDICATIONS      Current Outpatient Medications   Medication Sig Dispense Refill    pregabalin (Lyrica) 75 mg capsule Take 1 Capsule by mouth three (3) times daily. Max Daily Amount: 225 mg. 90 Capsule 0    amitriptyline (ELAVIL) 25 mg tablet Take 3 Tablets by mouth nightly. 90 Tablet 0    betamethasone valerate (VALISONE) 0.1 % topical cream APPLY TO AFFECTED AREA ON SKIN TWICE DAILY AS NEEDED FOR ITCHING 15 g 0    triamcinolone acetonide 0.025 % lotion Sig: apply thin layer to affected area twice a day 60 mL 0    lisinopril-hydroCHLOROthiazide (PRINZIDE, ZESTORETIC) 20-25 mg per tablet Take 1 tablet by mouth once daily 90 Tablet 1    pravastatin (PRAVACHOL) 20 mg tablet Take 1 Tablet by mouth nightly. 90 Tablet 3    multivitamin (ONE A DAY) tablet Take 1 Tablet by mouth daily. PARoxetine (PAXIL) 20 mg tablet Take 1 Tablet by mouth daily. 90 Tablet 1    fluticasone propionate (FLONASE) 50 mcg/actuation nasal spray instill 1 spray into each nostril once daily 1 Each 2    calcium carbonate-vitamin D3 (Caltrate 600 plus D) 600 mg-20 mcg (800 unit) chew Take 2 Tablets by mouth once over twenty-four (24) hours. 180 Tablet 3    Allergy Relief, fexofenadine, 180 mg tablet Take 180 mg by mouth daily. hydrOXYzine HCL (ATARAX) 25 mg tablet take 1 tablet by mouth AT NIGHT      allergy injection by SubCUTAneous route every fourteen (14) days. cyclobenzaprine (FLEXERIL) 10 mg tablet Take 1 Tab by mouth three (3) times daily as needed for Muscle Spasm(s). 20 Tab 0    naproxen (NAPROSYN) 500 mg tablet Take 1 Tab by mouth two (2) times daily (with meals). 30 Tab 0    ascorbic acid, vitamin C, (VITAMIN C) 500 mg tablet Take 500 mg by mouth two (2) times daily (with meals). bisacodyL (DULCOLAX) 5 mg EC tablet Take 10 mg by mouth daily as needed. cholecalciferol (VITAMIN D3) 25 mcg (1,000 unit) cap Take 1,000 Units by mouth daily.       multivit-min-ferrous fumarate (Multi Vitamin) 9 mg iron/15 mL liqd       levocetirizine (XYZAL) 5 mg tablet Take 5 mg by mouth daily. diclofenac (VOLTAREN) 1 % gel Apply  to affected area four (4) times daily. (Patient not taking: Reported on 10/6/2022) 1 Each 1       ALLERGIES    No Known Allergies       SOCIAL HISTORY    Social History     Socioeconomic History    Marital status:    Tobacco Use    Smoking status: Never    Smokeless tobacco: Never   Substance and Sexual Activity    Alcohol use: No    Drug use: No    Sexual activity: Not Currently       FAMILY HISTORY    Family History   Problem Relation Age of Onset    Cancer Mother     Ovarian Cancer Mother 68    Stroke Father        REVIEW OF SYSTEMS  Review of Systems   Constitutional:  Negative for chills, fever and weight loss. Respiratory:  Negative for shortness of breath. Cardiovascular:  Negative for chest pain. Gastrointestinal:  Negative for constipation. Genitourinary:  Negative for dysuria. Musculoskeletal:  Positive for back pain (right lower). Skin:  Negative for rash. Neurological:  Negative for dizziness, tingling, tremors, focal weakness and headaches. Endo/Heme/Allergies:  Does not bruise/bleed easily. Psychiatric/Behavioral:  The patient does not have insomnia. PHYSICAL EXAMINATION  Visit Vitals  Pulse 95   Temp 97.4 °F (36.3 °C) (Temporal)   Resp 15   Ht 5' 0.25\" (1.53 m)   Wt 177 lb (80.3 kg)   LMP  (LMP Unknown)   SpO2 99%   BMI 34.28 kg/m²       Pain Assessment  10/6/2022   Location of Pain Back; Hip   Pain Location Comment -   Location Modifiers -   Severity of Pain 2   Quality of Pain (No Data)   Quality of Pain Comment stiffness tingling numbness   Duration of Pain A few hours   Frequency of Pain Intermittent   Date Pain First Started 6/6/2022   Aggravating Factors Standing   Aggravating Factors Comment -   Limiting Behavior Yes   Relieving Factors Rest   Relieving Factors Comment -   Result of Injury -       Constitutional:  Well developed, well nourished, in no acute distress. Psychiatric: Affect and mood are appropriate. HEENT: Normocephalic, atraumatic. Extraocular movements intact. Integumentary: No rashes or abrasions noted on exposed areas. Cardiovascular: Regular rate and rhythm. Pulmonary: Clear to auscultation bilaterally. SPINE/MUSCULOSKELETAL EXAM    Lumbar spine:  No rash, ecchymosis, or gross obliquity. No fasciculations. No focal atrophy is noted. No pain with hip ROM. Range of motion is normal.  Mild tenderness to palpation. Tenderness to palpation at the right sciatic notch. SI joints non-tender. Trochanters non tender. Piriformis stretch test negative on right side. Sensation in the bilateral legs grossly intact to light touch. Updates from 2/21/19:  Tenderness to palpation of left upper buttock. Positive SLR on the L  leans to the left, but her right shoulder sits lower than her left  Neurologically intact     Updates from 12/10/19:  Tenderness to palpation of lumbar paraspinals    MOTOR:      Biceps  Triceps Deltoids Wrist Ext Wrist Flex Hand Intrin   Right 5/5 5/5 5/5 5/5 5/5 5/5   Left 5/5 5/5 5/5 5/5 5/5 5/5             Hip Flex  Quads Hamstrings Ankle DF EHL Ankle PF   Right 5/5 5/5 5/5 5/5 5/5 5/5   Left 5/5 5/5 5/5 5/5 5/5 5/5     DTRs are 2+ biceps, triceps, brachioradialis, patella, and Achilles. Negative Straight Leg raise. Squat not tested. No difficulty with tandem gait. Ambulation without assistive device. FWB. RADIOGRAPHS  Lumbar MRI images taken on 4/19/16 personally reviewed with patient:  Study presumes presence of five lumbar vertebra. There is grade 1  spondylolisthesis of both L4 and L5. No pars defect. Normal vertebral body  heights. Unremarkable marrow signal. Mild disc space narrowing with disc  desiccation at L5-S1. Less so at L4-5. Normal conus at upper L2. Axial imaging correlation:     T12-L1: Minimal disc bulge. Patent canal and foramina.      L1-2: Patent canal and foramina. L2-3: Patent canal and foramina. L3-4: Mild facet arthropathy. Patent canal and foramina. L4-5: Listhesis with uncovering of the disc. No focal disc herniation. Bilateral  facet arthropathy. Low-grade facet inflammation. An overall mild concentric  spinal stenosis. Crossing nerve abutment but no impingement. Mild foraminal  stenoses without exiting nerve impingement. Axial T2 series 2 images 12-13. L5-S1: Listhesis with uncovering of the disc. No focal disc herniation, with  minimal disc bulging. Pronounced facet arthropathy. Mild narrowing at the  lateral recesses but overall no significant spinal stenosis. However, there is  moderate severity foraminal stenosis. Exiting nerve contact in the right  foramen, but no overt impingement. Axial T2 images 8-9; also axial T1 image 5 of  series 3. Incidental imaging of regional soft tissues is unremarkable. IMPRESSION:     1. Degenerative grade 1 anterior listhesis of both L4 and L5 in the presence of  advanced facet arthropathy. No high-grade spinal stenosis. There are bilateral  foraminal stenoses. Some exiting nerve contact but no overt impingement. Details  as above. 16 minutes of face-to-face contact were spent with the patient during today's visit extensively discussing symptoms and treatment plan. All questions were answered. More than half of this visit today was spent on counseling. Written by Roselia Bright, as dictated by Dr. Isaac Guzman.

## 2022-10-06 ENCOUNTER — OFFICE VISIT (OUTPATIENT)
Dept: ORTHOPEDIC SURGERY | Age: 67
End: 2022-10-06
Payer: MEDICARE

## 2022-10-06 VITALS
HEART RATE: 95 BPM | WEIGHT: 177 LBS | RESPIRATION RATE: 15 BRPM | HEIGHT: 60 IN | OXYGEN SATURATION: 99 % | BODY MASS INDEX: 34.75 KG/M2 | TEMPERATURE: 97.4 F

## 2022-10-06 DIAGNOSIS — M54.16 LUMBAR RADICULOPATHY: ICD-10-CM

## 2022-10-06 PROCEDURE — G8417 CALC BMI ABV UP PARAM F/U: HCPCS | Performed by: PHYSICAL MEDICINE & REHABILITATION

## 2022-10-06 PROCEDURE — G8427 DOCREV CUR MEDS BY ELIG CLIN: HCPCS | Performed by: PHYSICAL MEDICINE & REHABILITATION

## 2022-10-06 PROCEDURE — 1123F ACP DISCUSS/DSCN MKR DOCD: CPT | Performed by: PHYSICAL MEDICINE & REHABILITATION

## 2022-10-06 PROCEDURE — G8536 NO DOC ELDER MAL SCRN: HCPCS | Performed by: PHYSICAL MEDICINE & REHABILITATION

## 2022-10-06 PROCEDURE — G9717 DOC PT DX DEP/BP F/U NT REQ: HCPCS | Performed by: PHYSICAL MEDICINE & REHABILITATION

## 2022-10-06 PROCEDURE — 3017F COLORECTAL CA SCREEN DOC REV: CPT | Performed by: PHYSICAL MEDICINE & REHABILITATION

## 2022-10-06 PROCEDURE — 1101F PT FALLS ASSESS-DOCD LE1/YR: CPT | Performed by: PHYSICAL MEDICINE & REHABILITATION

## 2022-10-06 PROCEDURE — 1090F PRES/ABSN URINE INCON ASSESS: CPT | Performed by: PHYSICAL MEDICINE & REHABILITATION

## 2022-10-06 PROCEDURE — G9899 SCRN MAM PERF RSLTS DOC: HCPCS | Performed by: PHYSICAL MEDICINE & REHABILITATION

## 2022-10-06 PROCEDURE — G8756 NO BP MEASURE DOC: HCPCS | Performed by: PHYSICAL MEDICINE & REHABILITATION

## 2022-10-06 PROCEDURE — 99213 OFFICE O/P EST LOW 20 MIN: CPT | Performed by: PHYSICAL MEDICINE & REHABILITATION

## 2022-10-06 PROCEDURE — G8399 PT W/DXA RESULTS DOCUMENT: HCPCS | Performed by: PHYSICAL MEDICINE & REHABILITATION

## 2022-10-06 NOTE — PROGRESS NOTES
Chief Complaint   Patient presents with    Follow-up       Pt preferred language for health care discussion is english. Is someone accompanying this pt? no    Is the patient using any DME equipment during OV? no    Depression Screening:  3 most recent PHQ Screens 5/19/2022 1/13/2022 2/10/2020 5/12/2014   Little interest or pleasure in doing things Not at all Not at all Not at all Not at all   Feeling down, depressed, irritable, or hopeless Not at all Not at all Not at all Not at all   Total Score PHQ 2 0 0 0 0       Learning Assessment:  Learning Assessment 2/10/2020 1/9/2015 5/12/2014   PRIMARY LEARNER Patient Patient Patient   HIGHEST LEVEL OF EDUCATION - PRIMARY LEARNER  GRADUATED HIGH SCHOOL OR GED GRADUATED HIGH SCHOOL OR GED GRADUATED HIGH SCHOOL OR GED   BARRIERS PRIMARY LEARNER NONE NONE NONE   CO-LEARNER CAREGIVER No No -   PRIMARY [facility]    NEED No - -   LEARNER PREFERENCE PRIMARY DEMONSTRATION DEMONSTRATION LISTENING     - LISTENING -   LEARNING SPECIAL TOPICS No - -   ANSWERED BY patient self [employer]   RELATIONSHIP SELF SELF SELF       Abuse Screening:  Abuse Screening Questionnaire 9/28/2022 4/13/2021 2/10/2020 5/10/2019 2/11/2019 4/16/2018 11/13/2017   Do you ever feel afraid of your partner? N N N N N N N   Are you in a relationship with someone who physically or mentally threatens you? N N N N N N N   Is it safe for you to go home? Brandy NIX       Fall Risk  Fall Risk Assessment, last 12 mths 5/19/2022   Able to walk? Yes   Fall in past 12 months? 0   Do you feel unsteady? -   Are you worried about falling -           Advance Directive:  1. Do you have an advance directive in place? Patient Reply:no    2. If not, would you like material regarding how to put one in place? Patient Reply: no    2. Per patient no changes to their ACP contact no. Coordination of Care:  1. Have you been to the ER, urgent care clinic since your last visit? Hospitalized since your last visit? no    2. Have you seen or consulted any other health care providers outside of the [address] [place] [address] since your last visit? Include any pap smears or colon screening.  no

## 2022-10-07 RX ORDER — PREGABALIN 75 MG/1
75 CAPSULE ORAL 3 TIMES DAILY
Qty: 90 CAPSULE | Refills: 0 | Status: SHIPPED | OUTPATIENT
Start: 2022-10-07

## 2022-10-07 RX ORDER — AMITRIPTYLINE HYDROCHLORIDE 25 MG/1
50 TABLET, FILM COATED ORAL
Qty: 180 TABLET | Refills: 1 | Status: SHIPPED | OUTPATIENT
Start: 2022-10-07 | End: 2023-01-05

## 2022-10-08 ENCOUNTER — HOSPITAL ENCOUNTER (OUTPATIENT)
Dept: LAB | Age: 67
Discharge: HOME | End: 2022-10-08

## 2022-10-08 LAB — XX-LABCORP SPECIMEN COL,LCBCF: NORMAL

## 2022-10-08 PROCEDURE — 99001 SPECIMEN HANDLING PT-LAB: CPT

## 2022-10-09 LAB
BASOPHILS # BLD AUTO: 0 X10E3/UL (ref 0–0.2)
BASOPHILS NFR BLD AUTO: 1 %
EOSINOPHIL # BLD AUTO: 0.1 X10E3/UL (ref 0–0.4)
EOSINOPHIL NFR BLD AUTO: 2 %
ERYTHROCYTE [DISTWIDTH] IN BLOOD BY AUTOMATED COUNT: 14.5 % (ref 11.7–15.4)
FERRITIN SERPL-MCNC: 8 NG/ML (ref 15–150)
FOLATE SERPL-MCNC: 18.4 NG/ML
HCT VFR BLD AUTO: 31.2 % (ref 34–46.6)
HGB BLD-MCNC: 9.8 G/DL (ref 11.1–15.9)
IMM GRANULOCYTES # BLD AUTO: 0 X10E3/UL (ref 0–0.1)
IMM GRANULOCYTES NFR BLD AUTO: 0 %
IRON SATN MFR SERPL: 5 % (ref 15–55)
IRON SERPL-MCNC: 19 UG/DL (ref 27–139)
LYMPHOCYTES # BLD AUTO: 1 X10E3/UL (ref 0.7–3.1)
LYMPHOCYTES NFR BLD AUTO: 31 %
MCH RBC QN AUTO: 23 PG (ref 26.6–33)
MCHC RBC AUTO-ENTMCNC: 31.4 G/DL (ref 31.5–35.7)
MCV RBC AUTO: 73 FL (ref 79–97)
MONOCYTES # BLD AUTO: 0.2 X10E3/UL (ref 0.1–0.9)
MONOCYTES NFR BLD AUTO: 7 %
NEUTROPHILS # BLD AUTO: 1.9 X10E3/UL (ref 1.4–7)
NEUTROPHILS NFR BLD AUTO: 59 %
PLATELET # BLD AUTO: 249 X10E3/UL (ref 150–450)
RBC # BLD AUTO: 4.26 X10E6/UL (ref 3.77–5.28)
TIBC SERPL-MCNC: 404 UG/DL (ref 250–450)
UIBC SERPL-MCNC: 385 UG/DL (ref 118–369)
VIT B12 SERPL-MCNC: 1138 PG/ML (ref 232–1245)
WBC # BLD AUTO: 3.2 X10E3/UL (ref 3.4–10.8)

## 2022-10-10 DIAGNOSIS — D72.819 LEUKOPENIA, UNSPECIFIED TYPE: ICD-10-CM

## 2022-10-10 DIAGNOSIS — D50.9 IRON DEFICIENCY ANEMIA, UNSPECIFIED IRON DEFICIENCY ANEMIA TYPE: Primary | ICD-10-CM

## 2022-10-10 RX ORDER — FERROUS SULFATE 325(65) MG
325 TABLET, DELAYED RELEASE (ENTERIC COATED) ORAL
Qty: 90 TABLET | Refills: 1 | Status: SHIPPED | OUTPATIENT
Start: 2022-10-10

## 2022-10-10 RX ORDER — ASCORBIC ACID 500 MG
500 TABLET ORAL DAILY
Qty: 90 TABLET | Refills: 1 | Status: SHIPPED | OUTPATIENT
Start: 2022-10-10

## 2022-10-11 NOTE — PROGRESS NOTES
Significant iron deficiency anemia- will have to find source of this for her age is from GI tract, so will refer to gastroenterologist for further evaluation, order placed  Also will replete with oral iron, but at this level and going down, would suggest hematology eval and consideration for IV fe infusion as oral fe is hard to absorb  Erx po fe 325 mg OD and increase to BID/TID as tolerated (constipation is common se), take with ascorbic acid or orange juice for better absorption  Pls notify pt.  Recheck cbc prior next visit in nov, order placed

## 2022-12-20 ENCOUNTER — HOSPITAL ENCOUNTER (OUTPATIENT)
Dept: LAB | Age: 67
Discharge: HOME | End: 2022-12-20

## 2022-12-20 ENCOUNTER — OFFICE VISIT (OUTPATIENT)
Dept: FAMILY MEDICINE CLINIC | Age: 67
End: 2022-12-20
Payer: MEDICARE

## 2022-12-20 VITALS
WEIGHT: 175.5 LBS | HEART RATE: 87 BPM | RESPIRATION RATE: 18 BRPM | DIASTOLIC BLOOD PRESSURE: 82 MMHG | TEMPERATURE: 98.1 F | OXYGEN SATURATION: 95 % | HEIGHT: 64 IN | SYSTOLIC BLOOD PRESSURE: 136 MMHG | BODY MASS INDEX: 29.96 KG/M2

## 2022-12-20 DIAGNOSIS — R73.03 PREDIABETES: ICD-10-CM

## 2022-12-20 DIAGNOSIS — Z00.00 MEDICARE ANNUAL WELLNESS VISIT, SUBSEQUENT: Primary | ICD-10-CM

## 2022-12-20 DIAGNOSIS — R73.01 IFG (IMPAIRED FASTING GLUCOSE): ICD-10-CM

## 2022-12-20 DIAGNOSIS — I10 ESSENTIAL HYPERTENSION: ICD-10-CM

## 2022-12-20 DIAGNOSIS — F33.9 RECURRENT DEPRESSION (HCC): ICD-10-CM

## 2022-12-20 DIAGNOSIS — R23.2 HOT FLASHES: ICD-10-CM

## 2022-12-20 DIAGNOSIS — M48.061 SPINAL STENOSIS OF LUMBAR REGION WITHOUT NEUROGENIC CLAUDICATION: ICD-10-CM

## 2022-12-20 DIAGNOSIS — M85.80 OSTEOPENIA, UNSPECIFIED LOCATION: ICD-10-CM

## 2022-12-20 LAB — XX-LABCORP SPECIMEN COL,LCBCF: NORMAL

## 2022-12-20 PROCEDURE — 3074F SYST BP LT 130 MM HG: CPT | Performed by: FAMILY MEDICINE

## 2022-12-20 PROCEDURE — 99001 SPECIMEN HANDLING PT-LAB: CPT

## 2022-12-20 PROCEDURE — 1090F PRES/ABSN URINE INCON ASSESS: CPT | Performed by: FAMILY MEDICINE

## 2022-12-20 PROCEDURE — 3078F DIAST BP <80 MM HG: CPT | Performed by: FAMILY MEDICINE

## 2022-12-20 PROCEDURE — G8427 DOCREV CUR MEDS BY ELIG CLIN: HCPCS | Performed by: FAMILY MEDICINE

## 2022-12-20 PROCEDURE — G8399 PT W/DXA RESULTS DOCUMENT: HCPCS | Performed by: FAMILY MEDICINE

## 2022-12-20 PROCEDURE — 1101F PT FALLS ASSESS-DOCD LE1/YR: CPT | Performed by: FAMILY MEDICINE

## 2022-12-20 PROCEDURE — 1123F ACP DISCUSS/DSCN MKR DOCD: CPT | Performed by: FAMILY MEDICINE

## 2022-12-20 PROCEDURE — G8754 DIAS BP LESS 90: HCPCS | Performed by: FAMILY MEDICINE

## 2022-12-20 PROCEDURE — G0439 PPPS, SUBSEQ VISIT: HCPCS | Performed by: FAMILY MEDICINE

## 2022-12-20 PROCEDURE — G8417 CALC BMI ABV UP PARAM F/U: HCPCS | Performed by: FAMILY MEDICINE

## 2022-12-20 PROCEDURE — G8536 NO DOC ELDER MAL SCRN: HCPCS | Performed by: FAMILY MEDICINE

## 2022-12-20 PROCEDURE — G9899 SCRN MAM PERF RSLTS DOC: HCPCS | Performed by: FAMILY MEDICINE

## 2022-12-20 PROCEDURE — 3017F COLORECTAL CA SCREEN DOC REV: CPT | Performed by: FAMILY MEDICINE

## 2022-12-20 PROCEDURE — G8752 SYS BP LESS 140: HCPCS | Performed by: FAMILY MEDICINE

## 2022-12-20 PROCEDURE — 99214 OFFICE O/P EST MOD 30 MIN: CPT | Performed by: FAMILY MEDICINE

## 2022-12-20 PROCEDURE — G9717 DOC PT DX DEP/BP F/U NT REQ: HCPCS | Performed by: FAMILY MEDICINE

## 2022-12-20 RX ORDER — PAROXETINE HYDROCHLORIDE 20 MG/1
20 TABLET, FILM COATED ORAL DAILY
Qty: 90 TABLET | Refills: 1 | Status: SHIPPED | OUTPATIENT
Start: 2022-12-20

## 2022-12-20 RX ORDER — BETAMETHASONE VALERATE 1.2 MG/G
CREAM TOPICAL
Qty: 15 G | Refills: 0 | Status: SHIPPED | OUTPATIENT
Start: 2022-12-20

## 2022-12-20 RX ORDER — TRIAMCINOLONE ACETONIDE 0.25 MG/ML
LOTION TOPICAL
Qty: 60 ML | Refills: 0 | Status: SHIPPED | OUTPATIENT
Start: 2022-12-20

## 2022-12-20 NOTE — PATIENT INSTRUCTIONS
DASH Diet: Care Instructions  Your Care Instructions     The DASH diet is an eating plan that can help lower your blood pressure. DASH stands for Dietary Approaches to Stop Hypertension. Hypertension is high blood pressure. The DASH diet focuses on eating foods that are high in calcium, potassium, and magnesium. These nutrients can lower blood pressure. The foods that are highest in these nutrients are fruits, vegetables, low-fat dairy products, nuts, seeds, and legumes. But taking calcium, potassium, and magnesium supplements instead of eating foods that are high in those nutrients does not have the same effect. The DASH diet also includes whole grains, fish, and poultry. The DASH diet is one of several lifestyle changes your doctor may recommend to lower your high blood pressure. Your doctor may also want you to decrease the amount of sodium in your diet. Lowering sodium while following the DASH diet can lower blood pressure even further than just the DASH diet alone. Follow-up care is a key part of your treatment and safety. Be sure to make and go to all appointments, and call your doctor if you are having problems. It's also a good idea to know your test results and keep a list of the medicines you take. How can you care for yourself at home? Following the DASH diet  Eat 4 to 5 servings of fruit each day. A serving is 1 medium-sized piece of fruit, ½ cup chopped or canned fruit, 1/4 cup dried fruit, or 4 ounces (½ cup) of fruit juice. Choose fruit more often than fruit juice. Eat 4 to 5 servings of vegetables each day. A serving is 1 cup of lettuce or raw leafy vegetables, ½ cup of chopped or cooked vegetables, or 4 ounces (½ cup) of vegetable juice. Choose vegetables more often than vegetable juice. Get 2 to 3 servings of low-fat and fat-free dairy each day. A serving is 8 ounces of milk, 1 cup of yogurt, or 1 ½ ounces of cheese. Eat 6 to 8 servings of grains each day.  A serving is 1 slice of bread, 1 ounce of dry cereal, or ½ cup of cooked rice, pasta, or cooked cereal. Try to choose whole-grain products as much as possible. Limit lean meat, poultry, and fish to 2 servings each day. A serving is 3 ounces, about the size of a deck of cards. Eat 4 to 5 servings of nuts, seeds, and legumes (cooked dried beans, lentils, and split peas) each week. A serving is 1/3 cup of nuts, 2 tablespoons of seeds, or ½ cup of cooked beans or peas. Limit fats and oils to 2 to 3 servings each day. A serving is 1 teaspoon of vegetable oil or 2 tablespoons of salad dressing. Limit sweets and added sugars to 5 servings or less a week. A serving is 1 tablespoon jelly or jam, ½ cup sorbet, or 1 cup of lemonade. Eat less than 2,300 milligrams (mg) of sodium a day. If you limit your sodium to 1,500 mg a day, you can lower your blood pressure even more. Be aware that all of these are the suggested number of servings for people who eat 1,800 to 2,000 calories a day. Your recommended number of servings may be different if you need more or fewer calories. Tips for success  Start small. Do not try to make dramatic changes to your diet all at once. You might feel that you are missing out on your favorite foods and then be more likely to not follow the plan. Make small changes, and stick with them. Once those changes become habit, add a few more changes. Try some of the following:  Make it a goal to eat a fruit or vegetable at every meal and at snacks. This will make it easy to get the recommended amount of fruits and vegetables each day. Try yogurt topped with fruit and nuts for a snack or healthy dessert. Add lettuce, tomato, cucumber, and onion to sandwiches. Combine a ready-made pizza crust with low-fat mozzarella cheese and lots of vegetable toppings. Try using tomatoes, squash, spinach, broccoli, carrots, cauliflower, and onions.   Have a variety of cut-up vegetables with a low-fat dip as an appetizer instead of chips and dip.  Sprinkle sunflower seeds or chopped almonds over salads. Or try adding chopped walnuts or almonds to cooked vegetables. Try some vegetarian meals using beans and peas. Add garbanzo or kidney beans to salads. Make burritos and tacos with mashed mccracken beans or black beans. Where can you learn more? Go to http://www.bell.com/  Enter H967 in the search box to learn more about \"DASH Diet: Care Instructions. \"  Current as of: January 10, 2022               Content Version: 13.4  © 2823-9058 [employer]. Care instructions adapted under license by [employer] (which disclaims liability or warranty for this information). If you have questions about a medical condition or this instruction, always ask your healthcare professional. [address] any warranty or liability for your use of this information.

## 2022-12-20 NOTE — PROGRESS NOTES
Care Suites Post Procedure Note    Patient Information  Name: Lucille Rojas  Age: 85 year old    Post Procedure  Time patient returned to Care Suites: 1045  Concerns/abnormal assessment: none  If abnormal assessment, provider notified: N/A  Plan/Other: discharge 1120    Nazanin Grimes RN        This is the Subsequent Medicare Annual Wellness Exam, performed 12 months or more after the Initial AWV or the last Subsequent AWV    I have reviewed the patient's medical history in detail and updated the computerized patient record.        Assessment/Plan   Education and counseling provided:  Are appropriate based on today's review and evaluation  Pneumococcal Vaccine- 23/13 completed  Influenza Vaccine- annually  Screening Mammography- 3/2022  Colorectal cancer screening tests- 2020 update 2025, but new finding anemia GI plans to recheck scheduled 3/2023  Cardiovascular screening blood test 8/2021- plan to order on next visit  Bone mass measurement (DEXA) -1/2022 osteopenia  Diabetes screening test 4/2022    Depression Risk Factor Screening     3 most recent PHQ Screens 12/20/2022   Little interest or pleasure in doing things Not at all   Feeling down, depressed, irritable, or hopeless Not at all   Total Score PHQ 2 0   Trouble falling or staying asleep, or sleeping too much Not at all   Feeling tired or having little energy Not at all   Poor appetite, weight loss, or overeating Several days   Feeling bad about yourself - or that you are a failure or have let yourself or your family down Not at all   Trouble concentrating on things such as school, work, reading, or watching TV Not at all   Moving or speaking so slowly that other people could have noticed; or the opposite being so fidgety that others notice Not at all   Thoughts of being better off dead, or hurting yourself in some way Not at all   PHQ 9 Score 1   How difficult have these problems made it for you to do your work, take care of your home and get along with others Not difficult at all       Alcohol Risk Screen    Do you average more than 1 drink per night or more than 7 drinks a week:  No    On any one occasion in the past three months have you have had more than 3 drinks containing alcohol:  No        Functional Ability and Level of Safety Hearing: Hearing is good. Activities of Daily Living: The home contains: no safety equipment. Patient does total self care      Ambulation: with no difficulty     Fall Risk:  Fall Risk Assessment, last 12 mths 12/20/2022   Able to walk? Yes   Fall in past 12 months? 0   Do you feel unsteady?  0   Are you worried about falling 0      Abuse Screen:  Patient is not abused       Cognitive Screening    Has your family/caregiver stated any concerns about your memory: no      Health Maintenance Due     Health Maintenance Due   Topic Date Due    COVID-19 Vaccine (4 - Booster for [employer] series) 03/12/2022       Patient Care Team   Patient Care Team:  Vanessa Ornelas MD as PCP - General (Family Medicine)  Vanessa Ornelas MD as PCP - [facility]  Kobe Sheriff., RN  Koby Gusman MD (Orthopedic Surgery)  Nunu Jhaveri MD (Orthopedic Surgery)  Gentry Lara MD (Gastroenterology)  Jaycee Pang NP (Nurse Practitioner)  Robbi Kothari MD (Gastroenterology)    History     Patient Active Problem List   Diagnosis Code    Essential hypertension I10    Recurrent depression ([address] F33.9    Lumbar radiculopathy M54.16    Herpes genitalis in women A60.09    Allergic rhinitis due to allergen J30.9    Hot flashes R23.2    Advance directive discussed with patient Z71.89    Facet arthropathy M47.819    Lumbar stenosis M48.061    Advance care planning Z71.89    Obesity E66.9    Mild single current episode of major depressive disorder ([address] F32.0    IFG (impaired fasting glucose) R73.01    Osteopenia M85.80     Past Medical History:   Diagnosis Date    Arthritis     Bulging lumbar disc     Degenerative arthritis of finger     MP joint right index finger    Depression     H/O colonoscopy 12/2014    recommended yearly hemoccult stools    Hypertension     Low back pain     Lower extremity pain, left     Pinched nerve       Past Surgical History:   Procedure Laterality Date    COLONOSCOPY N/A 2/5/2020    COLONOSCOPY performed by April Meza MD at [facility] ENDOSCOPY    HX CARPAL TUNNEL RELEASE Right     hand    HX COLONOSCOPY  2014    benign polyp    HX HYSTERECTOMY      HX ORTHOPAEDIC      heel spurs     Current Outpatient Medications   Medication Sig Dispense Refill    betamethasone valerate (VALISONE) 0.1 % topical cream APPLY TO AFFECTED AREA ON SKIN TWICE DAILY AS NEEDED FOR ITCHING 15 g 0    PARoxetine (PAXIL) 20 mg tablet Take 1 Tablet by mouth daily. 90 Tablet 1    triamcinolone acetonide 0.025 % lotion Sig: apply thin layer to affected area twice a day 60 mL 0    ferrous sulfate (IRON) 325 mg (65 mg iron) EC tablet Take 1 Tablet by mouth Daily (before breakfast). 90 Tablet 1    ascorbic acid, vitamin C, (VITAMIN C) 500 mg tablet Take 1 Tablet by mouth daily. 90 Tablet 1    amitriptyline (ELAVIL) 25 mg tablet Take 2 Tablets by mouth nightly for 90 days. 180 Tablet 1    pregabalin (Lyrica) 75 mg capsule Take 1 Capsule by mouth three (3) times daily. Max Daily Amount: 225 mg. 90 Capsule 0    lisinopril-hydroCHLOROthiazide (PRINZIDE, ZESTORETIC) 20-25 mg per tablet Take 1 tablet by mouth once daily 90 Tablet 1    pravastatin (PRAVACHOL) 20 mg tablet Take 1 Tablet by mouth nightly. 90 Tablet 3    multivitamin (ONE A DAY) tablet Take 1 Tablet by mouth daily. fluticasone propionate (FLONASE) 50 mcg/actuation nasal spray instill 1 spray into each nostril once daily 1 Each 2    calcium carbonate-vitamin D3 (Caltrate 600 plus D) 600 mg-20 mcg (800 unit) chew Take 2 Tablets by mouth once over twenty-four (24) hours. 180 Tablet 3    Allergy Relief, fexofenadine, 180 mg tablet Take 180 mg by mouth daily. hydrOXYzine HCL (ATARAX) 25 mg tablet take 1 tablet by mouth AT NIGHT      levocetirizine (XYZAL) 5 mg tablet Take 5 mg by mouth daily. diclofenac (VOLTAREN) 1 % gel Apply  to affected area four (4) times daily. 1 Each 1    allergy injection by SubCUTAneous route every fourteen (14) days. cyclobenzaprine (FLEXERIL) 10 mg tablet Take 1 Tab by mouth three (3) times daily as needed for Muscle Spasm(s). 20 Tab 0    naproxen (NAPROSYN) 500 mg tablet Take 1 Tab by mouth two (2) times daily (with meals). 30 Tab 0    bisacodyL (DULCOLAX) 5 mg EC tablet Take 10 mg by mouth daily as needed. cholecalciferol (VITAMIN D3) 25 mcg (1,000 unit) cap Take 1,000 Units by mouth daily. multivit-min-ferrous fumarate (Multi Vitamin) 9 mg iron/15 mL liqd        No Known Allergies    Family History   Problem Relation Age of Onset    Cancer Mother     Ovarian Cancer Mother 68    Stroke Father      Social History     Tobacco Use    Smoking status: Never    Smokeless tobacco: Never   Substance Use Topics    Alcohol use: No         Jackson Alexander MD     Cecy Coelho, 78 y.o.,  female    SUBJECTIVE  Ff-up     HTN- taking prinzide without problems. She also has prediabetes    HL- on pravachol    Depression/hot flashes- says doing well for the most part, taking paxil. Chronic LBP- on lyrica, following dr. Duarte Grade    Allergic rhinitis-doing well    Iron def anemia- has seen GI and plan for EGD/colonoscopy 3/2023. Denies any abdominal pain, melena. Says taking po fe.     ROS:  See HPI, all others negative        Patient Active Problem List   Diagnosis Code    Essential hypertension I10    Recurrent depression [address] [address] F33.9    Lumbar radiculopathy M54.16    Herpes genitalis in women A60.09    Allergic rhinitis due to allergen J30.9    Hot flashes R23.2    Advance directive discussed with patient Z71.89    Facet arthropathy M47.819    Lumbar stenosis M48.061    Advance care planning Z71.89    Obesity E66.9    Mild single current episode of major depressive disorder ([facility] [address] F32.0    IFG (impaired fasting glucose) R73.01    Osteopenia M85.80       Current Outpatient Medications   Medication Sig Dispense Refill    betamethasone valerate (VALISONE) 0.1 % topical cream APPLY TO AFFECTED AREA ON SKIN TWICE DAILY AS NEEDED FOR ITCHING 15 g 0    PARoxetine (PAXIL) 20 mg tablet Take 1 Tablet by mouth daily. 90 Tablet 1    triamcinolone acetonide 0.025 % lotion Sig: apply thin layer to affected area twice a day 60 mL 0    ferrous sulfate (IRON) 325 mg (65 mg iron) EC tablet Take 1 Tablet by mouth Daily (before breakfast). 90 Tablet 1    ascorbic acid, vitamin C, (VITAMIN C) 500 mg tablet Take 1 Tablet by mouth daily. 90 Tablet 1    amitriptyline (ELAVIL) 25 mg tablet Take 2 Tablets by mouth nightly for 90 days. 180 Tablet 1    pregabalin (Lyrica) 75 mg capsule Take 1 Capsule by mouth three (3) times daily. Max Daily Amount: 225 mg. 90 Capsule 0    lisinopril-hydroCHLOROthiazide (PRINZIDE, ZESTORETIC) 20-25 mg per tablet Take 1 tablet by mouth once daily 90 Tablet 1    pravastatin (PRAVACHOL) 20 mg tablet Take 1 Tablet by mouth nightly. 90 Tablet 3    multivitamin (ONE A DAY) tablet Take 1 Tablet by mouth daily. fluticasone propionate (FLONASE) 50 mcg/actuation nasal spray instill 1 spray into each nostril once daily 1 Each 2    calcium carbonate-vitamin D3 (Caltrate 600 plus D) 600 mg-20 mcg (800 unit) chew Take 2 Tablets by mouth once over twenty-four (24) hours. 180 Tablet 3    Allergy Relief, fexofenadine, 180 mg tablet Take 180 mg by mouth daily. hydrOXYzine HCL (ATARAX) 25 mg tablet take 1 tablet by mouth AT NIGHT      levocetirizine (XYZAL) 5 mg tablet Take 5 mg by mouth daily. diclofenac (VOLTAREN) 1 % gel Apply  to affected area four (4) times daily. 1 Each 1    allergy injection by SubCUTAneous route every fourteen (14) days. cyclobenzaprine (FLEXERIL) 10 mg tablet Take 1 Tab by mouth three (3) times daily as needed for Muscle Spasm(s). 20 Tab 0    naproxen (NAPROSYN) 500 mg tablet Take 1 Tab by mouth two (2) times daily (with meals). 30 Tab 0    bisacodyL (DULCOLAX) 5 mg EC tablet Take 10 mg by mouth daily as needed.       cholecalciferol (VITAMIN D3) 25 mcg (1,000 unit) cap Take 1,000 Units by mouth daily.      multivit-min-ferrous fumarate (Multi Vitamin) 9 mg iron/15 mL liqd          No Known Allergies    Past Medical History:   Diagnosis Date    Arthritis     Bulging lumbar disc     Degenerative arthritis of finger     MP joint right index finger    Depression     H/O colonoscopy 12/2014    recommended yearly hemoccult stools    Hypertension     Low back pain     Lower extremity pain, left     Pinched nerve        Social History     Socioeconomic History    Marital status:      Spouse name: Not on file    Number of children: Not on file    Years of education: Not on file    Highest education level: Not on file   Occupational History    Not on file   Tobacco Use    Smoking status: Never    Smokeless tobacco: Never   Substance and Sexual Activity    Alcohol use: No    Drug use: No    Sexual activity: Not Currently   Other Topics Concern    Not on file   Social History Narrative    Not on file     Social Determinants of Health     Financial Resource Strain: Low Risk     Difficulty of Paying Living Expenses: Not hard at all   Food Insecurity: No Food Insecurity    Worried About Running Out of Food in the Last Year: Never true    Ran Out of Food in the Last Year: Never true   Transportation Needs: Not on file   Physical Activity: Not on file   Stress: Not on file   Social Connections: Not on file   Intimate Partner Violence: Not on file   Housing Stability: Not on file       Family History   Problem Relation Age of Onset    Cancer Mother     Ovarian Cancer Mother 68    Stroke Father          OBJECTIVE    Physical Exam:     Visit Vitals  /82 (BP 1 Location: Right arm, BP Patient Position: Sitting, BP Cuff Size: Adult)   Pulse 87   Temp 98.1 °F (36.7 °C) (Temporal)   Resp 18   Ht 5' 4\" (1.626 m)   Wt 175 lb 8 oz (79.6 kg)   LMP  (LMP Unknown)   SpO2 95%   BMI 30.12 kg/m²       General: alert, well-appearing, obese,AA, in no apparent distress or pain  Head: atraumatic.  Non-tender maxillary and frontal sinuses  Breasts: breasts appear normal, no suspicious masses, no skin or nipple changes or axillary nodes. Neck: supple, no adenopathy palpated  CVS: normal rate, regular rhythm, distinct S1 and S2  Lungs:clear to ausculation bilaterally, no crackles, wheezing or rhonchi noted  Abdomen: normoactive bowel sounds, soft, non-tender  Extremities no edema  Skin: warm, no lesions, rashes noted  Psych:  mood and affect normal    Results for orders placed or performed during the hospital encounter of 10/08/22   [facility] SPECIMEN COL   Result Value Ref Range    [facility] SPECIMEN [facility]. Specimens collected/sent to [facility]. Please direct inquiries to ([phone]). ASSESSMENT/PLAN  Diagnoses and all orders for this visit:    1. IFG (impaired fasting glucose)  Persistent  TLCs, monitoring    2. Essential hypertension  Controlled  Cont prinzide  Cont pravachol  Cbc/bmp soon    3. Recurrent depression (HCC)  Stable  Cont paxil    4. Spinal stenosis of lumbar region with neurogenic claudication  On lyrica  Following PMNR    5. Mixed hyperlipidemia  Good range on pravachol 20 mg qhs, to cont  Plan to update lipids/a1c on next visit    6. Iron deficiency anemia  Already following GI, upcoming egd/colonscopy 3/2023  Cont po fe  Recheck cbc/bmp soon    7. Osteopenia unspecified location  Cont ca/vit d/wt bearing exercises  Update dexa 1/2024    Patient understands plan of care. Patient has provided input and agrees with goals.

## 2022-12-20 NOTE — PROGRESS NOTES
Chief Complaint   Patient presents with    Annual Wellness Visit    Hypertension    Depression    Osteopenia      Visit Vitals  /82 (BP 1 Location: Right arm, BP Patient Position: Sitting, BP Cuff Size: Adult)   Pulse 87   Temp 98.1 °F (36.7 °C) (Temporal)   Resp 18   Ht 5' 4\" (1.626 m)   Wt 175 lb 8 oz (79.6 kg)   LMP  (LMP Unknown)   SpO2 95%   BMI 30.12 kg/m²      PHQ 9 Score: 1 (12/20/2022  9:19 AM)  Thoughts of being better off dead, or hurting yourself in some way: 0 (12/20/2022  9:19 AM)    Fall Risk Assessment, last 12 mths 12/20/2022   Able to walk? Yes   Fall in past 12 months? 0   Do you feel unsteady? 0   Are you worried about falling 0       Abuse Screening Questionnaire 12/20/2022   Do you ever feel afraid of your partner? N   Are you in a relationship with someone who physically or mentally threatens you? N   Is it safe for you to go home? Y        1. \"Have you been to the ER, urgent care clinic since your last visit? Hospitalized since your last visit? \" No    2. \"Have you seen or consulted any other health care providers outside of the [address] [place] [address] since your last visit? \" No     3. For patients aged 39-70: Has the patient had a colonoscopy / FIT/ Cologuard? Yes - no Care Gap present      If the patient is female:    4. For patients aged 41-77: Has the patient had a mammogram within the past 2 years? Yes - no Care Gap present      5. For patients aged 21-65: Has the patient had a pap smear?  NA - based on age or sex

## 2022-12-21 LAB
BASOPHILS # BLD AUTO: 0 X10E3/UL (ref 0–0.2)
BASOPHILS NFR BLD AUTO: 1 %
BUN SERPL-MCNC: 14 MG/DL (ref 8–27)
BUN/CREAT SERPL: 18 (ref 12–28)
CALCIUM SERPL-MCNC: 10.3 MG/DL (ref 8.7–10.3)
CHLORIDE SERPL-SCNC: 101 MMOL/L (ref 96–106)
CO2 SERPL-SCNC: 28 MMOL/L (ref 20–29)
CREAT SERPL-MCNC: 0.76 MG/DL (ref 0.57–1)
EGFR: 86 ML/MIN/1.73
EOSINOPHIL # BLD AUTO: 0.1 X10E3/UL (ref 0–0.4)
EOSINOPHIL NFR BLD AUTO: 2 %
ERYTHROCYTE [DISTWIDTH] IN BLOOD BY AUTOMATED COUNT: 18.2 % (ref 11.7–15.4)
GLUCOSE SERPL-MCNC: 71 MG/DL (ref 70–99)
HCT VFR BLD AUTO: 36.1 % (ref 34–46.6)
HGB BLD-MCNC: 10.9 G/DL (ref 11.1–15.9)
IMM GRANULOCYTES # BLD AUTO: 0 X10E3/UL (ref 0–0.1)
IMM GRANULOCYTES NFR BLD AUTO: 0 %
LYMPHOCYTES # BLD AUTO: 1 X10E3/UL (ref 0.7–3.1)
LYMPHOCYTES NFR BLD AUTO: 28 %
MCH RBC QN AUTO: 22.4 PG (ref 26.6–33)
MCHC RBC AUTO-ENTMCNC: 30.2 G/DL (ref 31.5–35.7)
MCV RBC AUTO: 74 FL (ref 79–97)
MONOCYTES # BLD AUTO: 0.3 X10E3/UL (ref 0.1–0.9)
MONOCYTES NFR BLD AUTO: 10 %
NEUTROPHILS # BLD AUTO: 2 X10E3/UL (ref 1.4–7)
NEUTROPHILS NFR BLD AUTO: 59 %
PLATELET # BLD AUTO: 227 X10E3/UL (ref 150–450)
POTASSIUM SERPL-SCNC: 4 MMOL/L (ref 3.5–5.2)
RBC # BLD AUTO: 4.87 X10E6/UL (ref 3.77–5.28)
SODIUM SERPL-SCNC: 141 MMOL/L (ref 134–144)
WBC # BLD AUTO: 3.4 X10E3/UL (ref 3.4–10.8)

## 2022-12-22 DIAGNOSIS — I10 ESSENTIAL HYPERTENSION: ICD-10-CM

## 2022-12-22 DIAGNOSIS — R73.01 IFG (IMPAIRED FASTING GLUCOSE): Primary | ICD-10-CM

## 2022-12-22 NOTE — PROGRESS NOTES
Anemia has improved, kidney function/electrolytes normal  Will plan on fasting labs (lipids/a1c/cbc) prior to next visit in march  Pls notify pt.

## 2023-01-03 ENCOUNTER — OFFICE VISIT (OUTPATIENT)
Dept: ONCOLOGY | Age: 68
End: 2023-01-03
Payer: MEDICARE

## 2023-01-03 VITALS
HEART RATE: 90 BPM | OXYGEN SATURATION: 98 % | BODY MASS INDEX: 30.22 KG/M2 | WEIGHT: 177 LBS | HEIGHT: 64 IN | DIASTOLIC BLOOD PRESSURE: 101 MMHG | SYSTOLIC BLOOD PRESSURE: 162 MMHG | RESPIRATION RATE: 18 BRPM

## 2023-01-03 DIAGNOSIS — D50.9 IRON DEFICIENCY ANEMIA, UNSPECIFIED IRON DEFICIENCY ANEMIA TYPE: Primary | ICD-10-CM

## 2023-01-03 NOTE — PROGRESS NOTES
Hematology/Oncology Consultation Note      Date: 1/3/2023    Name: Lokesh Turcios  : 1955        Kait Simms MD         Subjective:     Chief complaint: Iron deficiency anemia    History of Present Illness:   Ms. Harper Houston is a most pleasant 78y.o. year old female who was seen for consultation of iron deficiency anemia. The patient has a past medical history significant of bleeding status post colonoscopy. She has follow-up with GI.  10/8/2022 CBC reported hemoglobin 9.8, hematocrit 31.2%, MCV is 73, platelet 873,982. Iron saturation 5%, TIBC 404, ferritin 8. B12 1138. Repeat CBC on 2022 showed improving hemoglobin 10.9, hematocrit 36.1, MCV 74. WBC 3.4, platelet 693,272. No latest iron study/ferritin obtained. The patient otherwise has no other complaints. Denied fever, chills, night sweat, unintentional weight loss, skin lumps or bumps, acute bleeding or bruising issues. Denied headache, acute vision change, dizziness, chest pain, worsen shortness of breath, palpitation, productive cough, nausea, vomiting, abdominal pain, altered bowel habits, dysuria, worsen bone pain or back pain, new focal numbness or weakness.        Past Medical History, Family History, and Social History:    Past Medical History:   Diagnosis Date    Anemia     Arthritis     Bulging lumbar disc     Degenerative arthritis of finger     MP joint right index finger    Depression     H/O colonoscopy 2014    recommended yearly hemoccult stools    Hypertension     Low back pain     Lower extremity pain, left     Pinched nerve      Past Surgical History:   Procedure Laterality Date    COLONOSCOPY N/A 2020    COLONOSCOPY performed by Raphael Leon MD at [facility]    HX [address] Right     hand    HX COLONOSCOPY  2014    benign polyp    HX HYSTERECTOMY      HX ORTHOPAEDIC      heel spurs     Social History     Socioeconomic History    Marital status:      Spouse name: Not on file Number of children: Not on file    Years of education: Not on file    Highest education level: Not on file   Occupational History    Not on file   Tobacco Use    Smoking status: Never    Smokeless tobacco: Never   Substance and Sexual Activity    Alcohol use: No    Drug use: No    Sexual activity: Not Currently   Other Topics Concern    Not on file   Social History Narrative    Not on file     Social Determinants of Health     Financial Resource Strain: Low Risk     Difficulty of Paying Living Expenses: Not hard at all   Food Insecurity: No Food Insecurity    Worried About Running Out of Food in the Last Year: Never true    Ran Out of Food in the Last Year: Never true   Transportation Needs: Not on file   Physical Activity: Not on file   Stress: Not on file   Social Connections: Not on file   Intimate Partner Violence: Not on file   Housing Stability: Not on file     Family History   Problem Relation Age of Onset    Cancer Mother     Ovarian Cancer Mother 68    Stroke Father      Current Outpatient Medications   Medication Sig Dispense Refill    betamethasone valerate (VALISONE) 0.1 % topical cream APPLY TO AFFECTED AREA ON SKIN TWICE DAILY AS NEEDED FOR ITCHING 15 g 0    PARoxetine (PAXIL) 20 mg tablet Take 1 Tablet by mouth daily. 90 Tablet 1    triamcinolone acetonide 0.025 % lotion Sig: apply thin layer to affected area twice a day 60 mL 0    ferrous sulfate (IRON) 325 mg (65 mg iron) EC tablet Take 1 Tablet by mouth Daily (before breakfast). 90 Tablet 1    ascorbic acid, vitamin C, (VITAMIN C) 500 mg tablet Take 1 Tablet by mouth daily. 90 Tablet 1    amitriptyline (ELAVIL) 25 mg tablet Take 2 Tablets by mouth nightly for 90 days. 180 Tablet 1    pregabalin (Lyrica) 75 mg capsule Take 1 Capsule by mouth three (3) times daily.  Max Daily Amount: 225 mg. 90 Capsule 0    lisinopril-hydroCHLOROthiazide (PRINZIDE, ZESTORETIC) 20-25 mg per tablet Take 1 tablet by mouth once daily 90 Tablet 1    pravastatin (PRAVACHOL) 20 mg tablet Take 1 Tablet by mouth nightly. 90 Tablet 3    multivitamin (ONE A DAY) tablet Take 1 Tablet by mouth daily. fluticasone propionate (FLONASE) 50 mcg/actuation nasal spray instill 1 spray into each nostril once daily 1 Each 2    calcium carbonate-vitamin D3 (Caltrate 600 plus D) 600 mg-20 mcg (800 unit) chew Take 2 Tablets by mouth once over twenty-four (24) hours. 180 Tablet 3    Allergy Relief, fexofenadine, 180 mg tablet Take 180 mg by mouth daily. hydrOXYzine HCL (ATARAX) 25 mg tablet take 1 tablet by mouth AT NIGHT      levocetirizine (XYZAL) 5 mg tablet Take 5 mg by mouth daily. diclofenac (VOLTAREN) 1 % gel Apply  to affected area four (4) times daily. 1 Each 1    allergy injection by SubCUTAneous route every fourteen (14) days. cyclobenzaprine (FLEXERIL) 10 mg tablet Take 1 Tab by mouth three (3) times daily as needed for Muscle Spasm(s). 20 Tab 0    naproxen (NAPROSYN) 500 mg tablet Take 1 Tab by mouth two (2) times daily (with meals). 30 Tab 0    bisacodyL (DULCOLAX) 5 mg EC tablet Take 10 mg by mouth daily as needed. cholecalciferol (VITAMIN D3) 25 mcg (1,000 unit) cap Take 1,000 Units by mouth daily. multivit-min-ferrous fumarate (Multi Vitamin) 9 mg iron/15 mL liqd          Review of Systems   Constitutional:  Negative for chills, diaphoresis, fever, malaise/fatigue and weight loss. Respiratory:  Negative for cough, hemoptysis, shortness of breath and wheezing. Cardiovascular:  Negative for chest pain, palpitations and leg swelling. Gastrointestinal:  Negative for abdominal pain, diarrhea, heartburn, nausea and vomiting. Genitourinary:  Negative for dysuria, frequency, hematuria and urgency. Musculoskeletal:  Negative for joint pain and myalgias. Skin:  Negative for itching and rash. Neurological:  Negative for dizziness, seizures, weakness and headaches. Psychiatric/Behavioral:  Negative for depression. The patient does not have insomnia. Objective:   Visit Vitals  BP (!) 162/101   Pulse 90   Resp 18   Ht 5' 4\" (1.626 m)   Wt 80.3 kg (177 lb)   LMP  (LMP Unknown)   SpO2 98%   BMI 30.38 kg/m²       ECOG Performance Status (grade): 1  0 - able to carry on all pre-disease activity w/out restriction  1 - restricted but able to carry out light work  2 - ambulatory and can self- care but unable to carry out work  3 - bed or chair >50% of waking hours  4 - completely disable, total care, confined to bed or chair    Physical Exam  Constitutional:       Appearance: Normal appearance. HENT:      Head: Normocephalic and atraumatic. Eyes:      Pupils: Pupils are equal, round, and reactive to light. Cardiovascular:      Rate and Rhythm: Normal rate and regular rhythm. Heart sounds: Normal heart sounds. Pulmonary:      Effort: Pulmonary effort is normal.      Breath sounds: Normal breath sounds. Abdominal:      General: Bowel sounds are normal.      Palpations: Abdomen is soft. Tenderness: There is no abdominal tenderness. There is no guarding. Musculoskeletal:         General: Normal range of motion. Cervical back: Neck supple. Right lower leg: No edema. Left lower leg: No edema. Skin:     General: Skin is warm. Neurological:      General: No focal deficit present. Mental Status: She is alert and oriented to person, place, and time. Mental status is at baseline. Diagnostics:      No results found for this or any previous visit (from the past 96 hour(s)). Imaging:  No results found for this or any previous visit. Results for orders placed during the hospital encounter of 07/10/21    XR FOREARM RT AP/LAT    Narrative  History: Bilateral forearm pain. No trauma. COMPARISON: None. TECHNIQUE: Frontal and lateral views of both forearms. FINDINGS:    Left: No fracture. Tiny triceps enthesopathy. Partially imaged wrist  degenerative change. Right: No fracture. Small triceps enthesopathy. Partially imaged wrist  degenerative change. Impression  No acute findings. If symptoms persist consider MRI. No results found for this or any previous visit. Pathology          Assessment:                                        1. Iron deficiency anemia, unspecified iron deficiency anemia type        Plan:                                        # Iron deficiency anemia  -- past medical history significant of bleeding status post colonoscopy. She has follow-up with GI.  -- 10/8/2022 CBC reported hemoglobin 9.8, hematocrit 31.2%, MCV is 73, platelet 803,537. Iron saturation 5%, TIBC 404, ferritin 8. B12 1138.  -- Repeat CBC on 12/20/2022 showed improving hemoglobin 10.9, hematocrit 36.1, MCV 74. WBC 3.4, platelet 766,058. No latest iron study/ferritin obtained. Plan:  --Today I have reviewed with the patient about recent labs reports. -- Lab check today CBC with differential, chemistry, iron profiles, ferritin, reticulocyte count. -- The patient will be notified if any interventions is warranted. Further workup will be guided by results from aforementioned initial study. --The patient was agreeable with the plan. -- We will see the patient back in about 4 weeks. Always sooner if required. Orders Placed This Encounter    METABOLIC PANEL, COMPREHENSIVE     Standing Status:   Future     Standing Expiration Date:   1/4/2024    RETICULOCYTE COUNT     Standing Status:   Future     Standing Expiration Date:   1/3/2024    CBC WITH AUTOMATED DIFF     Standing Status:   Future     Standing Expiration Date:   1/4/2024    FERRITIN     Standing Status:   Future     Standing Expiration Date:   1/4/2024    IRON PROFILE     Standing Status:   Future     Standing Expiration Date:   1/4/2024             Ms. Darren Max has a reminder for a \"due or due soon\" health maintenance. I have asked that she contact her primary care provider for follow-up on this health maintenance.    All of patient's questions answered to their apparent satisfaction. They verbally show understanding and agreement with aforementioned plan. Ramakrishna Valadez MD  1/3/2023              Above mentioned total time spent for this encounter with more than 50% of the time spent in face-to-face counseling, discussing on diagnosis and management plan going forward, and co-ordination of care. Parts of this document has been produced using Dragon dictation system. Unrecognized errors in transcription may be present. Please do not hesitate to reach out for any questions or clarifications.       CC: Karen Bedolla MD

## 2023-01-04 LAB
ALBUMIN SERPL-MCNC: 4.3 G/DL (ref 3.8–4.8)
ALBUMIN/GLOB SERPL: 1.5 {RATIO} (ref 1.2–2.2)
ALP SERPL-CCNC: 82 IU/L (ref 44–121)
ALT SERPL-CCNC: 21 IU/L (ref 0–32)
AST SERPL-CCNC: 23 IU/L (ref 0–40)
BASOPHILS # BLD AUTO: 0 X10E3/UL (ref 0–0.2)
BASOPHILS NFR BLD AUTO: 1 %
BILIRUB SERPL-MCNC: 0.4 MG/DL (ref 0–1.2)
BUN SERPL-MCNC: 14 MG/DL (ref 8–27)
BUN/CREAT SERPL: 19 (ref 12–28)
CALCIUM SERPL-MCNC: 10.2 MG/DL (ref 8.7–10.3)
CHLORIDE SERPL-SCNC: 100 MMOL/L (ref 96–106)
CO2 SERPL-SCNC: 28 MMOL/L (ref 20–29)
CREAT SERPL-MCNC: 0.73 MG/DL (ref 0.57–1)
EGFR: 91 ML/MIN/1.73
EOSINOPHIL # BLD AUTO: 0.1 X10E3/UL (ref 0–0.4)
EOSINOPHIL NFR BLD AUTO: 2 %
ERYTHROCYTE [DISTWIDTH] IN BLOOD BY AUTOMATED COUNT: 20.2 % (ref 11.7–15.4)
FERRITIN SERPL-MCNC: 23 NG/ML (ref 15–150)
GLOBULIN SER CALC-MCNC: 2.9 G/DL (ref 1.5–4.5)
GLUCOSE SERPL-MCNC: 90 MG/DL (ref 70–99)
HCT VFR BLD AUTO: 37.3 % (ref 34–46.6)
HGB BLD-MCNC: 12 G/DL (ref 11.1–15.9)
IMM GRANULOCYTES # BLD AUTO: 0 X10E3/UL (ref 0–0.1)
IMM GRANULOCYTES NFR BLD AUTO: 0 %
IRON SATN MFR SERPL: 44 % (ref 15–55)
IRON SERPL-MCNC: 183 UG/DL (ref 27–139)
LYMPHOCYTES # BLD AUTO: 1.1 X10E3/UL (ref 0.7–3.1)
LYMPHOCYTES NFR BLD AUTO: 36 %
MCH RBC QN AUTO: 24.6 PG (ref 26.6–33)
MCHC RBC AUTO-ENTMCNC: 32.2 G/DL (ref 31.5–35.7)
MCV RBC AUTO: 77 FL (ref 79–97)
MONOCYTES # BLD AUTO: 0.3 X10E3/UL (ref 0.1–0.9)
MONOCYTES NFR BLD AUTO: 11 %
NEUTROPHILS # BLD AUTO: 1.6 X10E3/UL (ref 1.4–7)
NEUTROPHILS NFR BLD AUTO: 50 %
PLATELET # BLD AUTO: 194 X10E3/UL (ref 150–450)
POTASSIUM SERPL-SCNC: 4.5 MMOL/L (ref 3.5–5.2)
PROT SERPL-MCNC: 7.2 G/DL (ref 6–8.5)
RBC # BLD AUTO: 4.87 X10E6/UL (ref 3.77–5.28)
RETICS/RBC NFR AUTO: 1.9 % (ref 0.6–2.6)
SODIUM SERPL-SCNC: 142 MMOL/L (ref 134–144)
TIBC SERPL-MCNC: 414 UG/DL (ref 250–450)
UIBC SERPL-MCNC: 231 UG/DL (ref 118–369)
WBC # BLD AUTO: 3.2 X10E3/UL (ref 3.4–10.8)

## 2023-01-06 NOTE — PROGRESS NOTES
[address]  [address], [address]  [place], [address] [place] [address] Street  Phone: [phone]  Fax: [phone]       Wally Avilez  : 1955  PCP: Rahul Holguin MD  1/10/2023    PROGRESS NOTE    HISTORY OF PRESENT ILLNESS  Alison Pinzon is a 78 y.o. female who was initially seen in 2017 for c/o lumbar pain radiating into her LLE. She found some relief from Tylenol #3 PRN and Gabapentin. Her pain was exacerbated with increased lifting, bending, cleaning, and walking. She found relief from lying down. She was not maintaining an HEP. She found some relief from Naproxen and Flexeril. She noted that pressure while sitting exacerbated her pain. Lumbar MRI 16: Degenerative grade 1 anterior listhesis of both L4 and L5 in the presence of advanced facet arthropathy. No high-grade spinal stenosis. There are bilateral foraminal stenoses. Some exiting nerve contact but no overt impingement. She continued to have some residual low back pain with some tingling in the LLE. She c/o right heel pain that she associated with a bone spur in the heel. Her PCP provided exercises for plantar fasciitis. 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 She was intolerant to GABAPENTIN secondary to somnolence. She did not tolerate the Lyrica 150 mg BID as it caused drowsiness, however, she did find some relief at the 75mg dose She was focusing on a diet and cutting out starches. She was maintaining well on Lyrica 75 mg TID. Her symptoms continued to wax and wane. She tended to have flare ups of her back pain after she was more active (cleaning house, etc). She felt somnolence with Lyrica 75 mg TID. She continued to have low back pain radiating into the LLE that was worse at night. She had difficulty sleeping. During last OV on 22, she continued to have some low back pain radiating into the LLE. She has been taking Lyrica 75 mg TID with benefit.  She has also been tolerating Amitriptyline 25 mg QHS. She notes that Amitriptyline 50 mg QHS was a little too much, so she went back to 25 mg with benefit. She has been trying to maintain a consistent HEP of walking. During 10/6/22 OV, pt noted of pain in right lower back. Pt continues with Amitriptyline 25mg BID and Lyrica 75mg TID with benefit. Pt walks for exercise. Pain rating was 2/10. Denies radiation of pain down legs. Del Shelley was seen today for follow up. She notes her lower back pain feels improved. She maintains HEP. She continues to find benefit from Lyrica 75mg TID and Amitryptyline 25mg BID. Pain Score: 4/10     Treatments patient has tried:  Physical therapy: Yes  Doing HEP: No  Non-opioid medications: yes - gabapentin (intolerant), lyrica, amitriptyline  Spinal injections: Unknown  Spinal surgery- Unknown  Last Lumbar Spine MRI: 2016      PmHx: HTN, arthritis    ASSESSMENT  Del Shelley is a 78 y.o. female with improved lower back pain that previously radiated into LLE. She finds benefit from current medication regimens. Her symptoms are likely due to a left-sided lumbar radiculopathy. PLAN  Refill of Lyrica 75mg TID  Refill of Amitryptyline 25mg BID   Maintain HEP    Pt will f/u in 6 months or sooner if needed. Diagnoses and all orders for this visit:    1. Lumbar radiculopathy         Follow-up and Dispositions    Return in about 6 months (around 7/10/2023).            PAST MEDICAL HISTORY   Past Medical History:   Diagnosis Date    Anemia     Arthritis     Bulging lumbar disc     Degenerative arthritis of finger     MP joint right index finger    Depression     H/O colonoscopy 12/01/2014    recommended yearly hemoccult stools    Hypertension     Low back pain     Lower extremity pain, left     Pinched nerve        Past Surgical History:   Procedure Laterality Date    COLONOSCOPY N/A 2/5/2020    COLONOSCOPY performed by Michelle Goode MD at [address] [place] [address] RELEASE Right     hand    HX COLONOSCOPY  2014    benign polyp    HX HYSTERECTOMY      HX ORTHOPAEDIC      heel spurs       MEDICATIONS      Current Outpatient Medications   Medication Sig Dispense Refill    betamethasone valerate (VALISONE) 0.1 % topical cream APPLY TO AFFECTED AREA ON SKIN TWICE DAILY AS NEEDED FOR ITCHING 15 g 0    PARoxetine (PAXIL) 20 mg tablet Take 1 Tablet by mouth daily. 90 Tablet 1    triamcinolone acetonide 0.025 % lotion Sig: apply thin layer to affected area twice a day 60 mL 0    ferrous sulfate (IRON) 325 mg (65 mg iron) EC tablet Take 1 Tablet by mouth Daily (before breakfast). 90 Tablet 1    ascorbic acid, vitamin C, (VITAMIN C) 500 mg tablet Take 1 Tablet by mouth daily. 90 Tablet 1    pregabalin (Lyrica) 75 mg capsule Take 1 Capsule by mouth three (3) times daily. Max Daily Amount: 225 mg. 90 Capsule 0    lisinopril-hydroCHLOROthiazide (PRINZIDE, ZESTORETIC) 20-25 mg per tablet Take 1 tablet by mouth once daily 90 Tablet 1    pravastatin (PRAVACHOL) 20 mg tablet Take 1 Tablet by mouth nightly. 90 Tablet 3    multivitamin (ONE A DAY) tablet Take 1 Tablet by mouth daily. fluticasone propionate (FLONASE) 50 mcg/actuation nasal spray instill 1 spray into each nostril once daily 1 Each 2    calcium carbonate-vitamin D3 (Caltrate 600 plus D) 600 mg-20 mcg (800 unit) chew Take 2 Tablets by mouth once over twenty-four (24) hours. 180 Tablet 3    Allergy Relief, fexofenadine, 180 mg tablet Take 180 mg by mouth daily. hydrOXYzine HCL (ATARAX) 25 mg tablet take 1 tablet by mouth AT NIGHT      levocetirizine (XYZAL) 5 mg tablet Take 5 mg by mouth daily. diclofenac (VOLTAREN) 1 % gel Apply  to affected area four (4) times daily. 1 Each 1    allergy injection by SubCUTAneous route every fourteen (14) days. cyclobenzaprine (FLEXERIL) 10 mg tablet Take 1 Tab by mouth three (3) times daily as needed for Muscle Spasm(s).  20 Tab 0    naproxen (NAPROSYN) 500 mg tablet Take 1 Tab by mouth two (2) times daily (with meals). 30 Tab 0    bisacodyL (DULCOLAX) 5 mg EC tablet Take 10 mg by mouth daily as needed. cholecalciferol (VITAMIN D3) 25 mcg (1,000 unit) cap Take 1,000 Units by mouth daily. multivit-min-ferrous fumarate (Multi Vitamin) 9 mg iron/15 mL liqd          ALLERGIES    No Known Allergies       SOCIAL HISTORY    Social History     Socioeconomic History    Marital status:    Tobacco Use    Smoking status: Never    Smokeless tobacco: Never   Substance and Sexual Activity    Alcohol use: No    Drug use: No    Sexual activity: Not Currently     Social Determinants of Health     Financial Resource Strain: Low Risk     Difficulty of Paying Living Expenses: Not hard at all   Food Insecurity: No Food Insecurity    Worried About Running Out of Food in the Last Year: Never true    Ran Out of Food in the Last Year: Never true       FAMILY HISTORY    Family History   Problem Relation Age of Onset    Cancer Mother     Ovarian Cancer Mother 68    Stroke Father        REVIEW OF SYSTEMS  Review of Systems   Constitutional:  Negative for chills, fever and weight loss. Respiratory:  Negative for shortness of breath. Cardiovascular:  Negative for chest pain. Gastrointestinal:  Negative for constipation. Negative for fecal incontinence   Genitourinary:  Negative for dysuria. Negative for urinary incontinence   Musculoskeletal:  Positive for back pain (lower). Skin:  Negative for rash. Neurological:  Negative for dizziness, tingling, tremors, focal weakness and headaches. Endo/Heme/Allergies:  Does not bruise/bleed easily. Psychiatric/Behavioral:  The patient does not have insomnia.        PHYSICAL EXAMINATION  Visit Vitals  Pulse 69   Temp 97.9 °F (36.6 °C) (Temporal)   Ht 5' 4\" (1.626 m)   Wt 175 lb (79.4 kg)   LMP  (LMP Unknown)   SpO2 99%   BMI 30.04 kg/m²       Pain Assessment  1/10/2023   Location of Pain Back   Pain Location Comment -   Location Modifiers -   Severity of Pain 4   Quality of Pain Aching   Quality of Pain Comment -   Duration of Pain Persistent   Frequency of Pain Intermittent   Date Pain First Started -   Aggravating Factors Exercise; Other (Comment)   Aggravating Factors Comment doing any thing   Limiting Behavior No   Relieving Factors Other (Comment)   Relieving Factors Comment meds   Result of Injury No       Constitutional:  Well developed, well nourished, in no acute distress. Psychiatric: Affect and mood are appropriate. HEENT: Normocephalic, atraumatic. Extraocular movements intact. Integumentary: No rashes or abrasions noted on exposed areas. Cardiovascular: Regular rate and rhythm. Pulmonary: Clear to auscultation bilaterally. SPINE/MUSCULOSKELETAL EXAM    Lumbar spine:  No rash, ecchymosis, or gross obliquity. No fasciculations. No focal atrophy is noted. No pain with hip ROM. Range of motion is normal.  Mild tenderness to palpation. Tenderness to palpation at the right sciatic notch. SI joints non-tender. Trochanters non tender. Piriformis stretch test negative on right side. Sensation in the bilateral legs grossly intact to light touch. Updates from 2/21/19:  Tenderness to palpation of left upper buttock. Positive SLR on the L  leans to the left, but her right shoulder sits lower than her left  Neurologically intact     Updates from 12/10/19:  Tenderness to palpation of lumbar paraspinals    Updates 1/10/23:  Neurologically intact    MOTOR:      Elbow Flex  Elbow Ext Arm Abd Wrist Ext Wrist Flex Hand Intrin   Right 5/5 5/5 5/5 5/5 5/5 5/5   Left 5/5 5/5 5/5 5/5 5/5 5/5             Hip flex  Knee Ext EHL Ankle DF Ankle PF      Right 5/5 5/5 5/5 5/5 5/5    Left 5/5 5/5 5/5 5/5 5/5      DTRs are 2+ biceps, triceps, brachioradialis, patella, and Achilles. Negative Straight Leg raise. Squat not tested. No difficulty with tandem gait. Ambulation without assistive device. FWB.    RADIOGRAPHS  Lumbar MRI images taken on 4/19/16 personally reviewed with patient:  Study presumes presence of five lumbar vertebra. There is grade 1  spondylolisthesis of both L4 and L5. No pars defect. Normal vertebral body  heights. Unremarkable marrow signal. Mild disc space narrowing with disc  desiccation at L5-S1. Less so at L4-5. Normal conus at upper L2. Axial imaging correlation:     T12-L1: Minimal disc bulge. Patent canal and foramina. L1-2: Patent canal and foramina. L2-3: Patent canal and foramina. L3-4: Mild facet arthropathy. Patent canal and foramina. L4-5: Listhesis with uncovering of the disc. No focal disc herniation. Bilateral  facet arthropathy. Low-grade facet inflammation. An overall mild concentric  spinal stenosis. Crossing nerve abutment but no impingement. Mild foraminal  stenoses without exiting nerve impingement. Axial T2 series 2 images 12-13. L5-S1: Listhesis with uncovering of the disc. No focal disc herniation, with  minimal disc bulging. Pronounced facet arthropathy. Mild narrowing at the  lateral recesses but overall no significant spinal stenosis. However, there is  moderate severity foraminal stenosis. Exiting nerve contact in the right  foramen, but no overt impingement. Axial T2 images 8-9; also axial T1 image 5 of  series 3. Incidental imaging of regional soft tissues is unremarkable. IMPRESSION:     1. Degenerative grade 1 anterior listhesis of both L4 and L5 in the presence of  advanced facet arthropathy. No high-grade spinal stenosis. There are bilateral  foraminal stenoses. Some exiting nerve contact but no overt impingement. Details  as above. 7 minutes of face-to-face contact were spent with the patient during today's visit extensively discussing symptoms and treatment plan. All questions were answered. More than half of this visit today was spent on counseling.       Written by Angelita Marino, as dictated by Dr. Richard Jhonson.

## 2023-01-10 ENCOUNTER — OFFICE VISIT (OUTPATIENT)
Dept: ORTHOPEDIC SURGERY | Age: 68
End: 2023-01-10
Payer: MEDICARE

## 2023-01-10 VITALS
TEMPERATURE: 97.9 F | HEIGHT: 64 IN | OXYGEN SATURATION: 99 % | BODY MASS INDEX: 29.88 KG/M2 | WEIGHT: 175 LBS | HEART RATE: 69 BPM

## 2023-01-10 DIAGNOSIS — M54.16 LUMBAR RADICULOPATHY: ICD-10-CM

## 2023-01-10 PROCEDURE — G9717 DOC PT DX DEP/BP F/U NT REQ: HCPCS | Performed by: PHYSICAL MEDICINE & REHABILITATION

## 2023-01-10 PROCEDURE — G9899 SCRN MAM PERF RSLTS DOC: HCPCS | Performed by: PHYSICAL MEDICINE & REHABILITATION

## 2023-01-10 PROCEDURE — 1123F ACP DISCUSS/DSCN MKR DOCD: CPT | Performed by: PHYSICAL MEDICINE & REHABILITATION

## 2023-01-10 PROCEDURE — 3017F COLORECTAL CA SCREEN DOC REV: CPT | Performed by: PHYSICAL MEDICINE & REHABILITATION

## 2023-01-10 PROCEDURE — G8536 NO DOC ELDER MAL SCRN: HCPCS | Performed by: PHYSICAL MEDICINE & REHABILITATION

## 2023-01-10 PROCEDURE — G8399 PT W/DXA RESULTS DOCUMENT: HCPCS | Performed by: PHYSICAL MEDICINE & REHABILITATION

## 2023-01-10 PROCEDURE — 1090F PRES/ABSN URINE INCON ASSESS: CPT | Performed by: PHYSICAL MEDICINE & REHABILITATION

## 2023-01-10 PROCEDURE — G8417 CALC BMI ABV UP PARAM F/U: HCPCS | Performed by: PHYSICAL MEDICINE & REHABILITATION

## 2023-01-10 PROCEDURE — 1101F PT FALLS ASSESS-DOCD LE1/YR: CPT | Performed by: PHYSICAL MEDICINE & REHABILITATION

## 2023-01-10 PROCEDURE — 99213 OFFICE O/P EST LOW 20 MIN: CPT | Performed by: PHYSICAL MEDICINE & REHABILITATION

## 2023-01-10 PROCEDURE — G8427 DOCREV CUR MEDS BY ELIG CLIN: HCPCS | Performed by: PHYSICAL MEDICINE & REHABILITATION

## 2023-01-10 RX ORDER — PREGABALIN 75 MG/1
75 CAPSULE ORAL 3 TIMES DAILY
Qty: 270 CAPSULE | Refills: 1 | Status: SHIPPED | OUTPATIENT
Start: 2023-01-10 | End: 2023-04-10

## 2023-01-10 RX ORDER — AMITRIPTYLINE HYDROCHLORIDE 25 MG/1
25 TABLET, FILM COATED ORAL 2 TIMES DAILY
Qty: 180 TABLET | Refills: 1 | Status: SHIPPED | OUTPATIENT
Start: 2023-01-10 | End: 2023-04-10

## 2023-01-10 NOTE — PROGRESS NOTES
Leslie Pal presents today for   Chief Complaint   Patient presents with    Back Pain       Is someone accompanying this pt? no    Is the patient using any DME equipment during OV? no    Depression Screening:  3 most recent PHQ Screens 12/20/2022   Little interest or pleasure in doing things Not at all   Feeling down, depressed, irritable, or hopeless Not at all   Total Score PHQ 2 0   Trouble falling or staying asleep, or sleeping too much Not at all   Feeling tired or having little energy Not at all   Poor appetite, weight loss, or overeating Several days   Feeling bad about yourself - or that you are a failure or have let yourself or your family down Not at all   Trouble concentrating on things such as school, work, reading, or watching TV Not at all   Moving or speaking so slowly that other people could have noticed; or the opposite being so fidgety that others notice Not at all   Thoughts of being better off dead, or hurting yourself in some way Not at all   PHQ 9 Score 1   How difficult have these problems made it for you to do your work, take care of your home and get along with others Not difficult at all       Learning Assessment:  Learning Assessment 12/20/2022   PRIMARY LEARNER Patient   HIGHEST LEVEL OF EDUCATION - PRIMARY LEARNER  [address] [facility] PRIMARY LEARNER [address] CAREGIVER No   PRIMARY LANGUAGE ENGLISH    NEED -   LEARNER PREFERENCE PRIMARY DEMONSTRATION     -   LEARNING SPECIAL TOPICS No   ANSWERED BY KENNEDY Chan   RELATIONSHIP SELF       Abuse Screening:  Abuse Screening Questionnaire 12/20/2022   Do you ever feel afraid of your partner? N   Are you in a relationship with someone who physically or mentally threatens you? N   Is it safe for you to go home? Y       Fall Risk  Fall Risk Assessment, last 12 mths 12/20/2022   Able to walk? Yes   Fall in past 12 months? 0   Do you feel unsteady?  0   Are you worried about falling 0       OPIOID RISK TOOL  No flowsheet data found. Coordination of Care:  1. Have you been to the ER, urgent care clinic since your last visit? no  Hospitalized since your last visit? no    2. Have you seen or consulted any other health care providers outside of the [address] [place] [address] since your last visit? no Include any pap smears or colon screening.  no

## 2023-01-31 ENCOUNTER — OFFICE VISIT (OUTPATIENT)
Dept: ONCOLOGY | Age: 68
End: 2023-01-31
Payer: MEDICARE

## 2023-01-31 VITALS
HEIGHT: 64 IN | DIASTOLIC BLOOD PRESSURE: 91 MMHG | BODY MASS INDEX: 30.05 KG/M2 | WEIGHT: 176 LBS | OXYGEN SATURATION: 98 % | SYSTOLIC BLOOD PRESSURE: 155 MMHG | HEART RATE: 91 BPM | TEMPERATURE: 98.1 F

## 2023-01-31 DIAGNOSIS — D50.9 IRON DEFICIENCY ANEMIA, UNSPECIFIED IRON DEFICIENCY ANEMIA TYPE: Primary | ICD-10-CM

## 2023-01-31 PROCEDURE — 1123F ACP DISCUSS/DSCN MKR DOCD: CPT | Performed by: INTERNAL MEDICINE

## 2023-01-31 PROCEDURE — G8427 DOCREV CUR MEDS BY ELIG CLIN: HCPCS | Performed by: INTERNAL MEDICINE

## 2023-01-31 PROCEDURE — 1101F PT FALLS ASSESS-DOCD LE1/YR: CPT | Performed by: INTERNAL MEDICINE

## 2023-01-31 PROCEDURE — G8417 CALC BMI ABV UP PARAM F/U: HCPCS | Performed by: INTERNAL MEDICINE

## 2023-01-31 PROCEDURE — 3017F COLORECTAL CA SCREEN DOC REV: CPT | Performed by: INTERNAL MEDICINE

## 2023-01-31 PROCEDURE — G9899 SCRN MAM PERF RSLTS DOC: HCPCS | Performed by: INTERNAL MEDICINE

## 2023-01-31 PROCEDURE — G8536 NO DOC ELDER MAL SCRN: HCPCS | Performed by: INTERNAL MEDICINE

## 2023-01-31 PROCEDURE — G9717 DOC PT DX DEP/BP F/U NT REQ: HCPCS | Performed by: INTERNAL MEDICINE

## 2023-01-31 PROCEDURE — 3077F SYST BP >= 140 MM HG: CPT | Performed by: INTERNAL MEDICINE

## 2023-01-31 PROCEDURE — 3080F DIAST BP >= 90 MM HG: CPT | Performed by: INTERNAL MEDICINE

## 2023-01-31 PROCEDURE — 99213 OFFICE O/P EST LOW 20 MIN: CPT | Performed by: INTERNAL MEDICINE

## 2023-01-31 PROCEDURE — 1090F PRES/ABSN URINE INCON ASSESS: CPT | Performed by: INTERNAL MEDICINE

## 2023-01-31 PROCEDURE — G8399 PT W/DXA RESULTS DOCUMENT: HCPCS | Performed by: INTERNAL MEDICINE

## 2023-01-31 NOTE — PROGRESS NOTES
Hematology/Oncology Note      Date: 2023    Name: Alea Clemons  : 1955        Nikki Ornelas MD         Subjective:     Chief complaint: Iron deficiency anemia    History of Present Illness:   Ms. Kortney Busch is a most pleasant 78y.o. year old female who was seen for consultation of iron deficiency anemia. The patient has a past medical history significant of bleeding status post colonoscopy. She has follow-up with GI. She has started Iron pills for about 2 months. She reported doing better overall. The patient otherwise has no other complaints. Denied fever, chills, night sweat, unintentional weight loss, skin lumps or bumps, acute bleeding or bruising issues. Denied headache, acute vision change, dizziness, chest pain, worsen shortness of breath, palpitation, productive cough, nausea, vomiting, abdominal pain, altered bowel habits, dysuria, worsen bone pain or back pain, new focal numbness or weakness.        Past Medical History, Family History, and Social History:    Past Medical History:   Diagnosis Date    Anemia     Arthritis     Bulging lumbar disc     Degenerative arthritis of finger     MP joint right index finger    Depression     H/O colonoscopy 2014    recommended yearly hemoccult stools    Hypertension     Low back pain     Lower extremity pain, left     Pinched nerve      Past Surgical History:   Procedure Laterality Date    COLONOSCOPY N/A 2020    COLONOSCOPY performed by Marta Sims MD at [facility] ENDOSCOPY    HX [address] Right     hand    HX COLONOSCOPY  2014    benign polyp    HX HYSTERECTOMY      HX ORTHOPAEDIC      heel spurs     Social History     Socioeconomic History    Marital status:      Spouse name: Not on file    Number of children: Not on file    Years of education: Not on file    Highest education level: Not on file   Occupational History    Not on file   Tobacco Use    Smoking status: Never    Smokeless tobacco: Never   Substance and Sexual Activity    Alcohol use: No    Drug use: No    Sexual activity: Not Currently   Other Topics Concern    Not on file   Social History Narrative    Not on file     Social Determinants of Health     Financial Resource Strain: Low Risk     Difficulty of Paying Living Expenses: Not hard at all   Food Insecurity: No Food Insecurity    Worried About Running Out of Food in the Last Year: Never true    Ran Out of Food in the Last Year: Never true   Transportation Needs: Not on file   Physical Activity: Not on file   Stress: Not on file   Social Connections: Not on file   Intimate Partner Violence: Not on file   Housing Stability: Not on file     Family History   Problem Relation Age of Onset    Cancer Mother     Ovarian Cancer Mother 68    Stroke Father      Current Outpatient Medications   Medication Sig Dispense Refill    pregabalin (Lyrica) 75 mg capsule Take 1 Capsule by mouth three (3) times daily for 90 days. Max Daily Amount: 225 mg. 270 Capsule 1    amitriptyline (ELAVIL) 25 mg tablet Take 1 Tablet by mouth two (2) times a day for 90 days. 180 Tablet 1    betamethasone valerate (VALISONE) 0.1 % topical cream APPLY TO AFFECTED AREA ON SKIN TWICE DAILY AS NEEDED FOR ITCHING 15 g 0    PARoxetine (PAXIL) 20 mg tablet Take 1 Tablet by mouth daily. 90 Tablet 1    triamcinolone acetonide 0.025 % lotion Sig: apply thin layer to affected area twice a day 60 mL 0    ferrous sulfate (IRON) 325 mg (65 mg iron) EC tablet Take 1 Tablet by mouth Daily (before breakfast). 90 Tablet 1    ascorbic acid, vitamin C, (VITAMIN C) 500 mg tablet Take 1 Tablet by mouth daily. 90 Tablet 1    lisinopril-hydroCHLOROthiazide (PRINZIDE, ZESTORETIC) 20-25 mg per tablet Take 1 tablet by mouth once daily 90 Tablet 1    pravastatin (PRAVACHOL) 20 mg tablet Take 1 Tablet by mouth nightly. 90 Tablet 3    multivitamin (ONE A DAY) tablet Take 1 Tablet by mouth daily.       fluticasone propionate (FLONASE) 50 mcg/actuation nasal spray instill 1 spray into each nostril once daily 1 Each 2    calcium carbonate-vitamin D3 (Caltrate 600 plus D) 600 mg-20 mcg (800 unit) chew Take 2 Tablets by mouth once over twenty-four (24) hours. 180 Tablet 3    Allergy Relief, fexofenadine, 180 mg tablet Take 180 mg by mouth daily. hydrOXYzine HCL (ATARAX) 25 mg tablet take 1 tablet by mouth AT NIGHT      levocetirizine (XYZAL) 5 mg tablet Take 5 mg by mouth daily. diclofenac (VOLTAREN) 1 % gel Apply  to affected area four (4) times daily. 1 Each 1    allergy injection by SubCUTAneous route every fourteen (14) days. cyclobenzaprine (FLEXERIL) 10 mg tablet Take 1 Tab by mouth three (3) times daily as needed for Muscle Spasm(s). 20 Tab 0    naproxen (NAPROSYN) 500 mg tablet Take 1 Tab by mouth two (2) times daily (with meals). 30 Tab 0    bisacodyL (DULCOLAX) 5 mg EC tablet Take 10 mg by mouth daily as needed. cholecalciferol (VITAMIN D3) 25 mcg (1,000 unit) cap Take 1,000 Units by mouth daily. multivit-min-ferrous fumarate (Multi Vitamin) 9 mg iron/15 mL liqd          Review of Systems   Constitutional:  Negative for chills, diaphoresis, fever, malaise/fatigue and weight loss. Respiratory:  Negative for cough, hemoptysis, shortness of breath and wheezing. Cardiovascular:  Negative for chest pain, palpitations and leg swelling. Gastrointestinal:  Negative for abdominal pain, diarrhea, heartburn, nausea and vomiting. Genitourinary:  Negative for dysuria, frequency, hematuria and urgency. Musculoskeletal:  Negative for joint pain and myalgias. Skin:  Negative for itching and rash. Neurological:  Negative for dizziness, seizures, weakness and headaches. Psychiatric/Behavioral:  Negative for depression. The patient does not have insomnia.            Objective:   Visit Vitals  BP (!) 155/91   Pulse 91   Temp 98.1 °F (36.7 °C)   Ht 5' 4\" (1.626 m)   Wt 79.8 kg (176 lb)   LMP  (LMP Unknown)   SpO2 98%   BMI 30.21 kg/m²       ECOG Performance Status (grade): 0  0 - able to carry on all pre-disease activity w/out restriction  1 - restricted but able to carry out light work  2 - ambulatory and can self- care but unable to carry out work  3 - bed or chair >50% of waking hours  4 - completely disable, total care, confined to bed or chair    Physical Exam  Constitutional:       Appearance: Normal appearance. HENT:      Head: Normocephalic and atraumatic. Eyes:      Pupils: Pupils are equal, round, and reactive to light. Cardiovascular:      Rate and Rhythm: Normal rate and regular rhythm. Heart sounds: Normal heart sounds. Pulmonary:      Effort: Pulmonary effort is normal.      Breath sounds: Normal breath sounds. Abdominal:      General: Bowel sounds are normal.      Palpations: Abdomen is soft. Tenderness: There is no abdominal tenderness. There is no guarding. Musculoskeletal:         General: Normal range of motion. Cervical back: Neck supple. Right lower leg: No edema. Left lower leg: No edema. Skin:     General: Skin is warm. Neurological:      General: No focal deficit present. Mental Status: She is alert and oriented to person, place, and time. Mental status is at baseline. Diagnostics:      No results found for this or any previous visit (from the past 96 hour(s)). Imaging:  No results found for this or any previous visit. Results for orders placed during the hospital encounter of 07/10/21    XR FOREARM RT AP/LAT    Narrative  History: Bilateral forearm pain. No trauma. COMPARISON: None. TECHNIQUE: Frontal and lateral views of both forearms. FINDINGS:    Left: No fracture. Tiny triceps enthesopathy. Partially imaged wrist  degenerative change. Right: No fracture. Small triceps enthesopathy. Partially imaged wrist  degenerative change. Impression  No acute findings. If symptoms persist consider MRI. No results found for this or any previous visit.         Pathology Diagnosis:                                        1. Iron deficiency anemia, unspecified iron deficiency anemia type          Assessment and Plan:                                        # Iron deficiency anemia  -- past medical history significant of bleeding status post colonoscopy. She has follow-up with GI.  -- 10/8/2022 CBC reported hemoglobin 9.8, hematocrit 31.2%, MCV is 73, platelet 675,354. Iron saturation 5%, TIBC 404, ferritin 8. B12 1138.  -- Repeat CBC on 12/20/2022 showed improving hemoglobin 10.9, hematocrit 36.1, MCV 74. WBC 3.4, platelet 466,231.  -- She has started Iron pills for about 2 months. She tolerated iron pills well. She reported doing better overall. -- Today I have reviewed with the patient about recent lab reports. 1/3/2023 CBC reported hemoglobin improving 12, hematocrit 37.3%, WBC 3.2, platelet 872,317. Iron saturation 44%, TIBC 414, ferritin improving 23. Plan:  -- She will continue with Iron pills at this time. -- Continue lab check CBC with differential, chemistry, iron profiles, ferritin, reticulocyte count. -- The patient was advised to keep appointment with GI for colonoscopy. She stated it was scheduled in March 2023. --The patient was agreeable with the plan. -- We will see the patient back in about 4 months. Always sooner if required. No orders of the defined types were placed in this encounter. Ms. Bebe Phillips has a reminder for a \"due or due soon\" health maintenance. I have asked that she contact her primary care provider for follow-up on this health maintenance. All of patient's questions answered to their apparent satisfaction. They verbally show understanding and agreement with aforementioned plan.            Anali Cruz MD  1/31/2023              Above mentioned total time spent for this encounter with more than 50% of the time spent in face-to-face counseling, discussing on diagnosis and management plan going forward, and co-ordination of care. Parts of this document has been produced using Dragon dictation system. Unrecognized errors in transcription may be present. Please do not hesitate to reach out for any questions or clarifications.       CC: Radhika Schneider MD

## 2023-02-03 NOTE — TELEPHONE ENCOUNTER
This patient contacted office for the following prescriptions to be filled:    Last office visit: 12/20/2023  Follow up appointment: 3/21/2023  Medication requested :   Requested Prescriptions     Pending Prescriptions Disp Refills    lisinopril-hydroCHLOROthiazide (PRINZIDE, ZESTORETIC) 20-25 mg per tablet 90 Tablet 1     Sig: Take 1 Tablet by mouth daily.     triamcinolone acetonide 0.025 % lotion 60 mL 0     Sig: Sig: apply thin layer to affected area twice a day    betamethasone valerate (VALISONE) 0.1 % topical cream 15 g 0     Sig: APPLY TO AFFECTED AREA ON SKIN TWICE DAILY AS NEEDED FOR ITCHING     PCP: Cecilio Gillis  Mail order or Local pharmacy name [facility] [phone]

## 2023-02-06 RX ORDER — LISINOPRIL AND HYDROCHLOROTHIAZIDE 20; 25 MG/1; MG/1
1 TABLET ORAL DAILY
Qty: 90 TABLET | Refills: 1 | Status: SHIPPED | OUTPATIENT
Start: 2023-02-06

## 2023-02-06 RX ORDER — TRIAMCINOLONE ACETONIDE 0.25 MG/ML
LOTION TOPICAL
Qty: 60 ML | Refills: 0 | Status: SHIPPED | OUTPATIENT
Start: 2023-02-06

## 2023-02-06 RX ORDER — BETAMETHASONE VALERATE 1.2 MG/G
CREAM TOPICAL
Qty: 15 G | Refills: 0 | Status: SHIPPED | OUTPATIENT
Start: 2023-02-06

## 2023-03-21 ENCOUNTER — OFFICE VISIT (OUTPATIENT)
Age: 68
End: 2023-03-21
Payer: MEDICARE

## 2023-03-21 VITALS
HEART RATE: 76 BPM | OXYGEN SATURATION: 98 % | DIASTOLIC BLOOD PRESSURE: 86 MMHG | TEMPERATURE: 98.5 F | SYSTOLIC BLOOD PRESSURE: 134 MMHG | HEIGHT: 64 IN | RESPIRATION RATE: 16 BRPM | BODY MASS INDEX: 30.39 KG/M2 | WEIGHT: 178 LBS

## 2023-03-21 DIAGNOSIS — R73.01 IFG (IMPAIRED FASTING GLUCOSE): Primary | ICD-10-CM

## 2023-03-21 DIAGNOSIS — R73.01 IFG (IMPAIRED FASTING GLUCOSE): ICD-10-CM

## 2023-03-21 DIAGNOSIS — F33.9 RECURRENT DEPRESSION (HCC): ICD-10-CM

## 2023-03-21 DIAGNOSIS — M54.16 LUMBAR RADICULOPATHY: ICD-10-CM

## 2023-03-21 DIAGNOSIS — M85.80 OSTEOPENIA, UNSPECIFIED LOCATION: ICD-10-CM

## 2023-03-21 DIAGNOSIS — R23.2 HOT FLASHES: ICD-10-CM

## 2023-03-21 DIAGNOSIS — I10 ESSENTIAL HYPERTENSION: ICD-10-CM

## 2023-03-21 PROCEDURE — 1123F ACP DISCUSS/DSCN MKR DOCD: CPT | Performed by: FAMILY MEDICINE

## 2023-03-21 PROCEDURE — 3074F SYST BP LT 130 MM HG: CPT | Performed by: FAMILY MEDICINE

## 2023-03-21 PROCEDURE — 3078F DIAST BP <80 MM HG: CPT | Performed by: FAMILY MEDICINE

## 2023-03-21 PROCEDURE — 99214 OFFICE O/P EST MOD 30 MIN: CPT | Performed by: FAMILY MEDICINE

## 2023-03-21 RX ORDER — LEVOCETIRIZINE DIHYDROCHLORIDE 5 MG/1
5 TABLET, FILM COATED ORAL DAILY
Qty: 90 TABLET | Refills: 3 | Status: SHIPPED | OUTPATIENT
Start: 2023-03-21

## 2023-03-21 RX ORDER — HYDROXYZINE HYDROCHLORIDE 25 MG/1
25 TABLET, FILM COATED ORAL NIGHTLY
Qty: 30 TABLET | Refills: 1 | Status: SHIPPED | OUTPATIENT
Start: 2023-03-21

## 2023-03-21 RX ORDER — AMITRIPTYLINE HYDROCHLORIDE 25 MG/1
50 TABLET, FILM COATED ORAL NIGHTLY
COMMUNITY
Start: 2023-01-03

## 2023-03-21 RX ORDER — TRIAMCINOLONE ACETONIDE 0.25 MG/ML
LOTION TOPICAL
Qty: 1 EACH | Refills: 1 | Status: SHIPPED | OUTPATIENT
Start: 2023-03-21

## 2023-03-21 SDOH — ECONOMIC STABILITY: INCOME INSECURITY: HOW HARD IS IT FOR YOU TO PAY FOR THE VERY BASICS LIKE FOOD, HOUSING, MEDICAL CARE, AND HEATING?: NOT VERY HARD

## 2023-03-21 SDOH — ECONOMIC STABILITY: FOOD INSECURITY: WITHIN THE PAST 12 MONTHS, THE FOOD YOU BOUGHT JUST DIDN'T LAST AND YOU DIDN'T HAVE MONEY TO GET MORE.: NEVER TRUE

## 2023-03-21 SDOH — ECONOMIC STABILITY: FOOD INSECURITY: WITHIN THE PAST 12 MONTHS, YOU WORRIED THAT YOUR FOOD WOULD RUN OUT BEFORE YOU GOT MONEY TO BUY MORE.: NEVER TRUE

## 2023-03-21 SDOH — ECONOMIC STABILITY: HOUSING INSECURITY
IN THE LAST 12 MONTHS, WAS THERE A TIME WHEN YOU DID NOT HAVE A STEADY PLACE TO SLEEP OR SLEPT IN A SHELTER (INCLUDING NOW)?: NO

## 2023-03-21 ASSESSMENT — PATIENT HEALTH QUESTIONNAIRE - PHQ9
SUM OF ALL RESPONSES TO PHQ QUESTIONS 1-9: 0
4. FEELING TIRED OR HAVING LITTLE ENERGY: 0
2. FEELING DOWN, DEPRESSED OR HOPELESS: 0
10. IF YOU CHECKED OFF ANY PROBLEMS, HOW DIFFICULT HAVE THESE PROBLEMS MADE IT FOR YOU TO DO YOUR WORK, TAKE CARE OF THINGS AT HOME, OR GET ALONG WITH OTHER PEOPLE: 0
SUM OF ALL RESPONSES TO PHQ9 QUESTIONS 1 & 2: 0
SUM OF ALL RESPONSES TO PHQ QUESTIONS 1-9: 0
3. TROUBLE FALLING OR STAYING ASLEEP: 0
SUM OF ALL RESPONSES TO PHQ QUESTIONS 1-9: 0
1. LITTLE INTEREST OR PLEASURE IN DOING THINGS: 0
9. THOUGHTS THAT YOU WOULD BE BETTER OFF DEAD, OR OF HURTING YOURSELF: 0
DEPRESSION UNABLE TO ASSESS: FUNCTIONAL CAPACITY MOTIVATION LIMITS ACCURACY
6. FEELING BAD ABOUT YOURSELF - OR THAT YOU ARE A FAILURE OR HAVE LET YOURSELF OR YOUR FAMILY DOWN: 0
7. TROUBLE CONCENTRATING ON THINGS, SUCH AS READING THE NEWSPAPER OR WATCHING TELEVISION: 0
8. MOVING OR SPEAKING SO SLOWLY THAT OTHER PEOPLE COULD HAVE NOTICED. OR THE OPPOSITE, BEING SO FIGETY OR RESTLESS THAT YOU HAVE BEEN MOVING AROUND A LOT MORE THAN USUAL: 0
SUM OF ALL RESPONSES TO PHQ QUESTIONS 1-9: 0
5. POOR APPETITE OR OVEREATING: 0

## 2023-03-22 NOTE — PROGRESS NOTES
Chief Complaint   Patient presents with    Depression    Hypertension    Other     Hx of osteopenia         1. \"Have you been to the ER, urgent care clinic since your last visit? Hospitalized since your last visit? \" No    2. \"Have you seen or consulted any other health care providers outside of the [address] [place] [address] since your last visit? \" No     3. For patients aged 39-70: Has the patient had a colonoscopy / FIT/ Cologuard? Yes - no Care Gap present      If the patient is female:    4. For patients aged 41-77: Has the patient had a mammogram within the past 2 years? Yes - no Care Gap present      5. For patients aged 21-65: Has the patient had a pap smear?  No hx of hysterectomy
 01/03/2023 09:32 AM    MPV 11.0 08/07/2021 11:48 AM        Lipids   Lab Results   Component Value Date/Time    CHOL 139 08/10/2022 12:00 AM    TRIG 48 08/10/2022 12:00 AM    LDLCALC 57 08/10/2022 12:00 AM    LABVLDL 11 08/07/2021 11:48 AM    CHOLHDLRATIO 2.1 08/07/2021 11:48 AM         Imaging results last 24 hrs :No results found. Imaging results impression onlyNo results found. No orders to display       A1c:   Hemoglobin A1C   Date Value Ref Range Status   04/13/2022 5.7 (H) 4.2 - 5.6 % Final     Comment:     (NOTE)  HbA1C Interpretive Ranges  <5.7              Normal  5.7 - 6.4         Consider Prediabetes  >6.5              Consider Diabetes     07/10/2021 5.8 (H) 4.2 - 5.6 % Final     Comment:     (NOTE)  HbA1C Interpretive Ranges  <5.7              Normal  5.7 - 6.4         Consider Prediabetes  >6.5              Consider Diabetes     11/14/2020 5.9 (H) 4.2 - 5.6 % Final     Comment:     (NOTE)  HbA1C Interpretive Ranges  <5.7              Normal  5.7 - 6.4         Consider Prediabetes  >6.5              Consider Diabetes             ASSESSMENT/PLAN  Diagnoses and all orders for this visit:    IFG (impaired fasting glucose)  Persistent  TLCs, monitoring  A1c. Cmp prior to next visit    Essential hypertension  Controlled  Cont prinzide  Cont pravachol  Check cmp.cbc, lipid panel prior to next visit    Recurrent depression ([address]  Stable  Cont paxil    Spinal stenosis of lumbar region with neurogenic claudication  On lyrica, elavil  Following PMNR    Mixed hyperlipidemia  Good range on pravachol 20 mg qhs, to cont    Iron deficiency anemia  Already following GI, upcoming egd/colonscopy 3/2023  Cont po fe  Recheck cbc/bmp soon     Osteopenia unspecified location  Cont ca/vit d/wt bearing exercises  Update dexa 1/2024    Ff-up in 3 months or sooner prn    Patient understands plan of care. Patient has provided input and agrees with goals.

## 2023-04-01 ENCOUNTER — HOSPITAL ENCOUNTER (OUTPATIENT)
Facility: HOSPITAL | Age: 68
End: 2023-04-01
Payer: MEDICARE

## 2023-04-01 DIAGNOSIS — Z12.31 VISIT FOR SCREENING MAMMOGRAM: ICD-10-CM

## 2023-04-01 PROCEDURE — 77063 BREAST TOMOSYNTHESIS BI: CPT

## 2023-05-02 ENCOUNTER — TELEPHONE (OUTPATIENT)
Age: 68
End: 2023-05-02

## 2023-05-02 NOTE — TELEPHONE ENCOUNTER
Tried calling Ms. Estela Garcia on cell # and that number is not working. Left message on home number for her to return our call.  She can be scheduled for Tuesday, 5/9 at 11:15AM

## 2023-05-02 NOTE — TELEPHONE ENCOUNTER
LOV 03/21/2023  F/U 06/22/2023  Patient is having trouble with sleeping. Has trouble staying asleep. This has been going on for about a month. IS unsure if Dr. Romana Poplin can give her something or if she needs a sooner appointment.

## 2023-05-08 NOTE — PROGRESS NOTES
1. \"Have you been to the ER, urgent care clinic since your last visit? Hospitalized since your last visit? \" No    2. \"Have you seen or consulted any other health care providers outside of the [address] [place] [address] since your last visit? \" No     3. For patients aged 39-70: Has the patient had a colonoscopy / FIT/ Cologuard? Yes - no Care Gap present 2/05/2020 colonoscopy      If the patient is female:    4. For patients aged 41-77: Has the patient had a mammogram within the past 2 years? Yes - no Care Gap present 4/01/23      5. For patients aged 21-65: Has the patient had a pap smear?  NA - based on age or sex    Chief Complaint   Patient presents with    Sleep Problem

## 2023-05-09 ENCOUNTER — OFFICE VISIT (OUTPATIENT)
Age: 68
End: 2023-05-09
Payer: MEDICARE

## 2023-05-09 VITALS
HEIGHT: 64 IN | OXYGEN SATURATION: 98 % | TEMPERATURE: 96.6 F | SYSTOLIC BLOOD PRESSURE: 118 MMHG | BODY MASS INDEX: 30.42 KG/M2 | DIASTOLIC BLOOD PRESSURE: 78 MMHG | RESPIRATION RATE: 16 BRPM | WEIGHT: 178.2 LBS | HEART RATE: 90 BPM

## 2023-05-09 DIAGNOSIS — R06.83 SNORING: ICD-10-CM

## 2023-05-09 DIAGNOSIS — G47.9 SLEEP DIFFICULTIES: Primary | ICD-10-CM

## 2023-05-09 DIAGNOSIS — F33.9 RECURRENT DEPRESSION (HCC): ICD-10-CM

## 2023-05-09 PROCEDURE — 1123F ACP DISCUSS/DSCN MKR DOCD: CPT | Performed by: FAMILY MEDICINE

## 2023-05-09 PROCEDURE — 3078F DIAST BP <80 MM HG: CPT | Performed by: FAMILY MEDICINE

## 2023-05-09 PROCEDURE — 3074F SYST BP LT 130 MM HG: CPT | Performed by: FAMILY MEDICINE

## 2023-05-09 PROCEDURE — 99214 OFFICE O/P EST MOD 30 MIN: CPT | Performed by: FAMILY MEDICINE

## 2023-05-09 RX ORDER — PANTOPRAZOLE SODIUM 40 MG/1
TABLET, DELAYED RELEASE ORAL
COMMUNITY
Start: 2023-03-27

## 2023-05-09 RX ORDER — TRAZODONE HYDROCHLORIDE 50 MG/1
50 TABLET ORAL NIGHTLY
Qty: 90 TABLET | Refills: 0 | Status: SHIPPED | OUTPATIENT
Start: 2023-05-09

## 2023-05-09 NOTE — PATIENT INSTRUCTIONS
Learning About Sleeping Well  What does sleeping well mean? Sleeping well means getting enough sleep to feel good and stay healthy. How much sleep is enough varies among people. The number of hours you sleep and how you feel when you wake up are both important. If you do not feel refreshed, you probably need more sleep. Another sign of not getting enough sleep is feeling tired during the day. Experts recommend that adults get at least 7 or more hours of sleep per day. Children and older adults need more sleep. Why is getting enough sleep important? Getting enough quality sleep is a basic part of good health. When your sleep suffers, your physical health, mood, and your thoughts can suffer too. You may find yourself feeling more grumpy or stressed. Not getting enough sleep also can lead to serious problems, including injury, accidents, anxiety, and depression. What might cause poor sleeping? Many things can cause sleep problems, including:  Changes to your sleep schedule. Stress. Stress can be caused by fear about a single event, such as giving a speech. Or you may have ongoing stress, such as worry about work or school. Depression, anxiety, and other mental or emotional conditions. Changes in your sleep habits or surroundings. This includes changes that happen where you sleep, such as noise, light, or sleeping in a different bed. It also includes changes in your sleep pattern, such as having jet lag or working a late shift. Health problems, such as pain, breathing problems, and restless legs syndrome. Lack of regular exercise. Using alcohol, nicotine, or caffeine before bed. How can you help yourself? Here are some tips that may help you sleep more soundly and wake up feeling more refreshed. Your sleeping area   Use your bedroom only for sleeping and sex. A bit of light reading may help you fall asleep. But if it doesn't, do your reading elsewhere in the house.  Try not to use your TV, computer,

## 2023-05-09 NOTE — PROGRESS NOTES
Poly Mariscal, 80 y.o.,  female    SUBJECTIVE  Sleep problem, requesting sleep aid    Says has had problems falling and staying asleep for years. But past few months has been very tired, taking a toll on her her daily activities. She has known depression, but not any works. She takes elavil for back pain prn. Says snores at times. Has tried various otc sleep aids-melatonin/benadryl, follows good sleep hygiene/routine. Does not consume caffeine.      ROS:  See HPI, all others negative        Patient Active Problem List   Diagnosis    Osteopenia    Recurrent depression ([facility] [address]    Lumbar stenosis    Hot flashes    Facet arthropathy    Essential hypertension    IFG (impaired fasting glucose)    Herpes genitalis in women    Mild single current episode of major depressive disorder (HCC)    Lumbar radiculopathy    Advance directive discussed with patient    Allergic rhinitis due to allergen    Obesity    Advance care planning       Current Outpatient Medications   Medication Sig Dispense Refill    pantoprazole (PROTONIX) 40 MG tablet TAKE 1 TABLET BY MOUTH EVERY MORNING 30-60 MINUTES BEFORE BREAKFAST      traZODone (DESYREL) 50 MG tablet Take 1 tablet by mouth nightly 90 tablet 0    betamethasone valerate (VALISONE) 0.1 % cream APPLY TO AFFECTED AREA ON SKIN TWICE DAILY AS NEEDED FOR ITCHING 45 g 1    triamcinolone (KENALOG) 0.025 % LOTN lotion Sig: apply thin layer to affected area twice a day 1 each 1    hydrOXYzine HCl (ATARAX) 25 MG tablet Take 1 tablet by mouth nightly 30 tablet 1    levocetirizine (XYZAL) 5 MG tablet Take 1 tablet by mouth daily 90 tablet 3    NONFORMULARY Inject into the skin every 30 days Allergy injections      ascorbic acid (VITAMIN C) 500 MG tablet Take 1 tablet by mouth daily      bisacodyl (DULCOLAX) 5 MG EC tablet Take 2 tablets by mouth daily as needed      Calcium Carb-Cholecalciferol (CALTRATE 600+D3 SOFT) 600-20 MG-MCG CHEW Take 2 tablets by mouth      vitamin D 25 MCG (1000 UT) CAPS

## 2023-05-18 ENCOUNTER — HOSPITAL ENCOUNTER (OUTPATIENT)
Facility: HOSPITAL | Age: 68
Discharge: HOME | End: 2023-05-21

## 2023-05-18 LAB — LABCORP SPECIMEN COLLECTION: NORMAL

## 2023-05-19 LAB
ALBUMIN SERPL-MCNC: 4.5 G/DL (ref 3.8–4.8)
ALBUMIN/GLOB SERPL: 1.6 {RATIO} (ref 1.2–2.2)
ALP SERPL-CCNC: 60 IU/L (ref 44–121)
ALT SERPL-CCNC: 19 IU/L (ref 0–32)
AST SERPL-CCNC: 19 IU/L (ref 0–40)
BASOPHILS # BLD AUTO: 0 X10E3/UL (ref 0–0.2)
BASOPHILS NFR BLD AUTO: 1 %
BILIRUB SERPL-MCNC: 1.1 MG/DL (ref 0–1.2)
BUN SERPL-MCNC: 16 MG/DL (ref 8–27)
BUN/CREAT SERPL: 20 (ref 12–28)
CALCIUM SERPL-MCNC: 10 MG/DL (ref 8.7–10.3)
CHLORIDE SERPL-SCNC: 99 MMOL/L (ref 96–106)
CHOLEST SERPL-MCNC: 188 MG/DL (ref 100–199)
CO2 SERPL-SCNC: 28 MMOL/L (ref 20–29)
CREAT SERPL-MCNC: 0.8 MG/DL (ref 0.57–1)
EGFRCR SERPLBLD CKD-EPI 2021: 81 ML/MIN/1.73
EOSINOPHIL # BLD AUTO: 0 X10E3/UL (ref 0–0.4)
EOSINOPHIL NFR BLD AUTO: 1 %
ERYTHROCYTE [DISTWIDTH] IN BLOOD BY AUTOMATED COUNT: 13.5 % (ref 11.7–15.4)
GLOBULIN SER CALC-MCNC: 2.8 G/DL (ref 1.5–4.5)
GLUCOSE SERPL-MCNC: 95 MG/DL (ref 70–99)
HBA1C MFR BLD: 6.3 % (ref 4.8–5.6)
HCT VFR BLD AUTO: 45.1 % (ref 34–46.6)
HDLC SERPL-MCNC: 74 MG/DL
HGB BLD-MCNC: 15.1 G/DL (ref 11.1–15.9)
IMM GRANULOCYTES # BLD AUTO: 0 X10E3/UL (ref 0–0.1)
IMM GRANULOCYTES NFR BLD AUTO: 0 %
LDLC SERPL CALC-MCNC: 102 MG/DL (ref 0–99)
LYMPHOCYTES # BLD AUTO: 1.1 X10E3/UL (ref 0.7–3.1)
LYMPHOCYTES NFR BLD AUTO: 32 %
MCH RBC QN AUTO: 28.9 PG (ref 26.6–33)
MCHC RBC AUTO-ENTMCNC: 33.5 G/DL (ref 31.5–35.7)
MCV RBC AUTO: 86 FL (ref 79–97)
MONOCYTES # BLD AUTO: 0.3 X10E3/UL (ref 0.1–0.9)
MONOCYTES NFR BLD AUTO: 9 %
NEUTROPHILS # BLD AUTO: 2 X10E3/UL (ref 1.4–7)
NEUTROPHILS NFR BLD AUTO: 57 %
PLATELET # BLD AUTO: 182 X10E3/UL (ref 150–450)
POTASSIUM SERPL-SCNC: 4.8 MMOL/L (ref 3.5–5.2)
PROT SERPL-MCNC: 7.3 G/DL (ref 6–8.5)
RBC # BLD AUTO: 5.22 X10E6/UL (ref 3.77–5.28)
SODIUM SERPL-SCNC: 138 MMOL/L (ref 134–144)
SPECIMEN STATUS REPORT: NORMAL
TRIGL SERPL-MCNC: 64 MG/DL (ref 0–149)
VLDLC SERPL CALC-MCNC: 12 MG/DL (ref 5–40)
WBC # BLD AUTO: 3.5 X10E3/UL (ref 3.4–10.8)

## 2023-05-24 ENCOUNTER — OFFICE VISIT (OUTPATIENT)
Age: 68
End: 2023-05-24
Payer: MEDICARE

## 2023-05-24 VITALS
DIASTOLIC BLOOD PRESSURE: 89 MMHG | BODY MASS INDEX: 30.08 KG/M2 | SYSTOLIC BLOOD PRESSURE: 136 MMHG | OXYGEN SATURATION: 97 % | HEIGHT: 64 IN | TEMPERATURE: 97 F | HEART RATE: 94 BPM | RESPIRATION RATE: 18 BRPM | WEIGHT: 176.2 LBS

## 2023-05-24 DIAGNOSIS — R35.1 NOCTURIA: ICD-10-CM

## 2023-05-24 DIAGNOSIS — R73.01 IFG (IMPAIRED FASTING GLUCOSE): ICD-10-CM

## 2023-05-24 DIAGNOSIS — I10 ESSENTIAL HYPERTENSION: ICD-10-CM

## 2023-05-24 DIAGNOSIS — G47.00 INSOMNIA, UNSPECIFIED TYPE: ICD-10-CM

## 2023-05-24 DIAGNOSIS — R06.83 SNORING: Primary | ICD-10-CM

## 2023-05-24 DIAGNOSIS — E66.9 OBESITY (BMI 30-39.9): ICD-10-CM

## 2023-05-24 PROCEDURE — 3075F SYST BP GE 130 - 139MM HG: CPT | Performed by: OTOLARYNGOLOGY

## 2023-05-24 PROCEDURE — 1123F ACP DISCUSS/DSCN MKR DOCD: CPT | Performed by: OTOLARYNGOLOGY

## 2023-05-24 PROCEDURE — 99203 OFFICE O/P NEW LOW 30 MIN: CPT | Performed by: OTOLARYNGOLOGY

## 2023-05-24 PROCEDURE — 3079F DIAST BP 80-89 MM HG: CPT | Performed by: OTOLARYNGOLOGY

## 2023-05-24 ASSESSMENT — ENCOUNTER SYMPTOMS
COUGH: 0
SHORTNESS OF BREATH: 0
CHOKING: 0
STRIDOR: 0
WHEEZING: 0
CHEST TIGHTNESS: 0

## 2023-05-24 ASSESSMENT — PATIENT HEALTH QUESTIONNAIRE - PHQ9
SUM OF ALL RESPONSES TO PHQ9 QUESTIONS 1 & 2: 0
SUM OF ALL RESPONSES TO PHQ QUESTIONS 1-9: 0
2. FEELING DOWN, DEPRESSED OR HOPELESS: 0
1. LITTLE INTEREST OR PLEASURE IN DOING THINGS: 0
SUM OF ALL RESPONSES TO PHQ QUESTIONS 1-9: 0

## 2023-05-24 ASSESSMENT — SLEEP AND FATIGUE QUESTIONNAIRES
HOW LIKELY ARE YOU TO NOD OFF OR FALL ASLEEP WHILE SITTING AND TALKING TO SOMEONE: 0
HOW LIKELY ARE YOU TO NOD OFF OR FALL ASLEEP WHILE SITTING AND READING: 0
ESS TOTAL SCORE: 0
HOW LIKELY ARE YOU TO NOD OFF OR FALL ASLEEP WHEN YOU ARE A PASSENGER IN A CAR FOR AN HOUR WITHOUT A BREAK: 0
NECK CIRCUMFERENCE (INCHES): 15
HOW LIKELY ARE YOU TO NOD OFF OR FALL ASLEEP WHILE WATCHING TV: 0
HOW LIKELY ARE YOU TO NOD OFF OR FALL ASLEEP WHILE SITTING QUIETLY AFTER LUNCH WITHOUT ALCOHOL: 0
HOW LIKELY ARE YOU TO NOD OFF OR FALL ASLEEP IN A CAR, WHILE STOPPED FOR A FEW MINUTES IN TRAFFIC: 0
HOW LIKELY ARE YOU TO NOD OFF OR FALL ASLEEP WHILE LYING DOWN TO REST IN THE AFTERNOON WHEN CIRCUMSTANCES PERMIT: 0
HOW LIKELY ARE YOU TO NOD OFF OR FALL ASLEEP WHILE SITTING INACTIVE IN A PUBLIC PLACE: 0

## 2023-05-24 NOTE — PROGRESS NOTES
Osmel Dietrich presents today for   Chief Complaint   Patient presents with    Sleep Problem    Snoring    Fatigue    New Patient       Is someone accompanying this pt? no    Is the patient using any DME equipment during OV? no    -DME Company N/A    Have you ever had a sleep study done before? no    Depression Screening:  PHQ-9  5/24/2023   Depression Unable to Assess -   Little interest or pleasure in doing things 0   Little interest or pleasure in doing things -   Feeling down, depressed, or hopeless 0   Trouble falling or staying asleep, or sleeping too much -   Feeling tired or having little energy -   Poor appetite or overeating -   Feeling bad about yourself - or that you are a failure or have let yourself or your family down -   Trouble concentrating on things, such as reading the newspaper or watching television -   Moving or speaking so slowly that other people could have noticed. Or the opposite - being so fidgety or restless that you have been moving around a lot more than usual -   Thoughts that you would be better off dead, or of hurting yourself in some way -   PHQ-2 Score 0   Total Score PHQ 2 -   PHQ-9 Total Score 0   If you checked off any problems, how difficult have these problems made it for you to do your work, take care of things at home, or get along with other people?  -   How difficult have these problems made it for you to do your work, take care of your home and get along with others -        [place] Sleepiness Scale:  Sleep Medicine 5/24/2023   Sitting and reading 0   Watching TV 0   Sitting, inactive in a public place (e.g. a theatre or a meeting) 0   As a passenger in a car for an hour without a break 0   Lying down to rest in the afternoon when circumstances permit 0   Sitting and talking to someone 0   Sitting quietly after a lunch without alcohol 0   In a car, while stopped for a few minutes in traffic 0   [place] Sleepiness Score 0   Neck circumference (Inches) 15
lesions. Neurological: CN 2-12 grossly intact, normal muscle tone, no focal deficits        Data Reviewed:  CBC:   Lab Results   Component Value Date/Time    WBC 3.5 05/18/2023 12:00 AM    HGB 15.1 05/18/2023 12:00 AM    HCT 45.1 05/18/2023 12:00 AM     05/18/2023 12:00 AM    MCV 86 05/18/2023 12:00 AM       BMP:   Lab Results   Component Value Date/Time     05/18/2023 12:00 AM    K 4.8 05/18/2023 12:00 AM    CL 99 05/18/2023 12:00 AM    CO2 28 05/18/2023 12:00 AM    BUN 16 05/18/2023 12:00 AM    GFRAA >60 04/13/2022 11:17 AM        Historical Sleep Testing Data: None    Anthony Madera DO, LEXIE  Sleep Medicine     I spent 25 minutes with the patient getting his history, completing exam and discussing my assessment and plan. I spent an additional 15 minutes reviewing medical records, writing a comprehensive note as well as ordering tests. This note was dictated utilizing voice recognition software which may lead to typographical errors. I apologize in advance if the situation occurs. If questions arise please do not hesitate to contact me or call our department.

## 2023-05-24 NOTE — PATIENT INSTRUCTIONS
device     Safety is strongly encouraged. You should not drive if sleepy, tired, distracted and/or fatigued. Chest Xrays and blood work do not require appointments. They are considered \"Walk-In\" services and can be obtained at either [facility] or [facility].   Blood work is performed at the Laboratory (Lab) from 08:00am - 95:28 pm (if applicable to your visit)

## 2023-06-08 ENCOUNTER — OFFICE VISIT (OUTPATIENT)
Age: 68
End: 2023-06-08
Payer: MEDICARE

## 2023-06-08 VITALS
DIASTOLIC BLOOD PRESSURE: 93 MMHG | HEIGHT: 64 IN | OXYGEN SATURATION: 100 % | WEIGHT: 174 LBS | BODY MASS INDEX: 29.71 KG/M2 | SYSTOLIC BLOOD PRESSURE: 169 MMHG | RESPIRATION RATE: 16 BRPM | HEART RATE: 67 BPM

## 2023-06-08 DIAGNOSIS — D50.8 IRON DEFICIENCY ANEMIA SECONDARY TO INADEQUATE DIETARY IRON INTAKE: ICD-10-CM

## 2023-06-08 DIAGNOSIS — D50.9 IRON DEFICIENCY ANEMIA, UNSPECIFIED IRON DEFICIENCY ANEMIA TYPE: Primary | ICD-10-CM

## 2023-06-08 PROCEDURE — 99213 OFFICE O/P EST LOW 20 MIN: CPT | Performed by: NURSE PRACTITIONER

## 2023-06-08 PROCEDURE — 3077F SYST BP >= 140 MM HG: CPT | Performed by: NURSE PRACTITIONER

## 2023-06-08 PROCEDURE — 3080F DIAST BP >= 90 MM HG: CPT | Performed by: NURSE PRACTITIONER

## 2023-06-08 PROCEDURE — 1123F ACP DISCUSS/DSCN MKR DOCD: CPT | Performed by: NURSE PRACTITIONER

## 2023-06-08 PROCEDURE — 99214 OFFICE O/P EST MOD 30 MIN: CPT | Performed by: NURSE PRACTITIONER

## 2023-06-08 RX ORDER — PROMETHAZINE HYDROCHLORIDE 25 MG/1
25 TABLET ORAL EVERY 8 HOURS PRN
Qty: 30 TABLET | Refills: 0 | Status: SHIPPED | OUTPATIENT
Start: 2023-06-08 | End: 2023-06-18

## 2023-06-08 ASSESSMENT — ENCOUNTER SYMPTOMS
NAUSEA: 1
RESPIRATORY NEGATIVE: 1
ALLERGIC/IMMUNOLOGIC NEGATIVE: 1
EYES NEGATIVE: 1

## 2023-06-08 NOTE — PROGRESS NOTES
with was advised to keep appointment with GI as started in her ER discharge summary. She has also been advise to follow up with PCP. Patient has been advise try phenergan since she report Zofran is not working for her. Encourage hydration and advise to go the ER with any acute issues   --The patient was agreeable with the plan. -- We will see the patient back in about 4 months. Always sooner if required. No orders of the defined types were placed in this encounter. Lauren Gramajo DNP  6/8/2023      Please note: This document has been produced using voice recognition software. Unrecognized errors in transcription may be present.

## 2023-06-21 ENCOUNTER — TELEPHONE (OUTPATIENT)
Age: 68
End: 2023-06-21

## 2023-06-21 NOTE — TELEPHONE ENCOUNTER
Sasha Harrison, from [facility] called to let us know that pt's recent Hgb=16.6 as of 6/6/23, and CT scan from 6/6/23 showed new findings they wanted to make Dr. Lillian Henry aware. Please advise.

## 2023-06-27 ENCOUNTER — HOSPITAL ENCOUNTER (OUTPATIENT)
Facility: HOSPITAL | Age: 68
Discharge: HOME | End: 2023-06-30

## 2023-06-27 ENCOUNTER — OFFICE VISIT (OUTPATIENT)
Age: 68
End: 2023-06-27
Payer: MEDICARE

## 2023-06-27 VITALS
BODY MASS INDEX: 29.88 KG/M2 | WEIGHT: 175 LBS | HEART RATE: 94 BPM | HEIGHT: 64 IN | OXYGEN SATURATION: 99 % | DIASTOLIC BLOOD PRESSURE: 78 MMHG | RESPIRATION RATE: 16 BRPM | TEMPERATURE: 97 F | SYSTOLIC BLOOD PRESSURE: 120 MMHG

## 2023-06-27 DIAGNOSIS — R19.00 PELVIC MASS: Primary | ICD-10-CM

## 2023-06-27 DIAGNOSIS — R19.09 OTHER INTRA-ABDOMINAL AND PELVIC SWELLING, MASS AND LUMP: ICD-10-CM

## 2023-06-27 DIAGNOSIS — R11.0 NAUSEA: ICD-10-CM

## 2023-06-27 DIAGNOSIS — R19.00 PELVIC MASS: ICD-10-CM

## 2023-06-27 DIAGNOSIS — N83.8 OVARIAN MASS: ICD-10-CM

## 2023-06-27 LAB — LABCORP SPECIMEN COLLECTION: NORMAL

## 2023-06-27 PROCEDURE — 3078F DIAST BP <80 MM HG: CPT | Performed by: FAMILY MEDICINE

## 2023-06-27 PROCEDURE — 1123F ACP DISCUSS/DSCN MKR DOCD: CPT | Performed by: FAMILY MEDICINE

## 2023-06-27 PROCEDURE — 99214 OFFICE O/P EST MOD 30 MIN: CPT | Performed by: FAMILY MEDICINE

## 2023-06-27 PROCEDURE — 3074F SYST BP LT 130 MM HG: CPT | Performed by: FAMILY MEDICINE

## 2023-06-27 RX ORDER — ONDANSETRON 4 MG/1
TABLET, ORALLY DISINTEGRATING ORAL
COMMUNITY
Start: 2023-06-07

## 2023-06-28 LAB
BASOPHILS # BLD AUTO: 0 X10E3/UL (ref 0–0.2)
BASOPHILS NFR BLD AUTO: 1 %
BUN SERPL-MCNC: 11 MG/DL (ref 8–27)
BUN/CREAT SERPL: 15 (ref 12–28)
CALCIUM SERPL-MCNC: 10.5 MG/DL (ref 8.7–10.3)
CANCER AG125 SERPL-ACNC: 11.9 U/ML (ref 0–38.1)
CHLORIDE SERPL-SCNC: 102 MMOL/L (ref 96–106)
CO2 SERPL-SCNC: 24 MMOL/L (ref 20–29)
CREAT SERPL-MCNC: 0.75 MG/DL (ref 0.57–1)
EGFRCR SERPLBLD CKD-EPI 2021: 87 ML/MIN/1.73
EOSINOPHIL # BLD AUTO: 0 X10E3/UL (ref 0–0.4)
EOSINOPHIL NFR BLD AUTO: 1 %
ERYTHROCYTE [DISTWIDTH] IN BLOOD BY AUTOMATED COUNT: 13.4 % (ref 11.7–15.4)
GLUCOSE SERPL-MCNC: 93 MG/DL (ref 70–99)
HCT VFR BLD AUTO: 44.6 % (ref 34–46.6)
HGB BLD-MCNC: 15 G/DL (ref 11.1–15.9)
IMM GRANULOCYTES # BLD AUTO: 0 X10E3/UL (ref 0–0.1)
IMM GRANULOCYTES NFR BLD AUTO: 0 %
LYMPHOCYTES # BLD AUTO: 1.3 X10E3/UL (ref 0.7–3.1)
LYMPHOCYTES NFR BLD AUTO: 28 %
MCH RBC QN AUTO: 29.1 PG (ref 26.6–33)
MCHC RBC AUTO-ENTMCNC: 33.6 G/DL (ref 31.5–35.7)
MCV RBC AUTO: 87 FL (ref 79–97)
MONOCYTES # BLD AUTO: 0.5 X10E3/UL (ref 0.1–0.9)
MONOCYTES NFR BLD AUTO: 10 %
NEUTROPHILS # BLD AUTO: 2.7 X10E3/UL (ref 1.4–7)
NEUTROPHILS NFR BLD AUTO: 60 %
PLATELET # BLD AUTO: 182 X10E3/UL (ref 150–450)
POTASSIUM SERPL-SCNC: 4.6 MMOL/L (ref 3.5–5.2)
RBC # BLD AUTO: 5.15 X10E6/UL (ref 3.77–5.28)
SODIUM SERPL-SCNC: 141 MMOL/L (ref 134–144)
WBC # BLD AUTO: 4.5 X10E3/UL (ref 3.4–10.8)

## 2023-06-29 ENCOUNTER — CARE COORDINATION (OUTPATIENT)
Facility: CLINIC | Age: 68
End: 2023-06-29

## 2023-07-07 ENCOUNTER — CARE COORDINATION (OUTPATIENT)
Facility: CLINIC | Age: 68
End: 2023-07-07

## 2023-07-07 ENCOUNTER — TELEPHONE (OUTPATIENT)
Dept: SLEEP MEDICINE | Facility: HOSPITAL | Age: 68
End: 2023-07-07

## 2023-07-07 NOTE — CARE COORDINATION
Ambulatory Care Coordination Note  2023    Patient Current Location:  Home: [address] [place] [address]  [address] [place] [address]     ACM contacted the patient by telephone. Verified name and  with patient as identifiers. Provided introduction to self, and explanation of the ACM role. Challenges to be reviewed by the provider   Additional needs identified to be addressed with provider: No  none               Method of communication with provider: none. ACM: Erickson Gentile RN    Hx of Anemia, OA, Depression, HTN. Recent identification of pelvic mass. Ccm recommended by PCP  States feeling ok. Aware of upcoming appts. No other questions/issues at this time. Offered patient enrollment in the Remote Patient Monitoring (RPM) program for in-home monitoring: NA. Lab Results       None            Care Coordination Interventions    Referral from Primary Care Provider: Yes  Suggested Interventions and Community Resources          Goals Addressed                   This Visit's Progress     Conditions and Symptoms   On track     I will schedule office visits, as directed by my provider. I will keep my appointment or reschedule if I have to cancel. I will notify my provider of any barriers to my plan of care. I will notify my provider of any symptoms that indicate a worsening of my condition. Barriers: none  Plan for overcoming my barriers: N/A  Confidence: 10/10  Anticipated Goal Completion Date: 23         Medication Management   On track     I will take my medication as directed. I will notify my provider of any problems with medications, like adverse effects or side effects. I will notify my provider/Care Coordinator if I am unable to afford my medications. I will notify my provider for advice before I stop taking any of my medication.     Barriers: none  Plan for overcoming my barriers: N/A  Confidence: 10/10  Anticipated Goal Completion Date: 23       Reduce Risk of Hospitalization   On track

## 2023-07-10 ENCOUNTER — CARE COORDINATION (OUTPATIENT)
Facility: CLINIC | Age: 68
End: 2023-07-10

## 2023-07-10 ENCOUNTER — TELEPHONE (OUTPATIENT)
Age: 68
End: 2023-07-10

## 2023-07-10 NOTE — CARE COORDINATION
Ambulatory Care Coordination Note  7/10/2023    Patient Current Location:  Home: [address] [place] [address]  [address] [place]     ACM contacted the patient by telephone. Verified name and  with patient as identifiers. Provided introduction to self, and explanation of the ACM role. Challenges to be reviewed by the provider   Additional needs identified to be addressed with provider: Yes  medications-States having itch along torso. Previous medications are not helping. Method of communication with provider: phone. ACM: Kwadwo Espinoza RN    Hx of Anemia, OA, Depression, HTN. Recent identification of pelvic mass. Ccm recommended by PCP  States itch that has been bothering her. Previous medications not helping. Asking to see if there's anything else she can try. Notified office, they will send message to PCP  Aware of upcoming appts. No other questions/issues at this time. Offered patient enrollment in the Remote Patient Monitoring (RPM) program for in-home monitoring: NA. Lab Results       None            Care Coordination Interventions    Referral from Primary Care Provider: Yes  Suggested Interventions and Community Resources          Goals Addressed                   This Visit's Progress     Conditions and Symptoms   On track     I will schedule office visits, as directed by my provider. I will keep my appointment or reschedule if I have to cancel. I will notify my provider of any barriers to my plan of care. I will notify my provider of any symptoms that indicate a worsening of my condition. Barriers: none  Plan for overcoming my barriers: N/A  Confidence: 10/10  Anticipated Goal Completion Date: 23         Medication Management   On track     I will take my medication as directed. I will notify my provider of any problems with medications, like adverse effects or side effects. I will notify my provider/Care Coordinator if I am unable to afford my medications.   I will notify

## 2023-07-10 NOTE — TELEPHONE ENCOUNTER
Spoke with patient and she states that she has been itching from under her breast to pelvic area for sometime now. No rash, no redness. Patient states the itching is getting worse. Has tried Benadryl,zyrtec, allergra, Hydroxyzine. Been using the betamethasone valerate cream with no relief. Patient states that it has been keeping her up at night.  Please advise    Patient has appt with GYN 07/12/2023  [address]

## 2023-07-10 NOTE — TELEPHONE ENCOUNTER
Harshil Ng states that pt has tried several meds for itching and she would like to know if Dr Angela Lou would send in something else. She states that theses are not working. Please advise.

## 2023-07-11 ENCOUNTER — OFFICE VISIT (OUTPATIENT)
Age: 68
End: 2023-07-11
Payer: MEDICARE

## 2023-07-11 VITALS
SYSTOLIC BLOOD PRESSURE: 118 MMHG | OXYGEN SATURATION: 97 % | HEIGHT: 64 IN | DIASTOLIC BLOOD PRESSURE: 82 MMHG | BODY MASS INDEX: 30.05 KG/M2 | HEART RATE: 78 BPM | RESPIRATION RATE: 16 BRPM | WEIGHT: 176 LBS | TEMPERATURE: 98 F

## 2023-07-11 DIAGNOSIS — L29.9 PRURITUS: Primary | ICD-10-CM

## 2023-07-11 DIAGNOSIS — R19.00 PELVIC MASS: ICD-10-CM

## 2023-07-11 PROCEDURE — 3079F DIAST BP 80-89 MM HG: CPT | Performed by: FAMILY MEDICINE

## 2023-07-11 PROCEDURE — 1123F ACP DISCUSS/DSCN MKR DOCD: CPT | Performed by: FAMILY MEDICINE

## 2023-07-11 PROCEDURE — 99214 OFFICE O/P EST MOD 30 MIN: CPT | Performed by: FAMILY MEDICINE

## 2023-07-11 PROCEDURE — 3074F SYST BP LT 130 MM HG: CPT | Performed by: FAMILY MEDICINE

## 2023-07-11 RX ORDER — CYCLOBENZAPRINE HCL 10 MG
1 TABLET ORAL 3 TIMES DAILY PRN
COMMUNITY
Start: 2021-05-21 | End: 2023-07-11

## 2023-07-11 RX ORDER — PAROXETINE HYDROCHLORIDE 20 MG/1
20 TABLET, FILM COATED ORAL DAILY
Qty: 90 TABLET | Refills: 0 | Status: SHIPPED | OUTPATIENT
Start: 2023-07-11

## 2023-07-11 RX ORDER — HYDROXYZINE PAMOATE 25 MG/1
25 CAPSULE ORAL 3 TIMES DAILY PRN
Qty: 30 CAPSULE | Refills: 1 | Status: SHIPPED | OUTPATIENT
Start: 2023-07-11 | End: 2023-07-25

## 2023-07-11 RX ORDER — AMMONIUM LACTATE 12 G/100G
CREAM TOPICAL
Qty: 1 EACH | Refills: 4 | Status: SHIPPED | OUTPATIENT
Start: 2023-07-11 | End: 2023-07-12 | Stop reason: SDUPTHER

## 2023-07-11 NOTE — PROGRESS NOTES
Chief Complaint   Patient presents with    Other     Has concerns about constant itching on torso (had tried several medications and itching has not improved) has tried benadryl, allegra, zyrtec        1. \"Have you been to the ER, urgent care clinic since your last visit? Hospitalized since your last visit? \" No    2. \"Have you seen or consulted any other health care providers outside of the [address] [place] [address] since your last visit? \" No     3. For patients aged 43-73: Has the patient had a colonoscopy / FIT/ Cologuard? Yes - no Care Gap present due 02/05/2025      If the patient is female:    4. For patients aged 43-66: Has the patient had a mammogram within the past 2 years? Yes - no Care Gap present due 04/01/2024      5. For patients aged 21-65: Has the patient had a pap smear?  No hx of hysterectomy
tablet by mouth once daily      ondansetron (ZOFRAN-ODT) 4 MG disintegrating tablet DISSOLVE 1 TABLET IN MOUTH EVERY 8 HOURS AS NEEDED FOR NAUSEA AND VOMITING ALLOW TABLET TO DISSOLVE ON TONGUE      fexofenadine (ALLEGRA) 180 MG tablet Take 1 tablet by mouth daily (Patient not taking: Reported on 6/27/2023)      fluticasone (FLONASE) 50 MCG/ACT nasal spray instill 1 spray into each nostril once daily (Patient not taking: Reported on 5/24/2023)      pravastatin (PRAVACHOL) 20 MG tablet Take 1 tablet by mouth (Patient not taking: Reported on 6/27/2023)       No current facility-administered medications for this visit. No Known Allergies    Past Medical History:   Diagnosis Date    Anemia     Arthritis     Bulging lumbar disc     Degenerative arthritis of finger     MP joint right index finger    Depression     H/O colonoscopy 12/01/2014    recommended yearly hemoccult stools    Hypertension     Low back pain     Lower extremity pain, left     Pinched nerve        Social History     Socioeconomic History    Marital status:      Spouse name: Not on file    Number of children: Not on file    Years of education: Not on file    Highest education level: Not on file   Occupational History    Not on file   Tobacco Use    Smoking status: Never    Smokeless tobacco: Never   Vaping Use    Vaping Use: Never used   Substance and Sexual Activity    Alcohol use: No    Drug use: Never    Sexual activity: Yes     Partners: Male   Other Topics Concern    Not on file   Social History Narrative    Not on file     Social Determinants of Health     Financial Resource Strain: Low Risk     Difficulty of Paying Living Expenses: Not very hard   Food Insecurity: No Food Insecurity    Worried About Running Out of Food in the Last Year: Never true    Ran Out of Food in the Last Year: Never true   Transportation Needs: Unknown    Lack of Transportation (Medical): Not on file    Lack of Transportation (Non-Medical):  No   Physical

## 2023-07-12 RX ORDER — AMMONIUM LACTATE 12 G/100G
CREAM TOPICAL
Qty: 1 EACH | Refills: 4 | Status: SHIPPED | OUTPATIENT
Start: 2023-07-12 | End: 2023-08-11

## 2023-07-20 ENCOUNTER — CARE COORDINATION (OUTPATIENT)
Facility: CLINIC | Age: 68
End: 2023-07-20

## 2023-07-20 NOTE — CARE COORDINATION
Ambulatory Care Coordination Note  2023    Patient Current Location:  Home: [address] [place] [address]  [address] [place]     ACM contacted the patient by telephone. Verified name and  with patient as identifiers. Provided introduction to self, and explanation of the ACM role. Challenges to be reviewed by the provider   Additional needs identified to be addressed with provider: Yes  medications-States having itch along torso. Previous medications are not helping. Method of communication with provider: phone. ACM: Viki Mcmahan RN    Hx of Anemia, OA, Depression, HTN. Recent identification of pelvic mass. Ccm recommended by PCP  Reports has f/u w/ onc coming up next Tu to make some decisions regarding pelvic mass. States w/ new medications, itch has mostly resolved. Has f/u scheduled w/ Derm. No other questions/issues at this time. Offered patient enrollment in the Remote Patient Monitoring (RPM) program for in-home monitoring: NA. Lab Results       None            Care Coordination Interventions    Referral from Primary Care Provider: Yes  Suggested Interventions and Community Resources          Goals Addressed                   This Visit's Progress     Conditions and Symptoms   On track     I will schedule office visits, as directed by my provider. I will keep my appointment or reschedule if I have to cancel. I will notify my provider of any barriers to my plan of care. I will notify my provider of any symptoms that indicate a worsening of my condition. Barriers: none  Plan for overcoming my barriers: N/A  Confidence: 10/10  Anticipated Goal Completion Date: 23         Medication Management   On track     I will take my medication as directed. I will notify my provider of any problems with medications, like adverse effects or side effects. I will notify my provider/Care Coordinator if I am unable to afford my medications.   I will notify my provider for advice before

## 2023-07-24 NOTE — TELEPHONE ENCOUNTER
Requested Prescriptions     Pending Prescriptions Disp Refills    lisinopril-hydroCHLOROthiazide (PRINZIDE;ZESTORETIC) 20-25 MG per tablet [Pharmacy Med Name: Lisinopril-hydroCHLOROthiazide 20-25 MG Oral Tablet] 90 tablet 0     Sig: Take 1 tablet by mouth once daily     Last OV: 7/11/2023  Last labs: 6/27/2023  Next OV and labs: 9/27/2023

## 2023-07-25 RX ORDER — LISINOPRIL AND HYDROCHLOROTHIAZIDE 25; 20 MG/1; MG/1
TABLET ORAL
Qty: 90 TABLET | Refills: 1 | Status: SHIPPED | OUTPATIENT
Start: 2023-07-25

## 2023-07-27 ENCOUNTER — CARE COORDINATION (OUTPATIENT)
Facility: CLINIC | Age: 68
End: 2023-07-27

## 2023-08-02 ENCOUNTER — CARE COORDINATION (OUTPATIENT)
Facility: CLINIC | Age: 68
End: 2023-08-02

## 2023-08-02 NOTE — CARE COORDINATION
Ambulatory Care Coordination Note  2023    Patient Current Location:  Home: [address] [place] [address]  [address] [place] [address]     ACM contacted the patient by telephone. Verified name and  with patient as identifiers. Provided introduction to self, and explanation of the ACM role. Challenges to be reviewed by the provider   Additional needs identified to be addressed with provider: Yes  medications-States having itch along torso. Previous medications are not helping. Method of communication with provider: phone. ACM: Alfred Friedman RN    Hx of Anemia, OA, Depression, HTN. Recent identification of pelvic mass. Ccm recommended by PCP  Reports appt for onc surgery coming up. Aware of date and time and instructions. Doing ok otherwise  No other questions/issues at this time. Offered patient enrollment in the Remote Patient Monitoring (RPM) program for in-home monitoring: NA. Lab Results       None            Care Coordination Interventions    Referral from Primary Care Provider: Yes  Suggested Interventions and Community Resources          Goals Addressed                   This Visit's Progress     Conditions and Symptoms   On track     I will schedule office visits, as directed by my provider. I will keep my appointment or reschedule if I have to cancel. I will notify my provider of any barriers to my plan of care. I will notify my provider of any symptoms that indicate a worsening of my condition. Barriers: none  Plan for overcoming my barriers: N/A  Confidence: 10/10  Anticipated Goal Completion Date: 23         Medication Management   On track     I will take my medication as directed. I will notify my provider of any problems with medications, like adverse effects or side effects. I will notify my provider/Care Coordinator if I am unable to afford my medications. I will notify my provider for advice before I stop taking any of my medication.     Barriers: none  Plan for

## 2023-08-08 ENCOUNTER — CARE COORDINATION (OUTPATIENT)
Facility: CLINIC | Age: 68
End: 2023-08-08

## 2023-08-28 ENCOUNTER — CARE COORDINATION (OUTPATIENT)
Facility: CLINIC | Age: 68
End: 2023-08-28

## 2023-09-11 ENCOUNTER — CARE COORDINATION (OUTPATIENT)
Facility: CLINIC | Age: 68
End: 2023-09-11

## 2023-09-21 ENCOUNTER — TELEPHONE (OUTPATIENT)
Age: 68
End: 2023-09-21

## 2023-09-21 NOTE — TELEPHONE ENCOUNTER
Attempted to contact patient regarding upcoming appointment and last insurance on file being [employer]. Wanted to advise patient insurance is no longer accepted. No answer from patient.

## 2023-09-29 ENCOUNTER — CARE COORDINATION (OUTPATIENT)
Facility: CLINIC | Age: 68
End: 2023-09-29

## 2023-09-29 NOTE — CARE COORDINATION
Patient has graduated from the Complex Care program on 9/29/23. Patient/family has the ability to self-manage at this time. Care management goals have been completed. No further Ambulatory Care Manager follow up scheduled. Goals Addressed                   This Visit's Progress     COMPLETED: Conditions and Symptoms        I will schedule office visits, as directed by my provider. I will keep my appointment or reschedule if I have to cancel. I will notify my provider of any barriers to my plan of care. I will notify my provider of any symptoms that indicate a worsening of my condition. Barriers: none  Plan for overcoming my barriers: N/A  Confidence: 10/10  Anticipated Goal Completion Date: 9/29/23         COMPLETED: Medication Management        I will take my medication as directed. I will notify my provider of any problems with medications, like adverse effects or side effects. I will notify my provider/Care Coordinator if I am unable to afford my medications. I will notify my provider for advice before I stop taking any of my medication. Barriers: none  Plan for overcoming my barriers: N/A  Confidence: 10/10  Anticipated Goal Completion Date: 9/29/23       COMPLETED: Reduce Risk of Hospitalization        I will take appropriate steps to reduce my risk of Hospitalization to include: Take all of medications as prescribed  Visit w/ all of my recommended specialists. Visit w/ my PCP as recommended. Avoid activities that can trigger my chronic conditions. Barriers: none  Plan for overcoming my barriers: N/A  Confidence: 10/10  Anticipated Goal Completion Date: 9/29/23                Patient has Ambulatory Care Manager's contact information for any further questions, concerns, or needs. Patients upcoming visits:  No future appointments.

## 2023-12-07 ENCOUNTER — TELEPHONE (OUTPATIENT)
Age: 68
End: 2023-12-07

## 2023-12-07 RX ORDER — PAROXETINE HYDROCHLORIDE 20 MG/1
20 TABLET, FILM COATED ORAL DAILY
Qty: 90 TABLET | Refills: 0 | Status: SHIPPED | OUTPATIENT
Start: 2023-12-07

## 2023-12-07 NOTE — TELEPHONE ENCOUNTER
This patient contacted office for the following prescriptions to be filled:    Medication requested : PARoxetine (PAXIL) 20 MG tablet   Qty 90  PCP: [address] or Print: [facility]  Mail order or [address]    Scheduled appointment if not seen by current providers in office: LOV 7/11/2023 JOAQUINA 1/31/2024

## 2024-01-26 NOTE — PROGRESS NOTES
\"Have you been to the ER, urgent care clinic since your last visit?  Hospitalized since your last visit?\"    YES - When: approximately 4 months ago.  Where and Why: [facility] 8/14/2023-8/17/2023 for Oophorectomy.    “Have you seen or consulted any other health care providers outside of [facility] since your last visit?”    NO

## 2024-01-31 ENCOUNTER — OFFICE VISIT (OUTPATIENT)
Age: 69
End: 2024-01-31
Payer: MEDICARE

## 2024-01-31 VITALS
WEIGHT: 186 LBS | HEIGHT: 64 IN | HEART RATE: 78 BPM | TEMPERATURE: 98 F | BODY MASS INDEX: 31.76 KG/M2 | DIASTOLIC BLOOD PRESSURE: 94 MMHG | RESPIRATION RATE: 16 BRPM | OXYGEN SATURATION: 98 % | SYSTOLIC BLOOD PRESSURE: 146 MMHG

## 2024-01-31 DIAGNOSIS — I10 ESSENTIAL HYPERTENSION: ICD-10-CM

## 2024-01-31 DIAGNOSIS — L30.4 INTERTRIGO: ICD-10-CM

## 2024-01-31 DIAGNOSIS — M85.80 OSTEOPENIA, UNSPECIFIED LOCATION: ICD-10-CM

## 2024-01-31 DIAGNOSIS — Z78.0 POSTMENOPAUSAL: ICD-10-CM

## 2024-01-31 DIAGNOSIS — R23.2 HOT FLASHES: ICD-10-CM

## 2024-01-31 DIAGNOSIS — R73.01 IFG (IMPAIRED FASTING GLUCOSE): ICD-10-CM

## 2024-01-31 DIAGNOSIS — F33.9 RECURRENT DEPRESSION (HCC): ICD-10-CM

## 2024-01-31 DIAGNOSIS — R09.82 POST-NASAL DRIP: ICD-10-CM

## 2024-01-31 DIAGNOSIS — R73.01 IFG (IMPAIRED FASTING GLUCOSE): Primary | ICD-10-CM

## 2024-01-31 PROCEDURE — 3080F DIAST BP >= 90 MM HG: CPT | Performed by: FAMILY MEDICINE

## 2024-01-31 PROCEDURE — 99214 OFFICE O/P EST MOD 30 MIN: CPT | Performed by: FAMILY MEDICINE

## 2024-01-31 PROCEDURE — 1123F ACP DISCUSS/DSCN MKR DOCD: CPT | Performed by: FAMILY MEDICINE

## 2024-01-31 PROCEDURE — 3077F SYST BP >= 140 MM HG: CPT | Performed by: FAMILY MEDICINE

## 2024-01-31 RX ORDER — LISINOPRIL AND HYDROCHLOROTHIAZIDE 25; 20 MG/1; MG/1
1 TABLET ORAL DAILY
Qty: 90 TABLET | Refills: 0 | Status: SHIPPED | OUTPATIENT
Start: 2024-01-31

## 2024-01-31 RX ORDER — FEXOFENADINE HCL 180 MG/1
180 TABLET ORAL DAILY
Qty: 90 TABLET | Refills: 0 | Status: SHIPPED | OUTPATIENT
Start: 2024-01-31

## 2024-01-31 RX ORDER — CLOTRIMAZOLE AND BETAMETHASONE DIPROPIONATE 10; .64 MG/G; MG/G
CREAM TOPICAL
Qty: 1 EACH | Refills: 0 | Status: SHIPPED | OUTPATIENT
Start: 2024-01-31

## 2024-01-31 RX ORDER — VENLAFAXINE HYDROCHLORIDE 75 MG/1
75 CAPSULE, EXTENDED RELEASE ORAL DAILY
Qty: 30 CAPSULE | Refills: 1 | Status: SHIPPED | OUTPATIENT
Start: 2024-01-31

## 2024-01-31 RX ORDER — VENLAFAXINE HYDROCHLORIDE 75 MG/1
150 CAPSULE, EXTENDED RELEASE ORAL DAILY
Qty: 30 CAPSULE | Refills: 1 | Status: SHIPPED | OUTPATIENT
Start: 2024-01-31 | End: 2024-01-31 | Stop reason: CLARIF

## 2024-01-31 RX ORDER — FLUTICASONE PROPIONATE 50 MCG
2 SPRAY, SUSPENSION (ML) NASAL DAILY
Qty: 16 G | Refills: 2 | Status: SHIPPED | OUTPATIENT
Start: 2024-01-31

## 2024-01-31 RX ORDER — PAROXETINE HYDROCHLORIDE 20 MG/1
20 TABLET, FILM COATED ORAL DAILY
Qty: 90 TABLET | Refills: 0 | Status: SHIPPED | OUTPATIENT
Start: 2024-01-31 | End: 2024-01-31 | Stop reason: ALTCHOICE

## 2024-01-31 ASSESSMENT — PATIENT HEALTH QUESTIONNAIRE - PHQ9
SUM OF ALL RESPONSES TO PHQ QUESTIONS 1-9: 0
1. LITTLE INTEREST OR PLEASURE IN DOING THINGS: 0
SUM OF ALL RESPONSES TO PHQ9 QUESTIONS 1 & 2: 0
SUM OF ALL RESPONSES TO PHQ QUESTIONS 1-9: 0
2. FEELING DOWN, DEPRESSED OR HOPELESS: 0

## 2024-01-31 NOTE — PROGRESS NOTES
Jessicamady Bonilla, 67 y.o.,  female    SUBJECTIVE  Ff-up     HTN- taking prinzide without problems. She also has prediabetes    HL- says came off of pravachol, unclear reason     Depression/hot flashes- says increase in hot flashes, taking paxil, interested in changing. .       Allergic rhinitis- reports congestion and dry cough, has not been using flonase/allegra    Iron def anemia- hx of, plan was for EGD/colonoscopy however interrupted due to pelvic mass finding, had TAHBSO 8/2023 benign cystadenoma. Denies any abdominal pain, melena.     Spinal stenosis- says came off of lyrica, without bothersome pain.    ROS:  See HPI, all others negative        Patient Active Problem List   Diagnosis Code    Essential hypertension I10    Recurrent depression (HCC) F33.9    Lumbar radiculopathy M54.16    Herpes genitalis in women A60.09    Allergic rhinitis due to allergen J30.9    Hot flashes R23.2    Advance directive discussed with patient Z71.89    Facet arthropathy M47.819    Lumbar stenosis M48.061    Advance care planning Z71.89    Obesity E66.9    Mild single current episode of major depressive disorder (HCC) F32.0    IFG (impaired fasting glucose) R73.01    Osteopenia M85.80       Current Outpatient Medications:     lisinopril-hydroCHLOROthiazide (PRINZIDE;ZESTORETIC) 20-25 MG per tablet, Take 1 tablet by mouth daily, Disp: 90 tablet, Rfl: 0    venlafaxine (EFFEXOR XR) 75 MG extended release capsule, Take 2 capsules by mouth daily, Disp: 30 capsule, Rfl: 1    fexofenadine (ALLEGRA) 180 MG tablet, Take 1 tablet by mouth daily, Disp: 90 tablet, Rfl: 0    fluticasone (FLONASE) 50 MCG/ACT nasal spray, 2 sprays by Each Nostril route daily, Disp: 16 g, Rfl: 2    clotrimazole-betamethasone (LOTRISONE) 1-0.05 % cream, Apply topically 2 times daily., Disp: 1 each, Rfl: 0    vitamin D 25 MCG (1000 UT) CAPS, Take 1 capsule by mouth daily, Disp: , Rfl:     pravastatin (PRAVACHOL) 20 MG tablet, Take 1 tablet by mouth (Patient

## 2024-02-02 ENCOUNTER — TELEPHONE (OUTPATIENT)
Age: 69
End: 2024-02-02

## 2024-02-02 NOTE — TELEPHONE ENCOUNTER
[facility] is requesting a prior auth for RX fexofenadine (ALLEGRA) 180 MG tablet . Visit [employer]/priorauthportal and enter TRX code [id number]. Thank you

## 2024-02-08 ENCOUNTER — HOSPITAL ENCOUNTER (OUTPATIENT)
Facility: HOSPITAL | Age: 69
Discharge: HOME | End: 2024-02-08
Attending: FAMILY MEDICINE
Payer: MEDICARE

## 2024-02-08 DIAGNOSIS — Z78.0 POSTMENOPAUSAL: ICD-10-CM

## 2024-02-08 PROCEDURE — 77080 DXA BONE DENSITY AXIAL: CPT

## 2024-02-19 ENCOUNTER — HOSPITAL ENCOUNTER (OUTPATIENT)
Facility: HOSPITAL | Age: 69
Discharge: HOME | End: 2024-02-22

## 2024-02-19 LAB — LABCORP SPECIMEN COLLECTION: NORMAL

## 2024-02-20 LAB
ALBUMIN SERPL-MCNC: 4.1 G/DL (ref 3.9–4.9)
ALBUMIN/GLOB SERPL: 1.3 {RATIO} (ref 1.2–2.2)
ALP SERPL-CCNC: 75 IU/L (ref 44–121)
ALT SERPL-CCNC: 13 IU/L (ref 0–32)
AST SERPL-CCNC: 18 IU/L (ref 0–40)
BASOPHILS # BLD AUTO: 0 X10E3/UL (ref 0–0.2)
BASOPHILS NFR BLD AUTO: 1 %
BILIRUB SERPL-MCNC: 0.8 MG/DL (ref 0–1.2)
BUN SERPL-MCNC: 16 MG/DL (ref 8–27)
BUN/CREAT SERPL: 22 (ref 12–28)
CALCIUM SERPL-MCNC: 10 MG/DL (ref 8.7–10.3)
CHLORIDE SERPL-SCNC: 102 MMOL/L (ref 96–106)
CHOLEST SERPL-MCNC: 195 MG/DL (ref 100–199)
CO2 SERPL-SCNC: 25 MMOL/L (ref 20–29)
CREAT SERPL-MCNC: 0.73 MG/DL (ref 0.57–1)
EGFRCR SERPLBLD CKD-EPI 2021: 90 ML/MIN/1.73
EOSINOPHIL # BLD AUTO: 0.1 X10E3/UL (ref 0–0.4)
EOSINOPHIL NFR BLD AUTO: 2 %
ERYTHROCYTE [DISTWIDTH] IN BLOOD BY AUTOMATED COUNT: 12.5 % (ref 11.7–15.4)
GLOBULIN SER CALC-MCNC: 3.1 G/DL (ref 1.5–4.5)
GLUCOSE SERPL-MCNC: 91 MG/DL (ref 70–99)
HBA1C MFR BLD: 6.1 % (ref 4.8–5.6)
HCT VFR BLD AUTO: 44.7 % (ref 34–46.6)
HDLC SERPL-MCNC: 73 MG/DL
HGB BLD-MCNC: 15.2 G/DL (ref 11.1–15.9)
IMM GRANULOCYTES # BLD AUTO: 0 X10E3/UL (ref 0–0.1)
IMM GRANULOCYTES NFR BLD AUTO: 0 %
LDLC SERPL CALC-MCNC: 110 MG/DL (ref 0–99)
LYMPHOCYTES # BLD AUTO: 1.1 X10E3/UL (ref 0.7–3.1)
LYMPHOCYTES NFR BLD AUTO: 30 %
MCH RBC QN AUTO: 29.4 PG (ref 26.6–33)
MCHC RBC AUTO-ENTMCNC: 34 G/DL (ref 31.5–35.7)
MCV RBC AUTO: 87 FL (ref 79–97)
MONOCYTES # BLD AUTO: 0.3 X10E3/UL (ref 0.1–0.9)
MONOCYTES NFR BLD AUTO: 9 %
NEUTROPHILS # BLD AUTO: 2.1 X10E3/UL (ref 1.4–7)
NEUTROPHILS NFR BLD AUTO: 58 %
PLATELET # BLD AUTO: 215 X10E3/UL (ref 150–450)
POTASSIUM SERPL-SCNC: 4.2 MMOL/L (ref 3.5–5.2)
PROT SERPL-MCNC: 7.2 G/DL (ref 6–8.5)
RBC # BLD AUTO: 5.17 X10E6/UL (ref 3.77–5.28)
SODIUM SERPL-SCNC: 144 MMOL/L (ref 134–144)
TRIGL SERPL-MCNC: 67 MG/DL (ref 0–149)
VLDLC SERPL CALC-MCNC: 12 MG/DL (ref 5–40)
WBC # BLD AUTO: 3.6 X10E3/UL (ref 3.4–10.8)

## 2024-03-15 ENCOUNTER — TRANSCRIBE ORDERS (OUTPATIENT)
Facility: HOSPITAL | Age: 69
End: 2024-03-15

## 2024-03-15 DIAGNOSIS — Z12.31 SCREENING MAMMOGRAM FOR HIGH-RISK PATIENT: Primary | ICD-10-CM

## 2024-03-19 ENCOUNTER — OFFICE VISIT (OUTPATIENT)
Age: 69
End: 2024-03-19
Payer: MEDICARE

## 2024-03-19 VITALS
HEIGHT: 64 IN | WEIGHT: 186 LBS | SYSTOLIC BLOOD PRESSURE: 148 MMHG | RESPIRATION RATE: 18 BRPM | BODY MASS INDEX: 31.76 KG/M2 | DIASTOLIC BLOOD PRESSURE: 88 MMHG | TEMPERATURE: 98.3 F | HEART RATE: 76 BPM | OXYGEN SATURATION: 97 %

## 2024-03-19 DIAGNOSIS — Z12.31 BREAST CANCER SCREENING BY MAMMOGRAM: ICD-10-CM

## 2024-03-19 DIAGNOSIS — R09.82 POST-NASAL DRIP: ICD-10-CM

## 2024-03-19 DIAGNOSIS — Z01.419 ENCOUNTER FOR ANNUAL ROUTINE GYNECOLOGICAL EXAMINATION: ICD-10-CM

## 2024-03-19 DIAGNOSIS — R23.2 HOT FLASHES: ICD-10-CM

## 2024-03-19 DIAGNOSIS — M79.18 MYOFASCIAL PAIN ON LEFT SIDE: ICD-10-CM

## 2024-03-19 DIAGNOSIS — Z00.00 MEDICARE ANNUAL WELLNESS VISIT, SUBSEQUENT: Primary | ICD-10-CM

## 2024-03-19 DIAGNOSIS — I10 ESSENTIAL HYPERTENSION: ICD-10-CM

## 2024-03-19 DIAGNOSIS — M85.80 OSTEOPENIA, UNSPECIFIED LOCATION: ICD-10-CM

## 2024-03-19 PROCEDURE — 3077F SYST BP >= 140 MM HG: CPT | Performed by: FAMILY MEDICINE

## 2024-03-19 PROCEDURE — 99214 OFFICE O/P EST MOD 30 MIN: CPT | Performed by: FAMILY MEDICINE

## 2024-03-19 PROCEDURE — G0439 PPPS, SUBSEQ VISIT: HCPCS | Performed by: FAMILY MEDICINE

## 2024-03-19 PROCEDURE — 1123F ACP DISCUSS/DSCN MKR DOCD: CPT | Performed by: FAMILY MEDICINE

## 2024-03-19 PROCEDURE — 3079F DIAST BP 80-89 MM HG: CPT | Performed by: FAMILY MEDICINE

## 2024-03-19 RX ORDER — AMLODIPINE BESYLATE 5 MG/1
5 TABLET ORAL DAILY
Qty: 90 TABLET | Refills: 0 | Status: SHIPPED | OUTPATIENT
Start: 2024-03-19

## 2024-03-19 RX ORDER — PRAVASTATIN SODIUM 20 MG
20 TABLET ORAL
Qty: 90 TABLET | Refills: 0 | Status: SHIPPED | OUTPATIENT
Start: 2024-03-19

## 2024-03-19 RX ORDER — BACLOFEN 10 MG/1
10 TABLET ORAL 3 TIMES DAILY
Qty: 30 TABLET | Refills: 0 | Status: SHIPPED | OUTPATIENT
Start: 2024-03-19

## 2024-03-19 RX ORDER — VENLAFAXINE HYDROCHLORIDE 75 MG/1
75 CAPSULE, EXTENDED RELEASE ORAL DAILY
Qty: 30 CAPSULE | Refills: 1 | Status: SHIPPED | OUTPATIENT
Start: 2024-03-19

## 2024-03-19 ASSESSMENT — PATIENT HEALTH QUESTIONNAIRE - PHQ9
1. LITTLE INTEREST OR PLEASURE IN DOING THINGS: NOT AT ALL
6. FEELING BAD ABOUT YOURSELF - OR THAT YOU ARE A FAILURE OR HAVE LET YOURSELF OR YOUR FAMILY DOWN: NOT AT ALL
SUM OF ALL RESPONSES TO PHQ QUESTIONS 1-9: 0
2. FEELING DOWN, DEPRESSED OR HOPELESS: NOT AT ALL
SUM OF ALL RESPONSES TO PHQ9 QUESTIONS 1 & 2: 0
3. TROUBLE FALLING OR STAYING ASLEEP: NOT AT ALL
4. FEELING TIRED OR HAVING LITTLE ENERGY: NOT AT ALL
9. THOUGHTS THAT YOU WOULD BE BETTER OFF DEAD, OR OF HURTING YOURSELF: NOT AT ALL
10. IF YOU CHECKED OFF ANY PROBLEMS, HOW DIFFICULT HAVE THESE PROBLEMS MADE IT FOR YOU TO DO YOUR WORK, TAKE CARE OF THINGS AT HOME, OR GET ALONG WITH OTHER PEOPLE: NOT DIFFICULT AT ALL
7. TROUBLE CONCENTRATING ON THINGS, SUCH AS READING THE NEWSPAPER OR WATCHING TELEVISION: NOT AT ALL
5. POOR APPETITE OR OVEREATING: NOT AT ALL
SUM OF ALL RESPONSES TO PHQ QUESTIONS 1-9: 0
8. MOVING OR SPEAKING SO SLOWLY THAT OTHER PEOPLE COULD HAVE NOTICED. OR THE OPPOSITE, BEING SO FIGETY OR RESTLESS THAT YOU HAVE BEEN MOVING AROUND A LOT MORE THAN USUAL: NOT AT ALL

## 2024-03-19 ASSESSMENT — LIFESTYLE VARIABLES
HOW MANY STANDARD DRINKS CONTAINING ALCOHOL DO YOU HAVE ON A TYPICAL DAY: PATIENT DOES NOT DRINK
HOW OFTEN DO YOU HAVE A DRINK CONTAINING ALCOHOL: NEVER

## 2024-03-19 NOTE — PATIENT INSTRUCTIONS
closely for changes in your health, and be sure to contact your doctor if you have any problems.  Where can you learn more?  Go to https://www.[employer].net/patientEd and enter F075 to learn more about \"A Healthy Heart: Care Instructions.\"  Current as of: June 24, 2023               Content Version: 14.0  © 5277-9727 [employer].   Care instructions adapted under license by [employer]. If you have questions about a medical condition or this instruction, always ask your healthcare professional. [employer] disclaims any warranty or liability for your use of this information.      Personalized Preventive Plan for Jessica Bonilla - 3/19/2024  Medicare offers a range of preventive health benefits. Some of the tests and screenings are paid in full while other may be subject to a deductible, co-insurance, and/or copay.    Some of these benefits include a comprehensive review of your medical history including lifestyle, illnesses that may run in your family, and various assessments and screenings as appropriate.    After reviewing your medical record and screening and assessments performed today your provider may have ordered immunizations, labs, imaging, and/or referrals for you.  A list of these orders (if applicable) as well as your Preventive Care list are included within your After Visit Summary for your review.    Other Preventive Recommendations:    A preventive eye exam performed by an eye specialist is recommended every 1-2 years to screen for glaucoma; cataracts, macular degeneration, and other eye disorders.  A preventive dental visit is recommended every 6 months.  Try to get at least 150 minutes of exercise per week or 10,000 steps per day on a pedometer .  Order or download the FREE \"Exercise & Physical Activity: Your Everyday Guide\" from [employer]. Call 1-381.413.3039 or search [employer] online.  You need 8211-0636 mg of calcium and

## 2024-03-19 NOTE — PROGRESS NOTES
\"Have you been to the ER, urgent care clinic since your last visit?  Hospitalized since your last visit?\"    NO    “Have you seen or consulted any other health care providers outside of [facility] since your last visit?”    NO    The patient reports needing a referral to GYN and medication concerns            Click Here for Release of Records Request  
noted  Abdomen: normoactive bowel sounds, soft, non-tender  Extremities no edema  Skin+ macular rash under breasts L>R  Psych:  mood and affect normal    CMP:   Lab Results   Component Value Date/Time     02/19/2024 12:00 AM    K 4.2 02/19/2024 12:00 AM     02/19/2024 12:00 AM    CO2 25 02/19/2024 12:00 AM    BUN 16 02/19/2024 12:00 AM    CREATININE 0.73 02/19/2024 12:00 AM    GLUCOSE 91 02/19/2024 12:00 AM    CALCIUM 10.0 02/19/2024 12:00 AM    PROT 7.2 02/19/2024 12:00 AM    LABALBU 4.1 02/19/2024 12:00 AM    BILITOT 0.8 02/19/2024 12:00 AM    AST 18 02/19/2024 12:00 AM    ALT 13 02/19/2024 12:00 AM        CBC:   Lab Results   Component Value Date/Time    WBC 3.6 02/19/2024 12:00 AM    RBC 5.17 02/19/2024 12:00 AM    HGB 15.2 02/19/2024 12:00 AM    HCT 44.7 02/19/2024 12:00 AM    MCV 87 02/19/2024 12:00 AM    MCH 29.4 02/19/2024 12:00 AM    MCHC 34.0 02/19/2024 12:00 AM    RDW 12.5 02/19/2024 12:00 AM     02/19/2024 12:00 AM    MPV 11.0 08/07/2021 11:48 AM        Lipids   Lab Results   Component Value Date/Time    CHOL 195 02/19/2024 12:00 AM    CHOL 139 08/10/2022 12:00 AM    TRIG 67 02/19/2024 12:00 AM    HDL 73 02/19/2024 12:00 AM    LDLCALC 110 02/19/2024 12:00 AM    CHOLHDLRATIO 2.1 08/07/2021 11:48 AM         Imaging results last 24 hrs :No results found.    Imaging results impression onlyNo results found.   ARNULFO DIGITAL SCREEN W OR WO CAD BILATERAL    (Results Pending)       A1c:   Hemoglobin A1C   Date Value Ref Range Status   02/19/2024 6.1 (H) 4.8 - 5.6 % Final     Comment:                 Prediabetes: 5.7 - 6.4           Diabetes: >6.4           Glycemic control for adults with diabetes: <7.0     05/18/2023 6.3 (H) 4.8 - 5.6 % Final     Comment:              Prediabetes: 5.7 - 6.4           Diabetes: >6.4           Glycemic control for adults with diabetes: <7.0     04/13/2022 5.7 (H) 4.2 - 5.6 % Final     Comment:     (NOTE)  HbA1C Interpretive Ranges  <5.7              Normal  5.7 -

## 2024-04-02 ENCOUNTER — HOSPITAL ENCOUNTER (OUTPATIENT)
Facility: HOSPITAL | Age: 69
Discharge: HOME | End: 2024-04-05
Attending: FAMILY MEDICINE
Payer: MEDICARE

## 2024-04-02 VITALS — WEIGHT: 186 LBS | BODY MASS INDEX: 31.76 KG/M2 | HEIGHT: 64 IN

## 2024-04-02 DIAGNOSIS — Z12.31 SCREENING MAMMOGRAM FOR HIGH-RISK PATIENT: ICD-10-CM

## 2024-04-02 PROCEDURE — 77063 BREAST TOMOSYNTHESIS BI: CPT

## 2024-04-16 ENCOUNTER — OFFICE VISIT (OUTPATIENT)
Age: 69
End: 2024-04-16
Payer: MEDICARE

## 2024-04-16 VITALS
DIASTOLIC BLOOD PRESSURE: 80 MMHG | OXYGEN SATURATION: 97 % | WEIGHT: 183 LBS | BODY MASS INDEX: 31.24 KG/M2 | SYSTOLIC BLOOD PRESSURE: 136 MMHG | HEART RATE: 84 BPM | TEMPERATURE: 98.2 F | HEIGHT: 64 IN | RESPIRATION RATE: 16 BRPM

## 2024-04-16 DIAGNOSIS — J01.00 ACUTE NON-RECURRENT MAXILLARY SINUSITIS: ICD-10-CM

## 2024-04-16 DIAGNOSIS — J30.9 ALLERGIC RHINITIS, UNSPECIFIED SEASONALITY, UNSPECIFIED TRIGGER: ICD-10-CM

## 2024-04-16 DIAGNOSIS — I10 ESSENTIAL HYPERTENSION: Primary | ICD-10-CM

## 2024-04-16 DIAGNOSIS — F32.0 MILD SINGLE CURRENT EPISODE OF MAJOR DEPRESSIVE DISORDER (HCC): ICD-10-CM

## 2024-04-16 DIAGNOSIS — R23.2 HOT FLASHES: ICD-10-CM

## 2024-04-16 DIAGNOSIS — R73.01 IFG (IMPAIRED FASTING GLUCOSE): ICD-10-CM

## 2024-04-16 PROCEDURE — 3079F DIAST BP 80-89 MM HG: CPT | Performed by: FAMILY MEDICINE

## 2024-04-16 PROCEDURE — 1123F ACP DISCUSS/DSCN MKR DOCD: CPT | Performed by: FAMILY MEDICINE

## 2024-04-16 PROCEDURE — 3075F SYST BP GE 130 - 139MM HG: CPT | Performed by: FAMILY MEDICINE

## 2024-04-16 PROCEDURE — 99214 OFFICE O/P EST MOD 30 MIN: CPT | Performed by: FAMILY MEDICINE

## 2024-04-16 RX ORDER — VENLAFAXINE 75 MG/1
75 TABLET ORAL 3 TIMES DAILY
Qty: 90 TABLET | Refills: 1 | Status: SHIPPED | OUTPATIENT
Start: 2024-04-16

## 2024-04-16 RX ORDER — AMOXICILLIN AND CLAVULANATE POTASSIUM 875; 125 MG/1; MG/1
1 TABLET, FILM COATED ORAL 2 TIMES DAILY
Qty: 20 TABLET | Refills: 0 | Status: SHIPPED | OUTPATIENT
Start: 2024-04-16 | End: 2024-04-26

## 2024-04-16 RX ORDER — DOXEPIN HYDROCHLORIDE 10 MG/1
10 CAPSULE ORAL NIGHTLY PRN
Qty: 90 CAPSULE | Refills: 0 | Status: SHIPPED | OUTPATIENT
Start: 2024-04-16

## 2024-04-16 SDOH — ECONOMIC STABILITY: FOOD INSECURITY: WITHIN THE PAST 12 MONTHS, THE FOOD YOU BOUGHT JUST DIDN'T LAST AND YOU DIDN'T HAVE MONEY TO GET MORE.: NEVER TRUE

## 2024-04-16 SDOH — ECONOMIC STABILITY: FOOD INSECURITY: WITHIN THE PAST 12 MONTHS, YOU WORRIED THAT YOUR FOOD WOULD RUN OUT BEFORE YOU GOT MONEY TO BUY MORE.: NEVER TRUE

## 2024-04-16 SDOH — ECONOMIC STABILITY: INCOME INSECURITY: HOW HARD IS IT FOR YOU TO PAY FOR THE VERY BASICS LIKE FOOD, HOUSING, MEDICAL CARE, AND HEATING?: NOT HARD AT ALL

## 2024-04-16 NOTE — PROGRESS NOTES
\"Have you been to the ER, urgent care clinic since your last visit?  Hospitalized since your last visit?\"    NO    “Have you seen or consulted any other health care providers outside of [facility] since your last visit?”    NO            Click Here for Release of Records Request

## 2024-04-16 NOTE — PROGRESS NOTES
Jessica Bonilla, 68 y.o.,  female    SUBJECTIVE  Ff-up     HTN-added norvasc on last visit, continues to be  taking prinzide without problems. BP log reviewed 120-130's. She also has prediabetes    HL- taking pravachol    Depression/hot flashes- says responding fairly to effexor 75 mg capsule once daily, better than paxil, however insurance will no longer cover capsule form, will need tablets    Insomnia/pruritus- Also requesting refill of doxepin, she says prescribed by her dermatologist for itching, she takes nightly and helps her sleep.    Allergic rhinitis- responding to  flonase/allegra. However past 2 months, increase in sinus pressure, thick nasal discharge. No fever, sob, cough.     Iron def anemia- hx of, plan was for EGD/colonoscopy however interrupted due to pelvic mass finding, had TAHBSO 8/2023 benign cystadenoma. Denies any abdominal pain, melena. She is requesting gyne referral for routine gyne care. No longer with anemia    Spinal stenosis- says came off of lyrica, without bothersome pain.      ROS:  See HPI, all others negative        Patient Active Problem List   Diagnosis Code    Essential hypertension I10    Recurrent depression (HCC) F33.9    Lumbar radiculopathy M54.16    Herpes genitalis in women A60.09    Allergic rhinitis due to allergen J30.9    Hot flashes R23.2    Advance directive discussed with patient Z71.89    Facet arthropathy M47.819    Lumbar stenosis M48.061    Advance care planning Z71.89    Obesity E66.9    Mild single current episode of major depressive disorder (HCC) F32.0    IFG (impaired fasting glucose) R73.01    Osteopenia M85.80       Current Outpatient Medications:     doxepin (SINEQUAN) 10 MG capsule, Take 1 capsule by mouth nightly as needed (itching), Disp: 90 capsule, Rfl: 0    amoxicillin-clavulanate (AUGMENTIN) 875-125 MG per tablet, Take 1 tablet by mouth 2 times daily for 10 days, Disp: 20 tablet, Rfl: 0    venlafaxine (EFFEXOR) 75 MG tablet, Take 1 tablet by

## 2024-05-14 ENCOUNTER — OFFICE VISIT (OUTPATIENT)
Age: 69
End: 2024-05-14
Payer: MEDICARE

## 2024-05-14 VITALS
DIASTOLIC BLOOD PRESSURE: 82 MMHG | HEIGHT: 64 IN | OXYGEN SATURATION: 96 % | TEMPERATURE: 97.7 F | HEART RATE: 90 BPM | RESPIRATION RATE: 16 BRPM | SYSTOLIC BLOOD PRESSURE: 138 MMHG | BODY MASS INDEX: 31.58 KG/M2 | WEIGHT: 185 LBS

## 2024-05-14 DIAGNOSIS — J30.9 ALLERGIC RHINITIS, UNSPECIFIED SEASONALITY, UNSPECIFIED TRIGGER: ICD-10-CM

## 2024-05-14 DIAGNOSIS — I10 ESSENTIAL HYPERTENSION: ICD-10-CM

## 2024-05-14 DIAGNOSIS — R23.2 HOT FLASHES: Primary | ICD-10-CM

## 2024-05-14 DIAGNOSIS — F32.A MILD DEPRESSION: ICD-10-CM

## 2024-05-14 PROCEDURE — 3075F SYST BP GE 130 - 139MM HG: CPT | Performed by: FAMILY MEDICINE

## 2024-05-14 PROCEDURE — 1036F TOBACCO NON-USER: CPT | Performed by: FAMILY MEDICINE

## 2024-05-14 PROCEDURE — 1090F PRES/ABSN URINE INCON ASSESS: CPT | Performed by: FAMILY MEDICINE

## 2024-05-14 PROCEDURE — 99214 OFFICE O/P EST MOD 30 MIN: CPT | Performed by: FAMILY MEDICINE

## 2024-05-14 PROCEDURE — 1123F ACP DISCUSS/DSCN MKR DOCD: CPT | Performed by: FAMILY MEDICINE

## 2024-05-14 PROCEDURE — 3079F DIAST BP 80-89 MM HG: CPT | Performed by: FAMILY MEDICINE

## 2024-05-14 PROCEDURE — 3017F COLORECTAL CA SCREEN DOC REV: CPT | Performed by: FAMILY MEDICINE

## 2024-05-14 PROCEDURE — G8417 CALC BMI ABV UP PARAM F/U: HCPCS | Performed by: FAMILY MEDICINE

## 2024-05-14 PROCEDURE — G8399 PT W/DXA RESULTS DOCUMENT: HCPCS | Performed by: FAMILY MEDICINE

## 2024-05-14 PROCEDURE — G8427 DOCREV CUR MEDS BY ELIG CLIN: HCPCS | Performed by: FAMILY MEDICINE

## 2024-05-14 RX ORDER — VENLAFAXINE HYDROCHLORIDE 150 MG/1
150 CAPSULE, EXTENDED RELEASE ORAL DAILY
Qty: 90 CAPSULE | Refills: 0 | Status: SHIPPED | OUTPATIENT
Start: 2024-05-14

## 2024-05-14 NOTE — PROGRESS NOTES
1.626 m (5' 4\")   Wt 83.9 kg (185 lb)   SpO2 96%   BMI 31.76 kg/m² ]      General: alert, well-appearing, obese,AA, in no apparent distress or pain  Head: atraumatic. Non-tender maxillary and frontal sinuses  Neck: supple, no adenopathy palpatedCVS: normal rate, regular rhythm, distinct S1 and S2  Lungs:clear to ausculation bilaterally, no crackles, wheezing or rhonchi noted  Psych:  mood and affect normal    CMP:   Lab Results   Component Value Date/Time     02/19/2024 12:00 AM    K 4.2 02/19/2024 12:00 AM     02/19/2024 12:00 AM    CO2 25 02/19/2024 12:00 AM    BUN 16 02/19/2024 12:00 AM    CREATININE 0.73 02/19/2024 12:00 AM    GLUCOSE 91 02/19/2024 12:00 AM    CALCIUM 10.0 02/19/2024 12:00 AM    BILITOT 0.8 02/19/2024 12:00 AM    AST 18 02/19/2024 12:00 AM    ALT 13 02/19/2024 12:00 AM        CBC:   Lab Results   Component Value Date/Time    WBC 3.6 02/19/2024 12:00 AM    RBC 5.17 02/19/2024 12:00 AM    HGB 15.2 02/19/2024 12:00 AM    HCT 44.7 02/19/2024 12:00 AM    MCV 87 02/19/2024 12:00 AM    MCH 29.4 02/19/2024 12:00 AM    MCHC 34.0 02/19/2024 12:00 AM    RDW 12.5 02/19/2024 12:00 AM     02/19/2024 12:00 AM    MPV 11.0 08/07/2021 11:48 AM        Lipids   Lab Results   Component Value Date/Time    CHOL 195 02/19/2024 12:00 AM    CHOL 139 08/10/2022 12:00 AM    TRIG 67 02/19/2024 12:00 AM    HDL 73 02/19/2024 12:00 AM    CHOLHDLRATIO 2.1 08/07/2021 11:48 AM         Imaging results last 24 hrs :No results found.    Imaging results impression onlyNo results found.   No orders to display       A1c:   Hemoglobin A1C   Date Value Ref Range Status   02/19/2024 6.1 (H) 4.8 - 5.6 % Final     Comment:                 Prediabetes: 5.7 - 6.4           Diabetes: >6.4           Glycemic control for adults with diabetes: <7.0     05/18/2023 6.3 (H) 4.8 - 5.6 % Final     Comment:              Prediabetes: 5.7 - 6.4           Diabetes: >6.4           Glycemic control for adults with diabetes: <7.0

## 2024-06-13 ENCOUNTER — TELEPHONE (OUTPATIENT)
Facility: CLINIC | Age: 69
End: 2024-06-13

## 2024-06-13 ENCOUNTER — OFFICE VISIT (OUTPATIENT)
Facility: CLINIC | Age: 69
End: 2024-06-13

## 2024-06-13 VITALS
HEIGHT: 64 IN | BODY MASS INDEX: 31.58 KG/M2 | OXYGEN SATURATION: 98 % | DIASTOLIC BLOOD PRESSURE: 84 MMHG | HEART RATE: 88 BPM | WEIGHT: 185 LBS | RESPIRATION RATE: 16 BRPM | SYSTOLIC BLOOD PRESSURE: 136 MMHG | TEMPERATURE: 97.8 F

## 2024-06-13 DIAGNOSIS — I10 ESSENTIAL HYPERTENSION: Primary | ICD-10-CM

## 2024-06-13 DIAGNOSIS — R23.2 HOT FLASHES: ICD-10-CM

## 2024-06-13 DIAGNOSIS — F32.0 MILD SINGLE CURRENT EPISODE OF MAJOR DEPRESSIVE DISORDER (HCC): ICD-10-CM

## 2024-06-13 DIAGNOSIS — A60.09 HERPES GENITALIS IN WOMEN: ICD-10-CM

## 2024-06-13 DIAGNOSIS — R73.01 IFG (IMPAIRED FASTING GLUCOSE): ICD-10-CM

## 2024-06-13 DIAGNOSIS — G47.09 OTHER INSOMNIA: ICD-10-CM

## 2024-06-13 PROBLEM — Z71.89 ADVANCE CARE PLANNING: Status: RESOLVED | Noted: 2017-06-19 | Resolved: 2024-06-13

## 2024-06-13 PROBLEM — E66.9 OBESITY: Status: RESOLVED | Noted: 2017-09-21 | Resolved: 2024-06-13

## 2024-06-13 RX ORDER — LISINOPRIL AND HYDROCHLOROTHIAZIDE 25; 20 MG/1; MG/1
1 TABLET ORAL DAILY
Qty: 90 TABLET | Refills: 1 | Status: SHIPPED | OUTPATIENT
Start: 2024-06-13

## 2024-06-13 RX ORDER — VALACYCLOVIR HYDROCHLORIDE 500 MG/1
500 TABLET, FILM COATED ORAL 2 TIMES DAILY
Qty: 12 TABLET | Refills: 1 | Status: SHIPPED | OUTPATIENT
Start: 2024-06-13 | End: 2024-06-16

## 2024-06-13 RX ORDER — PRAVASTATIN SODIUM 20 MG
20 TABLET ORAL
Qty: 90 TABLET | Refills: 3 | Status: SHIPPED | OUTPATIENT
Start: 2024-06-13

## 2024-06-13 RX ORDER — CLOTRIMAZOLE AND BETAMETHASONE DIPROPIONATE 10; .64 MG/G; MG/G
CREAM TOPICAL
Qty: 1 EACH | Refills: 0 | Status: SHIPPED | OUTPATIENT
Start: 2024-06-13

## 2024-06-13 RX ORDER — AMLODIPINE BESYLATE 5 MG/1
5 TABLET ORAL DAILY
Qty: 90 TABLET | Refills: 3 | Status: SHIPPED | OUTPATIENT
Start: 2024-06-13

## 2024-06-13 NOTE — PROGRESS NOTES
Jessica NAJMA Bonilla, 69 y.o.,  female    SUBJECTIVE  Ff-up     Hypertension-taking Norvasc and Prinzide    Hyperlipidemia-taking pravastatin    Depression/hot flashes- says feeling better on increased dose of Effexor, improved mood and night sweats     Insomnia/pruritus-continues to respond to doxepin she takes nightly and helps her sleep.    Allergic rhinitis- responding to  flonase/allegra.     Genital herpes-recent flare requesting refill on Valtrex    ROS:  See HPI, all others negative        Patient Active Problem List   Diagnosis Code    Essential hypertension I10    Recurrent depression (HCC) F33.9    Lumbar radiculopathy M54.16    Herpes genitalis in women A60.09    Allergic rhinitis due to allergen J30.9    Hot flashes R23.2    Advance directive discussed with patient Z71.89    Facet arthropathy M47.819    Lumbar stenosis M48.061    Advance care planning Z71.89    Obesity E66.9    Mild single current episode of major depressive disorder (HCC) F32.0    IFG (impaired fasting glucose) R73.01    Osteopenia M85.80       Current Outpatient Medications:     pravastatin (PRAVACHOL) 20 MG tablet, Take 1 tablet by mouth nightly, Disp: 90 tablet, Rfl: 3    clotrimazole-betamethasone (LOTRISONE) 1-0.05 % cream, Apply topically 2 times daily., Disp: 1 each, Rfl: 0    valACYclovir (VALTREX) 500 MG tablet, Take 1 tablet by mouth 2 times daily for 3 days As needed for flare ups, Disp: 12 tablet, Rfl: 1    amLODIPine (NORVASC) 5 MG tablet, Take 1 tablet by mouth daily, Disp: 90 tablet, Rfl: 3    venlafaxine (EFFEXOR XR) 150 MG extended release capsule, Take 1 capsule by mouth daily, Disp: 90 capsule, Rfl: 0    doxepin (SINEQUAN) 10 MG capsule, Take 1 capsule by mouth nightly as needed (itching), Disp: 90 capsule, Rfl: 0    baclofen (LIORESAL) 10 MG tablet, Take 1 tablet by mouth 3 times daily, Disp: 30 tablet, Rfl: 0    lisinopril-hydroCHLOROthiazide (PRINZIDE;ZESTORETIC) 20-25 MG per tablet, Take 1 tablet by mouth daily,

## 2024-06-13 NOTE — PATIENT INSTRUCTIONS
Heart-Healthy Diet: Care Instructions  Your Care Instructions     A heart-healthy diet has lots of vegetables, fruits, nuts, beans, and whole grains, and is low in salt. It limits foods that are high in saturated fat, such as meats, cheeses, and fried foods. It may be hard to change your diet, but even small changes can lower your risk of heart attack and heart disease.  Follow-up care is a key part of your treatment and safety. Be sure to make and go to all appointments, and call your doctor if you are having problems. It's also a good idea to know your test results and keep a list of the medicines you take.  How can you care for yourself at home?  Watch your portions  Use food labels to learn what the recommended servings are for the foods you eat.  Eat only the number of calories you need to stay at a healthy weight. If you need to lose weight, eat fewer calories than your body burns (through exercise and other physical activity).  Eat more fruits and vegetables  Eat a variety of fruit and vegetables every day. Dark green, deep orange, red, or yellow fruits and vegetables are especially good for you. Examples include spinach, carrots, peaches, and berries.  Keep carrots, celery, and other veggies handy for snacks. Buy fruit that is in season and store it where you can see it so that you will be tempted to eat it.  Cook dishes that have a lot of veggies in them, such as stir-fries and soups.  Limit saturated fat  Read food labels, and try to avoid saturated fats. They increase your risk of heart disease.  Use olive or canola oil when you cook.  Bake, broil, grill, or steam foods instead of frying them.  Choose lean meats instead of high-fat meats such as hot dogs and sausages. Cut off all visible fat when you prepare meat.  Eat fish, skinless poultry, and meat alternatives such as soy products instead of high-fat meats. Soy products, such as tofu, may be especially good for your heart.  Choose low-fat or fat-free

## 2024-06-13 NOTE — TELEPHONE ENCOUNTER
This patient contacted office for the following prescriptions to be filled:    Medication requested : Lisinopril Hydrochlo  PCP: Dr. Comer   Pharmacy or Print: Pharmacy  Mail order or Local pharmacy [facility]    Scheduled appointment if not seen by current providers in office: 09/16/2024

## 2024-08-14 RX ORDER — CLOTRIMAZOLE AND BETAMETHASONE DIPROPIONATE 10; .64 MG/G; MG/G
CREAM TOPICAL
Qty: 1 EACH | Refills: 0 | Status: SHIPPED | OUTPATIENT
Start: 2024-08-14

## 2024-08-14 RX ORDER — VENLAFAXINE HYDROCHLORIDE 150 MG/1
150 CAPSULE, EXTENDED RELEASE ORAL DAILY
Qty: 90 CAPSULE | Refills: 0 | Status: SHIPPED | OUTPATIENT
Start: 2024-08-14

## 2024-09-14 ENCOUNTER — HOSPITAL ENCOUNTER (OUTPATIENT)
Facility: HOSPITAL | Age: 69
Discharge: HOME | End: 2024-09-17

## 2024-09-14 LAB — LABCORP SPECIMEN COLLECTION: NORMAL

## 2024-09-14 PROCEDURE — 99001 SPECIMEN HANDLING PT-LAB: CPT

## 2024-09-15 LAB
ALBUMIN SERPL-MCNC: 4.3 G/DL (ref 3.9–4.9)
ALP SERPL-CCNC: 80 IU/L (ref 44–121)
ALT SERPL-CCNC: 25 IU/L (ref 0–32)
AST SERPL-CCNC: 24 IU/L (ref 0–40)
BILIRUB SERPL-MCNC: 0.7 MG/DL (ref 0–1.2)
BUN SERPL-MCNC: 12 MG/DL (ref 8–27)
BUN/CREAT SERPL: 17 (ref 12–28)
CALCIUM SERPL-MCNC: 9.8 MG/DL (ref 8.7–10.3)
CHLORIDE SERPL-SCNC: 103 MMOL/L (ref 96–106)
CHOLEST SERPL-MCNC: 172 MG/DL (ref 100–199)
CO2 SERPL-SCNC: 27 MMOL/L (ref 20–29)
CREAT SERPL-MCNC: 0.69 MG/DL (ref 0.57–1)
EGFRCR SERPLBLD CKD-EPI 2021: 94 ML/MIN/1.73
GLOBULIN SER CALC-MCNC: 2.6 G/DL (ref 1.5–4.5)
GLUCOSE SERPL-MCNC: 103 MG/DL (ref 70–99)
HDLC SERPL-MCNC: 66 MG/DL
LDLC SERPL CALC-MCNC: 94 MG/DL (ref 0–99)
POTASSIUM SERPL-SCNC: 4 MMOL/L (ref 3.5–5.2)
PROT SERPL-MCNC: 6.9 G/DL (ref 6–8.5)
SODIUM SERPL-SCNC: 143 MMOL/L (ref 134–144)
SPECIMEN STATUS REPORT: NORMAL
TRIGL SERPL-MCNC: 61 MG/DL (ref 0–149)
VLDLC SERPL CALC-MCNC: 12 MG/DL (ref 5–40)

## 2024-09-16 ENCOUNTER — OFFICE VISIT (OUTPATIENT)
Facility: CLINIC | Age: 69
End: 2024-09-16
Payer: MEDICARE

## 2024-09-16 VITALS
TEMPERATURE: 98 F | WEIGHT: 180.4 LBS | HEART RATE: 78 BPM | RESPIRATION RATE: 16 BRPM | BODY MASS INDEX: 31.96 KG/M2 | HEIGHT: 63 IN | OXYGEN SATURATION: 99 % | DIASTOLIC BLOOD PRESSURE: 80 MMHG | SYSTOLIC BLOOD PRESSURE: 150 MMHG

## 2024-09-16 DIAGNOSIS — Z23 NEEDS FLU SHOT: ICD-10-CM

## 2024-09-16 DIAGNOSIS — R73.01 IFG (IMPAIRED FASTING GLUCOSE): ICD-10-CM

## 2024-09-16 DIAGNOSIS — G47.09 OTHER INSOMNIA: ICD-10-CM

## 2024-09-16 DIAGNOSIS — A60.09 HERPES GENITALIS IN WOMEN: ICD-10-CM

## 2024-09-16 DIAGNOSIS — F33.9 RECURRENT DEPRESSION (HCC): ICD-10-CM

## 2024-09-16 DIAGNOSIS — I10 ESSENTIAL HYPERTENSION: Primary | ICD-10-CM

## 2024-09-16 LAB — HBA1C MFR BLD: 6 % (ref 4.8–5.6)

## 2024-09-16 PROCEDURE — 3077F SYST BP >= 140 MM HG: CPT | Performed by: FAMILY MEDICINE

## 2024-09-16 PROCEDURE — 3017F COLORECTAL CA SCREEN DOC REV: CPT | Performed by: FAMILY MEDICINE

## 2024-09-16 PROCEDURE — G8427 DOCREV CUR MEDS BY ELIG CLIN: HCPCS | Performed by: FAMILY MEDICINE

## 2024-09-16 PROCEDURE — G0008 ADMIN INFLUENZA VIRUS VAC: HCPCS | Performed by: FAMILY MEDICINE

## 2024-09-16 PROCEDURE — 90653 IIV ADJUVANT VACCINE IM: CPT | Performed by: FAMILY MEDICINE

## 2024-09-16 PROCEDURE — G8399 PT W/DXA RESULTS DOCUMENT: HCPCS | Performed by: FAMILY MEDICINE

## 2024-09-16 PROCEDURE — 3079F DIAST BP 80-89 MM HG: CPT | Performed by: FAMILY MEDICINE

## 2024-09-16 PROCEDURE — 1123F ACP DISCUSS/DSCN MKR DOCD: CPT | Performed by: FAMILY MEDICINE

## 2024-09-16 PROCEDURE — 99214 OFFICE O/P EST MOD 30 MIN: CPT | Performed by: FAMILY MEDICINE

## 2024-09-16 PROCEDURE — 1036F TOBACCO NON-USER: CPT | Performed by: FAMILY MEDICINE

## 2024-09-16 PROCEDURE — 1090F PRES/ABSN URINE INCON ASSESS: CPT | Performed by: FAMILY MEDICINE

## 2024-09-16 PROCEDURE — G8417 CALC BMI ABV UP PARAM F/U: HCPCS | Performed by: FAMILY MEDICINE

## 2024-09-16 RX ORDER — TRIAMCINOLONE ACETONIDE 0.25 MG/G
OINTMENT TOPICAL
Qty: 60 G | Refills: 1 | Status: SHIPPED | OUTPATIENT
Start: 2024-09-16 | End: 2024-09-23

## 2024-10-22 ENCOUNTER — OFFICE VISIT (OUTPATIENT)
Facility: CLINIC | Age: 69
End: 2024-10-22
Payer: MEDICARE

## 2024-10-22 VITALS
SYSTOLIC BLOOD PRESSURE: 146 MMHG | HEART RATE: 100 BPM | RESPIRATION RATE: 16 BRPM | OXYGEN SATURATION: 95 % | DIASTOLIC BLOOD PRESSURE: 72 MMHG | WEIGHT: 178 LBS | HEIGHT: 64 IN | BODY MASS INDEX: 30.39 KG/M2 | TEMPERATURE: 96.8 F

## 2024-10-22 DIAGNOSIS — R23.2 HOT FLASHES: ICD-10-CM

## 2024-10-22 DIAGNOSIS — F32.0 MILD SINGLE CURRENT EPISODE OF MAJOR DEPRESSIVE DISORDER (HCC): ICD-10-CM

## 2024-10-22 DIAGNOSIS — I10 ESSENTIAL HYPERTENSION: Primary | ICD-10-CM

## 2024-10-22 DIAGNOSIS — Z98.890 HISTORY OF OVARIAN CYSTECTOMY: Primary | ICD-10-CM

## 2024-10-22 DIAGNOSIS — Z87.42 HISTORY OF OVARIAN CYSTECTOMY: ICD-10-CM

## 2024-10-22 DIAGNOSIS — Z87.42 HISTORY OF OVARIAN CYSTECTOMY: Primary | ICD-10-CM

## 2024-10-22 DIAGNOSIS — Z01.419 ENCOUNTER FOR WELL WOMAN EXAM WITH ROUTINE GYNECOLOGICAL EXAM: ICD-10-CM

## 2024-10-22 DIAGNOSIS — F33.9 RECURRENT DEPRESSION (HCC): ICD-10-CM

## 2024-10-22 DIAGNOSIS — Z98.890 HISTORY OF OVARIAN CYSTECTOMY: ICD-10-CM

## 2024-10-22 PROCEDURE — 1090F PRES/ABSN URINE INCON ASSESS: CPT | Performed by: FAMILY MEDICINE

## 2024-10-22 PROCEDURE — G8482 FLU IMMUNIZE ORDER/ADMIN: HCPCS | Performed by: FAMILY MEDICINE

## 2024-10-22 PROCEDURE — 1123F ACP DISCUSS/DSCN MKR DOCD: CPT | Performed by: FAMILY MEDICINE

## 2024-10-22 PROCEDURE — G8399 PT W/DXA RESULTS DOCUMENT: HCPCS | Performed by: FAMILY MEDICINE

## 2024-10-22 PROCEDURE — G8417 CALC BMI ABV UP PARAM F/U: HCPCS | Performed by: FAMILY MEDICINE

## 2024-10-22 PROCEDURE — 1036F TOBACCO NON-USER: CPT | Performed by: FAMILY MEDICINE

## 2024-10-22 PROCEDURE — G8427 DOCREV CUR MEDS BY ELIG CLIN: HCPCS | Performed by: FAMILY MEDICINE

## 2024-10-22 PROCEDURE — 3077F SYST BP >= 140 MM HG: CPT | Performed by: FAMILY MEDICINE

## 2024-10-22 PROCEDURE — 3078F DIAST BP <80 MM HG: CPT | Performed by: FAMILY MEDICINE

## 2024-10-22 PROCEDURE — 99214 OFFICE O/P EST MOD 30 MIN: CPT | Performed by: FAMILY MEDICINE

## 2024-10-22 PROCEDURE — 3017F COLORECTAL CA SCREEN DOC REV: CPT | Performed by: FAMILY MEDICINE

## 2024-10-22 RX ORDER — AMLODIPINE BESYLATE 10 MG/1
10 TABLET ORAL DAILY
Qty: 90 TABLET | Refills: 0 | Status: SHIPPED | OUTPATIENT
Start: 2024-10-22

## 2024-10-22 RX ORDER — TRIAMCINOLONE ACETONIDE 0.25 MG/G
OINTMENT TOPICAL
COMMUNITY
Start: 2024-10-14

## 2024-10-22 NOTE — PROGRESS NOTES
\"Have you been to the ER, urgent care clinic since your last visit?  Hospitalized since your last visit?\"    NO    “Have you seen or consulted any other health care providers outside our system since your last visit?”    NO           
Stability: Not on file       Family History   Problem Relation Age of Onset    Cancer Mother     Ovarian Cancer Mother 76    Stroke Father          OBJECTIVE    Physical Exam:     BP (!) 146/72 (Site: Right Upper Arm, Position: Sitting, Cuff Size: Large Adult)   Pulse 100   Temp 96.8 °F (36 °C) (Temporal)   Resp 16   Ht 1.626 m (5' 4\")   Wt 80.7 kg (178 lb)   SpO2 95%   BMI 30.55 kg/m² ]      General: alert, well-appearing, obese,AA, in no apparent distress or pain  Head: atraumatic. Non-tender maxillary and frontal sinuses  Neck: supple, no adenopathy palpatedCVS: normal rate, regular rhythm, distinct S1 and S2  Lungs:clear to ausculation bilaterally, no crackles, wheezing or rhonchi noted  Psych:  mood and affect normal    CMP:   Lab Results   Component Value Date/Time     09/14/2024 12:00 AM    K 4.0 09/14/2024 12:00 AM     09/14/2024 12:00 AM    CO2 27 09/14/2024 12:00 AM    BUN 12 09/14/2024 12:00 AM    CREATININE 0.69 09/14/2024 12:00 AM    GLUCOSE 103 09/14/2024 12:00 AM    CALCIUM 9.8 09/14/2024 12:00 AM    BILITOT 0.7 09/14/2024 12:00 AM    AST 24 09/14/2024 12:00 AM    ALT 25 09/14/2024 12:00 AM        CBC:   Lab Results   Component Value Date/Time    WBC 3.6 02/19/2024 12:00 AM    RBC 5.17 02/19/2024 12:00 AM    HGB 15.2 02/19/2024 12:00 AM    HCT 44.7 02/19/2024 12:00 AM    MCV 87 02/19/2024 12:00 AM    MCH 29.4 02/19/2024 12:00 AM    MCHC 34.0 02/19/2024 12:00 AM    RDW 12.5 02/19/2024 12:00 AM     02/19/2024 12:00 AM    MPV 11.0 08/07/2021 11:48 AM        Lipids   Lab Results   Component Value Date/Time    CHOL 172 09/14/2024 12:00 AM    TRIG 61 09/14/2024 12:00 AM    HDL 66 09/14/2024 12:00 AM    CHOLHDLRATIO 2.1 08/07/2021 11:48 AM         Imaging results last 24 hrs :No results found.    Imaging results impression onlyNo results found.   No orders to display       A1c:   Hemoglobin A1C   Date Value Ref Range Status   09/14/2024 6.0 (H) 4.8 - 5.6 % Final     Comment:

## 2024-11-08 NOTE — PROGRESS NOTES
\"Have you been to the ER, urgent care clinic since your last visit?  Hospitalized since your last visit?\"    {YES/NO:[id number]}    “Have you seen or consulted any other health care providers outside our system since your last visit?”    {YES/NO:[id number]}        {Click Here for Release of Records Request :[id number]}

## 2024-11-11 RX ORDER — TRIAMCINOLONE ACETONIDE 0.25 MG/G
OINTMENT TOPICAL
Qty: 60 G | Refills: 0 | Status: SHIPPED | OUTPATIENT
Start: 2024-11-11

## 2024-11-12 ENCOUNTER — OFFICE VISIT (OUTPATIENT)
Facility: CLINIC | Age: 69
End: 2024-11-12

## 2024-11-12 VITALS
TEMPERATURE: 97.3 F | SYSTOLIC BLOOD PRESSURE: 128 MMHG | HEART RATE: 82 BPM | OXYGEN SATURATION: 100 % | RESPIRATION RATE: 16 BRPM | DIASTOLIC BLOOD PRESSURE: 84 MMHG

## 2024-11-12 DIAGNOSIS — I10 ESSENTIAL HYPERTENSION: Primary | ICD-10-CM

## 2024-11-12 RX ORDER — VENLAFAXINE HYDROCHLORIDE 150 MG/1
150 CAPSULE, EXTENDED RELEASE ORAL DAILY
Qty: 90 CAPSULE | Refills: 0 | Status: SHIPPED | OUTPATIENT
Start: 2024-11-12

## 2024-11-12 NOTE — PATIENT INSTRUCTIONS
reaction after a previous dose of influenza vaccine, or has any severe, life-threatening allergies  Has ever had Guillain-Barré Syndrome (also called \"GBS\")  In some cases, your health care provider may decide to postpone influenza vaccination until a future visit.  Influenza vaccine can be administered at any time during pregnancy. People who are or will be pregnant during influenza season should receive inactivated influenza vaccine.  People with minor illnesses, such as a cold, may be vaccinated. People who are moderately or severely ill should usually wait until they recover before getting influenza vaccine.  Your health care provider can give you more information.  Risks of a vaccine reaction  Soreness, redness, and swelling where the shot is given, fever, muscle aches, and headache can happen after influenza vaccination.  There may be a very small increased risk of Guillain-Barré Syndrome (GBS) after inactivated influenza vaccine (the flu shot).  Young children who get the flu shot along with pneumococcal vaccine (PCV13) and/or DTaP vaccine at the same time might be slightly more likely to have a seizure caused by fever. Tell your health care provider if a child who is getting flu vaccine has ever had a seizure.  People sometimes faint after medical procedures, including vaccination. Tell your provider if you feel dizzy or have vision changes or ringing in the ears.  As with any medicine, there is a very remote chance of a vaccine causing a severe allergic reaction, other serious injury, or death.  What if there is a serious problem?  An allergic reaction could occur after the vaccinated person leaves the clinic. If you see signs of a severe allergic reaction (hives, swelling of the face and throat, difficulty breathing, a fast heartbeat, dizziness, or weakness), call 9-1-1 and get the person to the nearest hospital.  For other signs that concern you, call your health care provider.  Adverse reactions should

## 2024-12-05 RX ORDER — LISINOPRIL AND HYDROCHLOROTHIAZIDE 20; 25 MG/1; MG/1
1 TABLET ORAL DAILY
Qty: 90 TABLET | Refills: 1 | Status: SHIPPED | OUTPATIENT
Start: 2024-12-05

## 2024-12-05 NOTE — TELEPHONE ENCOUNTER
Last OV 10/22/2024  Next OV 01/22/2025  Last lab 09/14/2024  Last filled 06/13/2024  90 tabs  1 RF

## 2024-12-09 RX ORDER — TRIAMCINOLONE ACETONIDE 0.25 MG/G
OINTMENT TOPICAL
Qty: 60 G | Refills: 0 | Status: SHIPPED | OUTPATIENT
Start: 2024-12-09

## 2025-01-20 ENCOUNTER — HOSPITAL ENCOUNTER (OUTPATIENT)
Facility: HOSPITAL | Age: 70
Discharge: HOME | End: 2025-01-23

## 2025-01-20 LAB — LABCORP SPECIMEN COLLECTION: NORMAL

## 2025-01-20 PROCEDURE — 99001 SPECIMEN HANDLING PT-LAB: CPT

## 2025-01-21 LAB
ALBUMIN SERPL-MCNC: 4.3 G/DL (ref 3.9–4.9)
ALP SERPL-CCNC: 84 IU/L (ref 44–121)
ALT SERPL-CCNC: 15 IU/L (ref 0–32)
AST SERPL-CCNC: 20 IU/L (ref 0–40)
BILIRUB SERPL-MCNC: 0.5 MG/DL (ref 0–1.2)
BUN SERPL-MCNC: 11 MG/DL (ref 8–27)
BUN/CREAT SERPL: 14 (ref 12–28)
CALCIUM SERPL-MCNC: 10.2 MG/DL (ref 8.7–10.3)
CHLORIDE SERPL-SCNC: 105 MMOL/L (ref 96–106)
CO2 SERPL-SCNC: 25 MMOL/L (ref 20–29)
CREAT SERPL-MCNC: 0.76 MG/DL (ref 0.57–1)
EGFRCR SERPLBLD CKD-EPI 2021: 85 ML/MIN/1.73
GLOBULIN SER CALC-MCNC: 2.7 G/DL (ref 1.5–4.5)
GLUCOSE SERPL-MCNC: 96 MG/DL (ref 70–99)
POTASSIUM SERPL-SCNC: 4.3 MMOL/L (ref 3.5–5.2)
PROT SERPL-MCNC: 7 G/DL (ref 6–8.5)
SODIUM SERPL-SCNC: 142 MMOL/L (ref 134–144)
SPECIMEN STATUS REPORT: NORMAL

## 2025-01-21 RX ORDER — AMLODIPINE BESYLATE 10 MG/1
10 TABLET ORAL DAILY
Qty: 90 TABLET | Refills: 3 | Status: SHIPPED | OUTPATIENT
Start: 2025-01-21

## 2025-02-07 NOTE — TELEPHONE ENCOUNTER
This patient contacted the office for the following prescriptions to be refilled:    Medication requested :   Requested Prescriptions     Pending Prescriptions Disp Refills    venlafaxine (EFFEXOR XR) 150 MG extended release capsule [Pharmacy Med Name: Venlafaxine HCl  MG Oral Capsule Extended Release 24 Hour] 90 capsule 0     Sig: Take 1 capsule by mouth once daily        Last Refilled: 11/12/2024    Last Office Visit: 11/12/2024    Next Office Visit: Visit date not found    Last Labs:1/20/2025

## 2025-02-10 RX ORDER — VENLAFAXINE HYDROCHLORIDE 150 MG/1
150 CAPSULE, EXTENDED RELEASE ORAL DAILY
Qty: 90 CAPSULE | Refills: 0 | Status: SHIPPED | OUTPATIENT
Start: 2025-02-10

## 2025-03-20 ENCOUNTER — OFFICE VISIT (OUTPATIENT)
Facility: CLINIC | Age: 70
End: 2025-03-20
Payer: MEDICARE

## 2025-03-20 VITALS
SYSTOLIC BLOOD PRESSURE: 134 MMHG | TEMPERATURE: 97.5 F | WEIGHT: 173.3 LBS | OXYGEN SATURATION: 100 % | BODY MASS INDEX: 29.59 KG/M2 | DIASTOLIC BLOOD PRESSURE: 82 MMHG | HEIGHT: 64 IN | HEART RATE: 82 BPM | RESPIRATION RATE: 16 BRPM

## 2025-03-20 DIAGNOSIS — B33.8 SWEATING FEVER: ICD-10-CM

## 2025-03-20 DIAGNOSIS — R11.0 NAUSEA: Primary | ICD-10-CM

## 2025-03-20 DIAGNOSIS — R53.83 OTHER FATIGUE: ICD-10-CM

## 2025-03-20 LAB
BILIRUBIN, URINE, POC: NEGATIVE
BLOOD URINE, POC: NEGATIVE
EXP DATE SOLUTION: NORMAL
EXP DATE SWAB: NORMAL
EXPIRATION DATE: NORMAL
GLUCOSE URINE, POC: NEGATIVE
INFLUENZA A ANTIGEN, POC: NEGATIVE
INFLUENZA B ANTIGEN, POC: NEGATIVE
KETONES, URINE, POC: NEGATIVE
LEUKOCYTE ESTERASE, URINE, POC: ABNORMAL
LOT NUMBER POC: NORMAL
LOT NUMBER SOLUTION: NORMAL
LOT NUMBER SWAB: NORMAL
NITRITE, URINE, POC: NEGATIVE
PH, URINE, POC: 5.5 (ref 4.6–8)
PROTEIN,URINE, POC: NEGATIVE
SARS-COV-2 RNA, POC: NEGATIVE
SPECIFIC GRAVITY, URINE, POC: 1.02 (ref 1–1.03)
URINALYSIS CLARITY, POC: ABNORMAL
URINALYSIS COLOR, POC: YELLOW
UROBILINOGEN, POC: ABNORMAL
VALID INTERNAL CONTROL, POC: YES

## 2025-03-20 PROCEDURE — 1123F ACP DISCUSS/DSCN MKR DOCD: CPT | Performed by: FAMILY MEDICINE

## 2025-03-20 PROCEDURE — 87635 SARS-COV-2 COVID-19 AMP PRB: CPT | Performed by: FAMILY MEDICINE

## 2025-03-20 PROCEDURE — 87502 INFLUENZA DNA AMP PROBE: CPT | Performed by: FAMILY MEDICINE

## 2025-03-20 PROCEDURE — 1090F PRES/ABSN URINE INCON ASSESS: CPT | Performed by: FAMILY MEDICINE

## 2025-03-20 PROCEDURE — G8399 PT W/DXA RESULTS DOCUMENT: HCPCS | Performed by: FAMILY MEDICINE

## 2025-03-20 PROCEDURE — 3017F COLORECTAL CA SCREEN DOC REV: CPT | Performed by: FAMILY MEDICINE

## 2025-03-20 PROCEDURE — 3075F SYST BP GE 130 - 139MM HG: CPT | Performed by: FAMILY MEDICINE

## 2025-03-20 PROCEDURE — 1036F TOBACCO NON-USER: CPT | Performed by: FAMILY MEDICINE

## 2025-03-20 PROCEDURE — 99214 OFFICE O/P EST MOD 30 MIN: CPT | Performed by: FAMILY MEDICINE

## 2025-03-20 PROCEDURE — G8417 CALC BMI ABV UP PARAM F/U: HCPCS | Performed by: FAMILY MEDICINE

## 2025-03-20 PROCEDURE — 3079F DIAST BP 80-89 MM HG: CPT | Performed by: FAMILY MEDICINE

## 2025-03-20 PROCEDURE — 81003 URINALYSIS AUTO W/O SCOPE: CPT | Performed by: FAMILY MEDICINE

## 2025-03-20 PROCEDURE — G8428 CUR MEDS NOT DOCUMENT: HCPCS | Performed by: FAMILY MEDICINE

## 2025-03-20 PROCEDURE — 1126F AMNT PAIN NOTED NONE PRSNT: CPT | Performed by: FAMILY MEDICINE

## 2025-03-20 RX ORDER — PROCHLORPERAZINE MALEATE 5 MG/1
5 TABLET ORAL 2 TIMES DAILY PRN
Qty: 10 TABLET | Refills: 0 | Status: SHIPPED | OUTPATIENT
Start: 2025-03-20

## 2025-03-20 RX ORDER — VALACYCLOVIR HYDROCHLORIDE 500 MG/1
TABLET, FILM COATED ORAL
COMMUNITY

## 2025-03-20 RX ORDER — FLUTICASONE PROPIONATE 50 MCG
2 SPRAY, SUSPENSION (ML) NASAL DAILY
Qty: 16 G | Refills: 2 | Status: SHIPPED | OUTPATIENT
Start: 2025-03-20

## 2025-03-20 NOTE — PROGRESS NOTES
\"Have you been to the ER, urgent care clinic since your last visit?  Hospitalized since your last visit?\"    NO    “Have you seen or consulted any other health care providers outside our system since your last visit?”    [facility] LOV: 1/08/25    Chief Complaint   Patient presents with    Tinnitus     X 1 day    Sweats     X 1 day    Cough     X 1 day    Nausea     X 1 day    Chills    Fatigue     X 1 day    Diarrhea     X 1 day           
134/82, pulse 82, temperature 97.5 °F (36.4 °C), temperature source Temporal, resp. rate 16, height 1.626 m (5' 4\"), weight 78.6 kg (173 lb 4.8 oz), SpO2 100%.  Physical Exam  The patient is sitting without any acute distress.  There is some ear wax present, but it does not appear to be infected.  The patient is able to talk in full sentences without using any extra respiratory muscles. No audible wheezing is detected. Lungs are clear.  Heart rhythm is regular.  Abdomen is nontender.  Behavior is normal. The patient is awake, alert, and oriented to time, place, and person. No focal neurological deficit is observed.           The patient (or guardian, if applicable) and other individuals in attendance with the patient were advised that Artificial Intelligence will be utilized during this visit to record, process the conversation to generate a clinical note and to support improvement of the AI technology. The patient (or guardian, if applicable) and other individuals in attendance at the appointment consented to the use of AI, including the recording.      An electronic signature was used to authenticate this note.    --Maude Zaldivar MD

## 2025-04-19 ENCOUNTER — HOSPITAL ENCOUNTER (OUTPATIENT)
Facility: HOSPITAL | Age: 70
Discharge: HOME | End: 2025-04-22
Attending: FAMILY MEDICINE
Payer: MEDICARE

## 2025-04-19 VITALS — HEIGHT: 64 IN | WEIGHT: 175 LBS | BODY MASS INDEX: 29.88 KG/M2

## 2025-04-19 DIAGNOSIS — Z12.31 VISIT FOR SCREENING MAMMOGRAM: ICD-10-CM

## 2025-04-19 PROCEDURE — 77063 BREAST TOMOSYNTHESIS BI: CPT

## 2025-04-28 RX ORDER — LISINOPRIL AND HYDROCHLOROTHIAZIDE 20; 25 MG/1; MG/1
1 TABLET ORAL DAILY
Qty: 90 TABLET | Refills: 0 | Status: SHIPPED | OUTPATIENT
Start: 2025-04-28

## 2025-05-01 ENCOUNTER — TELEPHONE (OUTPATIENT)
Facility: CLINIC | Age: 70
End: 2025-05-01

## 2025-05-01 NOTE — TELEPHONE ENCOUNTER
Last OV: 10/22/2024 routine and on (12/30/2024 nurse visit only)  Last labs:01/20/2025  Next OV and labs: left message advising Mrs. Jessica Bonilla to call [facility] to schedule overdue follow up for June with PCP Dr. Comer was due January of 2025.     Spoke with [facility] who states that it is too soon to fill by couple of days.

## 2025-05-01 NOTE — TELEPHONE ENCOUNTER
This patient contacted office for the following prescriptions to be filled:    Medication requested : Lisinopril   PCP: Dr. Comer   Pharmacy or Print:  [facility]   Mail order or Local pharmacy [facility]     Scheduled appointment if not seen by current providers in office:  Lov  03/20/2025,

## 2025-05-05 NOTE — TELEPHONE ENCOUNTER
[facility] has provided Mrs. Bonilla a 30 day supply of medication lisinopril-hydroCHLOROthiazide (PRINZIDE;ZESTORETIC) 20-25 MG.     Letter mailed to address on file.

## 2025-05-06 NOTE — TELEPHONE ENCOUNTER
Last OV: 10/22/2024 PCP (Dr. Comer) routine care and on 12/30/2024 nurse visit only and on 03/20/2025 acute visit with Dr. Zaldivar  Last labs: 01/20/2025  Next OV and labs:  on 05/01/2025 left voicemail advsing Mrs. Jessica Bonilla to call [facility] to schedule her routine care appointment and on 05/06/2025 2nd voicemail jhas been left.

## 2025-05-08 RX ORDER — VENLAFAXINE HYDROCHLORIDE 150 MG/1
150 CAPSULE, EXTENDED RELEASE ORAL DAILY
Qty: 30 CAPSULE | Refills: 0 | Status: SHIPPED | OUTPATIENT
Start: 2025-05-08

## 2025-05-08 NOTE — TELEPHONE ENCOUNTER
I have tried several times to reach Mrs. Jessica Bonilla. Message mobile device and home number [phone] states - This number screen calls and our number is on the list of not excepting calls.

## 2025-05-28 RX ORDER — PRAVASTATIN SODIUM 20 MG
20 TABLET ORAL
Qty: 90 TABLET | Refills: 0 | Status: SHIPPED | OUTPATIENT
Start: 2025-05-28

## 2025-05-28 NOTE — TELEPHONE ENCOUNTER
Overdue for ff-up, I see has PCP appt with another group in June. Is she transferring care? If so, will provide refills until she transitions and pls update chart  If not, then overdue for ff-up, pls sched

## 2025-06-03 RX ORDER — VENLAFAXINE HYDROCHLORIDE 150 MG/1
150 CAPSULE, EXTENDED RELEASE ORAL DAILY
Qty: 30 CAPSULE | Refills: 0 | Status: SHIPPED | OUTPATIENT
Start: 2025-06-03

## (undated) DEVICE — Device

## (undated) DEVICE — AIRLIFE™ NASAL OXYGEN CANNULA CURVED, NONFLARED TIP WITH 14 FOOT (4.3 M) CRUSH-RESISTANT TUBING, OVER-THE-EAR STYLE: Brand: AIRLIFE™

## (undated) DEVICE — FLEX ADVANTAGE 1500CC: Brand: FLEX ADVANTAGE

## (undated) DEVICE — MEDI-VAC NON-CONDUCTIVE SUCTION TUBING: Brand: CARDINAL HEALTH

## (undated) DEVICE — CATH IV SAFE STR 22GX1IN BLU -- PROTECTIV PLUS